# Patient Record
Sex: MALE | Race: WHITE | Employment: OTHER | URBAN - METROPOLITAN AREA
[De-identification: names, ages, dates, MRNs, and addresses within clinical notes are randomized per-mention and may not be internally consistent; named-entity substitution may affect disease eponyms.]

---

## 2017-04-13 PROBLEM — R29.898 WEAKNESS OF BOTH ARMS: Status: ACTIVE | Noted: 2017-04-13
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
## 2017-10-18 PROBLEM — G47.00 INSOMNIA: Status: ACTIVE | Noted: 2017-10-18

## 2017-11-13 PROBLEM — R32 URINARY INCONTINENCE: Status: ACTIVE | Noted: 2017-11-13

## 2017-11-27 ENCOUNTER — HOSPITAL ENCOUNTER (OUTPATIENT)
Dept: MRI IMAGING | Age: 67
Discharge: HOME | End: 2017-11-27
Attending: FAMILY MEDICINE
Payer: MEDICARE

## 2017-11-27 DIAGNOSIS — N39.498 OTHER URINARY INCONTINENCE: ICD-10-CM

## 2017-11-27 PROCEDURE — 70551 MRI BRAIN STEM W/O DYE: CPT

## 2017-12-13 PROBLEM — R26.81 UNSTEADY GAIT: Status: ACTIVE | Noted: 2017-12-13

## 2018-05-13 ENCOUNTER — HOSPITAL ENCOUNTER (EMERGENCY)
Age: 68
Discharge: HOME | End: 2018-05-13
Attending: EMERGENCY MEDICINE
Payer: MEDICARE

## 2018-05-13 VITALS
RESPIRATION RATE: 16 BRPM | OXYGEN SATURATION: 97 % | BODY MASS INDEX: 27.3 KG/M2 | SYSTOLIC BLOOD PRESSURE: 116 MMHG | DIASTOLIC BLOOD PRESSURE: 81 MMHG | HEART RATE: 88 BPM | TEMPERATURE: 97.8 F | HEIGHT: 71 IN | WEIGHT: 195 LBS

## 2018-05-13 DIAGNOSIS — S39.012A BACK STRAIN, INITIAL ENCOUNTER: Primary | ICD-10-CM

## 2018-05-13 PROCEDURE — 99281 EMR DPT VST MAYX REQ PHY/QHP: CPT | Performed by: EMERGENCY MEDICINE

## 2018-05-13 NOTE — DISCHARGE INSTRUCTIONS
Back Strain: Care Instructions  Your Care Instructions    Back strain happens when you overstretch, or pull, a muscle in your back. You may hurt your back in an accident or when you exercise or lift something. Most back pain will get better with rest and time. You can take care of yourself at home to help your back heal.  Follow-up care is a key part of your treatment and safety. Be sure to make and go to all appointments, and call your doctor if you are having problems. It's also a good idea to know your test results and keep a list of the medicines you take. How can you care for yourself at home? · Try to stay as active as you can, but stop or reduce any activity that causes pain. · Put ice or a cold pack on the sore muscle for 10 to 20 minutes at a time to stop swelling. Try this every 1 to 2 hours for 3 days (when you are awake) or until the swelling goes down. Put a thin cloth between the ice pack and your skin. · After 2 or 3 days, apply a heating pad on low or a warm cloth to your back. Some doctors suggest that you go back and forth between hot and cold treatments. · Take pain medicines exactly as directed. ¨ If the doctor gave you a prescription medicine for pain, take it as prescribed. ¨ If you are not taking a prescription pain medicine, ask your doctor if you can take an over-the-counter medicine. · Try sleeping on your side with a pillow between your legs. Or put a pillow under your knees when you lie on your back. These measures can ease pain in your lower back. · Return to your usual level of activity slowly. When should you call for help? Call 911 anytime you think you may need emergency care. For example, call if:  ? · You are unable to move a leg at all. ?Call your doctor now or seek immediate medical care if:  ? · You have new or worse symptoms in your legs, belly, or buttocks. Symptoms may include:  ¨ Numbness or tingling. ¨ Weakness. ¨ Pain.    ? · You lose bladder or bowel control. ? Watch closely for changes in your health, and be sure to contact your doctor if you are not getting better as expected. Where can you learn more? Go to http://keegan-marti.info/. Enter Y140 in the search box to learn more about \"Back Strain: Care Instructions. \"  Current as of: March 21, 2017  Content Version: 11.4  © 7678-6950 [employer]. Care instructions adapted under license by [employer] (which disclaims liability or warranty for this information). If you have questions about a medical condition or this instruction, always ask your healthcare professional. [place] [address] any warranty or liability for your use of this information.

## 2018-05-13 NOTE — ED PROVIDER NOTES
Patient is a 75 y.o. male presenting with fall. The history is provided by the patient. Fall   The accident occurred more than 1 week ago. The fall occurred while standing. He fell from a height of ground level. He landed on hard floor. There was no blood loss. Point of impact: buttocks. Pain location: low and mid back, worse in the morning. The pain is moderate. He was ambulatory at the scene. There was no entrapment after the fall. Associated symptoms include extremity weakness (chronic fr4om remote cva on left). Pertinent negatives include no fever, no numbness (no saddle anesthesia), no abdominal pain, no nausea, no vomiting, no headaches, no loss of consciousness and no tingling. The risk factors include being elderly. The symptoms are aggravated by activity. He has tried nothing for the symptoms.         Past Medical History:   Diagnosis Date    Alcoholic pancreatitis 52/9106    after mother's death, states he slowed down on his drinking after this episode    Aneurysm ([address]     3 small of aorta, 2.4 x 2.3  cm in diameter is largest    Aneurysm artery, celiac ([address] 8/6/2014    Chronic pain     left foot    CVA (cerebral infarction) 1998, 2008    left sided weakness(TIA) stroke causing weakness    Depression     Diabetes ([address] 2010     typell, Avg fasting ; denies s/s hypo does not know last HA1C    Diabetic ulcer of left great toe ([address]     Enlarged aorta ([address] 8/6/2014    arteriomegaly 2.9cm 7/30/14     Gout     History of stroke 1998    Residual left hemiparesis    Hypertension     Mixed hyperlipidemia     Obesity     Skin cancer     neck    Stroke ([address]     Unspecified constipation     Unspecified vitamin D deficiency        Past Surgical History:   Procedure Laterality Date    APPENDECTOMY      over 20 yrs ago    HX ADENOIDECTOMY      childhood    HX CATARACT REMOVAL  OD-2013/OS-2014    with IOL placement    HX COLONOSCOPY  2007, 2010, 2014    with polypectomy    HX ORTHOPAEDIC  12/2012    L great toe fusion    HX ORTHOPAEDIC  last 4/2013    Left great toe surgery to total 3    HX ORTHOPAEDIC  11/2013    amp left great toe    HX TONSILLECTOMY      childhood    HX UROLOGICAL  1980's    spermatocele removed         Family History:   Problem Relation Age of Onset    Cancer Mother     Other Brother      Angiosarcoma, AAA    Cancer Brother     Diabetes Brother     Diabetes Father     Hypertension Father     Other Father        Social History     Social History    Marital status:      Spouse name: N/A    Number of children: N/A    Years of education: N/A     Occupational History    Not on file. Social History Main Topics    Smoking status: Current Some Day Smoker     Years: 13.00     Types: Cigars    Smokeless tobacco: Never Used      Comment: cigar/ 2 per day    Alcohol use 0.0 oz/week      Comment: occassional    Drug use: No    Sexual activity: Not Currently     Other Topics Concern    Not on file     Social History Narrative         ALLERGIES: Penicillin g; Percocet [oxycodone-acetaminophen]; and Sulfa (sulfonamide antibiotics)    Review of Systems   Constitutional: Negative for fever. Gastrointestinal: Negative for abdominal pain, diarrhea, nausea and vomiting. Genitourinary: Negative for dysuria. Chronic ibncontinence     Musculoskeletal: Positive for back pain and extremity weakness (chronic fr4om remote cva on left). Negative for neck pain. Neurological: Negative for tingling, loss of consciousness, weakness, numbness (no saddle anesthesia) and headaches. Vitals:    05/13/18 1156   BP: 116/81   Pulse: 88   Resp: 16   Temp: 97.8 °F (36.6 °C)   SpO2: 97%   Weight: 88.5 kg (195 lb)   Height: 5' 11\" (1.803 m)            Physical Exam   Constitutional: He appears well-developed and well-nourished. HENT:   Mouth/Throat: Oropharynx is clear and moist.   Eyes: EOM are normal. Pupils are equal, round, and reactive to light. Cardiovascular: Normal rate, regular rhythm and normal heart sounds. Pulmonary/Chest: Effort normal and breath sounds normal.   Abdominal: Soft. He exhibits no distension and no mass. There is no tenderness. There is no rebound. Musculoskeletal: Normal range of motion. He exhibits tenderness (bilateral paraspinous tenderness that is mild. No midline thoracic or lumbar tenderness or sacral tenderness is present). Pelvis is stable and nontender. Moves lower extremities and ambulates without difficulty. Neurological: He is alert. No cranial nerve deficit or sensory deficit.   4 out of 5 strength left side compared to right. Chronic from prior CVA. Nursing note and vitals reviewed. MDM  Number of Diagnoses or Management Options  Back strain, initial encounter:   Diagnosis management comments: Clinically no indication for x-ray as there is no midline tenderness or pelvic or sacral tenderness. Muscle strain and pain described. Patient states he hates gets improvement with ice and heat throughout the day and is more stiff and hurts worse in the morning when he first gets up. History of abdominal aortic aneurysm but no pulsatile abdominal mass. Being followed by Dr. Angeli Conway. Exam and history not consistent with abdominal aortic rupture.         ED Course       Procedures

## 2018-05-13 NOTE — ED TRIAGE NOTES
Pt states he fell about a week ago and was helped up by EMS. Pt did not get seen after fall. Pt states he has appt with primary care doctor tomorrow. Pt states he is having pain and wanted to come be seen prior to tomorrow.     Vane Lynn, RN

## 2018-09-06 ENCOUNTER — APPOINTMENT (OUTPATIENT)
Dept: GENERAL RADIOLOGY | Age: 68
End: 2018-09-06
Attending: EMERGENCY MEDICINE
Payer: MEDICARE

## 2018-09-06 ENCOUNTER — HOSPITAL ENCOUNTER (EMERGENCY)
Age: 68
Discharge: HOME | End: 2018-09-06
Attending: EMERGENCY MEDICINE
Payer: MEDICARE

## 2018-09-06 VITALS
SYSTOLIC BLOOD PRESSURE: 131 MMHG | BODY MASS INDEX: 27.72 KG/M2 | WEIGHT: 198 LBS | HEIGHT: 71 IN | HEART RATE: 88 BPM | DIASTOLIC BLOOD PRESSURE: 84 MMHG | RESPIRATION RATE: 18 BRPM | OXYGEN SATURATION: 92 % | TEMPERATURE: 99.6 F

## 2018-09-06 PROCEDURE — 75810000275 HC EMERGENCY DEPT VISIT NO LEVEL OF CARE: Performed by: EMERGENCY MEDICINE

## 2019-04-29 ENCOUNTER — APPOINTMENT (OUTPATIENT)
Dept: CT IMAGING | Age: 69
End: 2019-04-29
Attending: EMERGENCY MEDICINE
Payer: MEDICARE

## 2019-04-29 ENCOUNTER — HOSPITAL ENCOUNTER (EMERGENCY)
Age: 69
Discharge: HOME | End: 2019-04-29
Attending: EMERGENCY MEDICINE
Payer: MEDICARE

## 2019-04-29 VITALS
WEIGHT: 210 LBS | HEIGHT: 71 IN | DIASTOLIC BLOOD PRESSURE: 78 MMHG | BODY MASS INDEX: 29.4 KG/M2 | SYSTOLIC BLOOD PRESSURE: 128 MMHG | TEMPERATURE: 98.1 F | HEART RATE: 88 BPM | RESPIRATION RATE: 16 BRPM | OXYGEN SATURATION: 96 %

## 2019-04-29 DIAGNOSIS — R42 DIZZINESS: Primary | ICD-10-CM

## 2019-04-29 LAB
ANION GAP SERPL CALC-SCNC: 9 MMOL/L
ATRIAL RATE: 81 BPM
BUN SERPL-MCNC: 17 MG/DL (ref 8–23)
CALCIUM SERPL-MCNC: 9.1 MG/DL (ref 8.3–10.4)
CALCULATED P AXIS, ECG09: 45 DEGREES
CALCULATED R AXIS, ECG10: 10 DEGREES
CALCULATED T AXIS, ECG11: 35 DEGREES
CHLORIDE SERPL-SCNC: 104 MMOL/L (ref 98–107)
CO2 SERPL-SCNC: 27 MMOL/L (ref 21–32)
CREAT SERPL-MCNC: 0.82 MG/DL (ref 0.8–1.5)
DIAGNOSIS, 93000: NORMAL
GLUCOSE SERPL-MCNC: 159 MG/DL (ref 65–100)
P-R INTERVAL, ECG05: 168 MS
POTASSIUM SERPL-SCNC: 4.1 MMOL/L (ref 3.5–5.1)
Q-T INTERVAL, ECG07: 382 MS
QRS DURATION, ECG06: 86 MS
QTC CALCULATION (BEZET), ECG08: 443 MS
SODIUM SERPL-SCNC: 140 MMOL/L (ref 136–145)
VENTRICULAR RATE, ECG03: 81 BPM

## 2019-04-29 PROCEDURE — 80048 BASIC METABOLIC PNL TOTAL CA: CPT

## 2019-04-29 PROCEDURE — 93005 ELECTROCARDIOGRAM TRACING: CPT | Performed by: EMERGENCY MEDICINE

## 2019-04-29 PROCEDURE — 99285 EMERGENCY DEPT VISIT HI MDM: CPT | Performed by: EMERGENCY MEDICINE

## 2019-04-29 PROCEDURE — 70450 CT HEAD/BRAIN W/O DYE: CPT

## 2019-04-29 RX ORDER — SODIUM CHLORIDE 0.9 % (FLUSH) 0.9 %
5-40 SYRINGE (ML) INJECTION AS NEEDED
Status: DISCONTINUED | OUTPATIENT
Start: 2019-04-29 | End: 2019-04-29 | Stop reason: HOSPADM

## 2019-04-29 RX ORDER — SODIUM CHLORIDE 0.9 % (FLUSH) 0.9 %
5-40 SYRINGE (ML) INJECTION EVERY 8 HOURS
Status: DISCONTINUED | OUTPATIENT
Start: 2019-04-29 | End: 2019-04-29 | Stop reason: HOSPADM

## 2019-04-29 NOTE — ED NOTES
I have reviewed discharge instructions with the patient. The patient verbalized understanding. Patient left ED via Discharge Method: ambulatory to Home with family. Opportunity for questions and clarification provided. Patient given 0 scripts. To continue your aftercare when you leave the hospital, you may receive an automated call from our care team to check in on how you are doing. This is a free service and part of our promise to provide the best care and service to meet your aftercare needs.  If you have questions, or wish to unsubscribe from this service please call [phone]. Thank you for Choosing our [facility] Emergency Department.

## 2019-04-29 NOTE — ED PROVIDER NOTES
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 Patient apparently went to an urgent care today where he had a CBC and EKG done and then he was sent here for further evaluation. Patient said he has not had a sure why he was sent here. He says that his dizziness is only a problem when he is driving. He said sitting at rest she does not feel as if the world was spinning around him and he has no sensation spinning. Patient said that he is able to walk at his baseline, which is with the use of a cane due to partial amputation of his foot. He said he does not feel unstable when he is up walking around. He said his dizziness manifests when he is trying to drive and he said he feels that he has to drive slowly in order to not be dizzy. He said at other times it doesn't seem to bother him. Denies chest pain and says he has no difficulty breathing or shortness of breath. Elements of this note were created using speech recognition software. As such, errors of speech recognition may be present. Past Medical History:  
Diagnosis Date  Alcoholic pancreatitis 95/1979  
 after mother's death, states he slowed down on his drinking after this episode  Aneurysm ([address] 3 small of aorta, 2.4 x 2.3  cm in diameter is largest  
 Aneurysm artery, celiac ([address] 8/6/2014  Chronic pain   
 left foot  CVA (cerebral infarction) 1998, 2008  
 left sided weakness(TIA) stroke causing weakness  Depression  Diabetes ([address] 2010  
  typell, Avg fasting ; denies s/s hypo does not know last HA1C  
 Diabetic ulcer of left great toe ([address]  Enlarged aorta ([address] 8/6/2014  
 arteriomegaly 2.9cm 7/30/14  Gout  History of stroke 1998 Residual left hemiparesis  Hypertension  Mixed hyperlipidemia  Obesity  Skin cancer   
 neck  Stroke ([address]  Unspecified constipation  Unspecified vitamin D deficiency Past Surgical History:  
Procedure Laterality Date  APPENDECTOMY    
 over 20 yrs ago  HX ADENOIDECTOMY childhood  HX CATARACT REMOVAL  OD-2013/OS-2014  
 with IOL placement  HX COLONOSCOPY  2007, 2010, 2014  
 with polypectomy  HX ORTHOPAEDIC  12/2012 L great toe fusion  HX ORTHOPAEDIC  last 4/2013 Left great toe surgery to total 3  
 HX ORTHOPAEDIC  11/2013  
 amp left great toe  HX TONSILLECTOMY childhood  HX UROLOGICAL  1980's  
 spermatocele removed Family History:  
Problem Relation Age of Onset  Cancer Mother  Other Brother Angiosarcoma, AAA  Cancer Brother  Diabetes Brother  Diabetes Father  Hypertension Father  Other Father Social History Socioeconomic History  Marital status:  Spouse name: Not on file  Number of children: Not on file  Years of education: Not on file  Highest education level: Not on file Occupational History  Not on file Social Needs  Financial resource strain: Not on file  Food insecurity:  
  Worry: Not on file Inability: Not on file  Transportation needs:  
  Medical: Not on file Non-medical: Not on file Tobacco Use  Smoking status: Current Some Day Smoker Years: 13.00 Types: Cigars  Smokeless tobacco: Never Used  Tobacco comment: cigar/ 2 per day Substance and Sexual Activity  Alcohol use: No  
  Alcohol/week: 0.0 oz  
  Comment: occassional  
 Drug use: No  
 Sexual activity: Not Currently Lifestyle  Physical activity:  
  Days per week: Not on file Minutes per session: Not on file  Stress: Not on file Relationships  Social connections:  
  Talks on phone: Not on file Gets together: Not on file Attends [employer] service: Not on file Active member of club or organization: Not on file Attends meetings of clubs or organizations: Not on file Relationship status: Not on file  Intimate partner violence:  
  Fear of current or ex partner: Not on file Emotionally abused: Not on file Physically abused: Not on file Forced sexual activity: Not on file Other Topics Concern  Not on file Social History Narrative  Not on file ALLERGIES: Penicillin g; Percocet [oxycodone-acetaminophen]; and Sulfa (sulfonamide antibiotics) Review of Systems Constitutional: Negative for chills, diaphoresis and fever. HENT: Negative for congestion, rhinorrhea and sore throat. Eyes: Negative for redness and visual disturbance. Respiratory: Negative for cough, chest tightness, shortness of breath and wheezing. Cardiovascular: Negative for chest pain and palpitations. Gastrointestinal: Negative for abdominal pain, blood in stool, diarrhea, nausea and vomiting. Endocrine: Negative for polydipsia and polyuria. Genitourinary: Negative for dysuria and hematuria. Musculoskeletal: Negative for arthralgias, myalgias and neck stiffness. Skin: Negative for rash. Allergic/Immunologic: Negative for environmental allergies and food allergies. Neurological: Positive for dizziness. Negative for weakness and headaches. Hematological: Negative for adenopathy. Does not bruise/bleed easily. Psychiatric/Behavioral: Negative for confusion and sleep disturbance. The patient is not nervous/anxious. Vitals:  
 04/29/19 1433 04/29/19 1525 04/29/19 1555 BP: 129/82 124/66 116/77 Pulse: 73 88 90 Resp: 16 16 16 Temp: 98.8 °F (37.1 °C) SpO2: 94%  95% Weight: 95.3 kg (210 lb) Height: 5' 11\" (1.803 m) Physical Exam  
Constitutional: He is oriented to person, place, and time. He appears well-developed and well-nourished. HENT:  
Head: Normocephalic and atraumatic. Eyes: Pupils are equal, round, and reactive to light. Conjunctivae and EOM are normal.  
Neck: Normal range of motion. Cardiovascular: Normal rate and regular rhythm. Pulmonary/Chest: Effort normal and breath sounds normal. No respiratory distress. He has no wheezes. He has no rales. He exhibits no tenderness. Abdominal: Soft. Bowel sounds are normal. There is no rebound and no guarding. Musculoskeletal: Normal range of motion. He exhibits no edema or tenderness. Lymphadenopathy:  
  He has no cervical adenopathy. Neurological: He is alert and oriented to person, place, and time. Patient is able to walk in the ER with his cane at his baseline. Skin: Skin is warm and dry. Psychiatric: He has a normal mood and affect. Nursing note and vitals reviewed. MDM Number of Diagnoses or Management Options Diagnosis management comments: I am uncertain about the exact nature of this dizziness. It does not sound like an ataxia or coordination problems. He does not have any vertiginous symptoms. Why iy manifests only when driving I am unsure. Blood work is unremarkable. His head CT scan is pending. 5:32 PM 
CT is unremarkable. I will plan to discharge him home and encouraged him to follow up with his primary care doctor. Procedures

## 2019-04-29 NOTE — DISCHARGE INSTRUCTIONS
Return with any fevers, vomiting, difficult breathing, worsening symptoms, or additional concerns. Follow-up with your primary care doctor for reevaluation in one or 2 days.

## 2019-04-29 NOTE — ED TRIAGE NOTES
Pt was seen at an urgent care and advised to come to the ER. Friend with pt states that he is to come to ER and get MRI, EKG and more labs because he is so dizzy he cannot drive. Pt denies pain, states he is dizzy. He is alert and oriented. Pt had labs at [facility]. Dr Sj Feliz said he would review the chart to see what needed to be ordered.

## 2020-05-11 ENCOUNTER — HOSPITAL ENCOUNTER (EMERGENCY)
Age: 70
Discharge: ANOTHER HOSPITAL | DRG: 872 | End: 2020-05-11
Attending: EMERGENCY MEDICINE
Payer: MEDICARE

## 2020-05-11 ENCOUNTER — HOSPITAL ENCOUNTER (INPATIENT)
Age: 70
LOS: 5 days | Discharge: NURSING, REHAB OR LONG-TERM CARE | DRG: 872 | End: 2020-05-16
Attending: INTERNAL MEDICINE | Admitting: FAMILY MEDICINE
Payer: MEDICARE

## 2020-05-11 VITALS
RESPIRATION RATE: 20 BRPM | HEART RATE: 70 BPM | OXYGEN SATURATION: 95 % | HEIGHT: 71 IN | BODY MASS INDEX: 26.04 KG/M2 | SYSTOLIC BLOOD PRESSURE: 139 MMHG | WEIGHT: 186 LBS | DIASTOLIC BLOOD PRESSURE: 79 MMHG | TEMPERATURE: 99.5 F

## 2020-05-11 DIAGNOSIS — N30.00 ACUTE CYSTITIS WITHOUT HEMATURIA: Primary | ICD-10-CM

## 2020-05-11 DIAGNOSIS — D72.829 LEUKOCYTOSIS, UNSPECIFIED TYPE: ICD-10-CM

## 2020-05-11 DIAGNOSIS — R53.1 WEAKNESS: ICD-10-CM

## 2020-05-11 DIAGNOSIS — E11.9 TYPE 2 DIABETES MELLITUS WITHOUT COMPLICATION, WITHOUT LONG-TERM CURRENT USE OF INSULIN (HCC): ICD-10-CM

## 2020-05-11 PROBLEM — A41.9 SEPSIS (HCC): Status: ACTIVE | Noted: 2020-05-11

## 2020-05-11 PROBLEM — N39.0 UTI (URINARY TRACT INFECTION): Status: ACTIVE | Noted: 2020-05-11

## 2020-05-11 LAB
ALBUMIN SERPL-MCNC: 4.3 G/DL (ref 3.2–4.6)
ALBUMIN/GLOB SERPL: 1.2 {RATIO} (ref 1.2–3.5)
ALP SERPL-CCNC: 42 U/L (ref 50–136)
ALT SERPL-CCNC: 28 U/L (ref 12–65)
ANION GAP SERPL CALC-SCNC: 8 MMOL/L (ref 7–16)
APPEARANCE UR: ABNORMAL
AST SERPL-CCNC: 22 U/L (ref 15–37)
ATRIAL RATE: 113 BPM
BACTERIA URNS QL MICRO: ABNORMAL /HPF
BASOPHILS # BLD: 0.1 K/UL (ref 0–0.2)
BASOPHILS NFR BLD: 0 % (ref 0–2)
BILIRUB SERPL-MCNC: 2.2 MG/DL (ref 0.2–1.1)
BILIRUB UR QL: NEGATIVE
BUN SERPL-MCNC: 20 MG/DL (ref 8–23)
CALCIUM SERPL-MCNC: 11.7 MG/DL (ref 8.3–10.4)
CALCULATED P AXIS, ECG09: 36 DEGREES
CALCULATED R AXIS, ECG10: 32 DEGREES
CALCULATED T AXIS, ECG11: 13 DEGREES
CASTS URNS QL MICRO: ABNORMAL /LPF
CHLORIDE SERPL-SCNC: 99 MMOL/L (ref 98–107)
CK SERPL-CCNC: 687 U/L (ref 21–215)
CO2 SERPL-SCNC: 26 MMOL/L (ref 21–32)
COLOR UR: ABNORMAL
CREAT SERPL-MCNC: 1.15 MG/DL (ref 0.8–1.5)
DIAGNOSIS, 93000: NORMAL
DIFFERENTIAL METHOD BLD: ABNORMAL
EOSINOPHIL # BLD: 0.1 K/UL (ref 0–0.8)
EOSINOPHIL NFR BLD: 0 % (ref 0.5–7.8)
EPI CELLS #/AREA URNS HPF: 0 /HPF
ERYTHROCYTE [DISTWIDTH] IN BLOOD BY AUTOMATED COUNT: 11.9 % (ref 11.9–14.6)
EST. AVERAGE GLUCOSE BLD GHB EST-MCNC: 166 MG/DL
GLOBULIN SER CALC-MCNC: 3.6 G/DL (ref 2.3–3.5)
GLUCOSE SERPL-MCNC: 228 MG/DL (ref 65–100)
GLUCOSE UR STRIP.AUTO-MCNC: 250 MG/DL
HBA1C MFR BLD: 7.4 % (ref 4.8–6)
HCT VFR BLD AUTO: 46.1 % (ref 41.1–50.3)
HGB BLD-MCNC: 16.5 G/DL (ref 13.6–17.2)
HGB UR QL STRIP: ABNORMAL
IMM GRANULOCYTES # BLD AUTO: 0.1 K/UL (ref 0–0.5)
IMM GRANULOCYTES NFR BLD AUTO: 1 % (ref 0–5)
KETONES UR QL STRIP.AUTO: ABNORMAL MG/DL
LACTATE SERPL-SCNC: 2.1 MMOL/L (ref 0.4–2)
LACTATE SERPL-SCNC: 4.6 MMOL/L (ref 0.4–2)
LEUKOCYTE ESTERASE UR QL STRIP.AUTO: ABNORMAL
LYMPHOCYTES # BLD: 0.6 K/UL (ref 0.5–4.6)
LYMPHOCYTES NFR BLD: 3 % (ref 13–44)
MCH RBC QN AUTO: 34.2 PG (ref 26.1–32.9)
MCHC RBC AUTO-ENTMCNC: 35.8 G/DL (ref 31.4–35)
MCV RBC AUTO: 95.6 FL (ref 79.6–97.8)
MONOCYTES # BLD: 1.2 K/UL (ref 0.1–1.3)
MONOCYTES NFR BLD: 6 % (ref 4–12)
MYOGLOBIN SERPL-MCNC: 1041 NG/ML
NEUTS SEG # BLD: 18.6 K/UL (ref 1.7–8.2)
NEUTS SEG NFR BLD: 90 % (ref 43–78)
NITRITE UR QL STRIP.AUTO: NEGATIVE
NRBC # BLD: 0 K/UL (ref 0–0.2)
P-R INTERVAL, ECG05: 176 MS
PH UR STRIP: 5 [PH] (ref 5–9)
PLATELET # BLD AUTO: 173 K/UL (ref 150–450)
PMV BLD AUTO: 10.3 FL (ref 9.4–12.3)
POTASSIUM SERPL-SCNC: 4 MMOL/L (ref 3.5–5.1)
PROT SERPL-MCNC: 7.9 G/DL (ref 6.3–8.2)
PROT UR STRIP-MCNC: NEGATIVE MG/DL
Q-T INTERVAL, ECG07: 296 MS
QRS DURATION, ECG06: 88 MS
QTC CALCULATION (BEZET), ECG08: 406 MS
RBC # BLD AUTO: 4.82 M/UL (ref 4.23–5.6)
RBC #/AREA URNS HPF: ABNORMAL /HPF
SODIUM SERPL-SCNC: 133 MMOL/L (ref 136–145)
SP GR UR REFRACTOMETRY: 1.02 (ref 1–1.02)
UROBILINOGEN UR QL STRIP.AUTO: 0.2 EU/DL (ref 0.2–1)
VENTRICULAR RATE, ECG03: 113 BPM
WBC # BLD AUTO: 20.6 K/UL (ref 4.3–11.1)
WBC URNS QL MICRO: ABNORMAL /HPF

## 2020-05-11 PROCEDURE — 93005 ELECTROCARDIOGRAM TRACING: CPT | Performed by: EMERGENCY MEDICINE

## 2020-05-11 PROCEDURE — 74011636637 HC RX REV CODE- 636/637: Performed by: FAMILY MEDICINE

## 2020-05-11 PROCEDURE — 96365 THER/PROPH/DIAG IV INF INIT: CPT

## 2020-05-11 PROCEDURE — 82962 GLUCOSE BLOOD TEST: CPT

## 2020-05-11 PROCEDURE — 65660000000 HC RM CCU STEPDOWN

## 2020-05-11 PROCEDURE — 87088 URINE BACTERIA CULTURE: CPT

## 2020-05-11 PROCEDURE — 85025 COMPLETE CBC W/AUTO DIFF WBC: CPT

## 2020-05-11 PROCEDURE — 83605 ASSAY OF LACTIC ACID: CPT

## 2020-05-11 PROCEDURE — 87086 URINE CULTURE/COLONY COUNT: CPT

## 2020-05-11 PROCEDURE — 87040 BLOOD CULTURE FOR BACTERIA: CPT

## 2020-05-11 PROCEDURE — 81001 URINALYSIS AUTO W/SCOPE: CPT

## 2020-05-11 PROCEDURE — 36415 COLL VENOUS BLD VENIPUNCTURE: CPT

## 2020-05-11 PROCEDURE — 82550 ASSAY OF CK (CPK): CPT

## 2020-05-11 PROCEDURE — 86580 TB INTRADERMAL TEST: CPT | Performed by: FAMILY MEDICINE

## 2020-05-11 PROCEDURE — 74011000302 HC RX REV CODE- 302: Performed by: FAMILY MEDICINE

## 2020-05-11 PROCEDURE — 74011000258 HC RX REV CODE- 258: Performed by: EMERGENCY MEDICINE

## 2020-05-11 PROCEDURE — 87186 SC STD MICRODIL/AGAR DIL: CPT

## 2020-05-11 PROCEDURE — 74011250636 HC RX REV CODE- 250/636: Performed by: EMERGENCY MEDICINE

## 2020-05-11 PROCEDURE — 83874 ASSAY OF MYOGLOBIN: CPT

## 2020-05-11 PROCEDURE — 83036 HEMOGLOBIN GLYCOSYLATED A1C: CPT

## 2020-05-11 PROCEDURE — 99285 EMERGENCY DEPT VISIT HI MDM: CPT

## 2020-05-11 PROCEDURE — 80053 COMPREHEN METABOLIC PANEL: CPT

## 2020-05-11 PROCEDURE — 74011250636 HC RX REV CODE- 250/636: Performed by: FAMILY MEDICINE

## 2020-05-11 RX ORDER — AMOXICILLIN 250 MG
2 CAPSULE ORAL
Status: DISCONTINUED | OUTPATIENT
Start: 2020-05-11 | End: 2020-05-17 | Stop reason: HOSPADM

## 2020-05-11 RX ORDER — OXYBUTYNIN CHLORIDE 10 MG/1
10 TABLET, EXTENDED RELEASE ORAL DAILY
COMMUNITY

## 2020-05-11 RX ORDER — DIPHENHYDRAMINE HCL 25 MG
25 CAPSULE ORAL
Status: DISCONTINUED | OUTPATIENT
Start: 2020-05-11 | End: 2020-05-17 | Stop reason: HOSPADM

## 2020-05-11 RX ORDER — CYCLOBENZAPRINE HCL 10 MG
10 TABLET ORAL
Status: DISCONTINUED | OUTPATIENT
Start: 2020-05-11 | End: 2020-05-12

## 2020-05-11 RX ORDER — INSULIN LISPRO 100 [IU]/ML
INJECTION, SOLUTION INTRAVENOUS; SUBCUTANEOUS
Status: DISCONTINUED | OUTPATIENT
Start: 2020-05-11 | End: 2020-05-17 | Stop reason: HOSPADM

## 2020-05-11 RX ORDER — NALOXONE HYDROCHLORIDE 0.4 MG/ML
0.4 INJECTION, SOLUTION INTRAMUSCULAR; INTRAVENOUS; SUBCUTANEOUS AS NEEDED
Status: DISCONTINUED | OUTPATIENT
Start: 2020-05-11 | End: 2020-05-17 | Stop reason: HOSPADM

## 2020-05-11 RX ORDER — ACETAMINOPHEN 325 MG/1
650 TABLET ORAL
Status: DISCONTINUED | OUTPATIENT
Start: 2020-05-11 | End: 2020-05-17 | Stop reason: HOSPADM

## 2020-05-11 RX ORDER — ONDANSETRON 2 MG/ML
4 INJECTION INTRAMUSCULAR; INTRAVENOUS
Status: DISCONTINUED | OUTPATIENT
Start: 2020-05-11 | End: 2020-05-17 | Stop reason: HOSPADM

## 2020-05-11 RX ORDER — GUAIFENESIN 100 MG/5ML
324 LIQUID (ML) ORAL DAILY
Status: DISCONTINUED | OUTPATIENT
Start: 2020-05-12 | End: 2020-05-17 | Stop reason: HOSPADM

## 2020-05-11 RX ORDER — HEPARIN SODIUM 5000 [USP'U]/ML
5000 INJECTION, SOLUTION INTRAVENOUS; SUBCUTANEOUS EVERY 8 HOURS
Status: DISCONTINUED | OUTPATIENT
Start: 2020-05-11 | End: 2020-05-17 | Stop reason: HOSPADM

## 2020-05-11 RX ADMIN — SODIUM CHLORIDE 1000 ML: 900 INJECTION, SOLUTION INTRAVENOUS at 16:47

## 2020-05-11 RX ADMIN — TUBERCULIN PURIFIED PROTEIN DERIVATIVE 5 UNITS: 5 INJECTION, SOLUTION INTRADERMAL at 22:21

## 2020-05-11 RX ADMIN — CEFTRIAXONE 1 G: 1 INJECTION, POWDER, FOR SOLUTION INTRAMUSCULAR; INTRAVENOUS at 16:46

## 2020-05-11 RX ADMIN — INSULIN LISPRO 4 UNITS: 100 INJECTION, SOLUTION INTRAVENOUS; SUBCUTANEOUS at 22:13

## 2020-05-11 RX ADMIN — HEPARIN SODIUM 5000 UNITS: 5000 INJECTION INTRAVENOUS; SUBCUTANEOUS at 22:14

## 2020-05-11 NOTE — PROGRESS NOTES
Chart reviewed and spoke with ED Physician prior to meeting pt. Pt lives at home alone, has a Hx of strokes with weakness on the one side. Pt has been falling recently and has struggles making it to the restroom. His best friend Valentina Deal" #[phone], calls multiple times daily and goes over every morning to change his pads on his bed. Pt is very inde, up until recently. His PCP Dr. Tyrone Solis had ordered [facility] services through [facility] approx a \"few months back\" but they have stopped coming. She and pt are interested on those services to be resumed, pt would like to return to his home residence upon d/c. . I have faxed info to [facility] for RN/PT/Aide services. Adrian Gifford stated that she was going over to the pa' house now to straighten up for him and she if he was to be d/c home, that she would wait there for him. @0728 Pt has a elevated WBC's and will need to be admitted at the [facility], CM will continue to follow for any other d/c needs. Pt may benefit form a BSC at home, since he has been having struggles making it to the restroom.

## 2020-05-11 NOTE — PROGRESS NOTES
TRANSFER - IN REPORT:    Verbal report received from Sharon(name) on Charles Cheese  being received from ER(unit) for routine progression of care      Report consisted of patients Situation, Background, Assessment and   Recommendations(SBAR). Information from the following report(s) SBAR, ED Summary and Recent Results was reviewed with the receiving nurse. Opportunity for questions and clarification was provided.

## 2020-05-11 NOTE — ED NOTES
TRANSFER - OUT REPORT:    Verbal report given to Patric Buerger, RN of [facility] on Aime Milton  being transferred to 1 [facility] for routine progression of care       Report consisted of patients Situation, Background, Assessment and   Recommendations(SBAR). Information from the following report(s) SBAR was reviewed with the receiving nurse. Lines:   Peripheral IV 05/11/20 Right Antecubital (Active)   Site Assessment Clean, dry, & intact 5/11/2020  3:01 PM   Phlebitis Assessment 0 5/11/2020  3:01 PM   Infiltration Assessment 0 5/11/2020  3:01 PM   Dressing Type Gauze;Tape;Transparent 5/11/2020  3:01 PM   Hub Color/Line Status Pink;Flushed 5/11/2020  3:01 PM   Action Taken Blood drawn 5/11/2020  3:01 PM        Opportunity for questions and clarification was provided.       Patient transported with:   [email or web address]

## 2020-05-11 NOTE — ED NOTES
Patient comes via EMS from home. Per EMS, patient found in \"deplorable conditions\". Patient was found on the floor unable to get back up on his own, unknown how long patient was on the floor for.

## 2020-05-11 NOTE — ED PROVIDER NOTES
Patient has a history of a prior stroke with residual left-sided weakness, type 2 diabetes, hyperlipidemia, hypertension and aortic aneurysm. He lives at home by himself independently. He states he slid out of his bed around 11 AM and fell to the floor. He was too weak to get up. He states this is not an uncommon occurrence. He has a friend who comes by daily who helps him clean and care for himself. She often has had to help him up off the floor. She came over today and was unable to get him up off of the floor thus EMS was called. They state that his house was in disarray and smelled of urine. I spoke with a friend by phone, she states that this is true but it is because he is unable to control his urination. She goes over daily to help him clean and washes his sheets. He has had extensive home health in the past though that has ceased with COVID-19. The patient is not complaining of any pain.            Past Medical History:   Diagnosis Date    Alcoholic pancreatitis 32/8420    after mother's death, states he slowed down on his drinking after this episode    Aneurysm ([address]     3 small of aorta, 2.4 x 2.3  cm in diameter is largest    Aneurysm artery, celiac ([address] 8/6/2014    Chronic pain     left foot    CVA (cerebral infarction) 1998, 2008    left sided weakness(TIA) stroke causing weakness    Depression     Diabetes ([address] 2010     typell, Avg fasting ; denies s/s hypo does not know last HA1C    Diabetic ulcer of left great toe ([address]     Enlarged aorta ([address] 8/6/2014    arteriomegaly 2.9cm 7/30/14     Gout     History of stroke 1998    Residual left hemiparesis    Hypertension     Mixed hyperlipidemia     Obesity     Skin cancer     neck    Stroke ([address]     Unspecified constipation     Unspecified vitamin D deficiency        Past Surgical History:   Procedure Laterality Date    APPENDECTOMY      over 20 yrs ago    HX ADENOIDECTOMY      childhood    HX CATARACT REMOVAL OD-2013/OS-2014    with IOL placement    HX COLONOSCOPY  2007, 2010, 2014    with polypectomy    HX ORTHOPAEDIC  12/2012    L great toe fusion    HX ORTHOPAEDIC  last 4/2013    Left great toe surgery to total 3    HX ORTHOPAEDIC  11/2013    amp left great toe    HX TONSILLECTOMY      childhood    HX UROLOGICAL  1980's    spermatocele removed         Family History:   Problem Relation Age of Onset    Cancer Mother     Other Brother         Angiosarcoma, AAA    Cancer Brother     Diabetes Brother     Diabetes Father     Hypertension Father     Other Father        Social History     Socioeconomic History    Marital status:      Spouse name: Not on file    Number of children: Not on file    Years of education: Not on file    Highest education level: Not on file   Occupational History    Not on file   Social Needs    Financial resource strain: Not on file    Food insecurity     Worry: Not on file     Inability: Not on file    Transportation needs     Medical: Not on file     Non-medical: Not on file   Tobacco Use    Smoking status: Current Some Day Smoker     Years: 13.00     Types: Cigars    Smokeless tobacco: Never Used    Tobacco comment: cigar/ 2 per day   Substance and Sexual Activity    Alcohol use: No     Alcohol/week: 0.0 standard drinks     Comment: occassional    Drug use: No    Sexual activity: Not Currently   Lifestyle    Physical activity     Days per week: Not on file     Minutes per session: Not on file    Stress: Not on file   Relationships    Social connections     Talks on phone: Not on file     Gets together: Not on file     Attends [employer] service: Not on file     Active member of club or organization: Not on file     Attends meetings of clubs or organizations: Not on file     Relationship status: Not on file    Intimate partner violence     Fear of current or ex partner: Not on file     Emotionally abused: Not on file     Physically abused: Not on file Forced sexual activity: Not on file   Other Topics Concern    Not on file   Social History Narrative    Not on file         ALLERGIES: Penicillin g; Percocet [oxycodone-acetaminophen]; and Sulfa (sulfonamide antibiotics)    Review of Systems   Constitutional: Negative for chills and fever. Gastrointestinal: Negative for nausea and vomiting. All other systems reviewed and are negative. Vitals:    05/11/20 1501 05/11/20 1507 05/11/20 1513 05/11/20 1519   BP:   121/65    Pulse: (!) 117  (!) 115 (!) 116   Temp:       SpO2: 94% 94%  96%            Physical Exam  Vitals signs and nursing note reviewed. Constitutional:       General: He is not in acute distress. Appearance: Normal appearance. He is well-developed and normal weight. HENT:      Head: Normocephalic and atraumatic. Eyes:      Conjunctiva/sclera: Conjunctivae normal.      Pupils: Pupils are equal, round, and reactive to light. Neck:      Musculoskeletal: Normal range of motion and neck supple. Cardiovascular:      Rate and Rhythm: Normal rate and regular rhythm. Pulses: Normal pulses. Heart sounds: Normal heart sounds. Pulmonary:      Effort: Pulmonary effort is normal. No respiratory distress. Musculoskeletal: Normal range of motion. Right lower leg: No edema. Left lower leg: No edema. Skin:     General: Skin is warm and dry. Neurological:      Mental Status: He is alert and oriented to person, place, and time. Motor: Weakness present. Comments: Diffuse L sided weakness          MDM  Number of Diagnoses or Management Options  Acute cystitis without hematuria: new and requires workup  Leukocytosis, unspecified type: new and requires workup  Type 2 diabetes mellitus without complication, without long-term current use of insulin ([facility]):   Weakness:   Diagnosis management comments: 2:41 PM Spoke with Sanam Marsh [phone], POA and patient's best friend. Will have social work see patient.   4:34 PM white count is 21.6 with a definite urinary tract infection. Discussed results with patient, need for admission. He is agreeable. The hospitalist has been paged.        Amount and/or Complexity of Data Reviewed  Clinical lab tests: ordered and reviewed  Obtain history from someone other than the patient: yes  Discuss the patient with other providers: yes    Risk of Complications, Morbidity, and/or Mortality  Presenting problems: moderate  Diagnostic procedures: moderate  Management options: moderate    Patient Progress  Patient progress: improved         Procedures

## 2020-05-12 ENCOUNTER — APPOINTMENT (OUTPATIENT)
Dept: ULTRASOUND IMAGING | Age: 70
DRG: 872 | End: 2020-05-12
Attending: FAMILY MEDICINE
Payer: MEDICARE

## 2020-05-12 LAB
ALBUMIN SERPL-MCNC: 3.7 G/DL (ref 3.2–4.6)
ALBUMIN/GLOB SERPL: 1.1 {RATIO} (ref 1.2–3.5)
ALP SERPL-CCNC: 41 U/L (ref 50–136)
ALT SERPL-CCNC: 28 U/L (ref 12–65)
ANION GAP SERPL CALC-SCNC: 6 MMOL/L (ref 7–16)
AST SERPL-CCNC: 51 U/L (ref 15–37)
BASOPHILS # BLD: 0.1 K/UL (ref 0–0.2)
BASOPHILS NFR BLD: 0 % (ref 0–2)
BILIRUB SERPL-MCNC: 2.3 MG/DL (ref 0.2–1.1)
BUN SERPL-MCNC: 20 MG/DL (ref 8–23)
CALCIUM SERPL-MCNC: 10.1 MG/DL (ref 8.3–10.4)
CHLORIDE SERPL-SCNC: 106 MMOL/L (ref 98–107)
CK SERPL-CCNC: 2004 U/L (ref 21–215)
CO2 SERPL-SCNC: 25 MMOL/L (ref 21–32)
CREAT SERPL-MCNC: 0.93 MG/DL (ref 0.8–1.5)
DIFFERENTIAL METHOD BLD: ABNORMAL
EOSINOPHIL # BLD: 0.1 K/UL (ref 0–0.8)
EOSINOPHIL NFR BLD: 0 % (ref 0.5–7.8)
ERYTHROCYTE [DISTWIDTH] IN BLOOD BY AUTOMATED COUNT: 11.9 % (ref 11.9–14.6)
GLOBULIN SER CALC-MCNC: 3.3 G/DL (ref 2.3–3.5)
GLUCOSE BLD STRIP.AUTO-MCNC: 185 MG/DL (ref 65–100)
GLUCOSE BLD STRIP.AUTO-MCNC: 189 MG/DL (ref 65–100)
GLUCOSE BLD STRIP.AUTO-MCNC: 195 MG/DL (ref 65–100)
GLUCOSE BLD STRIP.AUTO-MCNC: 195 MG/DL (ref 65–100)
GLUCOSE BLD STRIP.AUTO-MCNC: 232 MG/DL (ref 65–100)
GLUCOSE SERPL-MCNC: 167 MG/DL (ref 65–100)
HCT VFR BLD AUTO: 40.7 % (ref 41.1–50.3)
HGB BLD-MCNC: 14 G/DL (ref 13.6–17.2)
IMM GRANULOCYTES # BLD AUTO: 0.1 K/UL (ref 0–0.5)
IMM GRANULOCYTES NFR BLD AUTO: 1 % (ref 0–5)
LACTATE SERPL-SCNC: 1.5 MMOL/L (ref 0.4–2)
LYMPHOCYTES # BLD: 0.8 K/UL (ref 0.5–4.6)
LYMPHOCYTES NFR BLD: 5 % (ref 13–44)
MCH RBC QN AUTO: 33.4 PG (ref 26.1–32.9)
MCHC RBC AUTO-ENTMCNC: 34.4 G/DL (ref 31.4–35)
MCV RBC AUTO: 97.1 FL (ref 79.6–97.8)
MM INDURATION POC: 0 MM (ref 0–5)
MONOCYTES # BLD: 1.1 K/UL (ref 0.1–1.3)
MONOCYTES NFR BLD: 7 % (ref 4–12)
NEUTS SEG # BLD: 13.3 K/UL (ref 1.7–8.2)
NEUTS SEG NFR BLD: 87 % (ref 43–78)
NRBC # BLD: 0 K/UL (ref 0–0.2)
PLATELET # BLD AUTO: 126 K/UL (ref 150–450)
PMV BLD AUTO: 10.4 FL (ref 9.4–12.3)
POTASSIUM SERPL-SCNC: 3.6 MMOL/L (ref 3.5–5.1)
PPD POC: NEGATIVE NEGATIVE
PROT SERPL-MCNC: 7 G/DL (ref 6.3–8.2)
RBC # BLD AUTO: 4.19 M/UL (ref 4.23–5.6)
SODIUM SERPL-SCNC: 137 MMOL/L (ref 136–145)
WBC # BLD AUTO: 15.4 K/UL (ref 4.3–11.1)

## 2020-05-12 PROCEDURE — 74011250637 HC RX REV CODE- 250/637: Performed by: INTERNAL MEDICINE

## 2020-05-12 PROCEDURE — 97162 PT EVAL MOD COMPLEX 30 MIN: CPT

## 2020-05-12 PROCEDURE — 74011250637 HC RX REV CODE- 250/637: Performed by: FAMILY MEDICINE

## 2020-05-12 PROCEDURE — 74011636637 HC RX REV CODE- 636/637: Performed by: FAMILY MEDICINE

## 2020-05-12 PROCEDURE — 97166 OT EVAL MOD COMPLEX 45 MIN: CPT

## 2020-05-12 PROCEDURE — 83605 ASSAY OF LACTIC ACID: CPT

## 2020-05-12 PROCEDURE — 85025 COMPLETE CBC W/AUTO DIFF WBC: CPT

## 2020-05-12 PROCEDURE — 80053 COMPREHEN METABOLIC PANEL: CPT

## 2020-05-12 PROCEDURE — 97112 NEUROMUSCULAR REEDUCATION: CPT

## 2020-05-12 PROCEDURE — 74011000258 HC RX REV CODE- 258: Performed by: FAMILY MEDICINE

## 2020-05-12 PROCEDURE — 74011250636 HC RX REV CODE- 250/636: Performed by: INTERNAL MEDICINE

## 2020-05-12 PROCEDURE — 65660000000 HC RM CCU STEPDOWN

## 2020-05-12 PROCEDURE — 97535 SELF CARE MNGMENT TRAINING: CPT

## 2020-05-12 PROCEDURE — 74011250636 HC RX REV CODE- 250/636: Performed by: FAMILY MEDICINE

## 2020-05-12 PROCEDURE — 93978 VASCULAR STUDY: CPT

## 2020-05-12 PROCEDURE — 97530 THERAPEUTIC ACTIVITIES: CPT

## 2020-05-12 PROCEDURE — 82550 ASSAY OF CK (CPK): CPT

## 2020-05-12 PROCEDURE — 36415 COLL VENOUS BLD VENIPUNCTURE: CPT

## 2020-05-12 RX ORDER — ATORVASTATIN CALCIUM 40 MG/1
40 TABLET, FILM COATED ORAL
Status: DISCONTINUED | OUTPATIENT
Start: 2020-05-12 | End: 2020-05-17 | Stop reason: HOSPADM

## 2020-05-12 RX ORDER — ASPIRIN 325 MG
325 TABLET ORAL
Status: DISCONTINUED | OUTPATIENT
Start: 2020-05-12 | End: 2020-05-12 | Stop reason: SDUPTHER

## 2020-05-12 RX ORDER — SODIUM CHLORIDE 9 MG/ML
150 INJECTION, SOLUTION INTRAVENOUS CONTINUOUS
Status: DISCONTINUED | OUTPATIENT
Start: 2020-05-12 | End: 2020-05-17 | Stop reason: HOSPADM

## 2020-05-12 RX ORDER — OXYBUTYNIN CHLORIDE 10 MG/1
10 TABLET, EXTENDED RELEASE ORAL DAILY
Status: DISCONTINUED | OUTPATIENT
Start: 2020-05-12 | End: 2020-05-17 | Stop reason: HOSPADM

## 2020-05-12 RX ORDER — CYCLOBENZAPRINE HCL 10 MG
10 TABLET ORAL
Status: DISCONTINUED | OUTPATIENT
Start: 2020-05-12 | End: 2020-05-17 | Stop reason: HOSPADM

## 2020-05-12 RX ORDER — EZETIMIBE 10 MG/1
10 TABLET ORAL
Status: DISCONTINUED | OUTPATIENT
Start: 2020-05-12 | End: 2020-05-17 | Stop reason: HOSPADM

## 2020-05-12 RX ADMIN — ACETAMINOPHEN 650 MG: 325 TABLET, FILM COATED ORAL at 00:00

## 2020-05-12 RX ADMIN — HEPARIN SODIUM 5000 UNITS: 5000 INJECTION INTRAVENOUS; SUBCUTANEOUS at 21:28

## 2020-05-12 RX ADMIN — ASPIRIN 81 MG 324 MG: 81 TABLET ORAL at 08:30

## 2020-05-12 RX ADMIN — SODIUM CHLORIDE 125 ML/HR: 9 INJECTION, SOLUTION INTRAVENOUS at 21:28

## 2020-05-12 RX ADMIN — ATORVASTATIN CALCIUM 40 MG: 40 TABLET, FILM COATED ORAL at 21:29

## 2020-05-12 RX ADMIN — INSULIN LISPRO 2 UNITS: 100 INJECTION, SOLUTION INTRAVENOUS; SUBCUTANEOUS at 08:29

## 2020-05-12 RX ADMIN — INSULIN LISPRO 2 UNITS: 100 INJECTION, SOLUTION INTRAVENOUS; SUBCUTANEOUS at 16:19

## 2020-05-12 RX ADMIN — CEFTRIAXONE 1 G: 1 INJECTION, POWDER, FOR SOLUTION INTRAMUSCULAR; INTRAVENOUS at 15:36

## 2020-05-12 RX ADMIN — HEPARIN SODIUM 5000 UNITS: 5000 INJECTION INTRAVENOUS; SUBCUTANEOUS at 12:08

## 2020-05-12 RX ADMIN — INSULIN LISPRO 2 UNITS: 100 INJECTION, SOLUTION INTRAVENOUS; SUBCUTANEOUS at 22:00

## 2020-05-12 RX ADMIN — OXYBUTYNIN CHLORIDE 10 MG: 10 TABLET, EXTENDED RELEASE ORAL at 09:20

## 2020-05-12 RX ADMIN — SODIUM CHLORIDE 100 ML/HR: 9 INJECTION, SOLUTION INTRAVENOUS at 08:30

## 2020-05-12 RX ADMIN — INSULIN LISPRO 2 UNITS: 100 INJECTION, SOLUTION INTRAVENOUS; SUBCUTANEOUS at 12:08

## 2020-05-12 RX ADMIN — HEPARIN SODIUM 5000 UNITS: 5000 INJECTION INTRAVENOUS; SUBCUTANEOUS at 05:44

## 2020-05-12 RX ADMIN — EZETIMIBE 10 MG: 10 TABLET ORAL at 21:29

## 2020-05-12 NOTE — PROGRESS NOTES
Hospitalist Progress Note    2020  Admit Date: 2020  7:38 PM   NAME: Shruthi Odell   :  1950   MRN:  [id number]   Attending: Beryl Flores MD  PCP:  Saba Colin MD    HPI/SUBJECTIVE:   78 y.o. male who presented to the ED for cc weakness to which he slid off his bed without noted trauma and strong odor to urine. 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 Cristina Perez, and CVA with residual right sided weakness. Lives by himself. Admitted for sepsis due to UTI.    : Pt seen, feels better, still weak, AAO X 3. Denies any pain. Nursing notes and chart reviewed. Review of Systems negative with exception of pertinent positives noted above. PHYSICAL EXAM     Visit Vitals  BP 90/52 (BP 1 Location: Right arm, BP Patient Position: At rest;Head of bed elevated (Comment degrees))   Pulse 97   Temp 97.9 °F (36.6 °C)   Resp 20   SpO2 92%      Temp (24hrs), Av.3 °F (37.4 °C), Min:97.9 °F (36.6 °C), Max:100.9 °F (38.3 °C)    Oxygen Therapy  O2 Sat (%): 92 % (20 0254)    Intake/Output Summary (Last 24 hours) at 2020 0756  Last data filed at 2020 0302  Gross per 24 hour   Intake    Output 500 ml   Net -500 ml         General: No acute distress. Alert.    Head:  AT/NC  Lungs:  CTABL. Heart:  RRR, no murmur, rub, or gallop  Abdomen: Soft, non-distended, non-tender, +bs  Extremities: No cyanosis or clubbing. Neurologic:  No focal deficits. Moves all extremities. Skin:  No Obvious Rash  Psych:  Normal affect. LABS AND STUDIES:  Personally reviewed all labs, meds, and studies for past 24hrs.       ASSESSMENT      Active Hospital Problems    Diagnosis Date Noted    Sepsis ([facility] 2020    UTI (urinary tract infection) 2020    Unsteady gait 2017    Urinary incontinence 2017    Diabetes mellitus with complication ([facility]     Mixed hyperlipidemia     Aneurysm artery, celiac ([facility] 08/06/2014    CVA, old, hemiparesis ([facility] 05/24/2012       PLAN:    · Sepsis secondary to UTI- Ceftriaxone. Follow Cx.       · Lactic acidosis - Secondary to sepsis. Resolved.     · DM type II - SS. Check A1C.      · CVA with chronic right sided weakness= poor hygiene noted and very weak on exam. PPD. Consult PT/OT. Daily high dose ASA. COVID test ordered.      · Aortic aneurysm - Repeat US since has been over two years since last US performed. Keep systolic BP <485     · Elevated total bilirubin - Likely due to current sepsis. Trend. Hold statin. · Fall precautions    Dispo: Pending improvement, likely in 2-3 days, SW consulted. DVT ppx:  hsq  Discussed plan with pt who is in agreement. All questions answered.     Signed By: Margaretta Gowers, MD     May 12, 2020

## 2020-05-12 NOTE — PROGRESS NOTES
Pt resting quietly in bed, VSS. US aorta completed, COVID swab obtained. Pt's family called multiple times this shift for updates. Report to be given to oncoming RN at bedside.

## 2020-05-12 NOTE — PROGRESS NOTES
Problem: Mobility Impaired (Adult and Pediatric)  Goal: *Acute Goals and Plan of Care  Description: STG:  (1.)Maciej Myers will move from supine to sit and sit to supine , scoot up and down and roll side to side with MODERATE ASSIST within 3 treatment day(s). (2.)Maciej Isbell will transfer from bed to chair and chair to bed with MAXIMAL ASSIST using the least restrictive device within 3 treatment day(s). (3.)Maciej Isbell will perform standing static and dynamic balance activities x 5 minutes with MINIMAL ASSIST to improve safety within 3 treatment day(s). (4.)Maciej Isbell will perform bilateral lower extremity exercises x 20 min for HEP with SUPERVISION to improve strength, endurance, and functional mobility within 3 treatment day(s). LTG:  (1.)Maciej Isbell  will move from supine to sit and sit to supine , scoot up and down and roll side to side with CONTACT GUARD ASSIST within 7 treatment day(s). (2.)Maciej Isbell will transfer from bed to chair and chair to bed with MINIMAL ASSIST using the least restrictive device within 7 treatment day(s). (3.)Maciej Isbell will ambulate with MINIMAL ASSIST for 50 feet with the least restrictive device within 7 treatment day(s). (4.)Maciej Isbell will perform standing static and dynamic balance activities x 15 minutes with CONTACT GUARD ASSIST to improve safety within 7 treatment day(s).     PHYSICAL THERAPY: Initial Assessment, Daily Note, and AM 5/12/2020  INPATIENT: PT Visit Days : 1  [place] / [place] / Product Type: Medicare /       NAME/AGE/GENDER: Micaela Omer is a 78 y.o. male   PRIMARY DIAGNOSIS: Sepsis ([facility] [A41.9]  UTI (urinary tract infection) [N39.0] Sepsis ([facility] Sepsis ([facility]        ICD-10: Treatment Diagnosis   Generalized Muscle Weakness (M62.81)  Other lack of cordination (R27.8)  Difficulty in walking, Not elsewhere classified (R26.2)  Other abnormalities of gait and mobility (R26.89)  History of falling (Z91.81)   Precaution/Allergies:  Penicillin g; Percocet [oxycodone-acetaminophen]; and Sulfa (sulfonamide antibiotics)      ASSESSMENT:     Mr. Darnella Phalen is a 78year old M who presents to hospital after falling out of bed. Admitted with UTI and sepsis. PMH includes CVA with residual L sided weakness. Prior to hospital admission pt lives alone, with a friend checking on him daily, in a one story home with no step(s) to enter. Pt endorses just this one fall in past 6 months. Prior to admission Mr. Darnella Phalen uses a rollator walker for mobility. Upon entering, pt resting in bed, agreeable to PT evaluation. Slouched posture with lateral lean towards L side noted, with pt having difficulty correcting. he reports no pain at rest. BLE assessment indicates sensation to light touch intact, AROM WFL, and strength diminished. Pt performed supine > sit with Mod Ax2 and additional time and cues for sequencing, sitting EOB with initially poor sitting balance control. Pt fluctuating between fair and poor throughout entire session requiring facilitation to prevent posterolateral lean towards L side. Pt with decreased awareness of leaning and difficulty correcting without assist throughout session. Pt performed functional tasks sitting EOB with cues for upright posture and trunk control. Sit > stand with Max Ax2 using RW. Pt with poor standing balance, heavy lean to L requiring constant support to maintain safe standing as brief changed. Unable to progress BLE or take steps while in standing. Returned to sit Mod Ax2. Scooting towards head of bed Max A x2 with facilitation of forward trunk lean and coordination of BUE and BLE. Sit > supine Mod Ax2. Rolling side to side Mod A x2 for brief and pad positioning. At end of session pt resting in bed with all needs within reach, alarm activated for safety, RN notified.  Pt presents as functioning below his baseline, with deficits in mobility including transfers, gait, balance, and activity tolerance. Pt will benefit from skilled therapy services to address stated deficits to promote return to highest level of function, independence, and safety. Will continue to follow. At this time, patient is appropriate for Co-treatment with occupational therapy due to patient's decreased overall endurance/tolerance levels, as well as need for high level skilled assistance to complete functional transfers/mobility and functional tasks. Skippy Marker is appropriate for a multidisciplinary co-treatment of PT and OT to address goals of both disciplines. This section established at most recent assessment   PROBLEM LIST (Impairments causing functional limitations):  Decreased Strength  Decreased ADL/Functional Activities  Decreased Transfer Abilities  Decreased Ambulation Ability/Technique  Decreased Balance  Decreased Activity Tolerance   INTERVENTIONS PLANNED: (Benefits and precautions of physical therapy have been discussed with the patient.)  Balance Exercise  Bed Mobility  Family Education  Gait Training  Home Exercise Program (HEP)  Neuromuscular Re-education/Strengthening  Range of Motion (ROM)  Therapeutic Activites  Therapeutic Exercise/Strengthening  Transfer Training     TREATMENT PLAN: Frequency/Duration: 3 times a week for duration of hospital stay  Rehabilitation Potential For Stated Goals: Good     REHAB RECOMMENDATIONS (at time of discharge pending progress):    Placement: It is my opinion, based on this patient's performance to date, that Mr. Avelina Lindquist may benefit from intensive therapy at a [address] [place] [address] after discharge due to the functional deficits listed above that are likely to improve with skilled rehabilitation and concerns that he/she may be unsafe to be unsupervised at home due to medical complications and mobility deficits which put him at increase risk of functional decline and/or falling.   Equipment:   Hygiene Aides, Type: Commode (Bedside)              HISTORY:   History of Present Injury/Illness (Reason for Referral):  Per H&P:\"Patient is a 78 y.o. male who presented to the ED for cc weakness to which he slid off his bed without noted trauma and strong odor to urine. Patient is a poor historian so hard to get detailed history. 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 Andressa Chavez, and CVA with residual right sided weakness. Lives by himself. \"  Past Medical History/Comorbidities:   Mr. Erinn Harrison  has a past medical history of Alcoholic pancreatitis (63/4803), Aneurysm [facility]), Aneurysm artery, celiac ([facility] (8/6/2014), Chronic pain, CVA (cerebral infarction) (1998, 2008), Depression, Diabetes ([address] (2010), Diabetic ulcer of left great toe ([address] Enlarged aorta ([address] (8/6/2014), Gout, History of stroke (1998), Hypertension, Mixed hyperlipidemia, Obesity, Skin cancer, Stroke ([address] Unspecified constipation, and Unspecified vitamin D deficiency. He also has no past medical history of Chronic kidney disease. Mr. Erinn Harrison  has a past surgical history that includes hx tonsillectomy; hx adenoidectomy; pr appendectomy; hx orthopaedic (12/2012); hx orthopaedic (last 4/2013); hx orthopaedic (11/2013); hx cataract removal (OD-2013/OS-2014); hx urological (1980's); and hx colonoscopy (2007, 2010, 2014). Social History/Living Environment:   Home Environment: Private residence  # Steps to Enter: 0  One/Two Story Residence: One story  Living Alone: Yes  Support Systems: Friends \ neighbors  Patient Expects to be Discharged to[de-identified] Unknown  Current DME Used/Available at Home: Shower chair, Walker, rollator  Tub or Shower Type: Shower  Prior Level of Function/Work/Activity:  PMH includes CVA with residual L sided weakness. Prior to hospital admission pt lives alone, with a friend checking on him daily, in a one story home with no step(s) to enter. Pt endorses just this one fall in past 6 months.  Prior to admission Mr. Julián Maradiaga uses a rollator walker for mobility. Number of Personal Factors/Comorbidities that affect the Plan of Care: 1-2: MODERATE COMPLEXITY   EXAMINATION:   Most Recent Physical Functioning:   Gross Assessment:  AROM: Within functional limits  Strength: Generally decreased, functional  Coordination: Generally decreased, functional               Posture:  Posture (WDL): Exceptions to WDL  Posture Assessment: Forward head, Rounded shoulders, Trunk flexion  Balance:  Sitting: Impaired  Sitting - Static: Fair (occasional)  Sitting - Dynamic: Poor (constant support)  Standing: Impaired  Standing - Static: Poor  Standing - Dynamic : Poor Bed Mobility:  Rolling: Moderate assistance;Assist x2  Supine to Sit: Moderate assistance;Assist x2  Sit to Supine: Moderate assistance;Assist x2  Scooting: Maximum assistance;Assist x2  Interventions: Safety awareness training; Tactile cues; Verbal cues  Wheelchair Mobility:     Transfers:  Sit to Stand: Maximum assistance;Assist x2  Stand to Sit: Maximum assistance;Assist x2  Gait:            Body Structures Involved:  Nerves  Bones  Joints  Muscles Body Functions Affected:  Genitourinary  Neuromusculoskeletal  Movement Related Activities and Participation Affected:  General Tasks and Demands  Mobility  Self Care  Domestic Life  Interpersonal Interactions and Relationships   Number of elements that affect the Plan of Care: 4+: HIGH COMPLEXITY   CLINICAL PRESENTATION:   Presentation: Evolving clinical presentation with changing clinical characteristics: MODERATE COMPLEXITY   CLINICAL DECISION MAKIN [facility] Inpatient Short Form  How much difficulty does the patient currently have. .. Unable A Lot A Little None   1. Turning over in bed (including adjusting bedclothes, sheets and blankets)? [] 1   [x] 2   [] 3   [] 4   2.   Sitting down on and standing up from a chair with arms ( e.g., wheelchair, bedside commode, etc.)   [] 1   [x] 2   [] 3   [] 4   3. Moving from lying on back to sitting on the side of the bed? [] 1   [x] 2   [] 3   [] 4   How much help from another person does the patient currently need. .. Total A Lot A Little None   4. Moving to and from a bed to a chair (including a wheelchair)? [] 1   [x] 2   [] 3   [] 4   5. Need to walk in hospital room? [x] 1   [] 2   [] 3   [] 4   6. Climbing 3-5 steps with a railing? [x] 1   [] 2   [] 3   [] 4   © 2007, Trustees of [address] [place] [address] [address] [id number], under license to [employer]. All rights reserved      Score:  Initial: 10 Most Recent: X (Date: -- )    Interpretation of Tool:  Represents activities that are increasingly more difficult (i.e. Bed mobility, Transfers, Gait). Medical Necessity:     Patient is expected to demonstrate progress in   strength, range of motion, balance, coordination, and functional technique   to   improve safety during all mobility. .  Reason for Services/Other Comments:  Patient continues to require skilled intervention due to   medical complications and mobility deficits which imipact his level of function, safety, and independence as indicated above. .   Use of outcome tool(s) and clinical judgement create a POC that gives a: Questionable prediction of patient's progress: MODERATE COMPLEXITY        TREATMENT:   (In addition to Assessment/Re-Assessment sessions the following treatments were rendered)   Pre-treatment Symptoms/Complaints:  None  Pain: Initial:   Pain Intensity 1: 0  Post Session:  0/10     Today's treatment session addressed Decreased Strength, Decreased ADL/Functional Activities, Decreased Transfer Abilities, Decreased Balance, and Decreased Activity Tolerance to progress towards achieving stated therapy goals. During this session, Occupational Therapy addressed ADLs to progress towards their discipline specific goal(s).   Co-treatment was necessary to improve patient's cognitive participation, ability to follow higher level commands, ability to increase activity demands, and ability to return to normal functional activity. Therapeutic Activity: (    12 Minutes): Therapeutic activities including bed mobility (sit <> supine, scooting, rolling) and sit <> stand transfer training to improve mobility, strength, balance, and coordination. Required maximal   to promote static and dynamic balance in standing and promote coordination of bilateral, upper extremity(s), lower extremity(s). Neuromuscular Re-education: ( 28 Minutes): Facilitation of sitting and standing balance control supported with BUE to improve balance, coordination, and posture. Required maximal visual, verbal, manual, and tactile cues to promote static and dynamic balance in sitting and trunk control and midline orientation. Braces/Orthotics/Lines/Etc:   bowen catheter  O2 Device: Room Air   Treatment/Session Assessment:    Response to Treatment:  see above  Interdisciplinary Collaboration:   Physical Therapist  Occupational Therapist  Registered Nurse  After treatment position/precautions:   Supine in bed  Bed alarm/tab alert on  Bed/Chair-wheels locked  Bed in low position  Call light within reach  RN notified   Compliance with Program/Exercises: Will assess as treatment progresses  Recommendations/Intent for next treatment session: \"Next visit will focus on advancements to more challenging activities and reduction in assistance provided\".     Total Treatment Duration:  PT Patient Time In/Time Out  Time In: 0922  Time Out: [address], PT

## 2020-05-12 NOTE — PROGRESS NOTES
Patient arrived room 829 via stretcher. Patient is stable. Dual skin assessment completed with Michelle Amador. No skin break down noted. Redness noted on patient buttocks. Amputated left big toe. No distress at this time. Bed is low, locked and call light within reach.

## 2020-05-12 NOTE — PROGRESS NOTES
MSN, CM:  Spoke with patient this AM about discharge planning. Patient lives alone in own home with 3 steps for entrance. Patient has no family here but has a friend that helps him out daily. Patient is independent with all ADL's but requires a rollator for ambulation. Patient will need a BSC upon discharge r/t incontinent episodes at night. Will use [employer] for this equipment. Patient has right sided weakness r/t CVA x2 (1998 and 2008). Patient has received [facility] services in 2012 and recently had [facility] which was just completed. PT and OT ordered for consult and recommendations. Case Management will continue to monitor for discharge needs. Care Management Interventions  PCP Verified by CM: Yes(Dr. Rupa Cohen)  Mode of Transport at Discharge:  Other (see comment)(Friend to transport)  Transition of Care Consult (CM Consult): Discharge Planning  Physical Therapy Consult: Yes  Occupational Therapy Consult: Yes  Current Support Network: Own Home, Lives Alone  Confirm Follow Up Transport: Self  Freedom of Choice List was Provided with Basic Dialogue that Supports the Patient's Individualized Plan of Care/Goals, Treatment Preferences and Shares the Quality Data Associated with the Providers?: Yes  Discharge Location  Discharge Placement: Unable to determine at this time

## 2020-05-12 NOTE — PROGRESS NOTES
Problem: Self Care Deficits Care Plan (Adult)  Goal: *Acute Goals and Plan of Care (Insert Text)  Description:   1. Patient will complete lower body bathing and dressing with CGA and good static and fair dynamic seated balance. 2. Patient will complete toilet hygiene with Min A and fair static standing balance. 3. Patient will complete seated ADL task x 5 minutes without support from therapy for balance. 4. Patient will complete bed mobility with CGA and one cue for placement for UE to assist.  5. Patient will complete functional transfers with Min A and adaptive equipment as needed. 6. Patient will complete transfer from bed to chair to prepare for seated ADL task with Min A and adaptive equipment as needed. Timeframe: 7 visits      Outcome: Progressing Towards Goal     OCCUPATIONAL THERAPY: Initial Assessment, Daily Note, and AM 5/12/2020  INPATIENT: OT Visit Days: 1  [place] / [place] / Product Type: Medicare /      NAME/AGE/GENDER: Aime Milton is a 78 y.o. male   PRIMARY DIAGNOSIS:  Sepsis ([facility] [A41.9]  UTI (urinary tract infection) [N39.0] Sepsis ([facility] Sepsis ([facility]    ICD-10: Treatment Diagnosis:    · Generalized Muscle Weakness (M62.81)  · Other lack of cordination (R27.8)  · Difficulty in walking, Not elsewhere classified (R26.2)  · Other abnormalities of gait and mobility (R26.89)  · History of falling (Z91.81)   Precautions/Allergies:    Fall Risk Penicillin g; Percocet [oxycodone-acetaminophen]; and Sulfa (sulfonamide antibiotics)      ASSESSMENT:     Mr. Nilay Ryan is a 78 y.o. male admitted for Sepsis and s/p recent fall from chair in which pt was found on floor by friend. At baseline, patient lives alone in a one story home with 0 JOSUE.  Pt endorses independence for performance of ADLs but per evaluation pt seems as though he is receiving more assist. Pt reports dependence on friend \"Michelle\" for IADLs and reports that she comes to assist him with cooking and cleaning one time per week. Per chart review, pt with prior fall history and difficulty getting back up. Pt also with prior CVA (per pt approximately 10 years ago) that has resulted in residual L sided weakness. Sidney Figueroa found supine in bed with lean to L side and agreeable to OT/PT evaluation. Reports no pain prior to or following functional mobility. Patient able to complete bed mobility with Mod A x 2. Seated edged of bed pt with fair (-) static and poor dynamic seated balance. LUE generally decreased yet functional for ROM, strength, and coordination. RUE WFL for ROM, strength, and coordination. Pt reports R hand dominance. OT worked on self care while PT worked on functional transfers and balance. Pt required Mod to Max manual support for balance to perform self-care tasks. If pt was able to hold self upright and remain oriented to midline he would be more independent for the completion of self-care tasks. In addition to manual support for balance, pt required Min A for upper body bathing, Mod A for upper body dressing, Mod A for lower body bathing (feet and perineal), and CGA to don socks. Pt required Max A x 2 for sit to stand with use of RW. Upon standing, pt presented with significant lean to L side and unable to follow verbal, visual, and tactile cues to lean to R side to improve posture and balance. In standing, pt required Total A for toilet hygiene, including doffing/donning diaper brief, wiping, and gown management. Pt required Max A x 2 for stand to sit and Mod A x 2 to return from sit to supine. Pt left supine in bed and repositioned for comfort. All needs met and within reach. RN notified. Pt greatest deficit is his balance as he is unable to hold himself upright without support to complete seated or standing ADL tasks.      At this time, patient is appropriate for Co-treatment with physical therapy due to patient's decreased overall endurance/tolerance levels, as well as need for high level skilled assistance and cueing to complete functional transfers/mobility and functional tasks. Pt is appropriate for a multidisciplinary co-treatment of PT and OT to address goals of both disciplines. Chrissie Almeida is currently functioning below baseline for ADLs and functional mobility and would benefit from acute OT to increase independence and safety with ADLs and functional mobility. Will follow for duration of hospital stay to address the above goals. Patient was educated on role and plan of care of occupational therapy. This section established at most recent assessment   PROBLEM LIST (Impairments causing functional limitations):  1. Decreased Strength  2. Decreased ADL/Functional Activities  3. Decreased Transfer Abilities  4. Decreased Ambulation Ability/Technique  5. Decreased Balance  6. Decreased Activity Tolerance  7. Increased Fatigue  8. Decreased Flexibility/Joint Mobility  9. Decreased Cognition   INTERVENTIONS PLANNED: (Benefits and precautions of occupational therapy have been discussed with the patient.)  1. Activities of daily living training  2. Adaptive equipment training  3. Balance training  4. Clothing management  5. Neuromuscular re-eduation  6. Re-evaluation  7. Therapeutic activity  8. Therapeutic exercise     TREATMENT PLAN: Frequency/Duration: Follow patient 3x/week to address above goals. Rehabilitation Potential For Stated Goals: [facility] (at time of discharge pending progress):    Placement:   It is my opinion, based on this patient's performance to date, that Mr. Avelina Lindquist may benefit from intensive therapy at a [address] [place] [address] after discharge due to the functional deficits listed above that are likely to improve with skilled rehabilitation and concerns that he/she may be unsafe to be unsupervised at home due to decreased independence, safety, balance, strength, ROM, and activity tolerance for performace of ADLs and functional mobility. .  Equipment:    TBD              OCCUPATIONAL PROFILE AND HISTORY:   History of Present Injury/Illness (Reason for Referral):   See H&P  Past Medical History/Comorbidities:   Mr. 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 He also has no past medical history of Chronic kidney disease. Mr. Jose Felix  has a past surgical history that includes hx tonsillectomy; hx adenoidectomy; pr appendectomy; hx orthopaedic (12/2012); hx orthopaedic (last 4/2013); hx orthopaedic (11/2013); hx cataract removal (OD-2013/OS-2014); hx urological (1980's); and hx colonoscopy (2007, 2010, 2014). Social History/Living Environment:   Home Environment: Private residence  # Steps to Enter: 0  One/Two Story Residence: One story  Living Alone: Yes  Support Systems: Friends \ neighbors  Patient Expects to be Discharged to[de-identified] Private residence  Current DME Used/Available at Home: Shower chair, Walker, rollator  Tub or Shower Type: Shower  Prior Level of Function/Work/Activity:  At baseline, patient lives alone in a one story home with 0 JOSUE. Pt endorses independence for performance of ADLs but per evaluation it seems as though he requires greater assist. Pt reports dependence on friend \"Michelle\" for IADLs and reports that she comes to assist him with cooking and cleaning one time per week. Per chart review, pt with prior fall history and difficulty getting back up. Pt also with prior CVA (per pt approximately 10 years ago) that has resulted in residual L sided weakness.      Personal Factors:          Sex:  male        Age:  78 y.o.   Number of Personal Factors/Comorbidities that affect the Plan of Care: Extensive review of physical, cognitive, and psychosocial performance (3+):  HIGH COMPLEXITY   ASSESSMENT OF OCCUPATIONAL PERFORMANCE[de-identified]   Activities of Daily Living:   Basic ADLs (From Assessment) Complex ADLs (From Assessment)   Feeding: Setup, Minimum assistance  Oral Facial Hygiene/Grooming: Moderate assistance  Bathing: Moderate assistance  Upper Body Dressing: Moderate assistance  Lower Body Dressing: Maximum assistance  Toileting: Total assistance Instrumental ADL  Meal Preparation: Total assistance  Homemaking: Total assistance   Grooming/Bathing/Dressing Activities of Daily Living   Grooming  Grooming Assistance: Set-up  Position Performed: Seated edge of bed  Washing Face: Set-up(Mod to Max A for balance)     Upper Body Bathing  Bathing Assistance: Moderate assistance  Position Performed: Seated edge of bed(Mod A for balance)     Lower Body Bathing  Bathing Assistance: Moderate assistance  Perineal  : Total assistance (dependent)  Position Performed: (Seated edge of bed) Toileting  Toileting Assistance: Total assistance(dependent)  Bowel Hygiene: Total assistance (dependent)  Clothing Management: Total assistance (dependent)   Upper Body Dressing Assistance  Dressing Assistance: Moderate assistance  Hospital Gown: Moderate assistance  Pullover Shirt: Minimum assistance     Lower Body Dressing Assistance  Socks: Moderate assistance(For balance) Bed/Mat Mobility  Rolling: Moderate assistance;Assist x2  Supine to Sit: Moderate assistance;Assist x2  Sit to Supine:  Moderate assistance;Assist x2  Sit to Stand: Maximum assistance;Assist x2  Stand to Sit: Maximum assistance;Assist x2  Scooting: Maximum assistance     Most Recent Physical Functioning:   Gross Assessment:  AROM: Generally decreased, functional(L UE)  Strength: Generally decreased, functional(L UE)  Coordination: Generally decreased, functional(L UE)               Posture: Balance:  Sitting: Impaired  Sitting - Static: Fair (occasional)(-)  Sitting - Dynamic: Poor (constant support)  Standing: Impaired  Standing - Static: Poor  Standing - Dynamic : Poor Bed Mobility:  Rolling: Moderate assistance;Assist x2  Supine to Sit: Moderate assistance;Assist x2  Sit to Supine: Moderate assistance;Assist x2  Scooting: Maximum assistance  Wheelchair Mobility:     Transfers:  Sit to Stand: Maximum assistance;Assist x2  Stand to Sit: Maximum assistance;Assist x2            Patient Vitals for the past 6 hrs:   BP BP Patient Position SpO2 Pulse   20 0719 112/70 At rest;Head of bed elevated (Comment degrees) 98 % 65       Mental Status  Neurologic State: Alert, Confused  Orientation Level: Oriented X4                          Physical Skills Involved:  1. Range of Motion  2. Balance  3. Strength  4. Activity Tolerance  5. Fine Motor Control  6. Gross Motor Control Cognitive Skills Affected (resulting in the inability to perform in a timely and safe manner):  1. Perception  2. Executive Function  3. Sustained Attention  4. Divided Attention Psychosocial Skills Affected:  1. Habits/Routines  2. Environmental Adaptation   Number of elements that affect the Plan of Care: 5+:  HIGH COMPLEXITY   CLINICAL DECISION MAKIN [facility] [address] [address] AM-PAC 6 Clicks   Daily Activity Inpatient Short Form  How much help from another person does the patient currently need. .. Total A Lot A Little None   1. Putting on and taking off regular lower body clothing? [] 1   [x] 2   [] 3   [] 4   2. Bathing (including washing, rinsing, drying)? [] 1   [x] 2   [] 3   [] 4   3. Toileting, which includes using toilet, bedpan or urinal?   [x] 1   [] 2   [] 3   [] 4   4. Putting on and taking off regular upper body clothing? [] 1   [x] 2   [] 3   [] 4   5. Taking care of personal grooming such as brushing teeth? [] 1   [x] 2   [] 3   [] 4   6. Eating meals?    [] 1   [] 2   [x] 3   [] 4   © 2007, Trustees of [facility] [address] [address], under license to [employer]. All rights reserved      Score:  Initial: 12, completed, 5/12/2020 Most Recent: X (Date: -- )    Interpretation of Tool:  Represents activities that are increasingly more difficult (i.e. Bed mobility, Transfers, Gait). Medical Necessity:     · Skilled intervention continues to be required due to decreased independence, safety, balance, strength, ROM, and activity tolerance. .  Reason for Services/Other Comments:  · Patient continues to require skilled intervention due to medical complications and patient unable to attend/participate in therapy as expected. Use of outcome tool(s) and clinical judgement create a POC that gives a: MODERATE COMPLEXITY         TREATMENT:   (In addition to Assessment/Re-Assessment sessions the following treatments were rendered)     Pre-treatment Symptoms/Complaints:  Pt alert and oriented x 4 but confused during today's session and needs verbal cueing from therapy to answer questions. Pain: Initial:   Pain Intensity 1: 0  Post Session:  Unchanged     Today's treatment session addressed Decreased ADL/Functional Activities and Decreased Activity Tolerance to progress towards achieving goal(s) listed above. During this session, Physical Therapy addressed  Functional Transfersand Balance to progress towards their discipline specific goal(s). Co-treatment was necessary to improve patient's cognitive participation, ability to follow higher level commands, ability to increase activity demands and ability to return to normal functional activity. Self Care: (38 minutes): Procedure(s) utilized to improve and/or restore self-care/home management as related to dressing, bathing and grooming. Required moderate to maximal support from therapy for balance and minimal to moderate visual, verbal, manual and tactile cueing to facilitate activities of daily living skills.  Pt required Min A for upper body bathing, Mod A for upper body dressing, Mod A for lower body bathing (feet and perineal), and CGA to don socks. In standing, pt required Total A for toilet hygiene, including doffing/donning diaper brief, wiping, and gown management. Braces/Orthotics/Lines/Etc:   · bowen catheter   · Telemetry Monitor  Treatment/Session Assessment:    · Response to Treatment:  Pt good participant during therapy but required a lot of support from therapy for increased balance to perform self-care tasks. · Interdisciplinary Collaboration:   o Physical Therapist  o Occupational Therapist  o Registered Nurse  · After treatment position/precautions:   o Supine in bed  o Bed alarm/tab alert on  o Bed/Chair-wheels locked  o Bed in low position  o Call light within reach  o RN notified   · Compliance with Program/Exercises: Compliant most of the time, Will assess as treatment progresses. · Recommendations/Intent for next treatment session: \"Next visit will focus on advancements to more challenging activities and reduction in assistance provided\".   Total Treatment Duration:  OT Patient Time In/Time Out  Time In: 0922  Time Out: 31270 Germán Garcia

## 2020-05-13 LAB
ANION GAP SERPL CALC-SCNC: 9 MMOL/L (ref 7–16)
BACTERIA SPEC CULT: ABNORMAL
BUN SERPL-MCNC: 15 MG/DL (ref 8–23)
CALCIUM SERPL-MCNC: 8.5 MG/DL (ref 8.3–10.4)
CHLORIDE SERPL-SCNC: 107 MMOL/L (ref 98–107)
CK SERPL-CCNC: 1097 U/L (ref 21–215)
CO2 SERPL-SCNC: 23 MMOL/L (ref 21–32)
CREAT SERPL-MCNC: 0.7 MG/DL (ref 0.8–1.5)
ERYTHROCYTE [DISTWIDTH] IN BLOOD BY AUTOMATED COUNT: 11.7 % (ref 11.9–14.6)
GLUCOSE BLD STRIP.AUTO-MCNC: 131 MG/DL (ref 65–100)
GLUCOSE BLD STRIP.AUTO-MCNC: 156 MG/DL (ref 65–100)
GLUCOSE BLD STRIP.AUTO-MCNC: 158 MG/DL (ref 65–100)
GLUCOSE BLD STRIP.AUTO-MCNC: 199 MG/DL (ref 65–100)
GLUCOSE SERPL-MCNC: 156 MG/DL (ref 65–100)
HCT VFR BLD AUTO: 38.6 % (ref 41.1–50.3)
HGB BLD-MCNC: 13.3 G/DL (ref 13.6–17.2)
MAGNESIUM SERPL-MCNC: 1.6 MG/DL (ref 1.8–2.4)
MCH RBC QN AUTO: 33.8 PG (ref 26.1–32.9)
MCHC RBC AUTO-ENTMCNC: 34.5 G/DL (ref 31.4–35)
MCV RBC AUTO: 98 FL (ref 79.6–97.8)
MM INDURATION POC: 0 MM (ref 0–5)
NRBC # BLD: 0 K/UL (ref 0–0.2)
PLATELET # BLD AUTO: 130 K/UL (ref 150–450)
PMV BLD AUTO: 10.4 FL (ref 9.4–12.3)
POTASSIUM SERPL-SCNC: 3.7 MMOL/L (ref 3.5–5.1)
PPD POC: NEGATIVE NEGATIVE
RBC # BLD AUTO: 3.94 M/UL (ref 4.23–5.6)
SERVICE CMNT-IMP: ABNORMAL
SODIUM SERPL-SCNC: 139 MMOL/L (ref 136–145)
WBC # BLD AUTO: 8.9 K/UL (ref 4.3–11.1)

## 2020-05-13 PROCEDURE — 74011250636 HC RX REV CODE- 250/636: Performed by: INTERNAL MEDICINE

## 2020-05-13 PROCEDURE — 74011636637 HC RX REV CODE- 636/637: Performed by: FAMILY MEDICINE

## 2020-05-13 PROCEDURE — 85027 COMPLETE CBC AUTOMATED: CPT

## 2020-05-13 PROCEDURE — 74011250637 HC RX REV CODE- 250/637: Performed by: INTERNAL MEDICINE

## 2020-05-13 PROCEDURE — 74011250636 HC RX REV CODE- 250/636: Performed by: FAMILY MEDICINE

## 2020-05-13 PROCEDURE — 36415 COLL VENOUS BLD VENIPUNCTURE: CPT

## 2020-05-13 PROCEDURE — 82550 ASSAY OF CK (CPK): CPT

## 2020-05-13 PROCEDURE — 83735 ASSAY OF MAGNESIUM: CPT

## 2020-05-13 PROCEDURE — 80048 BASIC METABOLIC PNL TOTAL CA: CPT

## 2020-05-13 PROCEDURE — 65660000000 HC RM CCU STEPDOWN

## 2020-05-13 PROCEDURE — 74011000258 HC RX REV CODE- 258: Performed by: FAMILY MEDICINE

## 2020-05-13 PROCEDURE — 97535 SELF CARE MNGMENT TRAINING: CPT

## 2020-05-13 PROCEDURE — 74011250637 HC RX REV CODE- 250/637: Performed by: FAMILY MEDICINE

## 2020-05-13 PROCEDURE — 97530 THERAPEUTIC ACTIVITIES: CPT

## 2020-05-13 PROCEDURE — 82962 GLUCOSE BLOOD TEST: CPT

## 2020-05-13 RX ORDER — MAGNESIUM SULFATE HEPTAHYDRATE 40 MG/ML
2 INJECTION, SOLUTION INTRAVENOUS ONCE
Status: COMPLETED | OUTPATIENT
Start: 2020-05-13 | End: 2020-05-13

## 2020-05-13 RX ADMIN — SODIUM CHLORIDE 125 ML/HR: 9 INJECTION, SOLUTION INTRAVENOUS at 06:08

## 2020-05-13 RX ADMIN — SODIUM CHLORIDE 150 ML/HR: 9 INJECTION, SOLUTION INTRAVENOUS at 19:46

## 2020-05-13 RX ADMIN — HEPARIN SODIUM 5000 UNITS: 5000 INJECTION INTRAVENOUS; SUBCUTANEOUS at 06:08

## 2020-05-13 RX ADMIN — INSULIN LISPRO 2 UNITS: 100 INJECTION, SOLUTION INTRAVENOUS; SUBCUTANEOUS at 07:30

## 2020-05-13 RX ADMIN — ATORVASTATIN CALCIUM 40 MG: 40 TABLET, FILM COATED ORAL at 21:54

## 2020-05-13 RX ADMIN — MAGNESIUM SULFATE HEPTAHYDRATE 2 G: 40 INJECTION, SOLUTION INTRAVENOUS at 09:00

## 2020-05-13 RX ADMIN — INSULIN LISPRO 2 UNITS: 100 INJECTION, SOLUTION INTRAVENOUS; SUBCUTANEOUS at 11:54

## 2020-05-13 RX ADMIN — HEPARIN SODIUM 5000 UNITS: 5000 INJECTION INTRAVENOUS; SUBCUTANEOUS at 11:57

## 2020-05-13 RX ADMIN — EZETIMIBE 10 MG: 10 TABLET ORAL at 21:54

## 2020-05-13 RX ADMIN — SODIUM CHLORIDE 1000 ML: 9 INJECTION, SOLUTION INTRAVENOUS at 09:26

## 2020-05-13 RX ADMIN — HEPARIN SODIUM 5000 UNITS: 5000 INJECTION INTRAVENOUS; SUBCUTANEOUS at 21:54

## 2020-05-13 RX ADMIN — ASPIRIN 81 MG 324 MG: 81 TABLET ORAL at 07:35

## 2020-05-13 RX ADMIN — OXYBUTYNIN CHLORIDE 10 MG: 10 TABLET, EXTENDED RELEASE ORAL at 08:24

## 2020-05-13 RX ADMIN — INSULIN LISPRO 2 UNITS: 100 INJECTION, SOLUTION INTRAVENOUS; SUBCUTANEOUS at 17:11

## 2020-05-13 RX ADMIN — CEFTRIAXONE 1 G: 1 INJECTION, POWDER, FOR SOLUTION INTRAMUSCULAR; INTRAVENOUS at 14:28

## 2020-05-13 NOTE — PROGRESS NOTES
Hospitalist Progress Note    2020  Admit Date: 2020  7:38 PM   NAME: Angela Hudson   :  1950   MRN:  [id number]   Attending: Les Mayer MD  PCP:  Alex Frankel MD    HPI/SUBJECTIVE:   78 y.o. male who presented to the ED for cc weakness to which he slid off his bed without noted trauma and strong odor to urine. 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 Brice Roblero, and CVA with residual right sided weakness. Lives by himself. Admitted for sepsis due to E. Coli UTI.    : Pt seen, Afebrile, feels the same, AAO X 3. Denies pain. Laying in bed. Nursing notes and chart reviewed. Review of Systems negative with exception of pertinent positives noted above. PHYSICAL EXAM     Visit Vitals  /85 (BP 1 Location: Left arm, BP Patient Position: At rest)   Pulse 80   Temp 98.5 °F (36.9 °C)   Resp 18   SpO2 93%      Temp (24hrs), Av.2 °F (36.8 °C), Min:97.8 °F (36.6 °C), Max:98.5 °F (36.9 °C)    Oxygen Therapy  O2 Sat (%): 93 % (20 0736)  O2 Device: Room air (20 0704)    Intake/Output Summary (Last 24 hours) at 2020 0831  Last data filed at 2020 3936  Gross per 24 hour   Intake 2434 ml   Output 1050 ml   Net 1384 ml         General: No acute distress. Alert.    Head:  AT/NC  Lungs:  CTABL. Heart:  RRR, no murmur, rub, or gallop  Abdomen: Soft, non-distended, non-tender, +bs  Extremities: No cyanosis or clubbing. Neurologic:  No focal deficits. Moves all extremities. Skin:  No Obvious Rash  Psych:  Normal affect. LABS AND STUDIES:  Personally reviewed all labs, meds, and studies for past 24hrs.       ASSESSMENT      Active Hospital Problems    Diagnosis Date Noted    Sepsis ([address] 2020    UTI (urinary tract infection) 2020    Unsteady gait 2017    Urinary incontinence 2017    [facility] with [facility]     Mixed hyperlipidemia     Aneurysm artery, celiac ([facility] 08/06/2014    CVA, old, hemiparesis ([facility] 05/24/2012       PLAN:    · Sepsis secondary to E Coli UTI- c/w Ceftriaxone till 5/15.     · Lactic acidosis - Secondary to sepsis. Resolved.     · DM type II - on SS, trend sugars, A1c 7.4.      · CVA with chronic right sided weakness: Poor hygiene noted and very weak on exam. PPD. Consult PT/OT. Daily high dose ASA. COVID test pending for placement.     · Aortic aneurysm - Repeat US stable findings. BP control.      · Elevated total bilirubin - Likely due to current sepsis. Trend. Hold statin. · Hypomagnesemia: Replete and recheck. · Fall precautions    Dispo: Pending improvement, likely in 2-3 days, SW consulted. Needs SNF. DVT ppx:  hsq  Discussed plan with pt who is in agreement. All questions answered.     Signed By: Flower Burks MD     May 13, 2020

## 2020-05-13 NOTE — PROGRESS NOTES
Problem: Urinary Tract Infection - Adult  Goal: *Absence of infection signs and symptoms  Outcome: Progressing Towards Goal

## 2020-05-13 NOTE — PROGRESS NOTES
Shift assessment complete. Lungs clear on auscultation. Respirations present. Even and unlabored. No s/s of distress. Zero c/o pain at this time. Call light within reach. Encouraged patient to call for assistance. Patient verbalizes understanding. See Doc Flowsheet for assessment details. Patient resting in bed. Bed alarm in progress. Door open for observation. Will continue to monitor.

## 2020-05-13 NOTE — PROGRESS NOTES
[id number] Pt resting quietly in bed with no c/o pain, nausea, and no signs of acute distress noted. Refer to flowsheet for assessment. Bed locked in lowest position with call light in reach and bed alarm set.

## 2020-05-13 NOTE — PROGRESS NOTES
Report received from [place], [facility]. Pt resting quietly, NAD.

## 2020-05-13 NOTE — PROGRESS NOTES
Problem: Self Care Deficits Care Plan (Adult)  Goal: *Acute Goals and Plan of Care (Insert Text)  Description:   1. Patient will complete lower body bathing and dressing with CGA and good static and fair dynamic seated balance. 2. Patient will complete toilet hygiene with Min A and fair static standing balance. 3. Patient will complete seated ADL task x 5 minutes without support from therapy for balance. MET 5/13/2020  4. Patient will complete bed mobility with CGA and one cue for placement for UE to assist.  5. Patient will complete functional transfers with Min A and adaptive equipment as needed. 6. Patient will complete transfer from bed to chair to prepare for seated ADL task with Min A and adaptive equipment as needed. Timeframe: 7 visits      Outcome: Progressing Towards Goal     OCCUPATIONAL THERAPY: Daily Note and PM 5/13/2020  INPATIENT: OT Visit Days: 2  [place] / [place] / Product Type: Medicare /      NAME/AGE/GENDER: Kassidy Oleary is a 78 y.o. male   PRIMARY DIAGNOSIS:  Sepsis ([facility] [address] [A41.9]  UTI (urinary tract infection) [N39.0] Sepsis ([facility] Sepsis ([facility] [address]    ICD-10: Treatment Diagnosis:    · Generalized Muscle Weakness (M62.81)  · Other lack of cordination (R27.8)  · Difficulty in walking, Not elsewhere classified (R26.2)  · Other abnormalities of gait and mobility (R26.89)  · History of falling (Z91.81)   Precautions/Allergies:    Fall Risk Penicillin g; Percocet [oxycodone-acetaminophen]; and Sulfa (sulfonamide antibiotics)      ASSESSMENT:     Mr. Bijan Hernandez is a 78 y.o. male admitted for Sepsis and s/p recent fall from chair in which pt was found on floor by friend. At baseline, patient lives alone in a one story home with 0 JOSUE.  Pt endorses independence for performance of ADLs but per evaluation pt seems as though he is receiving more assist. Pt reports dependence on friend \"Michelle\" for IADLs and reports that she comes to assist him with cooking and cleaning one time per week. Per chart review, pt with prior fall history and difficulty getting back up. Pt also with prior CVA (per pt approximately 10 years ago) that has resulted in residual L sided weakness. Iain Norris found seated upright in recliner chair. Pt seen for co-treatment by OT and PT. OT worked on self-care while PT facilitated functional transfers. Reports no pain prior to or following functional mobility. Pt completes sit to stand with Mod A x 2 and use of RW. Pt still presents with poor static and poor dynamic standing balance and requires Mod A x 2 with use of RW to walk from chair to bed. Pt required Mod A x 2 for stand to sit. Seated edge of bed, pt with good static and fair dynamic seated balance. Pt requires Max A to don shower cap and lather leave in shampoo into hair with pt able to help minimally with use of R UE. Pt requires Mod A to comb hair with assist needed to access hair on back of head. Pt requires SBA to brush teeth. Pt able to complete self care tasks without support from therapy for balance. Pt required Mod A x 2 for sit to supine. Pt left supine in bed with all needs met and within reach. Repositioned for increased comfort. RN notified. Will continue POC. At this time, patient is appropriate for Co-treatment with physical therapy due to patient's decreased overall endurance/tolerance levels, as well as need for high level skilled assistance and cueing to complete functional transfers/mobility and functional tasks. Pt is appropriate for a multidisciplinary co-treatment of PT and OT to address goals of both disciplines. This section established at most recent assessment   PROBLEM LIST (Impairments causing functional limitations):  1. Decreased Strength  2. Decreased ADL/Functional Activities  3. Decreased Transfer Abilities  4. Decreased Ambulation Ability/Technique  5. Decreased Balance  6. Decreased Activity Tolerance  7. Increased Fatigue  8.  Decreased Flexibility/Joint Mobility  9. Decreased Cognition   INTERVENTIONS PLANNED: (Benefits and precautions of occupational therapy have been discussed with the patient.)  1. Activities of daily living training  2. Adaptive equipment training  3. Balance training  4. Clothing management  5. Neuromuscular re-eduation  6. Re-evaluation  7. Therapeutic activity  8. Therapeutic exercise     TREATMENT PLAN: Frequency/Duration: Follow patient 3x/week to address above goals. Rehabilitation Potential For Stated Goals: [facility] ([place], [place]) (at time of discharge pending progress):    Placement: It is my opinion, based on this patient's performance to date, that Mr. Jo-Ann Lees may benefit from intensive therapy at a [address] [place] [address] after discharge due to the functional deficits listed above that are likely to improve with skilled rehabilitation and concerns that he/she may be unsafe to be unsupervised at home due to decreased independence, safety, balance, strength, ROM, and activity tolerance for performace of ADLs and functional mobility. .  Equipment:    TBD              OCCUPATIONAL PROFILE AND HISTORY:   History of Present Injury/Illness (Reason for Referral):   See H&P  Past Medical History/Comorbidities:   Mr. 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 He also has no past medical history of Chronic kidney disease.   Mr. Jo-Ann Lees  has a past surgical history that includes hx tonsillectomy; hx adenoidectomy; pr appendectomy; hx orthopaedic (12/2012); hx orthopaedic (last 4/2013); hx orthopaedic (11/2013); hx cataract removal (OD-2013/OS-2014); hx urological (1980's); and hx colonoscopy (2007, 2010, 2014). Social History/Living Environment:   Home Environment: Private residence  # Steps to Enter: 0  One/Two Story Residence: One story  Living Alone: Yes  Support Systems: Friends \ neighbors  Patient Expects to be Discharged to[de-identified] Unknown  Current DME Used/Available at Home: Shower chair, Walker, rollator  Tub or Shower Type: Shower  Prior Level of Function/Work/Activity:  At baseline, patient lives alone in a one story home with 0 JOSUE. Pt endorses independence for performance of ADLs but per evaluation it seems as though he requires greater assist. Pt reports dependence on friend \"Michelle\" for IADLs and reports that she comes to assist him with cooking and cleaning one time per week. Per chart review, pt with prior fall history and difficulty getting back up. Pt also with prior CVA (per pt approximately 10 years ago) that has resulted in residual L sided weakness. Personal Factors:          Sex:  male        Age:  78 y.o. Number of Personal Factors/Comorbidities that affect the Plan of Care: Extensive review of physical, cognitive, and psychosocial performance (3+):  HIGH COMPLEXITY   ASSESSMENT OF OCCUPATIONAL PERFORMANCE[de-identified]   Activities of Daily Living:   Basic ADLs (From Assessment) Complex ADLs (From Assessment)   Feeding: Setup, Minimum assistance  Oral Facial Hygiene/Grooming: Moderate assistance  Bathing: Moderate assistance  Upper Body Dressing: Moderate assistance  Lower Body Dressing: Maximum assistance  Toileting: Total assistance Instrumental ADL  Meal Preparation: Total assistance  Homemaking: Total assistance   Grooming/Bathing/Dressing Activities of Daily Living   Grooming  Grooming Assistance: (Pt required Max A to don shower cap)  Position Performed: Seated edge of bed  Brushing Teeth: Stand-by assistance                         Bed/Mat Mobility  Rolling:  Moderate assistance;Assist x2  Supine to Sit: Moderate assistance;Assist x2  Sit to Supine: Moderate assistance;Maximum assistance;Assist x2  Sit to Stand: Moderate assistance;Assist x2  Stand to Sit: Moderate assistance;Assist x2  Bed to Chair: Moderate assistance;Assist x2  Scooting: Moderate assistance     Most Recent Physical Functioning:   Gross Assessment:  AROM: Generally decreased, functional(L UE)  Strength: Generally decreased, functional(L UE)  Coordination: Generally decreased, functional(L UE)               Posture:  Posture (WDL): Exceptions to WDL  Posture Assessment: Forward head, Rounded shoulders, Trunk flexion  Balance:  Sitting: Impaired  Sitting - Static: Good (unsupported)  Sitting - Dynamic: Fair (occasional)  Standing: Impaired  Standing - Static: Poor  Standing - Dynamic : Poor Bed Mobility:  Rolling: Moderate assistance;Assist x2  Supine to Sit: Moderate assistance;Assist x2  Sit to Supine: Moderate assistance;Maximum assistance;Assist x2  Scooting: Moderate assistance  Interventions: Safety awareness training; Tactile cues; Verbal cues  Wheelchair Mobility:     Transfers:  Sit to Stand: Moderate assistance;Assist x2  Stand to Sit: Moderate assistance;Assist x2  Bed to Chair: Moderate assistance;Assist x2  Interventions: Safety awareness training; Tactile cues; Verbal cues            Patient Vitals for the past 6 hrs:   BP BP Patient Position SpO2 Pulse   05/13/20 1129 112/68 At rest 95 % 87   05/13/20 1524 103/66 At rest  78       Mental Status  Neurologic State: Alert, Eyes open spontaneously  Orientation Level: Oriented X4  Cognition: Follows commands  Safety/Judgement: Fall prevention, Decreased awareness of environment, Decreased awareness of need for assistance, Decreased awareness of need for safety, Decreased insight into deficits                          Physical Skills Involved:  1. Range of Motion  2. Balance  3. Strength  4. Activity Tolerance  5. Fine Motor Control  6.  Gross Motor Control Cognitive Skills Affected (resulting in the inability to perform in a timely and safe manner):  1. Perception  2. Executive Function  3. Sustained Attention  4. Divided Attention Psychosocial Skills Affected:  1. Habits/Routines  2. Environmental Adaptation   Number of elements that affect the Plan of Care: 5+:  HIGH COMPLEXITY   CLINICAL DECISION MAKING:   FREDERIC HOLDERAGE AM-PAC 6 Clicks   Daily Activity Inpatient Short Form  How much help from another person does the patient currently need. .. Total A Lot A Little None   1. Putting on and taking off regular lower body clothing? [] 1   [x] 2   [] 3   [] 4   2. Bathing (including washing, rinsing, drying)? [] 1   [x] 2   [] 3   [] 4   3. Toileting, which includes using toilet, bedpan or urinal?   [x] 1   [] 2   [] 3   [] 4   4. Putting on and taking off regular upper body clothing? [] 1   [x] 2   [] 3   [] 4   5. Taking care of personal grooming such as brushing teeth? [] 1   [x] 2   [] 3   [] 4   6. Eating meals? [] 1   [] 2   [x] 3   [] 4   © 2007, Trustees of [facility] [employer], under license to [employer]. All rights reserved      Score:  Initial: 12, completed, 5/12/2020 Most Recent: X (Date: -- )    Interpretation of Tool:  Represents activities that are increasingly more difficult (i.e. Bed mobility, Transfers, Gait). Medical Necessity:     · Skilled intervention continues to be required due to decreased independence, safety, balance, strength, ROM, and activity tolerance. .  Reason for Services/Other Comments:  · Patient continues to require skilled intervention due to medical complications and patient unable to attend/participate in therapy as expected.    Use of outcome tool(s) and clinical judgement create a POC that gives a: MODERATE COMPLEXITY         TREATMENT:   (In addition to Assessment/Re-Assessment sessions the following treatments were rendered)     Pre-treatment Symptoms/Complaints:  None  Pain: Initial:   Pain Intensity 1: 0  Post Session:  Unchanged     Today's treatment session addressed Decreased ADL/Functional Activities and Decreased Activity Tolerance to progress towards achieving goal(s) listed above. During this session, Physical Therapy addressed  Functional Transfersto progress towards their discipline specific goal(s). Co-treatment was necessary to improve patient's cognitive participation, ability to follow higher level commands, ability to increase activity demands and ability to return to normal functional activity. Self Care: (25 minutes): Procedure(s) utilized to improve and/or restore self-care/home management as related to grooming. Required minimal to maximal visual, verbal, manual and tactile cueing to facilitate activities of daily living skills. Pt requires Max A to don shower cap and lather leave in shampoo into hair with pt able to help minimally with use of R UE. Pt requires Mod A to comb hair with assist needed to access hair on back of head. Pt requires SBA to brush teeth. Pt able to complete self care tasks without support from therapy for balance. Braces/Orthotics/Lines/Etc:   · bowen catheter   · Telemetry Monitor  Treatment/Session Assessment:    · Response to Treatment:  Pt observed to have improved balance for performance of seated ADL tasks compared to prior session. · Interdisciplinary Collaboration:   o Physical Therapist  o Occupational Therapist  o Registered Nurse  · After treatment position/precautions:   o Supine in bed  o Bed alarm/tab alert on  o Bed/Chair-wheels locked  o Bed in low position  o Call light within reach  o RN notified  o Nurse at bedside   · Compliance with Program/Exercises: Compliant most of the time, Will assess as treatment progresses. · Recommendations/Intent for next treatment session: \"Next visit will focus on advancements to more challenging activities and reduction in assistance provided\".   Total Treatment Duration:  OT Patient Time In/Time Out  Time In: 1400  Time Out: 5 [facility]

## 2020-05-13 NOTE — PROGRESS NOTES
Problem: Urinary Tract Infection - Adult  Goal: *Absence of infection signs and symptoms  Outcome: Progressing Towards Goal     Problem: Diabetes Self-Management  Goal: *Disease process and treatment process  Description: Define diabetes and identify own type of diabetes; list 3 options for treating diabetes. Outcome: Progressing Towards Goal  Goal: *Incorporating nutritional management into lifestyle  Description: Describe effect of type, amount and timing of food on blood glucose; list 3 methods for planning meals. Outcome: Progressing Towards Goal  Goal: *Incorporating physical activity into lifestyle  Description: State effect of exercise on blood glucose levels. Outcome: Progressing Towards Goal  Goal: *Developing strategies to promote health/change behavior  Description: Define the ABC's of diabetes; identify appropriate screenings, schedule and personal plan for screenings. Outcome: Progressing Towards Goal  Goal: *Using medications safely  Description: State effect of diabetes medications on diabetes; name diabetes medication taking, action and side effects. Outcome: Progressing Towards Goal  Goal: *Monitoring blood glucose, interpreting and using results  Description: Identify recommended blood glucose targets  and personal targets. Outcome: Progressing Towards Goal  Goal: *Prevention, detection, treatment of acute complications  Description: List symptoms of hyper- and hypoglycemia; describe how to treat low blood sugar and actions for lowering  high blood glucose level. Outcome: Progressing Towards Goal  Goal: *Prevention, detection and treatment of chronic complications  Description: Define the natural course of diabetes and describe the relationship of blood glucose levels to long term complications of diabetes.   Outcome: Progressing Towards Goal  Goal: *Developing strategies to address psychosocial issues  Description: Describe feelings about living with diabetes; identify support needed and support network  Outcome: Progressing Towards Goal  Goal: *Insulin pump training  Outcome: Progressing Towards Goal  Goal: *Sick day guidelines  Outcome: Progressing Towards Goal  Goal: *Patient Specific Goal (EDIT GOAL, INSERT TEXT)  Outcome: Progressing Towards Goal     Problem: Falls - Risk of  Goal: *Absence of Falls  Description: Document Muna Fall Risk and appropriate interventions in the flowsheet. Outcome: Progressing Towards Goal  Note: Fall Risk Interventions:  Mobility Interventions: Bed/chair exit alarm, Patient to call before getting OOB, OT consult for ADLs, PT Consult for mobility concerns, PT Consult for assist device competence, Strengthening exercises (ROM-active/passive), Utilize walker, cane, or other assistive device    Mentation Interventions: Adequate sleep, hydration, pain control, Bed/chair exit alarm, Reorient patient    Medication Interventions: Bed/chair exit alarm, Patient to call before getting OOB, Teach patient to arise slowly    Elimination Interventions: Bed/chair exit alarm, Call light in reach, Stay With Me (per policy), Patient to call for help with toileting needs    History of Falls Interventions: Bed/chair exit alarm, Room close to nurse's station         Problem: Pressure Injury - Risk of  Goal: *Prevention of pressure injury  Description: Document Malvin Scale and appropriate interventions in the flowsheet.   Outcome: Progressing Towards Goal  Note: Pressure Injury Interventions:  Sensory Interventions: Assess changes in LOC, Assess need for specialty bed, Chair cushion, Check visual cues for pain, Discuss PT/OT consult with provider, Float heels, Maintain/enhance activity level, Keep linens dry and wrinkle-free, Minimize linen layers, Monitor skin under medical devices, Pad between skin to skin    Moisture Interventions: Absorbent underpads, Internal/External fecal devices, Internal/External urinary devices    Activity Interventions: Increase time out of bed, PT/OT evaluation    Mobility Interventions: Assess need for specialty bed, HOB 30 degrees or less, PT/OT evaluation, Float heels    Nutrition Interventions: Offer support with meals,snacks and hydration, Document food/fluid/supplement intake

## 2020-05-13 NOTE — PROGRESS NOTES
Problem: Mobility Impaired (Adult and Pediatric)  Goal: *Acute Goals and Plan of Care  Description: STG:  (1.)Maciej Myers will move from supine to sit and sit to supine , scoot up and down and roll side to side with MODERATE ASSIST within 3 treatment day(s). (2.)Stephen D Wauneta Lesches will transfer from bed to chair and chair to bed with MAXIMAL ASSIST using the least restrictive device within 3 treatment day(s). (3.)Stephen D Wauneta Lesches will perform standing static and dynamic balance activities x 5 minutes with MINIMAL ASSIST to improve safety within 3 treatment day(s). (4.)Stephen D Wauneta Lesches will perform bilateral lower extremity exercises x 20 min for HEP with SUPERVISION to improve strength, endurance, and functional mobility within 3 treatment day(s). LTG:  (1.)Stephen D Wauneta Lesches  will move from supine to sit and sit to supine , scoot up and down and roll side to side with CONTACT GUARD ASSIST within 7 treatment day(s). (2.)Stephen D Wauneta Lesches will transfer from bed to chair and chair to bed with MINIMAL ASSIST using the least restrictive device within 7 treatment day(s). (3.)Stephen D Wauneta Lesches will ambulate with MINIMAL ASSIST for 50 feet with the least restrictive device within 7 treatment day(s). (4.)Stephen D Wauneta Lesches will perform standing static and dynamic balance activities x 15 minutes with CONTACT GUARD ASSIST to improve safety within 7 treatment day(s).     PHYSICAL THERAPY: Daily Note and PM 5/13/2020  INPATIENT: PT Visit Days : 2  [place] / [place] / Product Type: Medicare /       NAME/AGE/GENDER: Apple Jain is a 78 y.o. male   PRIMARY DIAGNOSIS: Sepsis ([facility] [address] [A41.9]  UTI (urinary tract infection) [N39.0] Sepsis ([facility] [address] Sepsis ([facility] [address]       ICD-10: Treatment Diagnosis   · Generalized Muscle Weakness (M62.81)  · Other lack of cordination (R27.8)  · Difficulty in walking, Not elsewhere classified (R26.2)  · Other abnormalities of gait and mobility (R26.89)  · History of falling (Z91.81)   Precaution/Allergies:  Penicillin g; Percocet [oxycodone-acetaminophen]; and Sulfa (sulfonamide antibiotics)      ASSESSMENT:     Mr. Ez Ramos is a 78year old M who presents to hospital after falling out of bed. Admitted with UTI and sepsis. PMH includes CVA with residual L sided weakness. Prior to hospital admission pt lives alone, with a friend checking on him daily, in a one story home with no step(s) to enter. Pt endorses just this one fall in past 6 months. Prior to admission Mr. Ez Ramos uses a rollator walker for mobility. 5/13 second session in order to assist pt back to bed per pt/RN request: Pt presents sitting up in chair, agreeable to mobility and requesting to get back to bed. Reports he was having discomfort in his neck which is why he wishes to get back to bed so soon. Co-treatment initiated to include scooting towards edge of chair Navi/additional time. Sit <> stand Mod A x2. Still with lean to L. Steps to edge of bed, Mod Ax2/RW. Pt with several episodes of unsteadiness and BLE crossing requiring max A and correction to maintain safety and balance. Supported standing Mod A edge of bed with BUE support at [address] [place] [address] while pt got cleaned up. Attempted to instruct pt in side steps toward head of bed however pt with immense difficulty progressing BLE, decreased coordination. Pt performed functional activities sitting edge of bed unsupported with fair sitting balance and trunk control. Sit > supine with Mod-Max A x2 - pt seems to have difficulty initiating mobility into supine with significant assist.  At end of session pt resting in bed, with all needs within reach, alarm activated for safety, RN at bedside. Pt presents as functioning below his baseline, with deficits in mobility including transfers, gait, balance, and activity tolerance.  Pt will benefit from skilled therapy services to address stated deficits to promote return to highest level of function, independence, and safety. Will continue therapy efforts towards stated goals which continue to be appropriate. Pt made progress with sitting balance, transfers, and standing activities this session. Still high fall risk. At this time, patient is appropriate for Co-treatment with occupational therapy due to patient's decreased overall endurance/tolerance levels, as well as need for high level skilled assistance to complete functional transfers/mobility and functional tasks. Kerwin Coombs is appropriate for a multidisciplinary co-treatment of PT and OT to address goals of both disciplines. This section established at most recent assessment   PROBLEM LIST (Impairments causing functional limitations):  1. Decreased Strength  2. Decreased ADL/Functional Activities  3. Decreased Transfer Abilities  4. Decreased Ambulation Ability/Technique  5. Decreased Balance  6. Decreased Activity Tolerance   INTERVENTIONS PLANNED: (Benefits and precautions of physical therapy have been discussed with the patient.)  1. Balance Exercise  2. Bed Mobility  3. Family Education  4. Gait Training  5. Home Exercise Program (HEP)  6. Neuromuscular Re-education/Strengthening  7. Range of Motion (ROM)  8. Therapeutic Activites  9. Therapeutic Exercise/Strengthening  10. Transfer Training     TREATMENT PLAN: Frequency/Duration: 3 times a week for duration of hospital stay  Rehabilitation Potential For Stated Goals: Good     REHAB RECOMMENDATIONS (at time of discharge pending progress):    Placement:   It is my opinion, based on this patient's performance to date, that Mr. Lexy Loza may benefit from intensive therapy at a [address] [place] [address] after discharge due to the functional deficits listed above that are likely to improve with skilled rehabilitation and concerns that he/she may be unsafe to be unsupervised at home due to medical complications and mobility deficits which put him at increase risk of functional decline and/or falling. Equipment:   [employer], Type: Commode (Bedside)            HISTORY:   History of Present Injury/Illness (Reason for Referral):  Per H&P:\"Patient is a 78 y.o. male who presented to the ED for cc weakness to which he slid off his bed without noted trauma and strong odor to urine. Patient is a poor historian so hard to get detailed history. 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 Darnell Barros, and CVA with residual right sided weakness. Lives by himself. \"  Past Medical History/Comorbidities:   Mr. Ez Ramos  has a past medical history of Alcoholic pancreatitis (07/7503), [facility] Aneurysm artery, celiac ([facility] [address] (8/6/2014), Chronic pain, CVA (cerebral infarction) (1998, 2008), Depression, Diabetes ([address] (2010), Diabetic ulcer of left great toe ([address] Enlarged aorta ([facility] [address] (8/6/2014), Gout, History of stroke (1998), Hypertension, Mixed hyperlipidemia, Obesity, Skin cancer, Stroke ([address] Unspecified constipation, and Unspecified vitamin D deficiency. He also has no past medical history of Chronic kidney disease. Mr. Ez Ramos  has a past surgical history that includes hx tonsillectomy; hx adenoidectomy; pr appendectomy; hx orthopaedic (12/2012); hx orthopaedic (last 4/2013); hx orthopaedic (11/2013); hx cataract removal (OD-2013/OS-2014); hx urological (1980's); and hx colonoscopy (2007, 2010, 2014). Social History/Living Environment:   Home Environment: Private residence  # Steps to Enter: 0  One/Two Story Residence: One story  Living Alone: Yes  Support Systems: Friends \ neighbors  Patient Expects to be Discharged to[de-identified] Unknown  Current DME Used/Available at Home: Shower chair, Walker, rollator  Tub or Shower Type: Shower  Prior Level of Function/Work/Activity:  PMH includes CVA with residual L sided weakness. Prior to hospital admission pt lives alone, with a friend checking on him daily, in a one story home with no step(s) to enter.  Pt endorses just this one fall in past 6 months. Prior to admission Mr. Janet Hedrick uses a rollator walker for mobility. Number of Personal Factors/Comorbidities that affect the Plan of Care: 1-2: MODERATE COMPLEXITY   EXAMINATION:   Most Recent Physical Functioning:   Gross Assessment:  AROM: Within functional limits  Strength: Generally decreased, functional  Coordination: Generally decreased, functional               Posture:  Posture (WDL): Exceptions to WDL  Posture Assessment: Forward head, Rounded shoulders, Trunk flexion  Balance:  Sitting: Impaired  Sitting - Static: Good (unsupported)  Sitting - Dynamic: Fair (occasional)  Standing: Impaired  Standing - Static: Poor  Standing - Dynamic : Poor Bed Mobility:  Rolling: Moderate assistance;Assist x2  Supine to Sit: Moderate assistance;Assist x2  Sit to Supine: Moderate assistance;Maximum assistance;Assist x2  Scooting: Moderate assistance  Interventions: Safety awareness training; Tactile cues; Verbal cues  Wheelchair Mobility:     Transfers:  Sit to Stand: Moderate assistance;Assist x2  Stand to Sit: Moderate assistance;Assist x2  Bed to Chair: Moderate assistance;Assist x2  Interventions: Safety awareness training; Tactile cues; Verbal cues  Gait:     Base of Support: Center of gravity altered;Narrowed  Speed/Lulú: Shuffled;Slow;Pace decreased (<100 feet/min)  Step Length: Left shortened;Right shortened  Swing Pattern: Left asymmetrical;Right asymmetrical  Gait Abnormalities: Decreased step clearance; Hemiplegic; Path deviations;Scissoring;Shuffling gait;Trunk sway increased  Distance (ft): 5 Feet (ft)  Assistive Device: Walker, rolling;Gait belt  Ambulation - Level of Assistance: Moderate assistance;Assist x2      Body Structures Involved:  1. Nerves  2. Bones  3. Joints  4. Muscles Body Functions Affected:  1. Genitourinary  2. Neuromusculoskeletal  3. Movement Related Activities and Participation Affected:  1. General Tasks and Demands  2. Mobility  3.  Self Care  4. Domestic Life  5. Interpersonal Interactions and Relationships   Number of elements that affect the Plan of Care: 4+: HIGH COMPLEXITY   CLINICAL PRESENTATION:   Presentation: Evolving clinical presentation with changing clinical characteristics: MODERATE COMPLEXITY   CLINICAL DECISION MAKIN [facility] Mobility Inpatient Short Form  How much difficulty does the patient currently have. .. Unable A Lot A Little None   1. Turning over in bed (including adjusting bedclothes, sheets and blankets)? [] 1   [x] 2   [] 3   [] 4   2. Sitting down on and standing up from a chair with arms ( e.g., wheelchair, bedside commode, etc.)   [] 1   [x] 2   [] 3   [] 4   3. Moving from lying on back to sitting on the side of the bed? [] 1   [x] 2   [] 3   [] 4   How much help from another person does the patient currently need. .. Total A Lot A Little None   4. Moving to and from a bed to a chair (including a wheelchair)? [] 1   [x] 2   [] 3   [] 4   5. Need to walk in hospital room? [x] 1   [] 2   [] 3   [] 4   6. Climbing 3-5 steps with a railing? [x] 1   [] 2   [] 3   [] 4   © , Trustees of [address] [place] [address] [address] [address], under license to [employer]. All rights reserved      Score:  Initial: 10 Most Recent: X (Date: -- )    Interpretation of Tool:  Represents activities that are increasingly more difficult (i.e. Bed mobility, Transfers, Gait). Medical Necessity:     · Patient is expected to demonstrate progress in   · strength, range of motion, balance, coordination, and functional technique  ·  to   · improve safety during all mobility. · .  Reason for Services/Other Comments:  · Patient continues to require skilled intervention due to   · medical complications and mobility deficits which imipact his level of function, safety, and independence as indicated above.    · .   Use of outcome tool(s) and clinical judgement create a POC that gives a: Questionable prediction of patient's progress: MODERATE COMPLEXITY        TREATMENT:   (In addition to Assessment/Re-Assessment sessions the following treatments were rendered)   Pre-treatment Symptoms/Complaints:  Wishing to get back to bed  Pain: Initial:   Pain Intensity 1: 0  Post Session:  0/10     Today's treatment session addressed Decreased Strength, Decreased ADL/Functional Activities, Decreased Transfer Abilities, Decreased Ambulation Ability/Technique, Decreased Balance and Decreased Activity Tolerance to progress towards achieving stated therapy goals. During this session, Occupational Therapy addressed ADLs to progress towards their discipline specific goal(s). Co-treatment was necessary to improve patient's cognitive participation, ability to follow higher level commands, ability to increase activity demands and ability to return to normal functional activity. Therapeutic Activity: (    29 Minutes): Therapeutic activities including bed mobility (sit <> supine, scooting, rolling) and sit <> stand transfer training, sitting and standing balance training, and steps to bedside chair to improve mobility, strength, balance, and coordination. Required maximal cues   to promote static and dynamic balance in standing and promote coordination of bilateral, upper extremity(s), lower extremity(s). Braces/Orthotics/Lines/Etc:   · IV  · bowen catheter  · O2 Device: Room Air  Treatment/Session Assessment:    · Response to Treatment:  see above  · Interdisciplinary Collaboration:   o Physical Therapist  o Occupational Therapist  o Registered Nurse  · After treatment position/precautions:   o Supine in bed  o Bed alarm/tab alert on  o Bed/Chair-wheels locked  o Bed in low position  o Call light within reach  o Nurse at bedside   · Compliance with Program/Exercises: Will assess as treatment progresses  · Recommendations/Intent for next treatment session:   \"Next visit will focus on advancements to more challenging activities and reduction in assistance provided\".     Total Treatment Duration:  PT Patient Time In/Time Out  Time In: 0140  Time Out: [address] MORENITA Walker

## 2020-05-13 NOTE — PROGRESS NOTES
Problem: Mobility Impaired (Adult and Pediatric)  Goal: *Acute Goals and Plan of Care  Description: STG:  (1.)Maciej Myers will move from supine to sit and sit to supine , scoot up and down and roll side to side with MODERATE ASSIST within 3 treatment day(s). (2.)Maciej Birmingham will transfer from bed to chair and chair to bed with MAXIMAL ASSIST using the least restrictive device within 3 treatment day(s). (3.)Maciej Birmingham will perform standing static and dynamic balance activities x 5 minutes with MINIMAL ASSIST to improve safety within 3 treatment day(s). (4.)Maciej Birmingham will perform bilateral lower extremity exercises x 20 min for HEP with SUPERVISION to improve strength, endurance, and functional mobility within 3 treatment day(s). LTG:  (1.)Maciej Birmingham  will move from supine to sit and sit to supine , scoot up and down and roll side to side with CONTACT GUARD ASSIST within 7 treatment day(s). (2.)Maciej Birmingham will transfer from bed to chair and chair to bed with MINIMAL ASSIST using the least restrictive device within 7 treatment day(s). (3.)Maciej Birmingham will ambulate with MINIMAL ASSIST for 50 feet with the least restrictive device within 7 treatment day(s). (4.)Maciej Birmingham will perform standing static and dynamic balance activities x 15 minutes with CONTACT GUARD ASSIST to improve safety within 7 treatment day(s).     PHYSICAL THERAPY: Daily Note and PM 5/13/2020  INPATIENT: PT Visit Days : 2  [place] / [place] / Product Type: Medicare /       NAME/AGE/GENDER: Charles Cuevas is a 78 y.o. male   PRIMARY DIAGNOSIS: Sepsis ([facility] [address] [A41.9]  UTI (urinary tract infection) [N39.0] Sepsis ([facility] [address] Sepsis ([facility] [address]       ICD-10: Treatment Diagnosis   · Generalized Muscle Weakness (M62.81)  · Other lack of cordination (R27.8)  · Difficulty in walking, Not elsewhere classified (R26.2)  · Other abnormalities of gait and mobility (R26.89)  · History of falling (Z91.81)   Precaution/Allergies:  Penicillin g; Percocet [oxycodone-acetaminophen]; and Sulfa (sulfonamide antibiotics)      ASSESSMENT:     Mr. Jose Felix is a 78year old M who presents to hospital after falling out of bed. Admitted with UTI and sepsis. PMH includes CVA with residual L sided weakness. Prior to hospital admission pt lives alone, with a friend checking on him daily, in a one story home with no step(s) to enter. Pt endorses just this one fall in past 6 months. Prior to admission Mr. Jose Felix uses a rollator walker for mobility. 5/13: Upon entering, pt resting in bed, agreeable to PT. Slouched posture with lateral lean and head turned towards L side. He reports no pain at rest. Pt performed supine > sit with Mod Ax2 and additional time and cues for sequencing, sitting EOB with improved sitting balance control compared to previous session. Sit > stand with Mod Ax2 using RW. Improved ability to get to upright standing, however still with heavy L lean requiring assist to maintain safety and balance in standing. Side steps to bedside chair; pt with delayed coordination BLE and decreased ability to progress BLE requiring increased cues. Pt began to sit prematurely despite cues/instruction from therapist. Max Ax2 to get pt to sit safely in chair & control descent. Pt able to scoot towards back of chair with CGA. At end of session pt resting in chair, footrest elevated, with all needs within reach, alarm activated for safety, RN notified. Pt presents as functioning below his baseline, with deficits in mobility including transfers, gait, balance, and activity tolerance. Pt will benefit from skilled therapy services to address stated deficits to promote return to highest level of function, independence, and safety. Will continue therapy efforts towards stated goals which continue to be appropriate.  Pt made progress with sitting balance, transfers, and standing activities this session. Still high fall risk. At this time, patient is appropriate for Co-treatment with occupational therapy due to patient's decreased overall endurance/tolerance levels, as well as need for high level skilled assistance to complete functional transfers/mobility and functional tasks. Sidney Figueroa is appropriate for a multidisciplinary co-treatment of PT and OT to address goals of both disciplines. This section established at most recent assessment   PROBLEM LIST (Impairments causing functional limitations):  1. Decreased Strength  2. Decreased ADL/Functional Activities  3. Decreased Transfer Abilities  4. Decreased Ambulation Ability/Technique  5. Decreased Balance  6. Decreased Activity Tolerance   INTERVENTIONS PLANNED: (Benefits and precautions of physical therapy have been discussed with the patient.)  1. Balance Exercise  2. Bed Mobility  3. Family Education  4. Gait Training  5. Home Exercise Program (HEP)  6. Neuromuscular Re-education/Strengthening  7. Range of Motion (ROM)  8. Therapeutic Activites  9. Therapeutic Exercise/Strengthening  10. Transfer Training     TREATMENT PLAN: Frequency/Duration: 3 times a week for duration of hospital stay  Rehabilitation Potential For Stated Goals: Good     REHAB RECOMMENDATIONS (at time of discharge pending progress):    Placement: It is my opinion, based on this patient's performance to date, that Mr. Everton Garcia may benefit from intensive therapy at a [address] [place] [address] after discharge due to the functional deficits listed above that are likely to improve with skilled rehabilitation and concerns that he/she may be unsafe to be unsupervised at home due to medical complications and mobility deficits which put him at increase risk of functional decline and/or falling.   Equipment:   [employer], Type: Commode (Bedside)            HISTORY:   History of Present Injury/Illness (Reason for Referral):  Per H&P:\"Patient is a 78 y.o. male who presented to the ED for cc weakness to which he slid off his bed without noted trauma and strong odor to urine. Patient is a poor historian so hard to get detailed history. 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 Behzad Stringer, and CVA with residual right sided weakness. Lives by himself. \"  Past Medical History/Comorbidities:   Mr. Avelina Lindquist  has a past medical history of Alcoholic pancreatitis (97/2902), [facility] Aneurysm artery, celiac ([facility] [address] (8/6/2014), Chronic pain, CVA (cerebral infarction) (1998, 2008), Depression, Diabetes ([facility] [address] (2010), Diabetic ulcer of left great toe ([facility] [address] Enlarged aorta ([facility] [address] (8/6/2014), Gout, History of stroke (1998), Hypertension, Mixed hyperlipidemia, Obesity, Skin cancer, Stroke ([facility] [address] Unspecified constipation, and Unspecified vitamin D deficiency. He also has no past medical history of Chronic kidney disease. Mr. Avelina Lindquist  has a past surgical history that includes hx tonsillectomy; hx adenoidectomy; pr appendectomy; hx orthopaedic (12/2012); hx orthopaedic (last 4/2013); hx orthopaedic (11/2013); hx cataract removal (OD-2013/OS-2014); hx urological (1980's); and hx colonoscopy (2007, 2010, 2014). Social History/Living Environment:   Home Environment: Private residence  # Steps to Enter: 0  One/Two Story Residence: One story  Living Alone: Yes  Support Systems: Friends \ neighbors  Patient Expects to be Discharged to[de-identified] Unknown  Current DME Used/Available at Home: Shower chair, Walker, rollator  Tub or Shower Type: Shower  Prior Level of Function/Work/Activity:  PMH includes CVA with residual L sided weakness. Prior to hospital admission pt lives alone, with a friend checking on him daily, in a one story home with no step(s) to enter. Pt endorses just this one fall in past 6 months. Prior to admission Mr. Avelina Lindquist uses a rollator walker for mobility.    Number of Personal Factors/Comorbidities that affect the Plan of Care: 1-2: MODERATE COMPLEXITY   EXAMINATION:   Most Recent Physical Functioning:   Gross Assessment:  AROM: Within functional limits  Strength: Generally decreased, functional  Coordination: Generally decreased, functional               Posture:  Posture (WDL): Exceptions to WDL  Posture Assessment: Forward head, Rounded shoulders, Trunk flexion  Balance:  Sitting: Impaired  Sitting - Static: Fair (occasional)  Sitting - Dynamic: Fair (occasional)  Standing: Impaired  Standing - Static: Poor  Standing - Dynamic : Poor Bed Mobility:  Rolling: Moderate assistance;Assist x2  Supine to Sit: Moderate assistance;Assist x2  Sit to Supine: Moderate assistance;Assist x2  Scooting: Moderate assistance;Assist x2  Interventions: Safety awareness training; Tactile cues; Verbal cues  Wheelchair Mobility:     Transfers:  Sit to Stand: Moderate assistance;Assist x2  Stand to Sit: Moderate assistance;Assist x2  Bed to Chair: Moderate assistance;Maximum assistance;Assist x2  Interventions: Safety awareness training; Tactile cues; Verbal cues  Gait:            Body Structures Involved:  1. Nerves  2. Bones  3. Joints  4. Muscles Body Functions Affected:  1. Genitourinary  2. Neuromusculoskeletal  3. Movement Related Activities and Participation Affected:  1. General Tasks and Demands  2. Mobility  3. Self Care  4. Domestic Life  5. Interpersonal Interactions and Relationships   Number of elements that affect the Plan of Care: 4+: HIGH COMPLEXITY   CLINICAL PRESENTATION:   Presentation: Evolving clinical presentation with changing clinical characteristics: MODERATE COMPLEXITY   CLINICAL DECISION MAKIN [facility] Mobility Inpatient Short Form  How much difficulty does the patient currently have. .. Unable A Lot A Little None   1. Turning over in bed (including adjusting bedclothes, sheets and blankets)? [] 1   [x] 2   [] 3   [] 4   2.   Sitting down on and standing up from a chair with arms ( e.g., wheelchair, bedside commode, etc.)   [] 1   [x] 2   [] 3   [] 4   3. Moving from lying on back to sitting on the side of the bed? [] 1   [x] 2   [] 3   [] 4   How much help from another person does the patient currently need. .. Total A Lot A Little None   4. Moving to and from a bed to a chair (including a wheelchair)? [] 1   [x] 2   [] 3   [] 4   5. Need to walk in hospital room? [x] 1   [] 2   [] 3   [] 4   6. Climbing 3-5 steps with a railing? [x] 1   [] 2   [] 3   [] 4   © 2007, Trustees of [address] [place] [address] [address] [address], under license to [employer]. All rights reserved      Score:  Initial: 10 Most Recent: X (Date: -- )    Interpretation of Tool:  Represents activities that are increasingly more difficult (i.e. Bed mobility, Transfers, Gait). Medical Necessity:     · Patient is expected to demonstrate progress in   · strength, range of motion, balance, coordination, and functional technique  ·  to   · improve safety during all mobility. · .  Reason for Services/Other Comments:  · Patient continues to require skilled intervention due to   · medical complications and mobility deficits which imipact his level of function, safety, and independence as indicated above. · .   Use of outcome tool(s) and clinical judgement create a POC that gives a: Questionable prediction of patient's progress: MODERATE COMPLEXITY        TREATMENT:   (In addition to Assessment/Re-Assessment sessions the following treatments were rendered)   Pre-treatment Symptoms/Complaints:  Requesting to eat lunch  Pain: Initial:   Pain Intensity 1: 0  Post Session:  0/10     Therapeutic Activity: (    23 Minutes): Therapeutic activities including bed mobility (sit <> supine, scooting, rolling) and sit <> stand transfer training, standing balance training, and steps to bedside chair to improve mobility, strength, balance, and coordination.   Required maximal   to promote static and dynamic balance in standing and promote coordination of bilateral, upper extremity(s), lower extremity(s). Braces/Orthotics/Lines/Etc:   · IV  · bowen catheter  · O2 Device: Room Air  Treatment/Session Assessment:    · Response to Treatment:  see above  · Interdisciplinary Collaboration:   o Physical Therapist  o Registered Nurse  o Rehabilitation Attendant  · After treatment position/precautions:   o Up in chair  o Bed alarm/tab alert on  o Bed/Chair-wheels locked  o Bed in low position  o Call light within reach  o RN notified   · Compliance with Program/Exercises: Will assess as treatment progresses  · Recommendations/Intent for next treatment session: \"Next visit will focus on advancements to more challenging activities and reduction in assistance provided\".     Total Treatment Duration:  PT Patient Time In/Time Out  Time In: 1305  Time Out: [address], [place]

## 2020-05-14 LAB
ANION GAP SERPL CALC-SCNC: 10 MMOL/L (ref 7–16)
BUN SERPL-MCNC: 9 MG/DL (ref 8–23)
CALCIUM SERPL-MCNC: 8.3 MG/DL (ref 8.3–10.4)
CHLORIDE SERPL-SCNC: 106 MMOL/L (ref 98–107)
CK SERPL-CCNC: 1272 U/L (ref 21–215)
CO2 SERPL-SCNC: 21 MMOL/L (ref 21–32)
CREAT SERPL-MCNC: 0.6 MG/DL (ref 0.8–1.5)
EMERGENT DISEASE PANEL, EDPR: NOT DETECTED
GLUCOSE BLD STRIP.AUTO-MCNC: 117 MG/DL (ref 65–100)
GLUCOSE BLD STRIP.AUTO-MCNC: 120 MG/DL (ref 65–100)
GLUCOSE BLD STRIP.AUTO-MCNC: 135 MG/DL (ref 65–100)
GLUCOSE BLD STRIP.AUTO-MCNC: 144 MG/DL (ref 65–100)
GLUCOSE SERPL-MCNC: 134 MG/DL (ref 65–100)
MAGNESIUM SERPL-MCNC: 1.8 MG/DL (ref 1.8–2.4)
MM INDURATION POC: 0 MM (ref 0–5)
POTASSIUM SERPL-SCNC: 3.3 MMOL/L (ref 3.5–5.1)
PPD POC: NEGATIVE NEGATIVE
SODIUM SERPL-SCNC: 137 MMOL/L (ref 136–145)

## 2020-05-14 PROCEDURE — 65660000000 HC RM CCU STEPDOWN

## 2020-05-14 PROCEDURE — 74011250636 HC RX REV CODE- 250/636: Performed by: FAMILY MEDICINE

## 2020-05-14 PROCEDURE — 74011250637 HC RX REV CODE- 250/637: Performed by: FAMILY MEDICINE

## 2020-05-14 PROCEDURE — 82962 GLUCOSE BLOOD TEST: CPT

## 2020-05-14 PROCEDURE — 80048 BASIC METABOLIC PNL TOTAL CA: CPT

## 2020-05-14 PROCEDURE — 74011250636 HC RX REV CODE- 250/636: Performed by: INTERNAL MEDICINE

## 2020-05-14 PROCEDURE — 82550 ASSAY OF CK (CPK): CPT

## 2020-05-14 PROCEDURE — 83735 ASSAY OF MAGNESIUM: CPT

## 2020-05-14 PROCEDURE — 74011250637 HC RX REV CODE- 250/637: Performed by: INTERNAL MEDICINE

## 2020-05-14 PROCEDURE — 36415 COLL VENOUS BLD VENIPUNCTURE: CPT

## 2020-05-14 PROCEDURE — 74011000258 HC RX REV CODE- 258: Performed by: FAMILY MEDICINE

## 2020-05-14 RX ORDER — POTASSIUM CHLORIDE 14.9 MG/ML
20 INJECTION INTRAVENOUS ONCE
Status: DISCONTINUED | OUTPATIENT
Start: 2020-05-14 | End: 2020-05-14

## 2020-05-14 RX ADMIN — SODIUM CHLORIDE 150 ML/HR: 9 INJECTION, SOLUTION INTRAVENOUS at 09:32

## 2020-05-14 RX ADMIN — SODIUM CHLORIDE 150 ML/HR: 9 INJECTION, SOLUTION INTRAVENOUS at 01:56

## 2020-05-14 RX ADMIN — ASPIRIN 81 MG 324 MG: 81 TABLET ORAL at 08:41

## 2020-05-14 RX ADMIN — HEPARIN SODIUM 5000 UNITS: 5000 INJECTION INTRAVENOUS; SUBCUTANEOUS at 21:49

## 2020-05-14 RX ADMIN — ACETAMINOPHEN 650 MG: 325 TABLET, FILM COATED ORAL at 16:04

## 2020-05-14 RX ADMIN — SODIUM CHLORIDE 1000 ML: 900 INJECTION, SOLUTION INTRAVENOUS at 11:00

## 2020-05-14 RX ADMIN — SODIUM CHLORIDE 150 ML/HR: 9 INJECTION, SOLUTION INTRAVENOUS at 21:47

## 2020-05-14 RX ADMIN — OXYBUTYNIN CHLORIDE 10 MG: 10 TABLET, EXTENDED RELEASE ORAL at 08:40

## 2020-05-14 RX ADMIN — ATORVASTATIN CALCIUM 40 MG: 40 TABLET, FILM COATED ORAL at 21:48

## 2020-05-14 RX ADMIN — HEPARIN SODIUM 5000 UNITS: 5000 INJECTION INTRAVENOUS; SUBCUTANEOUS at 05:47

## 2020-05-14 RX ADMIN — EZETIMIBE 10 MG: 10 TABLET ORAL at 21:48

## 2020-05-14 RX ADMIN — HEPARIN SODIUM 5000 UNITS: 5000 INJECTION INTRAVENOUS; SUBCUTANEOUS at 12:32

## 2020-05-14 RX ADMIN — POTASSIUM BICARBONATE 20 MEQ: 782 TABLET, EFFERVESCENT ORAL at 12:32

## 2020-05-14 RX ADMIN — CEFTRIAXONE 1 G: 1 INJECTION, POWDER, FOR SOLUTION INTRAMUSCULAR; INTRAVENOUS at 15:19

## 2020-05-14 NOTE — PROGRESS NOTES
Problem: Urinary Tract Infection - Adult  Goal: *Absence of infection signs and symptoms  Outcome: Progressing Towards Goal     Problem: Diabetes Self-Management  Goal: *Disease process and treatment process  Description: Define diabetes and identify own type of diabetes; list 3 options for treating diabetes. Outcome: Progressing Towards Goal  Goal: *Incorporating nutritional management into lifestyle  Description: Describe effect of type, amount and timing of food on blood glucose; list 3 methods for planning meals.   Outcome: Progressing Towards Goal

## 2020-05-14 NOTE — PROGRESS NOTES
Hospitalist Progress Note    2020  Admit Date: 2020  7:38 PM   NAME: Familia Robledo   :  1950   MRN:  [id number]   Attending: Harvey Aguirre MD  PCP:  Shilpa Owens MD    HPI/SUBJECTIVE:   78 y.o. male who presented to the ED for cc weakness to which he slid off his bed without noted trauma and strong odor to urine. 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 Shade Galloway, and CVA with residual right sided weakness. Lives by himself. Admitted for sepsis due to E. Coli UTI.    : Pt seen, laying in bed, afebrile, feels better, AAO X 3. Denies pain. Nursing notes and chart reviewed. Review of Systems negative with exception of pertinent positives noted above. PHYSICAL EXAM     Visit Vitals  /82 (BP 1 Location: Left arm, BP Patient Position: At rest;Head of bed elevated (Comment degrees))   Pulse 76   Temp 99.6 °F (37.6 °C)   Resp 18   Wt 86.1 kg (189 lb 13.1 oz)   SpO2 93%   BMI 26.47 kg/m²      Temp (24hrs), Av.3 °F (36.8 °C), Min:97.6 °F (36.4 °C), Max:99.6 °F (37.6 °C)    Oxygen Therapy  O2 Sat (%): 93 % (20 0741)  O2 Device: Room air (20 1430)    Intake/Output Summary (Last 24 hours) at 2020 0900  Last data filed at 2020 8892  Gross per 24 hour   Intake 3086 ml   Output 2550 ml   Net 536 ml         General: No acute distress. Alert.    Head:  AT/NC  Lungs:  CTABL. Heart:  RRR, no murmur, rub, or gallop  Abdomen: Soft, non-distended, non-tender, +bs  Extremities: No cyanosis or clubbing. Neurologic:  No focal deficits. Moves all extremities. Skin:  No Obvious Rash  Psych:  Normal affect. LABS AND STUDIES:  Personally reviewed all labs, meds, and studies for past 24hrs.       ASSESSMENT      Active Hospital Problems    Diagnosis Date Noted    Sepsis ([facility] [address] 2020    UTI (urinary tract infection) 2020    Unsteady gait 2017    Urinary incontinence 2017    Diabetes mellitus with complication ([email or web address] 85/21/1711    Mixed hyperlipidemia     Aneurysm artery, celiac ([email or web address] 08/06/2014    CVA, old, hemiparesis ([email or web address] 05/24/2012       PLAN:    · Sepsis secondary to E Coli UTI- c/w Ceftriaxone till 5/15.     · Lactic acidosis - Secondary to sepsis. Resolved.     · DM type II - on SS, trend sugars, A1c 7.4.      · CVA with chronic right sided weakness: Poor hygiene noted and very weak on exam. PPD. Consult PT/OT. Daily high dose ASA. COVID test negative for placement.     · Aortic aneurysm - Repeat US with stable findings. BP control. Outpatient follow-up.     · Elevated total bilirubin - Likely due to current sepsis. Trend. Holding statin. · Hypomagnesemia: Replete and recheck. · Fall precautions    Dispo: Pending improvement, likely in 2-3 days, SW consulted. Needs SNF. Full code  DVT ppx:  hsq  Discussed plan with pt who is in agreement. All questions answered.     Signed By: Manuel Johansen MD     May 14, 2020

## 2020-05-14 NOTE — PROGRESS NOTES
Patient alert and oriented x 4 with periods of confusion. Respirations even and unlabored. Lung sounds diminished in bases. Cruz draining clear yellow urine. Bowel sounds active. Trace edema present in bilateral feet. Denies any needs. No distress observed. Call light in reach. Bed in low position. Bed alarm on.  Door left open

## 2020-05-14 NOTE — PROGRESS NOTES
Problem: Urinary Tract Infection - Adult  Goal: *Absence of infection signs and symptoms  Outcome: Progressing Towards Goal     Problem: Patient Education: Go to Patient Education Activity  Goal: Patient/Family Education  Outcome: Progressing Towards Goal     Problem: Diabetes Self-Management  Goal: *Disease process and treatment process  Description: Define diabetes and identify own type of diabetes; list 3 options for treating diabetes. Outcome: Progressing Towards Goal  Goal: *Incorporating nutritional management into lifestyle  Description: Describe effect of type, amount and timing of food on blood glucose; list 3 methods for planning meals. Outcome: Progressing Towards Goal  Goal: *Incorporating physical activity into lifestyle  Description: State effect of exercise on blood glucose levels. Outcome: Progressing Towards Goal  Goal: *Developing strategies to promote health/change behavior  Description: Define the ABC's of diabetes; identify appropriate screenings, schedule and personal plan for screenings. Outcome: Progressing Towards Goal  Goal: *Using medications safely  Description: State effect of diabetes medications on diabetes; name diabetes medication taking, action and side effects. Outcome: Progressing Towards Goal  Goal: *Monitoring blood glucose, interpreting and using results  Description: Identify recommended blood glucose targets  and personal targets. Outcome: Progressing Towards Goal  Goal: *Prevention, detection, treatment of acute complications  Description: List symptoms of hyper- and hypoglycemia; describe how to treat low blood sugar and actions for lowering  high blood glucose level. Outcome: Progressing Towards Goal  Goal: *Prevention, detection and treatment of chronic complications  Description: Define the natural course of diabetes and describe the relationship of blood glucose levels to long term complications of diabetes.   Outcome: Progressing Towards Goal  Goal: *Developing strategies to address psychosocial issues  Description: Describe feelings about living with diabetes; identify support needed and support network  Outcome: Progressing Towards Goal  Goal: *Insulin pump training  Outcome: Progressing Towards Goal  Goal: *Sick day guidelines  Outcome: Progressing Towards Goal  Goal: *Patient Specific Goal (EDIT GOAL, INSERT TEXT)  Outcome: Progressing Towards Goal     Problem: Patient Education: Go to Patient Education Activity  Goal: Patient/Family Education  Outcome: Progressing Towards Goal     Problem: Patient Education: Go to Patient Education Activity  Goal: Patient/Family Education  Outcome: Progressing Towards Goal     Problem: Patient Education: Go to Patient Education Activity  Goal: Patient/Family Education  Outcome: Progressing Towards Goal     Problem: Falls - Risk of  Goal: *Absence of Falls  Description: Document Gianni Patel Fall Risk and appropriate interventions in the flowsheet. Outcome: Progressing Towards Goal  Note: Fall Risk Interventions:  Mobility Interventions: Bed/chair exit alarm, Patient to call before getting OOB, OT consult for ADLs    Mentation Interventions: Adequate sleep, hydration, pain control, Bed/chair exit alarm, Door open when patient unattended, Increase mobility, Reorient patient, Update white board    Medication Interventions: Bed/chair exit alarm, Evaluate medications/consider consulting pharmacy    Elimination Interventions: Bed/chair exit alarm, Call light in reach, Toileting schedule/hourly rounds    History of Falls Interventions: Bed/chair exit alarm, Door open when patient unattended, Evaluate medications/consider consulting pharmacy         Problem: Patient Education: Go to Patient Education Activity  Goal: Patient/Family Education  Outcome: Progressing Towards Goal     Problem: Pressure Injury - Risk of  Goal: *Prevention of pressure injury  Description: Document Malvin Scale and appropriate interventions in the flowsheet.   Outcome: Progressing Towards Goal  Note: Pressure Injury Interventions:  Sensory Interventions: Assess changes in LOC, Assess need for specialty bed, Maintain/enhance activity level    Moisture Interventions: Apply protective barrier, creams and emollients, Absorbent underpads, Moisture barrier    Activity Interventions: Pressure redistribution bed/mattress(bed type), PT/OT evaluation    Mobility Interventions: HOB 30 degrees or less, Pressure redistribution bed/mattress (bed type), PT/OT evaluation    Nutrition Interventions: Document food/fluid/supplement intake                     Problem: Patient Education: Go to Patient Education Activity  Goal: Patient/Family Education  Outcome: Progressing Towards Goal

## 2020-05-14 NOTE — PROGRESS NOTES
Patient is agreeable to discharging to rehab. Referrals made to [facility] and [facility]. Awaiting response. Addendum  CM called patient's emergency contact and made her aware of the discharge plan.

## 2020-05-15 ENCOUNTER — APPOINTMENT (OUTPATIENT)
Dept: GENERAL RADIOLOGY | Age: 70
DRG: 872 | End: 2020-05-15
Attending: INTERNAL MEDICINE
Payer: MEDICARE

## 2020-05-15 LAB
ANION GAP SERPL CALC-SCNC: 8 MMOL/L (ref 7–16)
BUN SERPL-MCNC: 9 MG/DL (ref 8–23)
CALCIUM SERPL-MCNC: 8.5 MG/DL (ref 8.3–10.4)
CHLORIDE SERPL-SCNC: 108 MMOL/L (ref 98–107)
CK SERPL-CCNC: 974 U/L (ref 21–215)
CO2 SERPL-SCNC: 21 MMOL/L (ref 21–32)
CREAT SERPL-MCNC: 0.56 MG/DL (ref 0.8–1.5)
GLUCOSE BLD STRIP.AUTO-MCNC: 122 MG/DL (ref 65–100)
GLUCOSE BLD STRIP.AUTO-MCNC: 145 MG/DL (ref 65–100)
GLUCOSE BLD STRIP.AUTO-MCNC: 165 MG/DL (ref 65–100)
GLUCOSE BLD STRIP.AUTO-MCNC: 192 MG/DL (ref 65–100)
GLUCOSE SERPL-MCNC: 121 MG/DL (ref 65–100)
POTASSIUM SERPL-SCNC: 3.5 MMOL/L (ref 3.5–5.1)
SODIUM SERPL-SCNC: 137 MMOL/L (ref 136–145)

## 2020-05-15 PROCEDURE — 36415 COLL VENOUS BLD VENIPUNCTURE: CPT

## 2020-05-15 PROCEDURE — 74011250637 HC RX REV CODE- 250/637: Performed by: FAMILY MEDICINE

## 2020-05-15 PROCEDURE — 97530 THERAPEUTIC ACTIVITIES: CPT

## 2020-05-15 PROCEDURE — 74011250636 HC RX REV CODE- 250/636: Performed by: INTERNAL MEDICINE

## 2020-05-15 PROCEDURE — 74011250637 HC RX REV CODE- 250/637: Performed by: INTERNAL MEDICINE

## 2020-05-15 PROCEDURE — 82550 ASSAY OF CK (CPK): CPT

## 2020-05-15 PROCEDURE — 74011000258 HC RX REV CODE- 258: Performed by: FAMILY MEDICINE

## 2020-05-15 PROCEDURE — 82962 GLUCOSE BLOOD TEST: CPT

## 2020-05-15 PROCEDURE — 74011636637 HC RX REV CODE- 636/637: Performed by: FAMILY MEDICINE

## 2020-05-15 PROCEDURE — 80048 BASIC METABOLIC PNL TOTAL CA: CPT

## 2020-05-15 PROCEDURE — 71045 X-RAY EXAM CHEST 1 VIEW: CPT

## 2020-05-15 PROCEDURE — 74011250636 HC RX REV CODE- 250/636: Performed by: FAMILY MEDICINE

## 2020-05-15 PROCEDURE — 65660000000 HC RM CCU STEPDOWN

## 2020-05-15 RX ADMIN — ASPIRIN 81 MG 324 MG: 81 TABLET ORAL at 08:30

## 2020-05-15 RX ADMIN — SODIUM CHLORIDE 150 ML/HR: 9 INJECTION, SOLUTION INTRAVENOUS at 18:01

## 2020-05-15 RX ADMIN — HEPARIN SODIUM 5000 UNITS: 5000 INJECTION INTRAVENOUS; SUBCUTANEOUS at 13:00

## 2020-05-15 RX ADMIN — EZETIMIBE 10 MG: 10 TABLET ORAL at 21:37

## 2020-05-15 RX ADMIN — OXYBUTYNIN CHLORIDE 10 MG: 10 TABLET, EXTENDED RELEASE ORAL at 08:30

## 2020-05-15 RX ADMIN — INSULIN LISPRO 2 UNITS: 100 INJECTION, SOLUTION INTRAVENOUS; SUBCUTANEOUS at 21:37

## 2020-05-15 RX ADMIN — ATORVASTATIN CALCIUM 40 MG: 40 TABLET, FILM COATED ORAL at 21:37

## 2020-05-15 RX ADMIN — INSULIN LISPRO 2 UNITS: 100 INJECTION, SOLUTION INTRAVENOUS; SUBCUTANEOUS at 18:01

## 2020-05-15 RX ADMIN — SENNOSIDES AND DOCUSATE SODIUM 2 TABLET: 8.6; 5 TABLET ORAL at 21:37

## 2020-05-15 RX ADMIN — SODIUM CHLORIDE 150 ML/HR: 9 INJECTION, SOLUTION INTRAVENOUS at 11:26

## 2020-05-15 RX ADMIN — HEPARIN SODIUM 5000 UNITS: 5000 INJECTION INTRAVENOUS; SUBCUTANEOUS at 21:37

## 2020-05-15 RX ADMIN — HEPARIN SODIUM 5000 UNITS: 5000 INJECTION INTRAVENOUS; SUBCUTANEOUS at 05:36

## 2020-05-15 RX ADMIN — SODIUM CHLORIDE 150 ML/HR: 9 INJECTION, SOLUTION INTRAVENOUS at 05:33

## 2020-05-15 RX ADMIN — CEFTRIAXONE 1 G: 1 INJECTION, POWDER, FOR SOLUTION INTRAMUSCULAR; INTRAVENOUS at 15:00

## 2020-05-15 NOTE — PROGRESS NOTES
Hospitalist Progress Note    5/15/2020  Admit Date: 2020  7:38 PM   NAME: Luz Kessler   :  1950   MRN:  [id number]   Attending: Roberto Metzger MD  PCP:  Melisa Joel MD    HPI/SUBJECTIVE:   78 y.o. male who presented to the ED for cc weakness to which he slid off his bed without noted trauma and strong odor to urine. 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 Khushi Justice, and CVA with residual right sided weakness. Lives by himself. Admitted for sepsis due to E. Coli UTI.    5/15: Pt seen, laying in bed, had fever overnight 100.8, feels better, AAO X 3. Denies pain. Nursing notes and chart reviewed. Review of Systems negative with exception of pertinent positives noted above. PHYSICAL EXAM     Visit Vitals  /72 (BP 1 Location: Right arm, BP Patient Position: At rest;Head of bed elevated (Comment degrees))   Pulse 77   Temp 99.1 °F (37.3 °C)   Resp 18   Wt 86.1 kg (189 lb 13.1 oz)   SpO2 93%   BMI 26.47 kg/m²      Temp (24hrs), Av °F (37.2 °C), Min:98.1 °F (36.7 °C), Max:100.8 °F (38.2 °C)    Oxygen Therapy  O2 Sat (%): 93 % (05/15/20 0409)  O2 Device: Room air (20 1358)    Intake/Output Summary (Last 24 hours) at 5/15/2020 0828  Last data filed at 5/15/2020 0536  Gross per 24 hour   Intake    Output 4610 ml   Net -4610 ml         General: No acute distress. Alert.    Head:  AT/NC  Lungs:  CTABL. Heart:  RRR, no murmur, rub, or gallop  Abdomen: Soft, non-distended, non-tender, +bs  Extremities: No cyanosis or clubbing. Neurologic:  No focal deficits. Moves all extremities. Skin:  No Obvious Rash  Psych:  Normal affect. LABS AND STUDIES:  Personally reviewed all labs, meds, and studies for past 24hrs.       ASSESSMENT      Active Hospital Problems    Diagnosis Date Noted    Sepsis ([address] 2020    UTI (urinary tract infection) 2020    Unsteady gait 2017    Urinary incontinence 2017    Diabetes mellitus with complication ([facility] 51/33/5325    Mixed hyperlipidemia     Aneurysm artery, celiac ([facility] 08/06/2014    CVA, old, hemiparesis ([facility] 05/24/2012       PLAN:    · Sepsis secondary to E Coli UTI- s/p Ceftriaxone till 5/15.     · Lactic acidosis - Secondary to sepsis. Resolved.     · DM type II - on SS, trend sugars, A1c 7.4.      · CVA with chronic right sided weakness: Poor hygiene noted and very weak on exam. PPD. Consult PT/OT. Daily high dose ASA. COVID test negative for placement.     · Aortic aneurysm - Repeat US with stable findings. BP control. Outpatient follow-up.     · Elevated total bilirubin - Likely due to current sepsis. Trend. Holding statin. · Hypomagnesemia: Replete and recheck. · Fall precautions    Dispo: Pending improvement, likely in 2-3 days, SW consulted. Needs SNF. COVID 19 negative. Full code  DVT ppx:  hsq  Discussed plan with pt who is in agreement. All questions answered.     Signed By: Morenita Crawford MD     May 15, 2020

## 2020-05-15 NOTE — PROGRESS NOTES
Problem: Mobility Impaired (Adult and Pediatric)  Goal: *Acute Goals and Plan of Care  Description: STG:  (1.)Maciej Myers will move from supine to sit and sit to supine , scoot up and down and roll side to side with MODERATE ASSIST within 3 treatment day(s). (2.)Maciej De Jesus will transfer from bed to chair and chair to bed with MAXIMAL ASSIST using the least restrictive device within 3 treatment day(s). (3.)Maciejyoly Lambmus Pu will perform standing static and dynamic balance activities x 5 minutes with MINIMAL ASSIST to improve safety within 3 treatment day(s). (4.)Maciej D [place] Pu will perform bilateral lower extremity exercises x 20 min for HEP with SUPERVISION to improve strength, endurance, and functional mobility within 3 treatment day(s). LTG:  (1.)Maciej Gutierrez Pu  will move from supine to sit and sit to supine , scoot up and down and roll side to side with CONTACT GUARD ASSIST within 7 treatment day(s). (2.)Maciej Gutierrez Pu will transfer from bed to chair and chair to bed with MINIMAL ASSIST using the least restrictive device within 7 treatment day(s). (3.)Maciejyoly Gutierrez Pu will ambulate with MINIMAL ASSIST for 50 feet with the least restrictive device within 7 treatment day(s). (4.)Maciejyoly Gutierrez Pu will perform standing static and dynamic balance activities x 15 minutes with CONTACT GUARD ASSIST to improve safety within 7 treatment day(s).     PHYSICAL THERAPY: Daily Note and PM 5/15/2020  INPATIENT: PT Visit Days : 3  [place] / [place] / Product Type: Medicare /       NAME/AGE/GENDER: Iain Norris is a 78 y.o. male   PRIMARY DIAGNOSIS: Sepsis ([facility] [A41.9]  UTI (urinary tract infection) [N39.0] Sepsis ([facility] Sepsis ([facility]       ICD-10: Treatment Diagnosis   · Generalized Muscle Weakness (M62.81)  · Other lack of cordination (R27.8)  · Difficulty in walking, Not elsewhere classified (R26.2)  · Other abnormalities of gait and mobility (R26.89)  · History of falling (Z91.81)   Precaution/Allergies:  Penicillin g; Percocet [oxycodone-acetaminophen]; and Sulfa (sulfonamide antibiotics)      ASSESSMENT:     Mr. Rosi Alvares is a 78year old M who presents to hospital after falling out of bed. Admitted with UTI and sepsis. PMH includes CVA with residual L sided weakness. Prior to hospital admission pt lives alone, with a friend checking on him daily, in a one story home with no step(s) to enter. Pt endorses just this one fall in past 6 months. Prior to admission Mr. Rosi Alvares uses a rollator walker for mobility. 5/14/20: Upon entering, pt resting in bed, agreeable to PT. Pt performed supine > sit with Min Ax2 and additional time and cues for sequencing, sitting EOB with good sitting balance control. Min A to scoot towards edge of bed for safe, comfortable positioning. Sit > stand with Min Ax2 using RW. Improved ability to get to upright standing, however still with significant L lean requiring assist to maintain safety and balance in standing. Side steps edge of bed, pt with delayed coordination BLE and decreased ability to progress BLE requiring increased cues. Decreased standing balance. Sit <> stand with Min Ax2. At one time pt sat prematurely, requiring assist to maintain safety in standing. Once pt fatigued, sit > supine with Min Ax2. At end of session pt resting in bed, all needs within reach, alarm activated for safety, RN notified. Pt presents as functioning below his baseline, with deficits in mobility including transfers, gait, balance, and activity tolerance. Pt will benefit from skilled therapy services to address stated deficits to promote return to highest level of function, independence, and safety. Will continue therapy efforts towards stated goals which continue to be appropriate. Continued progress with sitting and standing balance, transfers, and strength this session.      At this time, patient is appropriate for Co-treatment with occupational therapy due to patient's decreased overall endurance/tolerance levels, as well as need for high level skilled assistance to complete functional transfers/mobility and functional tasks. Iain Norris is appropriate for a multidisciplinary co-treatment of PT and OT to address goals of both disciplines. This section established at most recent assessment   PROBLEM LIST (Impairments causing functional limitations):  1. Decreased Strength  2. Decreased ADL/Functional Activities  3. Decreased Transfer Abilities  4. Decreased Ambulation Ability/Technique  5. Decreased Balance  6. Decreased Activity Tolerance   INTERVENTIONS PLANNED: (Benefits and precautions of physical therapy have been discussed with the patient.)  1. Balance Exercise  2. Bed Mobility  3. Family Education  4. Gait Training  5. Home Exercise Program (HEP)  6. Neuromuscular Re-education/Strengthening  7. Range of Motion (ROM)  8. Therapeutic Activites  9. Therapeutic Exercise/Strengthening  10. Transfer Training     TREATMENT PLAN: Frequency/Duration: 3 times a week for duration of hospital stay  Rehabilitation Potential For Stated Goals: Good     REHAB RECOMMENDATIONS (at time of discharge pending progress):    Placement: It is my opinion, based on this patient's performance to date, that Mr. Matt De Jesus may benefit from intensive therapy at a [address] [place] [address] after discharge due to the functional deficits listed above that are likely to improve with skilled rehabilitation and concerns that he/she may be unsafe to be unsupervised at home due to medical complications and mobility deficits which put him at increase risk of functional decline and/or falling.   Equipment:   Immunovative Therapies, Type: Commode (Bedside)            HISTORY:   History of Present Injury/Illness (Reason for Referral):  Per H&P:\"Patient is a 78 y.o. male who presented to the ED for cc weakness to which he slid off his bed without noted trauma and strong odor to urine. Patient is a poor historian so hard to get detailed history. 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 Christian Ruiz, and CVA with residual right sided weakness. Lives by himself. \"  Past Medical History/Comorbidities:   Mr. Jo-Ann Lees  has a past medical history of Alcoholic pancreatitis (45/9825), Aneurysm [facility]), Aneurysm artery, celiac ([facility] (8/6/2014), Chronic pain, CVA (cerebral infarction) (1998, 2008), Depression, Diabetes ([facility] (2010), Diabetic ulcer of left great toe ([facility] Enlarged aorta ([facility] (8/6/2014), Gout, History of stroke (1998), Hypertension, Mixed hyperlipidemia, Obesity, Skin cancer, Stroke ([facility] Unspecified constipation, and Unspecified vitamin D deficiency. He also has no past medical history of Chronic kidney disease. Mr. Jo-Ann Lees  has a past surgical history that includes hx tonsillectomy; hx adenoidectomy; pr appendectomy; hx orthopaedic (12/2012); hx orthopaedic (last 4/2013); hx orthopaedic (11/2013); hx cataract removal (OD-2013/OS-2014); hx urological (1980's); and hx colonoscopy (2007, 2010, 2014). Social History/Living Environment:   Home Environment: Private residence  # Steps to Enter: 0  One/Two Story Residence: One story  Living Alone: Yes  Support Systems: Friends \ neighbors  Patient Expects to be Discharged to[de-identified] Unknown  Current DME Used/Available at Home: Shower chair, Walker, rollator  Tub or Shower Type: Shower  Prior Level of Function/Work/Activity:  PMH includes CVA with residual L sided weakness. Prior to hospital admission pt lives alone, with a friend checking on him daily, in a one story home with no step(s) to enter. Pt endorses just this one fall in past 6 months. Prior to admission Mr. Jo-Ann Lees uses a rollator walker for mobility.    Number of Personal Factors/Comorbidities that affect the Plan of Care: 1-2: MODERATE COMPLEXITY   EXAMINATION:   Most Recent Physical Functioning:   Gross Assessment:  AROM: Within functional limits  Strength: Generally decreased, functional  Coordination: Generally decreased, functional               Posture:  Posture (WDL): Exceptions to WDL  Posture Assessment: Forward head, Rounded shoulders, Trunk flexion  Balance:  Sitting: Impaired  Sitting - Static: Good (unsupported)  Sitting - Dynamic: Fair (occasional)  Standing: Impaired  Standing - Static: Fair;Constant support  Standing - Dynamic : Poor;Constant support Bed Mobility:  Supine to Sit: Minimum assistance;Assist x1;Assist x2  Sit to Supine: Minimum assistance;Assist x2  Scooting: Minimum assistance;Assist x2  Interventions: Safety awareness training; Tactile cues; Verbal cues  Wheelchair Mobility:     Transfers:  Sit to Stand: Minimum assistance; Moderate assistance;Assist x2  Stand to Sit: Minimum assistance;Assist x2  Bed to Chair: Moderate assistance;Assist x2  Interventions: Safety awareness training; Tactile cues; Verbal cues  Gait:     Base of Support: Center of gravity altered  Speed/Lulú: Shuffled;Slow;Pace decreased (<100 feet/min)  Step Length: Right shortened;Left shortened  Swing Pattern: Left asymmetrical;Right asymmetrical  Gait Abnormalities: Decreased step clearance; Hemiplegic; Path deviations;Scissoring;Shuffling gait;Trunk sway increased  Distance (ft): 5 Feet (ft)  Assistive Device: Walker, rolling;Gait belt  Ambulation - Level of Assistance: Moderate assistance;Assist x2      Body Structures Involved:  1. Nerves  2. Bones  3. Joints  4. Muscles Body Functions Affected:  1. Genitourinary  2. Neuromusculoskeletal  3. Movement Related Activities and Participation Affected:  1. General Tasks and Demands  2. Mobility  3. Self Care  4. Domestic Life  5.  Interpersonal Interactions and Relationships   Number of elements that affect the Plan of Care: 4+: HIGH COMPLEXITY   CLINICAL PRESENTATION:   Presentation: Evolving clinical presentation with changing clinical characteristics: [facility] [id number] [place] [address] AM-PAC 6 Clicks   Basic Mobility Inpatient Short Form  How much difficulty does the patient currently have. .. Unable A Lot A Little None   1. Turning over in bed (including adjusting bedclothes, sheets and blankets)? [] 1   [x] 2   [] 3   [] 4   2. Sitting down on and standing up from a chair with arms ( e.g., wheelchair, bedside commode, etc.)   [] 1   [x] 2   [] 3   [] 4   3. Moving from lying on back to sitting on the side of the bed? [] 1   [x] 2   [] 3   [] 4   How much help from another person does the patient currently need. .. Total A Lot A Little None   4. Moving to and from a bed to a chair (including a wheelchair)? [] 1   [x] 2   [] 3   [] 4   5. Need to walk in hospital room? [x] 1   [] 2   [] 3   [] 4   6. Climbing 3-5 steps with a railing? [x] 1   [] 2   [] 3   [] 4   © , Trustees of [address] [place], [place] [address], under license to [employer]. All rights reserved      Score:  Initial: 10 Most Recent: X (Date: -- )    Interpretation of Tool:  Represents activities that are increasingly more difficult (i.e. Bed mobility, Transfers, Gait). Medical Necessity:     · Patient is expected to demonstrate progress in   · strength, range of motion, balance, coordination, and functional technique  ·  to   · improve safety during all mobility. · .  Reason for Services/Other Comments:  · Patient continues to require skilled intervention due to   · medical complications and mobility deficits which imipact his level of function, safety, and independence as indicated above.    · .   Use of outcome tool(s) and clinical judgement create a POC that gives a: Questionable prediction of patient's progress: MODERATE COMPLEXITY        TREATMENT:   (In addition to Assessment/Re-Assessment sessions the following treatments were rendered)   Pre-treatment Symptoms/Complaints:  None  Pain: Initial:   Pain Intensity 1: 0  Post Session:  0/10     Therapeutic Activity: (    23 Minutes): Therapeutic activities including bed mobility (sit <> supine, scooting, rolling) and sit <> stand transfer training, sitting and standing balance training, and steps edge of bed to improve mobility, strength, balance, and coordination. Required mod cues   to promote static and dynamic balance in standing and promote coordination of bilateral, upper extremity(s), lower extremity(s). Braces/Orthotics/Lines/Etc:   · IV  · bowen catheter  · O2 Device: Room Air  Treatment/Session Assessment:    · Response to Treatment:  see above  · Interdisciplinary Collaboration:   o Physical Therapist  o Registered Nurse  o Rehabilitation Attendant  · After treatment position/precautions:   o Supine in bed  o Bed alarm/tab alert on  o Bed/Chair-wheels locked  o Bed in low position  o Call light within reach  o Nurse at bedside   · Compliance with Program/Exercises: Will assess as treatment progresses  · Recommendations/Intent for next treatment session: \"Next visit will focus on advancements to more challenging activities and reduction in assistance provided\".     Total Treatment Duration:  PT Patient Time In/Time Out  Time In: 5431  Time Out: 99 [facility],

## 2020-05-15 NOTE — PROGRESS NOTES
Patient is resting in bed. Assessment completed. Respirations are even and unlabored. Cruz is intact. No distress at this time. Bed is low, locked and call light within reach.

## 2020-05-16 VITALS
OXYGEN SATURATION: 96 % | HEART RATE: 92 BPM | WEIGHT: 189.82 LBS | BODY MASS INDEX: 26.47 KG/M2 | DIASTOLIC BLOOD PRESSURE: 80 MMHG | SYSTOLIC BLOOD PRESSURE: 125 MMHG | TEMPERATURE: 97.2 F | RESPIRATION RATE: 16 BRPM

## 2020-05-16 LAB
BACTERIA SPEC CULT: NORMAL
BACTERIA SPEC CULT: NORMAL
CK SERPL-CCNC: 696 U/L (ref 21–215)
GLUCOSE BLD STRIP.AUTO-MCNC: 123 MG/DL (ref 65–100)
GLUCOSE BLD STRIP.AUTO-MCNC: 159 MG/DL (ref 65–100)
GLUCOSE BLD STRIP.AUTO-MCNC: 203 MG/DL (ref 65–100)
SERVICE CMNT-IMP: NORMAL
SERVICE CMNT-IMP: NORMAL

## 2020-05-16 PROCEDURE — 74011250637 HC RX REV CODE- 250/637: Performed by: INTERNAL MEDICINE

## 2020-05-16 PROCEDURE — 65660000000 HC RM CCU STEPDOWN

## 2020-05-16 PROCEDURE — 82962 GLUCOSE BLOOD TEST: CPT

## 2020-05-16 PROCEDURE — 74011636637 HC RX REV CODE- 636/637: Performed by: FAMILY MEDICINE

## 2020-05-16 PROCEDURE — 82550 ASSAY OF CK (CPK): CPT

## 2020-05-16 PROCEDURE — 74011250636 HC RX REV CODE- 250/636: Performed by: INTERNAL MEDICINE

## 2020-05-16 PROCEDURE — 74011250637 HC RX REV CODE- 250/637: Performed by: FAMILY MEDICINE

## 2020-05-16 PROCEDURE — 36415 COLL VENOUS BLD VENIPUNCTURE: CPT

## 2020-05-16 PROCEDURE — 74011250636 HC RX REV CODE- 250/636: Performed by: FAMILY MEDICINE

## 2020-05-16 RX ORDER — AMOXICILLIN 250 MG
2 CAPSULE ORAL DAILY
Qty: 30 TAB | Refills: 1 | Status: SHIPPED
Start: 2020-05-16

## 2020-05-16 RX ADMIN — INSULIN LISPRO 2 UNITS: 100 INJECTION, SOLUTION INTRAVENOUS; SUBCUTANEOUS at 11:30

## 2020-05-16 RX ADMIN — HEPARIN SODIUM 5000 UNITS: 5000 INJECTION INTRAVENOUS; SUBCUTANEOUS at 04:32

## 2020-05-16 RX ADMIN — OXYBUTYNIN CHLORIDE 10 MG: 10 TABLET, EXTENDED RELEASE ORAL at 09:38

## 2020-05-16 RX ADMIN — ASPIRIN 81 MG 324 MG: 81 TABLET ORAL at 09:38

## 2020-05-16 RX ADMIN — SODIUM CHLORIDE 150 ML/HR: 9 INJECTION, SOLUTION INTRAVENOUS at 00:49

## 2020-05-16 RX ADMIN — HEPARIN SODIUM 5000 UNITS: 5000 INJECTION INTRAVENOUS; SUBCUTANEOUS at 15:04

## 2020-05-16 NOTE — DISCHARGE SUMMARY
Hospitalist Discharge Summary     Patient ID:  Abisai Bertrand  [id number]  20 y.o.  1950  Admit date: 5/11/2020  7:38 PM  Discharge date and time: 5/16/2020  Attending: Jami Sever, MD  PCP:  Sravanthi Loredo MD  Treatment Team: Attending Provider: Jami Sever, MD; Utilization Review: Laisha Manzano; Utilization Review: Sy Davis RN; Care Manager: Carolyn Brito Principal Diagnosis Sepsis ([address]   Principal Problem:    Sepsis ([address] (5/11/2020)    Active Problems:    CVA, old, hemiparesis ([address] (5/24/2012)      Aneurysm artery, celiac ([address] (8/6/2014)      Mixed hyperlipidemia ()      Diabetes mellitus with complication ([address] (9/05/1096)      Urinary incontinence (11/13/2017)      Unsteady gait (12/13/2017)      UTI (urinary tract infection) (5/11/2020)         Hospital Course:    78 y.o. male who presented to the ED for cc weakness to which he slid off his bed without noted trauma and strong odor to urine. Hx DM type II, HLD, urinary incontinence, 2.5 CM AAA of right common iliac artery followed by Dr. Eloina Finney, and CVA with residual right sided weakness. Lives by himself. Admitted for sepsis due to UTI. He was started on IV fluids and ceftriaxone. Blood cultures were negative and urine culture grew pansensitive E. coli. Patient completed treatment with ceftriaxone. He is afebrile with stable vital signs and feels back to his baseline. Ultrasound of abdominal aorta showed 2.6 cm abdominal aortic aneurysm with stable size. It showed an incidental finding of her right common iliac artery new small dissection flap. This was discussed over the phone with Dr. Yane Mckenna vascular surgery who did not recommend any urgent intervention and recommended outpatient follow-up in his office. Patient is medically ready for discharge to rehab today. He will follow-up with his PCP and with vascular surgery in 2 weeks. Plan discussed with pt who is in agreement with the plan.   All questions answered. Pt was stable at time of discharge. Follow up as per below. Significant Diagnostic Imaging:     Labs: Results:       Chemistry Recent Labs     05/15/20  0545 05/14/20  0613   * 134*    137   K 3.5 3.3*   * 106   CO2 21 21   BUN 9 9   CREA 0.56* 0.60*   CA 8.5 8.3   AGAP 8 10      CBC w/Diff No results for input(s): WBC, RBC, HGB, HCT, PLT, GRANS, LYMPH, EOS, HGBEXT, HCTEXT, PLTEXT in the last 72 hours. Cardiac Enzymes Recent Labs     05/16/20  0632 05/15/20  0545   * 974*      Coagulation No results for input(s): PTP, INR, APTT, INREXT in the last 72 hours. Last A1c Lab Results   Component Value Date/Time    Hemoglobin A1c 7.4 (H) 05/11/2020 10:55 PM    Hemoglobin A1c, External 6.0 05/19/2015      Lipid Panel Lab Results   Component Value Date/Time    Cholesterol, total 124 09/01/2017 10:17 AM    HDL Cholesterol 32 (L) 09/01/2017 10:17 AM    LDL, calculated 64 09/01/2017 10:17 AM    VLDL, calculated 28 09/01/2017 10:17 AM    Triglyceride 139 09/01/2017 10:17 AM      BNP No results for input(s): BNPP in the last 72 hours. Liver Enzymes No results for input(s): TP, ALB, TBIL, AP, SGOT, GPT in the last 72 hours. No lab exists for component: DBIL   Thyroid Studies Lab Results   Component Value Date/Time    TSH 2.340 09/01/2017 10:17 AM          Xr Chest Sngl V    Result Date: 5/15/2020  EXAMINATION: CHEST RADIOGRAPH 5/15/2020 10:23 AM ACCESSION NUMBER: [id number] INDICATION: R/O aspiration PNEUMONIA COMPARISON: Chest x-ray 3/12/2008, CT abdomen and pelvis 1/26/2015 TECHNIQUE: A single AP view of the chest was obtained. FINDINGS: Support Lines and Tubes: None Cardiac Silhouette: Within normal limits in size. Lungs: There is a left-sided retrocardiac opacity in the setting of low lung volumes. Pleura: No pleural effusion. No pneumothorax. Osseous Structures: Multiple healed left rib fractures. Upper Abdomen: Unremarkable. IMPRESSION: 1.  Left lung base retrocardiac opacity. In the setting of low lung volumes, this is favored to be atelectasis. However, there is elevated concern for infection or aspiration, upright PA and lateral radiographs with deep inspiration would be of benefit. 2. Multiple healed left rib fractures. VOICE DICTATED BY: Dr. Zander Caldwell    Duplex Aorta/ivc/iliac/grafts Complete    Result Date: 5/12/2020  TITLE: Aortoiliacarterial ultrasound examination. INDICATION: Abdominal aortic aneurysm, 441.9. TECHNIQUE:  Grayscale, Color, and Spectral Doppler Interrogation performed. COMPARISON: Report dated 3/21/2018 and CT dated June 26, 2015. ABDOMEN:  The abdominal aorta measures maximally 2.7 cm in diameter. Previous maximum size is 2.6 cm. Of note, the proximal and mid aorta are not well-seen secondary to overlying bowel gas RIGHT LOWER EXTREMITY:  Common iliac artery equals 2.5 cm., Stable in appearance. However, note is made in the posterior wall, there may be a nonflow-limiting dissection flap. This is a new finding. LEFT LOWER EXTREMITY:  Common iliac artery equals 2.1 cm. Previously, this measured 1.9 cm. IMPRESSION:  1. Grossly stable appearance and size of the abdominal aorta and left common iliac artery. 2. The right common iliac artery is grossly stable in size however there may be a new small dissection flap.         Discharge Exam:  Patient Vitals for the past 24 hrs:   Temp Pulse Resp BP SpO2   05/16/20 1125 97.1 °F (36.2 °C) 83 18 117/83 92 %   05/16/20 0730 97.3 °F (36.3 °C) 68 20 (!) 143/91 94 %   05/16/20 0422 98.3 °F (36.8 °C) 74 16 134/80 92 %   05/15/20 2343 99.1 °F (37.3 °C) 68 16 124/79 95 %   05/15/20 2028 98.6 °F (37 °C) 73 18 124/89 94 %   05/15/20 1600 98.2 °F (36.8 °C) 71 18 126/72 95 %     Visit Vitals  /83 (BP 1 Location: Right arm, BP Patient Position: At rest;Supine)   Pulse 83   Temp 97.1 °F (36.2 °C)   Resp 18   Wt 86.1 kg (189 lb 13.1 oz)   SpO2 92%   BMI 26.47 kg/m²     General appearance: alert, cooperative, no distress  Lungs: CTABL  Heart: RRR, no m/r/g  Abdomen: soft, non-tender. Bowel sounds normal.   Extremities: no cyanosis. Neurologic: Left hemiparesis, cranial nerves intact. Disposition: Short-term rehab  Discharge Condition: stable  Patient Instructions: Activity as tolerated. Cardiac/diabetic diet. Current Discharge Medication List      START taking these medications    Details   senna-docusate (PERICOLACE) 8.6-50 mg per tablet Take 2 Tabs by mouth daily. Qty: 30 Tab, Refills: 1         CONTINUE these medications which have NOT CHANGED    Details   oxybutynin chloride XL (DITROPAN XL) 10 mg CR tablet Take 10 mg by mouth daily. dulaglutide (TRULICITY) 5.56 AY/5.3 mL sub-q pen 0.5 mL by SubCUTAneous route every seven (7) days. Qty: 4 Syringe, Refills: 3    Associated Diagnoses: Diabetes mellitus with complication (HCC)      cyclobenzaprine (FLEXERIL) 10 mg tablet Take 1 Tab by mouth three (3) times daily as needed for Muscle Spasm(s). Qty: 30 Tab, Refills: 1    Associated Diagnoses: Left-sided low back pain without sciatica, unspecified chronicity      metFORMIN ER (GLUCOPHAGE XR) 500 mg tablet Take 2 Tabs by mouth two (2) times a day. Qty: 120 Tab, Refills: 3    Associated Diagnoses: Diabetes mellitus with complication (HCC)      Blood-Glucose Meter monitoring kit Use as directed  DX: E11.8  Qty: 1 Kit, Refills: 0      glucose blood VI test strips (BLOOD GLUCOSE TEST) strip Test twice daily  DX: E11.8  Qty: 100 Strip, Refills: 11      Lancets misc Use twice daily   DX: E11.8  Qty: 100 Each, Refills: 11      mirabegron ER (MYRBETRIQ) 50 mg ER tablet Take 1 Tab by mouth daily. Qty: 30 Tab, Refills: 3    Associated Diagnoses: Urinary incontinence, unspecified type      atorvastatin (LIPITOR) 40 mg tablet Take 1 Tab by mouth nightly. Qty: 90 Tab, Refills: 1    Associated Diagnoses: Mixed hyperlipidemia      ezetimibe (ZETIA) 10 mg tablet Take 1 Tab by mouth nightly.   Qty: 90 Tab, Refills: 1 Associated Diagnoses: Mixed hyperlipidemia      L-Mfolate-B6 Phos-Methyl-B12 (METANX) 3-35-2 mg tab tab Take 1 Tab by mouth daily. Indications: VITAMIN DEFICIENCY PREVENTION  Qty: 90 Tab, Refills: 1    Associated Diagnoses: Mixed hyperlipidemia      linaclotide (LINZESS) 145 mcg cap capsule Take 1 Cap by mouth Daily (before breakfast). Qty: 90 Cap, Refills: 1    Associated Diagnoses: Other constipation      ibuprofen (MOTRIN) 600 mg tablet Take  by mouth every six (6) hours as needed for Pain.      ergocalciferol (VITAMIN D2) 50,000 unit capsule Take 50,000 Units by mouth every seven (7) days. aspirin (ASPIRIN) 325 mg tablet Take 325 mg by mouth nightly. fenofibric acid (TRILIPIX) 135 mg capsule Take 135 mg by mouth nightly. Vaccinations:   Pt screened by nursing for pneumococcal and influenza vaccination needs at discharge, will be ordered if needed and pt consented. Immunization History   Administered Date(s) Administered    Influenza High Dose Vaccine PF 10/18/2017    Influenza Vaccine 11/02/2013    Pneumococcal Conjugate (PCV-13) 08/30/2016    Pneumococcal Vaccine (Unspecified Type) 02/15/2010, 11/24/2014    TB Skin Test (PPD) Intradermal 05/11/2020    Tdap 02/15/2010    Zoster Vaccine, Live 01/01/2015       Follow-up Information     Follow up With Specialties Details Why Contact Info    [address], Courtney Black MD Family Practice   [address] [place] [address],[address], [address]  [phone]            Time spent in the discharge process was 35 minutes.       Signed By: Demetri Madera MD     May 16, 2020

## 2020-05-16 NOTE — PROGRESS NOTES
Reviewed notes for spiritual concerns prior to visit  Visit with patient to build rapport with . Calm  Encouraged with presence and words of hope    Patient demonstrates confidence in the care team.  Appears to be coping with illness.     No physical contact with patient per guidelines  Assurance of ongoing prayers    Nice man to visit       Renetta Olivier,  Staff   C: [phone]  /  [email or web address]

## 2020-05-16 NOTE — PROGRESS NOTES
Patient is up for discharge to [facility] today. Patient will be getting transported via [employer]. CM contacted [facility] to schedule transport for patient. Est time of arrival from [facility] is 5:30-6:00.

## 2020-05-16 NOTE — PROGRESS NOTES
Patient's AKASH Fulton) called stating that the patient told her that he does not want to go to [facility], but wants to go [facility]. I explained to her that [facility] did not have a bed available, only Van out of the recommendations that she provided us with. She asked me if I know if the facility has any patient's with COVID-19. I told her that I would not have that information but she could call the facility and ask them, but I was not sure if they would give her that information or not. I proceeded to explain to her the benefits of the patient going to a SNF (due to the 24 hour care, etc). She stated that if the patient did not want to go to the SNF, then he would pay to have nurses come to his house around the clock. She said that she was going to call the SNF and ask if they had any patient's with COVID-19, and call the patient to see if he still wanted to go and she would call me back. I explained to her that I have already contacted the ambulance services and that I needed to know quickly so that I would know whether or not to cancel the transport. She understood.

## 2020-05-16 NOTE — PROGRESS NOTES
HOB elevated , left hemiparesis, repositioned in bed, alert, oriented X 4, no acute distress, weakness left side, Lungs crackles bases anterior and posterior, bowen catheter, cloudy, right foot drop.

## 2020-05-16 NOTE — PROGRESS NOTES
Shift assessment completed via doc flow sheet. Pt A & O to person place and situation. Lungs clear but diminished, HR WNL, NSR. Abdomen soft and non tender. C/o constipation. PRN Pericolace 2 tabs administered. No c/o pain at this time. IVS c/d/i, no s/sx of infection/infiltration noted. Cruz in place. Draining well. Pt able to make needs known. No concerns at this time. Bed alarm in place. Bed low and locked. Call light within reach. Will continue to monitor.

## 2020-05-17 NOTE — PROGRESS NOTES
TRANSFER - OUT REPORT:    Verbal report given to Сергей on Camron Moise  being transferred to [facility] for routine progression of care       Report consisted of patients Situation, Background, Assessment and   Recommendations(SBAR). Information from the following report(s) SBAR was reviewed with the receiving nurse. Lines:   Peripheral IV 05/16/20 Right Antecubital (Active)        Opportunity for questions and clarification was provided. Patient transported via ambulance.

## 2020-05-18 ENCOUNTER — PATIENT OUTREACH (OUTPATIENT)
Dept: CASE MANAGEMENT | Age: 70
End: 2020-05-18

## 2020-05-19 NOTE — PROGRESS NOTES
Transition of care outreach postponed for 14 days due to patient's discharge to SNF. [facility] This note will not be viewable in [address]

## 2020-05-22 ENCOUNTER — PATIENT OUTREACH (OUTPATIENT)
Dept: CASE MANAGEMENT | Age: 70
End: 2020-05-22

## 2020-05-22 NOTE — PROGRESS NOTES
Community Care Team documentation for patient in [facility]    The information below provided by:Van    PT Update: PT ambulates 10 feet with Mod A and FWW, transfers CGA, UB Adls CGA and LB adls Mod A, toileting Mod A        Nursing Update:no acute nsg issues      Discharge Date:5/27/20 TBD      Assign to [address] [place] [address]

## 2020-05-29 ENCOUNTER — PATIENT OUTREACH (OUTPATIENT)
Dept: CASE MANAGEMENT | Age: 70
End: 2020-05-29

## 2020-05-30 NOTE — PROGRESS NOTES
Patient contacted regarding recent discharge and COVID-19 risk. Discussed COVID-19 related testing which was not done at this time. Test results were not done. Patient informed of results, if available? N/A    Care Transition Nurse/ Ambulatory Care Manager contacted the patient by telephone to perform post discharge assessment. Verified name and  with patient as identifiers. Patient has following risk factors of: immunocompromised. CTN/ACM reviewed discharge instructions, medical action plan and red flags related to discharge diagnosis. Reviewed and educated them on any new and changed medications related to discharge diagnosis. Advised obtaining a 90-day supply of all daily and as-needed medications. Education provided regarding infection prevention, and signs and symptoms of COVID-19 and when to seek medical attention with patient who verbalized understanding. Discussed exposure protocols and quarantine from [address] you at higher risk for severe illness  and given an opportunity for questions and concerns. The patient agrees to contact the COVID-19 hotline [phone] or PCP office for questions related to their healthcare. CTN/ACM provided contact information for future reference. From CDC: Are you at higher risk for severe illness?  Wash your hands often.  Avoid close contact (6 feet, which is about two arm lengths) with people who are sick.  Put distance between yourself and other people if COVID-19 is spreading in your community.  Clean and disinfect frequently touched surfaces.  Avoid all cruise travel and non-essential air travel.  Call your healthcare professional if you have concerns about COVID-19 and your underlying condition or if you are sick.     For more information on steps you can take to protect yourself, see CDC's How to Protect Yourself      Patient/family/caregiver given information for Kimo Dutta and agrees to enroll no  Patient's preferred e-mail:  N/A  Patient's preferred phone number: N/A  Based on Loop alert triggers, patient will be contacted by nurse care manager for worsening symptoms: no    Plan for follow-up call in 7-14 days based on severity of symptoms and risk factors.

## 2020-06-11 ENCOUNTER — PATIENT OUTREACH (OUTPATIENT)
Dept: CASE MANAGEMENT | Age: 70
End: 2020-06-11

## 2022-03-09 ENCOUNTER — HOSPITAL ENCOUNTER (EMERGENCY)
Age: 72
Discharge: HOME | End: 2022-03-09
Attending: EMERGENCY MEDICINE
Payer: MEDICARE

## 2022-03-09 VITALS
OXYGEN SATURATION: 97 % | HEIGHT: 71 IN | RESPIRATION RATE: 18 BRPM | SYSTOLIC BLOOD PRESSURE: 133 MMHG | HEART RATE: 93 BPM | WEIGHT: 180 LBS | DIASTOLIC BLOOD PRESSURE: 62 MMHG | BODY MASS INDEX: 25.2 KG/M2 | TEMPERATURE: 98.5 F

## 2022-03-09 DIAGNOSIS — E11.69 TYPE 2 DIABETES MELLITUS WITH OTHER SPECIFIED COMPLICATION, WITHOUT LONG-TERM CURRENT USE OF INSULIN (HCC): Primary | ICD-10-CM

## 2022-03-09 DIAGNOSIS — E86.0 DEHYDRATION: ICD-10-CM

## 2022-03-09 LAB
ALBUMIN SERPL-MCNC: 3.7 G/DL (ref 3.2–4.6)
ALBUMIN/GLOB SERPL: 1 {RATIO} (ref 1.2–3.5)
ALP SERPL-CCNC: 48 U/L (ref 50–136)
ALT SERPL-CCNC: 39 U/L (ref 12–65)
ANION GAP SERPL CALC-SCNC: 7 MMOL/L (ref 7–16)
AST SERPL-CCNC: 51 U/L (ref 15–37)
BACTERIA URNS QL MICRO: 0 /HPF
BASOPHILS # BLD: 0.1 K/UL (ref 0–0.2)
BASOPHILS NFR BLD: 1 % (ref 0–2)
BILIRUB SERPL-MCNC: 1 MG/DL (ref 0.2–1.1)
BUN SERPL-MCNC: 18 MG/DL (ref 8–23)
CALCIUM SERPL-MCNC: 9.1 MG/DL (ref 8.3–10.4)
CASTS URNS QL MICRO: 0 /LPF
CHLORIDE SERPL-SCNC: 106 MMOL/L (ref 98–107)
CO2 SERPL-SCNC: 21 MMOL/L (ref 21–32)
CREAT SERPL-MCNC: 0.87 MG/DL (ref 0.8–1.5)
DIFFERENTIAL METHOD BLD: ABNORMAL
EOSINOPHIL # BLD: 0.1 K/UL (ref 0–0.8)
EOSINOPHIL NFR BLD: 1 % (ref 0.5–7.8)
EPI CELLS #/AREA URNS HPF: ABNORMAL /HPF
ERYTHROCYTE [DISTWIDTH] IN BLOOD BY AUTOMATED COUNT: 12.3 % (ref 11.9–14.6)
GLOBULIN SER CALC-MCNC: 3.8 G/DL (ref 2.3–3.5)
GLUCOSE BLD STRIP.AUTO-MCNC: 210 MG/DL (ref 65–100)
GLUCOSE SERPL-MCNC: 212 MG/DL (ref 65–100)
HCT VFR BLD AUTO: 43.8 % (ref 41.1–50.3)
HGB BLD-MCNC: 15.3 G/DL (ref 13.6–17.2)
IMM GRANULOCYTES # BLD AUTO: 0 K/UL (ref 0–0.5)
IMM GRANULOCYTES NFR BLD AUTO: 0 % (ref 0–5)
LYMPHOCYTES # BLD: 1.3 K/UL (ref 0.5–4.6)
LYMPHOCYTES NFR BLD: 13 % (ref 13–44)
MCH RBC QN AUTO: 33.6 PG (ref 26.1–32.9)
MCHC RBC AUTO-ENTMCNC: 34.9 G/DL (ref 31.4–35)
MCV RBC AUTO: 96.3 FL (ref 79.6–97.8)
MONOCYTES # BLD: 0.9 K/UL (ref 0.1–1.3)
MONOCYTES NFR BLD: 9 % (ref 4–12)
NEUTS SEG # BLD: 7.5 K/UL (ref 1.7–8.2)
NEUTS SEG NFR BLD: 76 % (ref 43–78)
NRBC # BLD: 0 K/UL (ref 0–0.2)
PLATELET # BLD AUTO: 207 K/UL (ref 150–450)
PMV BLD AUTO: 10 FL (ref 9.4–12.3)
POTASSIUM SERPL-SCNC: 4.8 MMOL/L (ref 3.5–5.1)
PROT SERPL-MCNC: 7.5 G/DL (ref 6.3–8.2)
RBC # BLD AUTO: 4.55 M/UL (ref 4.23–5.6)
RBC #/AREA URNS HPF: >100 /HPF
SERVICE CMNT-IMP: ABNORMAL
SODIUM SERPL-SCNC: 134 MMOL/L (ref 136–145)
WBC # BLD AUTO: 9.8 K/UL (ref 4.3–11.1)
WBC URNS QL MICRO: ABNORMAL /HPF

## 2022-03-09 PROCEDURE — 80053 COMPREHEN METABOLIC PANEL: CPT

## 2022-03-09 PROCEDURE — 96360 HYDRATION IV INFUSION INIT: CPT

## 2022-03-09 PROCEDURE — 85025 COMPLETE CBC W/AUTO DIFF WBC: CPT

## 2022-03-09 PROCEDURE — 74011250636 HC RX REV CODE- 250/636: Performed by: EMERGENCY MEDICINE

## 2022-03-09 PROCEDURE — 81015 MICROSCOPIC EXAM OF URINE: CPT

## 2022-03-09 PROCEDURE — 99284 EMERGENCY DEPT VISIT MOD MDM: CPT

## 2022-03-09 PROCEDURE — 96361 HYDRATE IV INFUSION ADD-ON: CPT

## 2022-03-09 PROCEDURE — 82962 GLUCOSE BLOOD TEST: CPT

## 2022-03-09 RX ADMIN — SODIUM CHLORIDE 1000 ML: 900 INJECTION, SOLUTION INTRAVENOUS at 12:53

## 2022-03-09 NOTE — ED TRIAGE NOTES
Per EMS pt has increased weakness and high blood sugar. BGL of 210 in triage. Pt states he feels fine.

## 2022-03-09 NOTE — ED NOTES
I have reviewed discharge instructions with the patient. The patient verbalized understanding. Patient left ED via Discharge Method: stretcher to Home with Lanie Willis. Opportunity for questions and clarification provided. Patient given 0 scripts. To continue your aftercare when you leave the hospital, you may receive an automated call from our care team to check in on how you are doing. This is a free service and part of our promise to provide the best care and service to meet your aftercare needs.  If you have questions, or wish to unsubscribe from this service please call [phone]. Thank you for Choosing our [facility] Emergency Department.

## 2022-03-09 NOTE — ED PROVIDER NOTES
39-year-old well-appearing male presents today after a friend called EMS for him stating that the patient's blood sugar was elevated. Per EMS blood glucose was 294 on arrival, however patient had no other complaints at that time. On arrival here in the ER point-of-care glucose was 214. Patient denies any chest pain, shortness of breath, abdominal pain, nausea. He denies any increased weakness or fatigue. No fever, chills, body aches. Denies any headache or dizziness. No dysuria or hematuria. He states he is unsure why he was sent here. He does check his blood sugars intermittently at home and states he has not had any readings above 200. He takes his medications as prescribed. Per chart review patient does have home health care who helps him manage his medications at home. No recent hospitalizations or surgeries.            Past Medical History:   Diagnosis Date    Alcoholic pancreatitis 78/1880    after mother's death, states he slowed down on his drinking after this episode    Aneurysm ([address]     3 small of aorta, 2.4 x 2.3  cm in diameter is largest    Aneurysm artery, celiac ([address] 8/6/2014    Chronic pain     left foot    CVA (cerebral infarction) 1998, 2008    left sided weakness(TIA) stroke causing weakness    Depression     Diabetes ([address] 2010     typell, Avg fasting ; denies s/s hypo does not know last HA1C    Diabetic ulcer of left great toe ([address]     Enlarged aorta ([address] 8/6/2014    arteriomegaly 2.9cm 7/30/14     Gout     History of stroke 1998    Residual left hemiparesis    Hypertension     Mixed hyperlipidemia     Obesity     Skin cancer     neck    Stroke ([address]     Unspecified constipation     Unspecified vitamin D deficiency        Past Surgical History:   Procedure Laterality Date    HX ADENOIDECTOMY      childhood    HX CATARACT REMOVAL  OD-2013/OS-2014    with IOL placement    HX COLONOSCOPY  2007, 2010, 2014    with polypectomy    HX ORTHOPAEDIC  12/2012    L Problem: Patient Care Overview  Goal: Plan of Care/Patient Progress Review  Outcome: No Change  Orientation: Alert and oriented x4. Awake, alert, cooperative, no apparent distress.    VSS. 99% on RA. Afebrile.  IVF: Dilt gtt infusing 5 ml/hr  Tele: Afib RVR  LS: clear and equal bilaterally. Clear to auscultation bilaterally but diminished throughout, No wheezing  GI: Passing gas. No BM. Denies N/V.  Normal bowel sounds, soft, non-distended, non-tender  : Adequate urine output. Clear yellow.   Skin: WDL/intact. No rashes, no cyanosis, dry to touch  Activity: SBA to assist of 1 to the bathroom  Pain: C/o chest discomfort with ambulation and deep breaths, once in bed and at rest no chest pain. No PRN pain meds given.  Has hx of restless legs, uncomfortable and unable to sleep, requested requip as she takes this med at home to calm her legs at night. Ice pack and lotion applied to BLE to help relieve spasms also. requip given  DC Plan: Continue with current cares. Triggered sepsis/ lactate protocol this AM, 0648 Lactate resulted 2.2, MD paged         great toe fusion    HX ORTHOPAEDIC  last 4/2013    Left great toe surgery to total 3    HX ORTHOPAEDIC  11/2013    amp left great toe    HX TONSILLECTOMY      childhood    HX UROLOGICAL  1980's    spermatocele removed    [place] APPENDECTOMY      over 20 yrs ago         Family History:   Problem Relation Age of Onset    Cancer Mother     Other Brother         Angiosarcoma, AAA    Cancer Brother     Diabetes Brother     Diabetes Father     Hypertension Father     Other Father        Social History     Socioeconomic History    Marital status:      Spouse name: Not on file    Number of children: Not on file    Years of education: Not on file    Highest education level: Not on file   Occupational History    Not on file   Tobacco Use    Smoking status: Current Some Day Smoker     Years: 13.00     Types: Cigars    Smokeless tobacco: Never Used    Tobacco comment: cigar/ 2 per day   Substance and Sexual Activity    Alcohol use: No     Alcohol/week: 0.0 standard drinks     Comment: occassional    Drug use: No    Sexual activity: Not Currently   Other Topics Concern    Not on file   Social History Narrative    Not on file     Social Determinants of Health     Financial Resource Strain:     Difficulty of Paying Living Expenses: Not on file   Food Insecurity:     Worried About Running Out of Food in the Last Year: Not on file    Abram of Food in the Last Year: Not on file   Transportation Needs:     Lack of Transportation (Medical): Not on file    Lack of Transportation (Non-Medical):  Not on file   Physical Activity:     Days of Exercise per Week: Not on file    Minutes of Exercise per Session: Not on file   Stress:     Feeling of Stress : Not on file   Social Connections:     Frequency of Communication with Friends and Family: Not on file    Frequency of Social Gatherings with Friends and Family: Not on file    Attends Scientologist Services: Not on file   [employer] of Clubs or Organizations: Not on file    Attends Club or Organization Meetings: Not on file    Marital Status: Not on file   Intimate Partner Violence:     Fear of Current or Ex-Partner: Not on file    Emotionally Abused: Not on file    Physically Abused: Not on file    Sexually Abused: Not on file   Housing Stability:     Unable to Pay for Housing in the Last Year: Not on file    Number of [place] in the Last Year: Not on file    Unstable Housing in the Last Year: Not on file         ALLERGIES: Penicillin g, Percocet [oxycodone-acetaminophen], and Sulfa (sulfonamide antibiotics)    Review of Systems   Constitutional: Negative for activity change, chills, fatigue and fever. HENT: Negative for congestion, ear pain, sinus pressure and sore throat. Respiratory: Negative for cough and shortness of breath. Cardiovascular: Negative for chest pain. Gastrointestinal: Negative for abdominal pain. Musculoskeletal: Negative for arthralgias. Skin: Negative for rash. Neurological: Negative for dizziness, weakness and headaches. Vitals:    03/09/22 1225   BP: 136/75   Pulse: 93   Resp: 18   Temp: 98.5 °F (36.9 °C)   SpO2: 95%   Weight: 81.6 kg (180 lb)   Height: 5' 11\" (1.803 m)            Physical Exam  Vitals and nursing note reviewed. Constitutional:       General: He is not in acute distress. Appearance: Normal appearance. HENT:      Head: Normocephalic and atraumatic. Mouth/Throat:      Mouth: Mucous membranes are moist.      Pharynx: Oropharynx is clear. No oropharyngeal exudate or posterior oropharyngeal erythema. Eyes:      Conjunctiva/sclera: Conjunctivae normal.   Cardiovascular:      Rate and Rhythm: Normal rate and regular rhythm. Heart sounds: No murmur heard. No friction rub. No gallop. Pulmonary:      Effort: Pulmonary effort is normal.      Breath sounds: No wheezing, rhonchi or rales.    Abdominal:      General: Bowel sounds are normal.      Palpations: Abdomen is soft.      Tenderness: There is no abdominal tenderness. There is no guarding. Musculoskeletal:      Cervical back: Normal range of motion. Skin:     General: Skin is warm and dry. Capillary Refill: Capillary refill takes less than 2 seconds. Neurological:      Mental Status: He is alert and oriented to person, place, and time. Mental status is at baseline. Motor: No weakness. Psychiatric:         Mood and Affect: Mood normal.         Thought Content: Thought content normal.         Judgment: Judgment normal.          MDM  Number of Diagnoses or Management Options  Dehydration  Type 2 diabetes mellitus with other specified complication, without long-term current use of insulin ([facility] [address]  Diagnosis management comments: In summary this is a well-appearing 76-year-old male presenting today after a friend called EMS concerned that the patient's blood sugar was elevated. Himself has no complaints today. He is alert and oriented, though per chart review he does have some baseline dementia secondary to previous CVA. He is currently on Metformin 1500 mg daily and Trulicity for control of his 2 diabetes. On arrival point-of-care glucose 210. Labs are grossly unremarkable with no signs of infection. Urine is unremarkable as well. Patient was treated with IV fluids here today due to mild hyponatremia likely secondary to mild dehydration. Patient feels better after treatment and is ready to be discharged home. Discussed all lab and imaging results with patient and/or family. I discussed my recommendations and treatment plan with the patient and/or family using shared medical decision making they are in agreement with this plan. All medications if prescribed were discussed and possible adverse side effects discussed. Patient and/or family were provided an opportunity to ask questions.  They were educated on signs/symptoms that would warrant emergent re-evaluation in the ER and they verbalized understanding. Maryan Finley; 3/9/2022 @3:53 PM Voice dictation software was used during the making of this note. This software is not perfect and grammatical and other typographical errors may be present.   This note has not been proofread for errors.  ====================================         Amount and/or Complexity of Data Reviewed  Clinical lab tests: ordered and reviewed    Patient Progress  Patient progress: stable    ED Course as of 03/09/22 1508   Wed Mar 09, 2022   1417 Urine micro pending [KE]   1457 RBC(!): >100  Likely from catheterization, no gross hematuria present [KE]      ED Course User Index  [KE] LATONIA Haddad       Procedures

## 2022-03-11 ENCOUNTER — HOSPITAL ENCOUNTER (INPATIENT)
Age: 72
LOS: 6 days | Discharge: NURSING, REHAB OR LONG-TERM CARE | DRG: 064 | End: 2022-03-17
Attending: EMERGENCY MEDICINE | Admitting: STUDENT IN AN ORGANIZED HEALTH CARE EDUCATION/TRAINING PROGRAM
Payer: MEDICARE

## 2022-03-11 ENCOUNTER — APPOINTMENT (OUTPATIENT)
Dept: GENERAL RADIOLOGY | Age: 72
DRG: 064 | End: 2022-03-11
Attending: EMERGENCY MEDICINE
Payer: MEDICARE

## 2022-03-11 ENCOUNTER — APPOINTMENT (OUTPATIENT)
Dept: ULTRASOUND IMAGING | Age: 72
DRG: 064 | End: 2022-03-11
Attending: NURSE PRACTITIONER
Payer: MEDICARE

## 2022-03-11 ENCOUNTER — APPOINTMENT (OUTPATIENT)
Dept: CT IMAGING | Age: 72
DRG: 064 | End: 2022-03-11
Attending: EMERGENCY MEDICINE
Payer: MEDICARE

## 2022-03-11 DIAGNOSIS — R41.0 DELIRIUM: Primary | ICD-10-CM

## 2022-03-11 DIAGNOSIS — R50.9 ACUTE FEBRILE ILLNESS: ICD-10-CM

## 2022-03-11 DIAGNOSIS — I69.359 CVA, OLD, HEMIPARESIS (HCC): ICD-10-CM

## 2022-03-11 DIAGNOSIS — I65.1 BASILAR ARTERY THROMBOSIS: ICD-10-CM

## 2022-03-11 PROBLEM — I10 BENIGN ESSENTIAL HTN: Status: ACTIVE | Noted: 2022-03-11

## 2022-03-11 PROBLEM — R53.1 WEAKNESS: Status: ACTIVE | Noted: 2022-03-11

## 2022-03-11 LAB
ALBUMIN SERPL-MCNC: 3.9 G/DL (ref 3.2–4.6)
ALBUMIN/GLOB SERPL: 1.1 {RATIO} (ref 1.2–3.5)
ALP SERPL-CCNC: 59 U/L (ref 50–136)
ALT SERPL-CCNC: 37 U/L (ref 12–65)
ANION GAP SERPL CALC-SCNC: 7 MMOL/L (ref 7–16)
AST SERPL-CCNC: 23 U/L (ref 15–37)
B PERT DNA SPEC QL NAA+PROBE: NOT DETECTED
BACTERIA URNS QL MICRO: 0 /HPF
BASOPHILS # BLD: 0 K/UL (ref 0–0.2)
BASOPHILS NFR BLD: 1 % (ref 0–2)
BILIRUB SERPL-MCNC: 0.8 MG/DL (ref 0.2–1.1)
BORDETELLA PARAPERTUSSIS PCR, BORPAR: NOT DETECTED
BUN SERPL-MCNC: 18 MG/DL (ref 8–23)
C PNEUM DNA SPEC QL NAA+PROBE: NOT DETECTED
CALCIUM SERPL-MCNC: 9.9 MG/DL (ref 8.3–10.4)
CASTS URNS QL MICRO: ABNORMAL /LPF
CHLORIDE SERPL-SCNC: 106 MMOL/L (ref 98–107)
CK SERPL-CCNC: 113 U/L (ref 21–215)
CO2 SERPL-SCNC: 25 MMOL/L (ref 21–32)
COVID-19 RAPID TEST, COVR: NOT DETECTED
CREAT SERPL-MCNC: 0.88 MG/DL (ref 0.8–1.5)
DIFFERENTIAL METHOD BLD: ABNORMAL
EOSINOPHIL # BLD: 0.1 K/UL (ref 0–0.8)
EOSINOPHIL NFR BLD: 2 % (ref 0.5–7.8)
EPI CELLS #/AREA URNS HPF: ABNORMAL /HPF
ERYTHROCYTE [DISTWIDTH] IN BLOOD BY AUTOMATED COUNT: 12.2 % (ref 11.9–14.6)
FLUAV SUBTYP SPEC NAA+PROBE: NOT DETECTED
FLUBV RNA SPEC QL NAA+PROBE: NOT DETECTED
GLOBULIN SER CALC-MCNC: 3.4 G/DL (ref 2.3–3.5)
GLUCOSE BLD STRIP.AUTO-MCNC: 145 MG/DL (ref 65–100)
GLUCOSE SERPL-MCNC: 199 MG/DL (ref 65–100)
HADV DNA SPEC QL NAA+PROBE: NOT DETECTED
HCOV 229E RNA SPEC QL NAA+PROBE: NOT DETECTED
HCOV HKU1 RNA SPEC QL NAA+PROBE: NOT DETECTED
HCOV NL63 RNA SPEC QL NAA+PROBE: NOT DETECTED
HCOV OC43 RNA SPEC QL NAA+PROBE: NOT DETECTED
HCT VFR BLD AUTO: 44.1 % (ref 41.1–50.3)
HGB BLD-MCNC: 15.4 G/DL (ref 13.6–17.2)
HMPV RNA SPEC QL NAA+PROBE: NOT DETECTED
HPIV1 RNA SPEC QL NAA+PROBE: NOT DETECTED
HPIV2 RNA SPEC QL NAA+PROBE: NOT DETECTED
HPIV3 RNA SPEC QL NAA+PROBE: NOT DETECTED
HPIV4 RNA SPEC QL NAA+PROBE: NOT DETECTED
IMM GRANULOCYTES # BLD AUTO: 0 K/UL (ref 0–0.5)
IMM GRANULOCYTES NFR BLD AUTO: 0 % (ref 0–5)
LACTATE SERPL-SCNC: 1.8 MMOL/L (ref 0.4–2)
LYMPHOCYTES # BLD: 1 K/UL (ref 0.5–4.6)
LYMPHOCYTES NFR BLD: 12 % (ref 13–44)
M PNEUMO DNA SPEC QL NAA+PROBE: NOT DETECTED
MCH RBC QN AUTO: 33.3 PG (ref 26.1–32.9)
MCHC RBC AUTO-ENTMCNC: 34.9 G/DL (ref 31.4–35)
MCV RBC AUTO: 95.5 FL (ref 79.6–97.8)
MONOCYTES # BLD: 0.6 K/UL (ref 0.1–1.3)
MONOCYTES NFR BLD: 8 % (ref 4–12)
NEUTS SEG # BLD: 6.4 K/UL (ref 1.7–8.2)
NEUTS SEG NFR BLD: 77 % (ref 43–78)
NRBC # BLD: 0 K/UL (ref 0–0.2)
PLATELET # BLD AUTO: 198 K/UL (ref 150–450)
PMV BLD AUTO: 10 FL (ref 9.4–12.3)
POTASSIUM SERPL-SCNC: 3.6 MMOL/L (ref 3.5–5.1)
PROCALCITONIN SERPL-MCNC: <0.05 NG/ML (ref 0–0.49)
PROT SERPL-MCNC: 7.3 G/DL (ref 6.3–8.2)
RBC # BLD AUTO: 4.62 M/UL (ref 4.23–5.6)
RBC #/AREA URNS HPF: >100 /HPF
RSV RNA SPEC QL NAA+PROBE: NOT DETECTED
RV+EV RNA SPEC QL NAA+PROBE: NOT DETECTED
SARS-COV-2 PCR, COVPCR: NOT DETECTED
SERVICE CMNT-IMP: ABNORMAL
SODIUM SERPL-SCNC: 138 MMOL/L (ref 136–145)
SOURCE, COVRS: NORMAL
WBC # BLD AUTO: 8.1 K/UL (ref 4.3–11.1)
WBC URNS QL MICRO: ABNORMAL /HPF

## 2022-03-11 PROCEDURE — 71045 X-RAY EXAM CHEST 1 VIEW: CPT

## 2022-03-11 PROCEDURE — 82550 ASSAY OF CK (CPK): CPT

## 2022-03-11 PROCEDURE — 87635 SARS-COV-2 COVID-19 AMP PRB: CPT

## 2022-03-11 PROCEDURE — 0202U NFCT DS 22 TRGT SARS-COV-2: CPT

## 2022-03-11 PROCEDURE — 93978 VASCULAR STUDY: CPT

## 2022-03-11 PROCEDURE — 74011250636 HC RX REV CODE- 250/636: Performed by: NURSE PRACTITIONER

## 2022-03-11 PROCEDURE — 65270000029 HC RM PRIVATE

## 2022-03-11 PROCEDURE — 87086 URINE CULTURE/COLONY COUNT: CPT

## 2022-03-11 PROCEDURE — 2709999900 HC NON-CHARGEABLE SUPPLY

## 2022-03-11 PROCEDURE — 87040 BLOOD CULTURE FOR BACTERIA: CPT

## 2022-03-11 PROCEDURE — 84145 PROCALCITONIN (PCT): CPT

## 2022-03-11 PROCEDURE — 80053 COMPREHEN METABOLIC PANEL: CPT

## 2022-03-11 PROCEDURE — 81015 MICROSCOPIC EXAM OF URINE: CPT

## 2022-03-11 PROCEDURE — 85025 COMPLETE CBC W/AUTO DIFF WBC: CPT

## 2022-03-11 PROCEDURE — 93005 ELECTROCARDIOGRAM TRACING: CPT | Performed by: EMERGENCY MEDICINE

## 2022-03-11 PROCEDURE — 83605 ASSAY OF LACTIC ACID: CPT

## 2022-03-11 PROCEDURE — 74011250637 HC RX REV CODE- 250/637: Performed by: NURSE PRACTITIONER

## 2022-03-11 PROCEDURE — 74011000258 HC RX REV CODE- 258: Performed by: NURSE PRACTITIONER

## 2022-03-11 PROCEDURE — 74011000250 HC RX REV CODE- 250: Performed by: NURSE PRACTITIONER

## 2022-03-11 PROCEDURE — 82962 GLUCOSE BLOOD TEST: CPT

## 2022-03-11 PROCEDURE — 99285 EMERGENCY DEPT VISIT HI MDM: CPT

## 2022-03-11 PROCEDURE — 70450 CT HEAD/BRAIN W/O DYE: CPT

## 2022-03-11 RX ORDER — OXYBUTYNIN CHLORIDE 10 MG/1
10 TABLET, EXTENDED RELEASE ORAL DAILY
Status: DISCONTINUED | OUTPATIENT
Start: 2022-03-12 | End: 2022-03-17 | Stop reason: HOSPADM

## 2022-03-11 RX ORDER — ACETAMINOPHEN 650 MG/1
650 SUPPOSITORY RECTAL
Status: DISCONTINUED | OUTPATIENT
Start: 2022-03-11 | End: 2022-03-17 | Stop reason: HOSPADM

## 2022-03-11 RX ORDER — ENOXAPARIN SODIUM 100 MG/ML
40 INJECTION SUBCUTANEOUS DAILY
Status: DISCONTINUED | OUTPATIENT
Start: 2022-03-12 | End: 2022-03-13 | Stop reason: ALTCHOICE

## 2022-03-11 RX ORDER — SODIUM CHLORIDE 0.9 % (FLUSH) 0.9 %
5-40 SYRINGE (ML) INJECTION AS NEEDED
Status: DISCONTINUED | OUTPATIENT
Start: 2022-03-11 | End: 2022-03-17 | Stop reason: HOSPADM

## 2022-03-11 RX ORDER — AMOXICILLIN 250 MG
2 CAPSULE ORAL DAILY
Status: DISCONTINUED | OUTPATIENT
Start: 2022-03-12 | End: 2022-03-17 | Stop reason: HOSPADM

## 2022-03-11 RX ORDER — ONDANSETRON 4 MG/1
4 TABLET, ORALLY DISINTEGRATING ORAL
Status: DISCONTINUED | OUTPATIENT
Start: 2022-03-11 | End: 2022-03-17 | Stop reason: HOSPADM

## 2022-03-11 RX ORDER — ONDANSETRON 2 MG/ML
4 INJECTION INTRAMUSCULAR; INTRAVENOUS
Status: DISCONTINUED | OUTPATIENT
Start: 2022-03-11 | End: 2022-03-17 | Stop reason: HOSPADM

## 2022-03-11 RX ORDER — INSULIN LISPRO 100 [IU]/ML
INJECTION, SOLUTION INTRAVENOUS; SUBCUTANEOUS
Status: DISCONTINUED | OUTPATIENT
Start: 2022-03-11 | End: 2022-03-17 | Stop reason: HOSPADM

## 2022-03-11 RX ORDER — EZETIMIBE 10 MG/1
10 TABLET ORAL
Status: DISCONTINUED | OUTPATIENT
Start: 2022-03-12 | End: 2022-03-17 | Stop reason: HOSPADM

## 2022-03-11 RX ORDER — SODIUM CHLORIDE 0.9 % (FLUSH) 0.9 %
5-40 SYRINGE (ML) INJECTION EVERY 8 HOURS
Status: DISCONTINUED | OUTPATIENT
Start: 2022-03-11 | End: 2022-03-17 | Stop reason: HOSPADM

## 2022-03-11 RX ORDER — SODIUM CHLORIDE, SODIUM LACTATE, POTASSIUM CHLORIDE, CALCIUM CHLORIDE 600; 310; 30; 20 MG/100ML; MG/100ML; MG/100ML; MG/100ML
100 INJECTION, SOLUTION INTRAVENOUS CONTINUOUS
Status: DISCONTINUED | OUTPATIENT
Start: 2022-03-11 | End: 2022-03-13

## 2022-03-11 RX ORDER — FENOFIBRATE 160 MG/1
160 TABLET ORAL
Status: DISCONTINUED | OUTPATIENT
Start: 2022-03-12 | End: 2022-03-17 | Stop reason: HOSPADM

## 2022-03-11 RX ORDER — ACETAMINOPHEN 325 MG/1
650 TABLET ORAL
Status: DISCONTINUED | OUTPATIENT
Start: 2022-03-11 | End: 2022-03-17 | Stop reason: HOSPADM

## 2022-03-11 RX ORDER — ASPIRIN 325 MG
325 TABLET ORAL
Status: DISCONTINUED | OUTPATIENT
Start: 2022-03-11 | End: 2022-03-16

## 2022-03-11 RX ORDER — ATORVASTATIN CALCIUM 40 MG/1
40 TABLET, FILM COATED ORAL
Status: DISCONTINUED | OUTPATIENT
Start: 2022-03-11 | End: 2022-03-15

## 2022-03-11 RX ORDER — POLYETHYLENE GLYCOL 3350 17 G/17G
17 POWDER, FOR SOLUTION ORAL DAILY PRN
Status: DISCONTINUED | OUTPATIENT
Start: 2022-03-11 | End: 2022-03-17 | Stop reason: HOSPADM

## 2022-03-11 RX ADMIN — ATORVASTATIN CALCIUM 40 MG: 40 TABLET, FILM COATED ORAL at 20:13

## 2022-03-11 RX ADMIN — ASPIRIN 325 MG: 325 TABLET, FILM COATED ORAL at 21:10

## 2022-03-11 RX ADMIN — SODIUM CHLORIDE, PRESERVATIVE FREE 5 ML: 5 INJECTION INTRAVENOUS at 22:00

## 2022-03-11 RX ADMIN — CEFTRIAXONE 1 G: 1 INJECTION, POWDER, FOR SOLUTION INTRAMUSCULAR; INTRAVENOUS at 20:14

## 2022-03-11 RX ADMIN — SODIUM CHLORIDE, SODIUM LACTATE, POTASSIUM CHLORIDE, AND CALCIUM CHLORIDE 100 ML/HR: 600; 310; 30; 20 INJECTION, SOLUTION INTRAVENOUS at 18:54

## 2022-03-11 NOTE — ED NOTES
Un able to obtain urine specimen, resistance met. When retracting catheter, blood clot was present.  Pt asked to stop procedure

## 2022-03-11 NOTE — ED TRIAGE NOTES
Pt presented to ED via EMS from home with c/o unable to get out of chair <24 hours since last seen in ER on 03/09/2022. Pt noted to have urinated on self, deficated on self and bleeding around rectum from sitting in feces. Pt sts in pain when position pt when changing linens and clothes. Per family/friend at bedside, they have a care giver at the residence for 8 hours a day and a Home health RN visits once a week to check on pt.     VVS, Pt alert & oriented x3, respiratory even, non-labored    Pmh: CVA left side deficit

## 2022-03-11 NOTE — H&P
Hospitalist History and Physical   Admit Date:  3/11/2022  1:47 PM   Name:  Belkis Tejeda   Age:  66 y.o. Sex:  male  :  1950   MRN:  [id number]   Room:  [place] [address]    Presenting Complaint: Fatigue and Incontinence    Reason(s) for Admission: Weakness [R53.1]     History of Present Illness:   Belkis Tejeda is a 66 y.o. 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 He lives alone, does have a sitter 8 hours/day, his daughter lives out of state and essentially not involved. EMS was called related to inability to ambulate at all today. Found in feces and urine. Patient knows he is at the hospital knows his name. He is unsure of the date or day or year. Patient usually gets around with a Rollator. Also history of stroke with some residual left-sided weakness. Urine in ED showed 10-20 WBC, he has poor hygiene. T max 99. Procal 0.05, no leukocytosis. CK ordered and pending. BC x2 and urine culture ordered. CT head shows chronic findings of right side lacunar infarct and supraclinoid artery. H/O AAA and iliac aneurysm that he is followed by Dr. Jeremiah Beltrán for. Review of Systems:  10 systems reviewed and negative except as noted in HPI. Assessment & Plan:     Principal Problem:    UTI (urinary tract infection) (2020)    Rocephin  Trend urine and BC    Active Problems:    CVA, old, hemiparesis ([address] (2012)    PT/OT  Resume normal strength ASA and statin          Aneurysm artery, celiac ([address] (2014)    Noted  Never followed up with vascular, will obtain doppler to compare size and based on findings decide if inpatient consult needed    *last measurements:  ABDOMEN:  The abdominal aorta measures maximally 2.7 cm in diameter. Previous  maximum size is 2.6 cm.  Of note, the proximal and mid aorta are not well-seen  secondary to overlying bowel gas     RIGHT LOWER EXTREMITY:  Common iliac artery equals 2.5 cm., Stable in  appearance. However, note is made in the posterior wall, there may be a  nonflow-limiting dissection flap. This is a new finding.     LEFT LOWER EXTREMITY:  Common iliac artery equals 2.1 cm.  Previously, this  measured 1.9 cm.           Mixed hyperlipidemia ()    statin      Diabetes mellitus with complication ([facility] ()    SSI  Last A1C .9      Weakness with frequent falls (3/11/2022)    PT/OT, CM consult, PPD  Check CK  Start  ml/h      Benign essential HTN (3/11/2022)    Resume home meds  Controlled at present    Supraclinoid aneurysm:  Stable and chronic, noted on CT scan  Can follow up outpatient         Dispo/Discharge Plannin-2 days, likely need placement    Diet: ADULT DIET Regular; 3 carb choices (45 gm/meal)  VTE ppx: Lovenox SQ  Code status: Full Code    Hospital Problems as of 3/11/2022 Date Reviewed: 3/11/2022          Codes Class Noted - Resolved POA    Weakness ICD-10-CM: R53.1  ICD-9-CM: 780.79  3/11/2022 - Present Yes        Benign essential HTN ICD-10-CM: I10  ICD-9-CM: 401.1  3/11/2022 - Present Yes        * (Principal) UTI (urinary tract infection) ICD-10-CM: N39.0  ICD-9-CM: 599.0  2020 - Present Yes        Diabetes mellitus with complication ([facility] CLJ-18-CV: E11.8  ICD-9-CM: 250.90  2015 - Present Yes        Mixed hyperlipidemia ICD-10-CM: E78.2  ICD-9-CM: 272.2  Unknown - Present Yes        Aneurysm artery, celiac (HCC) (Chronic) ICD-10-CM: I72.8  ICD-9-CM: 442.84  2014 - Present Yes        CVA, old, hemiparesis ([facility] ICD-10-CM: S46.152  ICD-9-CM: 438.20  2012 - Present Yes              Past History:  Past Medical History:   Diagnosis Date    Alcoholic pancreatitis     after mother's death, states he slowed down on his drinking after this episode    Aneurysm ([address]     3 small of aorta, 2.4 x 2.3  cm in diameter is largest    Aneurysm artery, celiac ([facility] 8/6/2014    Chronic pain     left foot    CVA (cerebral infarction) 1998, 2008    left sided weakness(TIA) stroke causing weakness    Depression     Diabetes ([facility] 2010     typell, Avg fasting ; denies s/s hypo does not know last HA1C    Diabetic ulcer of left great toe ([facility]     Enlarged aorta ([facility] 8/6/2014    arteriomegaly 2.9cm 7/30/14     Gout     History of stroke 1998    Residual left hemiparesis    Hypertension     Mixed hyperlipidemia     Obesity     Skin cancer     neck    Stroke ([facility]     Unspecified constipation     Unspecified vitamin D deficiency      Past Surgical History:   Procedure Laterality Date    HX ADENOIDECTOMY      childhood    HX CATARACT REMOVAL  OD-2013/OS-2014    with IOL placement    HX COLONOSCOPY  2007, 2010, 2014    with polypectomy    HX ORTHOPAEDIC  12/2012    L great toe fusion    HX ORTHOPAEDIC  last 4/2013    Left great toe surgery to total 3    HX ORTHOPAEDIC  11/2013    amp left great toe    HX TONSILLECTOMY      childhood    HX UROLOGICAL  1980's    spermatocele removed    SC APPENDECTOMY      over 20 yrs ago      Allergies   Allergen Reactions    Penicillin G Hives and Rash    Percocet [Oxycodone-Acetaminophen] Anxiety    Sulfa (Sulfonamide Antibiotics) Other (comments)     Intolerance- unknown      Social History     Tobacco Use    Smoking status: Current Some Day Smoker     Years: 13.00     Types: Cigars    Smokeless tobacco: Never Used    Tobacco comment: cigar/ 2 per day   Substance Use Topics    Alcohol use: No     Alcohol/week: 0.0 standard drinks     Comment: occassional      Family History   Problem Relation Age of Onset    Cancer Mother     Other Brother         Angiosarcoma, AAA    Cancer Brother     Diabetes Brother     Diabetes Father     Hypertension Father     Other Father       Family history reviewed and negative except as noted above.     Immunization History   Administered Date(s) Administered   Lynette Rodriguez Influenza High Dose Vaccine PF 10/18/2017    Influenza Vaccine 11/02/2013    Pneumococcal Conjugate (PCV-13) 08/30/2016    Pneumococcal Vaccine (Unspecified Type) 02/15/2010, 11/24/2014    TB Skin Test (PPD) Intradermal 05/11/2020    Tdap 02/15/2010    Zoster Vaccine, Live 01/01/2015     Prior to Admit Medications:  Current Outpatient Medications   Medication Instructions    aspirin (ASPIRIN) 325 mg, EVERY BEDTIME    atorvastatin (LIPITOR) 40 mg, Oral, EVERY BEDTIME    Blood-Glucose Meter monitoring kit Use as directed  DX: E11.8    cyclobenzaprine (FLEXERIL) 10 mg, Oral, 3 TIMES DAILY AS NEEDED    dulaglutide (TRULICITY) 8.71 mg, SubCUTAneous, EVERY 7 DAYS    ergocalciferol (VITAMIN D2) 50,000 Units, EVERY 7 DAYS    ezetimibe (ZETIA) 10 mg, Oral, EVERY BEDTIME    fenofibric acid (TRILIPIX) 135 mg, EVERY BEDTIME    glucose blood VI test strips (BLOOD GLUCOSE TEST) strip Test twice daily  DX: E11.8    ibuprofen (MOTRIN) 600 mg tablet Oral, EVERY 6 HOURS AS NEEDED    L-Mfolate-B6 Phos-Methyl-B12 (METANX) 3-35-2 mg tab tab 1 Tablet, Oral, DAILY    Lancets misc Use twice daily   DX: E11.8    linaCLOtide (LINZESS) 145 mcg, Oral, DAILY BEFORE BREAKFAST    metFORMIN ER (GLUCOPHAGE XR) 1,000 mg, Oral, 2 TIMES DAILY    mirabegron ER (MYRBETRIQ) 50 mg, Oral, DAILY    oxybutynin chloride XL (DITROPAN XL) 10 mg, Oral, DAILY    senna-docusate (PERICOLACE) 8.6-50 mg per tablet 2 Tablets, Oral, DAILY       Objective:     Patient Vitals for the past 24 hrs:   Temp Pulse Resp BP SpO2   03/11/22 1756  93 24 (!) 131/90    03/11/22 1700  91  130/68 94 %   03/11/22 1659   23     03/11/22 1647  87 29  94 %   03/11/22 1630  91 30 133/63    03/11/22 1600  92 26 (!) 133/93    03/11/22 1413 99.1 °F (37.3 °C) 99 17 125/79 96 %   03/11/22 1342 99.1 °F (37.3 °C) (!) 102 17 (!) 136/91 97 %     Oxygen Therapy  O2 Sat (%): 94 % (03/11/22 1700)  Pulse via Oximetry: 91 beats per minute (03/11/22 1700)    Estimated body mass index is 24.41 kg/m² as calculated from the following:    Height as of this encounter: 6' (1.829 m). Weight as of this encounter: 81.6 kg (180 lb). Intake/Output Summary (Last 24 hours) at 3/11/2022 1833  Last data filed at 3/11/2022 1800  Gross per 24 hour   Intake    Output 100 ml   Net -100 ml         Physical Exam:  Blood pressure (!) 131/90, pulse 93, temperature 99.1 °F (37.3 °C), resp. rate 24, height 6' (1.829 m), weight 81.6 kg (180 lb), SpO2 94 %. General:    Well nourished. No overt distress, disheveled appearance, body odor  Head:  Normocephalic, atraumatic  Eyes:  Sclerae appear normal.  Pupils equally round. ENT:  Nares appear normal, no drainage. Moist oral mucosa  Neck:  No restricted ROM. Trachea midline   CV:   RRR. No m/r/g. No jugular venous distension. Lungs:   CTAB. No wheezing, rhonchi, or rales. Respirations even, unlabored  Abdomen: Bowel sounds present. Soft, nontender, nondistended. Extremities: No cyanosis or clubbing. No edema  Skin:     No rashes and normal coloration. Warm and dry. Neuro:  CN II-XII grossly intact. Sensation intact. A&O name, place, unsure situation, date, or time. Left side weakness  Psych:  Normal mood and affect.       I have reviewed ordered lab tests and independently visualized imaging below:    Last 24hr Labs:  Recent Results (from the past 24 hour(s))   CBC WITH AUTOMATED DIFF    Collection Time: 03/11/22  2:43 PM   Result Value Ref Range    WBC 8.1 4.3 - 11.1 K/uL    RBC 4.62 4.23 - 5.6 M/uL    HGB 15.4 13.6 - 17.2 g/dL    HCT 44.1 41.1 - 50.3 %    MCV 95.5 79.6 - 97.8 FL    MCH 33.3 (H) 26.1 - 32.9 PG    MCHC 34.9 31.4 - 35.0 g/dL    RDW 12.2 11.9 - 14.6 %    PLATELET 222 333 - 525 K/uL    MPV 10.0 9.4 - 12.3 FL    ABSOLUTE NRBC 0.00 0.0 - 0.2 K/uL    NEUTROPHILS 77 43 - 78 %    LYMPHOCYTES 12 (L) 13 - 44 %    MONOCYTES 8 4.0 - 12.0 %    EOSINOPHILS 2 0.5 - 7.8 %    BASOPHILS 1 0.0 - 2.0 %    IMMATURE GRANULOCYTES 0 0.0 - 5.0 %    ABS. NEUTROPHILS 6.4 1.7 - 8.2 K/UL    ABS. LYMPHOCYTES 1.0 0.5 - 4.6 K/UL    ABS. MONOCYTES 0.6 0.1 - 1.3 K/UL    ABS. EOSINOPHILS 0.1 0.0 - 0.8 K/UL    ABS. BASOPHILS 0.0 0.0 - 0.2 K/UL    ABS. IMM. GRANS. 0.0 0.0 - 0.5 K/UL    DF AUTOMATED     METABOLIC PANEL, COMPREHENSIVE    Collection Time: 03/11/22  2:43 PM   Result Value Ref Range    Sodium 138 136 - 145 mmol/L    Potassium 3.6 3.5 - 5.1 mmol/L    Chloride 106 98 - 107 mmol/L    CO2 25 21 - 32 mmol/L    Anion gap 7 7 - 16 mmol/L    Glucose 199 (H) 65 - 100 mg/dL    BUN 18 8 - 23 MG/DL    Creatinine 0.88 0.8 - 1.5 MG/DL    GFR est AA >60 >60 ml/min/1.73m2    GFR est non-AA >60 >60 ml/min/1.73m2    Calcium 9.9 8.3 - 10.4 MG/DL    Bilirubin, total 0.8 0.2 - 1.1 MG/DL    ALT (SGPT) 37 12 - 65 U/L    AST (SGOT) 23 15 - 37 U/L    Alk.  phosphatase 59 50 - 136 U/L    Protein, total 7.3 6.3 - 8.2 g/dL    Albumin 3.9 3.2 - 4.6 g/dL    Globulin 3.4 2.3 - 3.5 g/dL    A-G Ratio 1.1 (L) 1.2 - 3.5     LACTIC ACID    Collection Time: 03/11/22  2:43 PM   Result Value Ref Range    Lactic acid 1.8 0.4 - 2.0 MMOL/L   PROCALCITONIN    Collection Time: 03/11/22  2:43 PM   Result Value Ref Range    Procalcitonin <0.05 0.00 - 0.49 ng/mL   COVID-19 RAPID TEST    Collection Time: 03/11/22  3:50 PM   Result Value Ref Range    Specimen source NASAL SWAB      COVID-19 rapid test Not detected NOTD     URINE MICROSCOPIC    Collection Time: 03/11/22  5:58 PM   Result Value Ref Range    WBC 10-20 0 /hpf    RBC >100 (H) 0 /hpf    Epithelial cells 0-3 0 /hpf    Bacteria 0 0 /hpf    Casts 0-3 0 /lpf       All Micro Results     Procedure Component Value Units Date/Time    CULTURE, URINE [[id number]]     Order Status: Sent Specimen: Urine from Clean catch     CULTURE, BLOOD [[id number]]     Order Status: Sent Specimen: Blood     CULTURE, BLOOD [[id number]]     Order Status: Sent Specimen: Blood     RESPIRATORY VIRUS PANEL W/COVID-19, PCR [[id number]]     Order Status: Sent Specimen: NASOPHARYNGEAL SWAB     COVID-19 RAPID TEST [[id number]] Collected: 03/11/22 5025    Order Status: Completed Specimen: Nasopharyngeal Updated: 03/11/22 1621     Specimen source NASAL SWAB        COVID-19 rapid test Not detected        Comment:      The specimen is NEGATIVE for SARS-CoV-2, the novel coronavirus associated with COVID-19. A negative result does not rule out COVID-19. This test has been authorized by the FDA under an Emergency Use Authorization (EUA) for use by authorized laboratories. Fact sheet for Healthcare Providers: [employer]  Fact sheet for Patients: [employer]       Methodology: Isothermal Nucleic Acid Amplification               Other Studies:  CT HEAD WO CONT    Result Date: 3/11/2022  CT head without contrast History: Worsening weakness. Technique: 5mm axial images were obtained from the skull base to the vertex without intravenous contrast.  Radiation dose reduction techniques were used for this study:  Our CT scanners use one or all of the following: Automated exposure control, adjustment of the mA and/or kVp according to patient's size, iterative reconstruction. Sagittal and coronal reformatted images were submitted. Comparison: 04/29/2019. 09/07/2010. Findings: Prominent extra-axial CSF density collection along the left frontotemporal region has not significantly changed dating back to prior remote imaging in 2010. There are associated dystrophic calcifications. 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 There is approximately 5 mm of left right midline shift. Ventricles an sulci are prominent but stable. There are no acute extra-axial fluid collections. Patchy areas of decreased attenuation are present within the supratentorial white matter. These are nonspecific findings but would be most compatible with mild chronic small vessel ischemic change.  There is a remote right basal ganglia lacunar infarction. No evidence of acute intraparenchymal hemorrhage is present. There is no evidence to suggest an acute major territorial infarct. Stable chronic findings including left supraclinoid aneurysm. XR CHEST PORT    Result Date: 3/11/2022  Chest X-ray INDICATION: Sepsis. Lethargy. Hyperglycemia. COMPARISON: Chest x-ray 5/15/2020. A portable AP view of the chest was obtained. FINDINGS: The lungs are clear. There are no infiltrates or effusions. The heart size is normal.  Old healed left rib fracture deformities again noted. .      No acute findings in the chest           Signed:  Abdoulaye Brown NP

## 2022-03-11 NOTE — ED PROVIDER NOTES
74-year-old male was brought in by EMS. Patient social situation such that he lives by himself. He has somewhat of functions as a POA. He has no family other than a daughter that lives out of state and not involved. He has a nurse visits once a week and a caretaker they are present during 8 hours during the day daily. Patient's had some issues with some increasing weakness and ambulation. Seen in the emergency department 2 days ago because of increased blood sugar. Since being discharged in the home, patient is not ambulated since. He waits too much for the home health aide and the patient's power of  to really get him up to the bedside commode. He is resided in his own stool and urine since that time. Brought in today because he still not able to ambulate. Some decreased Po intake. Patient's had no vomiting or diarrhea. Denies any fever chills. Denies any pain. Patient knows he is at the hospital knows his name and converses with his power of . He is unsure of the date or day or year. Patient usually gets around with a Rollator. Patient has a history of hypertension and diabetes. Also history of stroke with some residual left-sided weakness. The history is provided by the patient, the EMS personnel and a caregiver. Fatigue  This is a new problem. The current episode started 2 days ago. The problem has been gradually worsening. There was no focality noted. There has been no fever. Associated symptoms include shortness of breath, altered mental status and confusion. Pertinent negatives include no chest pain, no vomiting and no nausea. Associated medical issues include CVA. Incontinence   Pertinent negatives include no nausea and no vomiting.         Past Medical History:   Diagnosis Date    Alcoholic pancreatitis 48/0999    after mother's death, states he slowed down on his drinking after this episode    Aneurysm ([facility] [address]     3 small of aorta, 2.4 x 2.3  cm in diameter is largest    Aneurysm artery, celiac ([facility] [address] 8/6/2014    Chronic pain     left foot    CVA (cerebral infarction) 1998, 2008    left sided weakness(TIA) stroke causing weakness    Depression     Diabetes ([address] 2010     typesarahi, Avg fasting ; denies s/s hypo does not know last HA1C    Diabetic ulcer of left great toe ([address]     Enlarged aorta ([facility] [address] 8/6/2014    arteriomegaly 2.9cm 7/30/14     Gout     History of stroke 1998    Residual left hemiparesis    Hypertension     Mixed hyperlipidemia     Obesity     Skin cancer     neck    Stroke ([address]     Unspecified constipation     Unspecified vitamin D deficiency        Past Surgical History:   Procedure Laterality Date    HX ADENOIDECTOMY      childhood    HX CATARACT REMOVAL  OD-2013/OS-2014    with IOL placement    HX COLONOSCOPY  2007, 2010, 2014    with polypectomy    HX ORTHOPAEDIC  12/2012    L great toe fusion    HX ORTHOPAEDIC  last 4/2013    Left great toe surgery to total 3    HX ORTHOPAEDIC  11/2013    amp left great toe    HX TONSILLECTOMY      childhood    HX UROLOGICAL  1980's    spermatocele removed    OK APPENDECTOMY      over 20 yrs ago         Family History:   Problem Relation Age of Onset    Cancer Mother     Other Brother         Angiosarcoma, AAA    Cancer Brother     Diabetes Brother     Diabetes Father     Hypertension Father     Other Father        Social History     Socioeconomic History    Marital status:      Spouse name: Not on file    Number of children: Not on file    Years of education: Not on file    Highest education level: Not on file   Occupational History    Not on file   Tobacco Use    Smoking status: Current Some Day Smoker     Years: 13.00     Types: Cigars    Smokeless tobacco: Never Used    Tobacco comment: cigar/ 2 per day   Substance and Sexual Activity    Alcohol use: No     Alcohol/week: 0.0 standard drinks     Comment: occassional    Drug use: No    Sexual activity: Not Currently   Other Topics Concern    Not on file   Social History Narrative    Not on file     Social Determinants of Health     Financial Resource Strain:     Difficulty of Paying Living Expenses: Not on file   Food Insecurity:     Worried About Running Out of Food in the Last Year: Not on file    Abram of Food in the Last Year: Not on file   Transportation Needs:     Lack of Transportation (Medical): Not on file    Lack of Transportation (Non-Medical): Not on file   Physical Activity:     Days of Exercise per Week: Not on file    Minutes of Exercise per Session: Not on file   Stress:     Feeling of Stress : Not on file   Social Connections:     Frequency of Communication with Friends and Family: Not on file    Frequency of Social Gatherings with Friends and Family: Not on file    Attends Cheondoism Services: Not on file    Active Member of [address] [place] [address] or Organizations: Not on file    Attends Club or Organization Meetings: Not on file    Marital Status: Not on file   Intimate Partner Violence:     Fear of Current or Ex-Partner: Not on file    Emotionally Abused: Not on file    Physically Abused: Not on file    Sexually Abused: Not on file   Housing Stability:     Unable to Pay for Housing in the Last Year: Not on file    Number of [place] in the Last Year: Not on file    Unstable Housing in the Last Year: Not on file         ALLERGIES: Penicillin g, Percocet [oxycodone-acetaminophen], and Sulfa (sulfonamide antibiotics)    Review of Systems   Constitutional: Positive for fatigue. Respiratory: Positive for shortness of breath. Cardiovascular: Negative for chest pain. Gastrointestinal: Negative for nausea and vomiting. Psychiatric/Behavioral: Positive for confusion. All other systems reviewed and are negative.       Vitals:    03/11/22 1342 03/11/22 1413   BP: (!) 136/91 125/79   Pulse: (!) 102 99   Resp: 17 17   Temp: 99.1 °F (37.3 °C) 99.1 °F (37.3 °C)   SpO2: 97% 96%   Weight: 81.6 kg (180 lb)    Height: 6' (1.829 m)             Physical Exam  Vitals and nursing note reviewed. Constitutional:       General: He is not in acute distress. Appearance: He is well-developed. HENT:      Head: Normocephalic and atraumatic. Right Ear: External ear normal.      Left Ear: External ear normal.      Mouth/Throat:      Pharynx: No oropharyngeal exudate. Eyes:      Conjunctiva/sclera: Conjunctivae normal.      Pupils: Pupils are equal, round, and reactive to light. Cardiovascular:      Rate and Rhythm: Normal rate and regular rhythm. Heart sounds: No murmur heard. Pulmonary:      Effort: No respiratory distress. Breath sounds: Normal breath sounds. Abdominal:      General: Bowel sounds are normal.      Palpations: Abdomen is soft. There is no mass. Tenderness: There is no abdominal tenderness. There is no guarding or rebound. Hernia: No hernia is present. Musculoskeletal:      Cervical back: Normal range of motion and neck supple. Skin:     General: Skin is warm and dry. Neurological:      Mental Status: He is alert. He is disoriented. Gait: Gait normal.      Comments: Nl speech  Some left-sided drift. Can hold right leg off the stretcher about 2 seconds. Cannot lift left leg off stretcher. Psychiatric:         Speech: Speech normal.          MDM  Number of Diagnoses or Management Options  Diagnosis management comments: Patient feels warm. Assess for fever. Potential for Covid. Screening lab work for dehydration, electrolyte abnormalities or anemia. Check EKG. Check x-ray for pneumonia. Repeat urinalysis get rectal temperature. Patient may have an acute issue regarding potential for some infection or dehydration on top of deconditioning and residual previous stroke. Cannot care for himself. .  Head CT to assess for recurrent stroke       Amount and/or Complexity of Data Reviewed  Clinical lab tests: ordered and reviewed  Tests in the radiology section of CPT®: ordered and reviewed  Tests in the medicine section of CPT®: ordered and reviewed  Obtain history from someone other than the patient: yes  Review and summarize past medical records: yes  Independent visualization of images, tracings, or specimens: yes (Interpretation EKG reveals normal sinus rhythm at 98. Nonspecific ST changes. 1 PVC. Normal QT interval)    Risk of Complications, Morbidity, and/or Mortality  Presenting problems: moderate  Diagnostic procedures: low  Management options: low           Procedures      Results Include:    Recent Results (from the past 24 hour(s))   CBC WITH AUTOMATED DIFF    Collection Time: 03/11/22  2:43 PM   Result Value Ref Range    WBC 8.1 4.3 - 11.1 K/uL    RBC 4.62 4.23 - 5.6 M/uL    HGB 15.4 13.6 - 17.2 g/dL    HCT 44.1 41.1 - 50.3 %    MCV 95.5 79.6 - 97.8 FL    MCH 33.3 (H) 26.1 - 32.9 PG    MCHC 34.9 31.4 - 35.0 g/dL    RDW 12.2 11.9 - 14.6 %    PLATELET 836 991 - 828 K/uL    MPV 10.0 9.4 - 12.3 FL    ABSOLUTE NRBC 0.00 0.0 - 0.2 K/uL    NEUTROPHILS 77 43 - 78 %    LYMPHOCYTES 12 (L) 13 - 44 %    MONOCYTES 8 4.0 - 12.0 %    EOSINOPHILS 2 0.5 - 7.8 %    BASOPHILS 1 0.0 - 2.0 %    IMMATURE GRANULOCYTES 0 0.0 - 5.0 %    ABS. NEUTROPHILS 6.4 1.7 - 8.2 K/UL    ABS. LYMPHOCYTES 1.0 0.5 - 4.6 K/UL    ABS. MONOCYTES 0.6 0.1 - 1.3 K/UL    ABS. EOSINOPHILS 0.1 0.0 - 0.8 K/UL    ABS. BASOPHILS 0.0 0.0 - 0.2 K/UL    ABS. IMM.  GRANS. 0.0 0.0 - 0.5 K/UL    DF AUTOMATED     METABOLIC PANEL, COMPREHENSIVE    Collection Time: 03/11/22  2:43 PM   Result Value Ref Range    Sodium 138 136 - 145 mmol/L    Potassium 3.6 3.5 - 5.1 mmol/L    Chloride 106 98 - 107 mmol/L    CO2 25 21 - 32 mmol/L    Anion gap 7 7 - 16 mmol/L    Glucose 199 (H) 65 - 100 mg/dL    BUN 18 8 - 23 MG/DL    Creatinine 0.88 0.8 - 1.5 MG/DL    GFR est AA >60 >60 ml/min/1.73m2    GFR est non-AA >60 >60 ml/min/1.73m2    Calcium 9.9 8.3 - 10.4 MG/DL    Bilirubin, total 0.8 0.2 - 1.1 MG/DL    ALT (SGPT) 37 12 - 65 U/L    AST (SGOT) 23 15 - 37 U/L    Alk. phosphatase 59 50 - 136 U/L    Protein, total 7.3 6.3 - 8.2 g/dL    Albumin 3.9 3.2 - 4.6 g/dL    Globulin 3.4 2.3 - 3.5 g/dL    A-G Ratio 1.1 (L) 1.2 - 3.5     LACTIC ACID    Collection Time: 03/11/22  2:43 PM   Result Value Ref Range    Lactic acid 1.8 0.4 - 2.0 MMOL/L   PROCALCITONIN    Collection Time: 03/11/22  2:43 PM   Result Value Ref Range    Procalcitonin <0.05 0.00 - 0.49 ng/mL   COVID-19 RAPID TEST    Collection Time: 03/11/22  3:50 PM   Result Value Ref Range    Specimen source NASAL SWAB      COVID-19 rapid test Not detected NOTD     URINE MICROSCOPIC    Collection Time: 03/11/22  5:58 PM   Result Value Ref Range    WBC 10-20 0 /hpf    RBC >100 (H) 0 /hpf    Epithelial cells 0-3 0 /hpf    Bacteria 0 0 /hpf    Casts 0-3 0 /lpf     CT HEAD WO CONT    Result Date: 3/11/2022  CT head without contrast History: Worsening weakness. Technique: 5mm axial images were obtained from the skull base to the vertex without intravenous contrast.  Radiation dose reduction techniques were used for this study:  Our CT scanners use one or all of the following: Automated exposure control, adjustment of the mA and/or kVp according to patient's size, iterative reconstruction. Sagittal and coronal reformatted images were submitted. Comparison: 04/29/2019. 09/07/2010. Findings: Prominent extra-axial CSF density collection along the left frontotemporal region has not significantly changed dating back to prior remote imaging in 2010. There are associated dystrophic calcifications. 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 There is approximately 5 mm of left right midline shift. Ventricles an sulci are prominent but stable. There are no acute extra-axial fluid collections. Patchy areas of decreased attenuation are present within the supratentorial white matter.  These are nonspecific findings but would be most compatible with mild chronic small vessel ischemic change. There is a remote right basal ganglia lacunar infarction. No evidence of acute intraparenchymal hemorrhage is present. There is no evidence to suggest an acute major territorial infarct. Stable chronic findings including left supraclinoid aneurysm. XR CHEST PORT    Result Date: 3/11/2022  Chest X-ray INDICATION: Sepsis. Lethargy. Hyperglycemia. COMPARISON: Chest x-ray 5/15/2020. A portable AP view of the chest was obtained. FINDINGS: The lungs are clear. There are no infiltrates or effusions. The heart size is normal.  Old healed left rib fracture deformities again noted. .      No acute findings in the chest         Febrile illness aggravating patient's chronic debility. Will discuss with hospitalist for admission.      Rectal temperature was 100.3 at 3 PM.

## 2022-03-12 ENCOUNTER — APPOINTMENT (OUTPATIENT)
Dept: CT IMAGING | Age: 72
DRG: 064 | End: 2022-03-12
Attending: STUDENT IN AN ORGANIZED HEALTH CARE EDUCATION/TRAINING PROGRAM
Payer: MEDICARE

## 2022-03-12 LAB
ANION GAP SERPL CALC-SCNC: 6 MMOL/L (ref 7–16)
ATRIAL RATE: 98 BPM
BUN SERPL-MCNC: 14 MG/DL (ref 8–23)
CALCIUM SERPL-MCNC: 9.3 MG/DL (ref 8.3–10.4)
CALCULATED P AXIS, ECG09: 43 DEGREES
CALCULATED R AXIS, ECG10: 39 DEGREES
CALCULATED T AXIS, ECG11: 15 DEGREES
CHLORIDE SERPL-SCNC: 106 MMOL/L (ref 98–107)
CO2 SERPL-SCNC: 24 MMOL/L (ref 21–32)
CREAT SERPL-MCNC: 0.71 MG/DL (ref 0.8–1.5)
DIAGNOSIS, 93000: NORMAL
ERYTHROCYTE [DISTWIDTH] IN BLOOD BY AUTOMATED COUNT: 12.1 % (ref 11.9–14.6)
GLUCOSE BLD STRIP.AUTO-MCNC: 139 MG/DL (ref 65–100)
GLUCOSE BLD STRIP.AUTO-MCNC: 155 MG/DL (ref 65–100)
GLUCOSE BLD STRIP.AUTO-MCNC: 156 MG/DL (ref 65–100)
GLUCOSE BLD STRIP.AUTO-MCNC: 170 MG/DL (ref 65–100)
GLUCOSE SERPL-MCNC: 152 MG/DL (ref 65–100)
HCT VFR BLD AUTO: 39.6 % (ref 41.1–50.3)
HGB BLD-MCNC: 14.1 G/DL (ref 13.6–17.2)
MCH RBC QN AUTO: 34.1 PG (ref 26.1–32.9)
MCHC RBC AUTO-ENTMCNC: 35.6 G/DL (ref 31.4–35)
MCV RBC AUTO: 95.7 FL (ref 79.6–97.8)
NRBC # BLD: 0 K/UL (ref 0–0.2)
P-R INTERVAL, ECG05: 164 MS
PLATELET # BLD AUTO: 193 K/UL (ref 150–450)
PMV BLD AUTO: 9.9 FL (ref 9.4–12.3)
POTASSIUM SERPL-SCNC: 3.4 MMOL/L (ref 3.5–5.1)
Q-T INTERVAL, ECG07: 314 MS
QRS DURATION, ECG06: 82 MS
QTC CALCULATION (BEZET), ECG08: 400 MS
RBC # BLD AUTO: 4.14 M/UL (ref 4.23–5.6)
SERVICE CMNT-IMP: ABNORMAL
SODIUM SERPL-SCNC: 136 MMOL/L (ref 136–145)
VENTRICULAR RATE, ECG03: 98 BPM
WBC # BLD AUTO: 7.9 K/UL (ref 4.3–11.1)

## 2022-03-12 PROCEDURE — 82962 GLUCOSE BLOOD TEST: CPT

## 2022-03-12 PROCEDURE — 97535 SELF CARE MNGMENT TRAINING: CPT

## 2022-03-12 PROCEDURE — 80048 BASIC METABOLIC PNL TOTAL CA: CPT

## 2022-03-12 PROCEDURE — 36415 COLL VENOUS BLD VENIPUNCTURE: CPT

## 2022-03-12 PROCEDURE — 74011250637 HC RX REV CODE- 250/637: Performed by: STUDENT IN AN ORGANIZED HEALTH CARE EDUCATION/TRAINING PROGRAM

## 2022-03-12 PROCEDURE — 97161 PT EVAL LOW COMPLEX 20 MIN: CPT

## 2022-03-12 PROCEDURE — 70496 CT ANGIOGRAPHY HEAD: CPT

## 2022-03-12 PROCEDURE — 74011250637 HC RX REV CODE- 250/637: Performed by: NURSE PRACTITIONER

## 2022-03-12 PROCEDURE — 74011000258 HC RX REV CODE- 258: Performed by: STUDENT IN AN ORGANIZED HEALTH CARE EDUCATION/TRAINING PROGRAM

## 2022-03-12 PROCEDURE — 74011000636 HC RX REV CODE- 636: Performed by: STUDENT IN AN ORGANIZED HEALTH CARE EDUCATION/TRAINING PROGRAM

## 2022-03-12 PROCEDURE — 65270000029 HC RM PRIVATE

## 2022-03-12 PROCEDURE — 85027 COMPLETE CBC AUTOMATED: CPT

## 2022-03-12 PROCEDURE — 74011636637 HC RX REV CODE- 636/637: Performed by: NURSE PRACTITIONER

## 2022-03-12 PROCEDURE — 97530 THERAPEUTIC ACTIVITIES: CPT

## 2022-03-12 PROCEDURE — 74011250636 HC RX REV CODE- 250/636: Performed by: NURSE PRACTITIONER

## 2022-03-12 PROCEDURE — 74011000250 HC RX REV CODE- 250: Performed by: NURSE PRACTITIONER

## 2022-03-12 PROCEDURE — 74011000258 HC RX REV CODE- 258: Performed by: NURSE PRACTITIONER

## 2022-03-12 PROCEDURE — 2709999900 HC NON-CHARGEABLE SUPPLY

## 2022-03-12 PROCEDURE — 97166 OT EVAL MOD COMPLEX 45 MIN: CPT

## 2022-03-12 RX ORDER — POTASSIUM CHLORIDE 20 MEQ/1
40 TABLET, EXTENDED RELEASE ORAL
Status: COMPLETED | OUTPATIENT
Start: 2022-03-12 | End: 2022-03-12

## 2022-03-12 RX ORDER — SODIUM CHLORIDE 0.9 % (FLUSH) 0.9 %
10 SYRINGE (ML) INJECTION
Status: COMPLETED | OUTPATIENT
Start: 2022-03-12 | End: 2022-03-12

## 2022-03-12 RX ADMIN — ATORVASTATIN CALCIUM 40 MG: 40 TABLET, FILM COATED ORAL at 21:52

## 2022-03-12 RX ADMIN — Medication 2 UNITS: at 12:12

## 2022-03-12 RX ADMIN — SODIUM CHLORIDE 100 ML: 9 INJECTION, SOLUTION INTRAVENOUS at 17:43

## 2022-03-12 RX ADMIN — Medication 10 ML: at 17:43

## 2022-03-12 RX ADMIN — POTASSIUM CHLORIDE 40 MEQ: 20 TABLET, EXTENDED RELEASE ORAL at 09:06

## 2022-03-12 RX ADMIN — SODIUM CHLORIDE, PRESERVATIVE FREE 10 ML: 5 INJECTION INTRAVENOUS at 14:00

## 2022-03-12 RX ADMIN — DOCUSATE SODIUM 50MG AND SENNOSIDES 8.6MG 2 TABLET: 8.6; 5 TABLET, FILM COATED ORAL at 09:06

## 2022-03-12 RX ADMIN — Medication 2 UNITS: at 09:05

## 2022-03-12 RX ADMIN — Medication 2 UNITS: at 21:52

## 2022-03-12 RX ADMIN — SODIUM CHLORIDE, SODIUM LACTATE, POTASSIUM CHLORIDE, AND CALCIUM CHLORIDE 100 ML/HR: 600; 310; 30; 20 INJECTION, SOLUTION INTRAVENOUS at 17:55

## 2022-03-12 RX ADMIN — SODIUM CHLORIDE, PRESERVATIVE FREE 10 ML: 5 INJECTION INTRAVENOUS at 06:10

## 2022-03-12 RX ADMIN — SODIUM CHLORIDE, PRESERVATIVE FREE 10 ML: 5 INJECTION INTRAVENOUS at 21:53

## 2022-03-12 RX ADMIN — OXYBUTYNIN CHLORIDE 10 MG: 10 TABLET, EXTENDED RELEASE ORAL at 09:06

## 2022-03-12 RX ADMIN — IOPAMIDOL 100 ML: 755 INJECTION, SOLUTION INTRAVENOUS at 17:43

## 2022-03-12 RX ADMIN — CEFTRIAXONE 1 G: 1 INJECTION, POWDER, FOR SOLUTION INTRAMUSCULAR; INTRAVENOUS at 19:58

## 2022-03-12 RX ADMIN — FENOFIBRATE 160 MG: 160 TABLET ORAL at 21:52

## 2022-03-12 RX ADMIN — ASPIRIN 325 MG: 325 TABLET, FILM COATED ORAL at 21:52

## 2022-03-12 RX ADMIN — EZETIMIBE 10 MG: 10 TABLET ORAL at 21:52

## 2022-03-12 RX ADMIN — ENOXAPARIN SODIUM 40 MG: 100 INJECTION SUBCUTANEOUS at 09:06

## 2022-03-12 NOTE — PROGRESS NOTES
END OF SHIFT NOTE:    Intake/Output  03/11 1901 - 03/12 0700  In: 1183 [I.V.:1183]  Out: -    Voiding: YES  Catheter: NO  Drain:   External Urinary Catheter 03/11/22 (Active)               Stool:  0 occurrences. Emesis:  0 occurrences. VITAL SIGNS  Patient Vitals for the past 12 hrs:   Temp Pulse Resp BP SpO2   03/12/22 0256 98 °F (36.7 °C) 73 18 116/86 95 %   03/11/22 2254 98 °F (36.7 °C) 80 18 (!) 139/93 96 %   03/11/22 2228  89 16 (!) 140/92 98 %   03/11/22 2130  86  (!) 148/82 98 %   03/11/22 2050  92  (!) 143/90 96 %   03/11/22 1950  90  (!) 142/86 95 %       Pain Assessment  Pain 1  Pain Scale 1: Visual (03/12/22 0256)  Pain Intensity 1: 0 (03/12/22 0256)  Patient Stated Pain Goal: 0 (03/12/22 0256)    Ambulating  No    Additional Information:   Unable to obtain bowen    Shift report given to oncoming nurse at the bedside.     Charito Das RN

## 2022-03-12 NOTE — PROGRESS NOTES
Problem: Mobility Impaired (Adult and Pediatric)  Goal: *Acute Goals and Plan of Care (Insert Text)  Outcome: Progressing Towards Goal  Note: STG:  (1.)Mr. Faraz Charles will move from supine to sit and sit to supine  with MINIMAL ASSIST within 4-7 treatment day(s). (2.)Mr. Faraz Charles will transfer from bed to chair and chair to bed with MINIMAL ASSIST using the least restrictive device within 4-7 treatment day(s). (3.)Mr. Faraz Charles will ambulate with MINIMAL ASSIST of 2 for 75 feet with the least restrictive device within 4-7 treatment day(s). (4.)Mr. Faraz Charles will participate in lower extremity exercises to increase strength to help with his mobility within 4-7 treatment days. PHYSICAL THERAPY: Initial Assessment and AM 3/12/2022  INPATIENT: PT Visit Days : 1  [place] / Product Type: Medicare /       NAME/AGE/GENDER: Susanne Blanca is a 66 y.o. male   PRIMARY DIAGNOSIS: Weakness [R53.1] UTI (urinary tract infection) UTI (urinary tract infection)        ICD-10: Treatment Diagnosis:    Generalized Muscle Weakness (M62.81)  Difficulty in walking, Not elsewhere classified (R26.2)   Precaution/Allergies:  Penicillin g, Percocet [oxycodone-acetaminophen], and Sulfa (sulfonamide antibiotics)      ASSESSMENT:     Mr. Faraz Charles presents with generalized weakness and decreased independence with functional mobility and pt will benefit from skilled PT interventions to maximize independence with functional mobility and strengthening. Pt lives at home alone, per chart has caregiver that is in the home 8 hours a day and a home health RN that comes once a week. Per chart he has been getting progressively weaker and not able to stand recently. Pt admitted with above diagnosis, able to say where he is but not why he came here. Today saw pt with OT and we worked on bed mobility, repeated sit to stand transfers and side stepping at the bedside.  Also worked on sitting balance/tolerance at the bedside. Pt very weak so helped get pt back to bed, positioned in supine with needs in reach. Hope to progress at next therapy session. This section established at most recent assessment   PROBLEM LIST (Impairments causing functional limitations):  Decreased Strength  Decreased ADL/Functional Activities  Decreased Transfer Abilities  Decreased Ambulation Ability/Technique  Decreased Balance  Decreased Activity Tolerance   INTERVENTIONS PLANNED: (Benefits and precautions of physical therapy have been discussed with the patient.)  Balance Exercise  Bed Mobility  Gait Training  Therapeutic Activites  Therapeutic Exercise/Strengthening  Transfer Training     TREATMENT PLAN: Frequency/Duration: daily for duration of hospital stay  Rehabilitation Potential For Stated Goals: Good     REHAB RECOMMENDATIONS (at time of discharge pending progress):    Placement: It is my opinion, based on this patient's performance to date, that Mr. Robles Calloway may benefit from intensive therapy at a [facility] after discharge due to the functional deficits listed above that are likely to improve with skilled rehabilitation and concerns that he/she may be unsafe to be unsupervised at home due to weakness and debility, would be unsafe to be up without assist .  Equipment: To be determined              HISTORY:   History of Present Injury/Illness (Reason for Referral):  Copied from MD note:   Gilford Lars is a 66 y.o. 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 He lives alone, does have a sitter 8 hours/day, his daughter lives out of state and essentially not involved. EMS was called related to inability to ambulate at all today. Found in feces and urine. Patient knows he is at the hospital knows his name. He is unsure of the date or day or year.   Patient usually gets around with a Rollator. Also history of stroke with some residual left-sided weakness. Urine in ED showed 10-20 WBC, he has poor hygiene. T max 99. Procal 0.05, no leukocytosis. CK ordered and pending. BC x2 and urine culture ordered. CT head shows chronic findings of right side lacunar infarct and supraclinoid artery. H/O AAA and iliac aneurysm that he is followed by Dr. Cox Friday for. Past Medical History/Comorbidities:   Mr. Darvin Easley  has a past medical history of Alcoholic pancreatitis (53/4877), Aneurysm [facility]), Aneurysm artery, celiac ([facility] [address] (8/6/2014), Chronic pain, CVA (cerebral infarction) (1998, 2008), Depression, Diabetes ([facility] [address] (2010), Diabetic ulcer of left great toe ([facility] [address] Enlarged aorta ([facility] [address] (8/6/2014), Gout, History of stroke (1998), Hypertension, Mixed hyperlipidemia, Obesity, Skin cancer, Stroke ([facility] [address] Unspecified constipation, and Unspecified vitamin D deficiency. He has no past medical history of Chronic kidney disease. Mr. Darvin Easley  has a past surgical history that includes hx tonsillectomy; hx adenoidectomy; pr appendectomy; hx orthopaedic (12/2012); hx orthopaedic (last 4/2013); hx orthopaedic (11/2013); hx cataract removal (OD-2013/OS-2014); hx urological (1980's); and hx colonoscopy (2007, 2010, 2014).   Social History/Living Environment:   Home Environment: Private residence  # Steps to Enter: 0  One/Two Story Residence: Two story, live on 1st floor  Living Alone: Yes  Support Systems: Caregiver/Home Care Staff  Patient Expects to be Discharged to[de-identified] Unable to determine at this time  Current DME Used/Available at Home: Walker, rollator  Prior Level of Function/Work/Activity:  Pt lives at home alone, getting weaker at home, used a rollator     Number of Personal Factors/Comorbidities that affect the Plan of Care: 0: LOW COMPLEXITY   EXAMINATION:   Most Recent Physical Functioning:   Gross Assessment:  AROM: Generally decreased, functional  Strength: Generally decreased, functional               Posture:  Posture (WDL): Exceptions to WDL  Posture Assessment: Forward head,Kyphosis,Rounded shoulders  Balance:  Sitting: Intact; High guard  Sitting - Static: Good (unsupported)  Sitting - Dynamic: Fair (occasional)  Standing: Impaired;Pull to stand; With support  Standing - Static: Constant support  Standing - Dynamic : Constant support Bed Mobility:  Supine to Sit: Moderate assistance  Sit to Supine: Minimum assistance  Wheelchair Mobility:     Transfers:  Sit to Stand: Minimum assistance; Moderate assistance  Stand to Sit: Minimum assistance  Gait:     Speed/Lulú: Shuffled; Slow  Step Length: Left shortened;Right shortened  Gait Abnormalities: Decreased step clearance;Shuffling gait; Step to gait;Trunk sway increased  Distance (ft):  (few steps by the bed)  Assistive Device: Walker, rolling  Ambulation - Level of Assistance: Minimal assistance; Moderate assistance;Assist x2  Interventions: Safety awareness training;Verbal cues  Home Environment: Private residence  Home Situation  Home Environment: Private residence  # Steps to Enter: 0  Living Alone: Yes  Patient Expects to be Discharged to[de-identified] Unable to determine at this time  Current DME Used/Available at Home: meli Young      Body Structures Involved:  Muscles Body Functions Affected: Movement Related Activities and Participation Affected: Mobility  Self Care   Number of elements that affect the Plan of Care: 4+: HIGH COMPLEXITY   CLINICAL PRESENTATION:   Presentation: Stable and uncomplicated: LOW COMPLEXITY   CLINICAL DECISION MAKIN [facility] [address] [address] AM-PAC 6 Clicks   Basic Mobility Inpatient Short Form  How much difficulty does the patient currently have. .. Unable A Lot A Little None   1. Turning over in bed (including adjusting bedclothes, sheets and blankets)? [] 1   [] 2   [x] 3   [] 4   2.   Sitting down on and standing up from a chair with arms ( e.g., wheelchair, bedside commode, etc.)   [] 1   [x] 2   [] 3   [] 4   3.  Moving from lying on back to sitting on the side of the bed? [] 1   [] 2   [x] 3   [] 4   How much help from another person does the patient currently need. .. Total A Lot A Little None   4. Moving to and from a bed to a chair (including a wheelchair)? [] 1   [x] 2   [] 3   [] 4   5. Need to walk in hospital room? [] 1   [x] 2   [] 3   [] 4   6. Climbing 3-5 steps with a railing? [] 1   [x] 2   [] 3   [] 4   © 2007, Trustees of [address] [place] [address] [address] [address], under license to [employer]. All rights reserved      Score:  Initial: 14 Most Recent: X (Date: -- )    Interpretation of Tool:  Represents activities that are increasingly more difficult (i.e. Bed mobility, Transfers, Gait). Medical Necessity:     Patient is expected to demonstrate progress in   strength, balance, coordination, and functional technique   to   decrease assistance required with functional mobility  . Reason for Services/Other Comments:  Patient continues to require skilled intervention due to   Inability to complete functional mobility independently  . Use of outcome tool(s) and clinical judgement create a POC that gives a: Questionable prediction of patient's progress: MODERATE COMPLEXITY            TREATMENT:   (In addition to Assessment/Re-Assessment sessions the following treatments were rendered)   Pre-treatment Symptoms/Complaints:  none  Pain: Initial:   Pain Intensity 1: 0  Post Session:  0/10     Therapeutic Activity: (    23): Therapeutic activities including Bed transfers, Ambulation on level ground, and sitting and standing balance/tolerance to improve mobility, strength, balance, and coordination. Required moderate Safety awareness training;Verbal cues to promote static and dynamic balance in sitting and standing.     Assessment    Braces/Orthotics/Lines/Etc:   IV  Treatment/Session Assessment:    Response to Treatment:  pt did fair, weak  Interdisciplinary Collaboration:   Occupational Therapist  Registered Nurse  After treatment position/precautions:   Supine in bed  Bed/Chair-wheels locked  Bed in low position  Call light within reach   Compliance with Program/Exercises: Will assess as treatment progresses  Recommendations/Intent for next treatment session: \"Next visit will focus on advancements to more challenging activities and reduction in assistance provided\".   Total Treatment Duration:  PT Patient Time In/Time Out  Time In: 0910  Time Out: [address] [facility] MORENITA Medina

## 2022-03-12 NOTE — PROGRESS NOTES
Problem: Self Care Deficits Care Plan (Adult)  Goal: *Acute Goals and Plan of Care (Insert Text)  Outcome: Progressing Towards Goal  Note:   Patient will complete toileting with moderate assist to increase self care independence. Patient will improve dynamic sitting, static standing, and dynamic standing at edge of bed for 15 minutes to improve independence with transfers and self cares. Patient will complete chair transfer with minimal assist using adaptive equipment as needed. Patient will complete toilet transfer with minimal assist using adaptive equipment as needed. Timeframe: 7 visits       OCCUPATIONAL THERAPY: Initial Assessment, Daily Note, and AM 3/12/2022  INPATIENT: OT Visit Days: 1  [place] / Product Type: Medicare /      NAME/AGE/GENDER: Barb Rinaldi is a 66 y.o. male   PRIMARY DIAGNOSIS:  Weakness [R53.1] UTI (urinary tract infection) UTI (urinary tract infection)        ICD-10: Treatment Diagnosis:    Generalized Muscle Weakness (M62.81)  Other lack of cordination (R27.8)  Difficulty in walking, Not elsewhere classified (R26.2)  History of falling (Z91.81)  Hemiplegia and hemiparesis following cerebral infarction affecting   left non-dominant side (V53.473)   Precautions/Allergies:   Penicillin g, Percocet [oxycodone-acetaminophen], and Sulfa (sulfonamide antibiotics)      ASSESSMENT:     Mr. Chuckie Olea presents with the above diagnosis and overall deficits in strength, functional mobility, and ADL performance. At baseline, pt lives alone and has assistance from caregiver 8hrs/day. He is able to mobilize with rollator at baseline and has assistance with all ADLs. Today, pt oriented to self and place but unable to verbalize time, situation, or elaborate on assistance needed at home. He required mod A from supine to sit and was note to have urine soaked brief. Pt able to maintain sitting balance with high guard at EOB during sponge bath with CNA. He require max A for bathing and total A for brief change and hygiene. He was able to perform sit<>stand at EOB with RW and min A x2. Able to take a couple of small steps to [facility] but was very unsteady. Pt returned to supine and was left with all needs met and in reach. He became emotional toward end of session. Anticipate further rehab upon discharge to return to his stated PLOF. This section established at most recent assessment   PROBLEM LIST (Impairments causing functional limitations):  Decreased Strength  Decreased ADL/Functional Activities  Decreased Transfer Abilities  Decreased Ambulation Ability/Technique  Decreased Balance  Decreased Activity Tolerance   INTERVENTIONS PLANNED: (Benefits and precautions of occupational therapy have been discussed with the patient.)  Activities of daily living training  Balance training  Neuromuscular re-eduation  Therapeutic activity  Therapeutic exercise     TREATMENT PLAN: Frequency/Duration: Follow patient 3x/week to address above goals. Rehabilitation Potential For Stated Goals: Good     REHAB RECOMMENDATIONS (at time of discharge pending progress):    Placement: It is my opinion, based on this patient's performance to date, that Mr. Himanshu Barbour may benefit from intensive therapy at a [address] [place] [address] after discharge due to the functional deficits listed above that are likely to improve with skilled rehabilitation and concerns that he/she may be unsafe to be unsupervised at home due to decline in functional mobility and increased confusion .   Equipment:   None at this time              OCCUPATIONAL PROFILE AND HISTORY:   History of Present Injury/Illness (Reason for Referral):  See H&P  Past Medical History/Comorbidities:   Mr. Himanshu Barbour  has a past medical history of Alcoholic pancreatitis (15/3800), Aneurysm ([facility] [address] Aneurysm artery, celiac ([facility] [address] (8/6/2014), Chronic pain, CVA (cerebral infarction) (1998, 2008), Depression, Diabetes ([address] (2010), Diabetic ulcer of left great toe ([facility] [address] Enlarged aorta ([facility] [address] (8/6/2014), Gout, History of stroke (1998), Hypertension, Mixed hyperlipidemia, Obesity, Skin cancer, Stroke ([facility] [address] Unspecified constipation, and Unspecified vitamin D deficiency. He has no past medical history of Chronic kidney disease. Mr. Eli Reis  has a past surgical history that includes hx tonsillectomy; hx adenoidectomy; pr appendectomy; hx orthopaedic (12/2012); hx orthopaedic (last 4/2013); hx orthopaedic (11/2013); hx cataract removal (OD-2013/OS-2014); hx urological (1980's); and hx colonoscopy (2007, 2010, 2014). Social History/Living Environment:   Home Environment: Private residence  # Steps to Enter: 0  One/Two Story Residence: Two story, live on 1st floor  Living Alone: Yes  Support Systems: Caregiver/Home Care Staff  Patient Expects to be Discharged to[de-identified] Home  Current DME Used/Available at Home: meli Cisneros  Prior Level of Function/Work/Activity:  Assistance with all ADLs from caregiver staff, rollator for mobility     Number of Personal Factors/Comorbidities that affect the Plan of Care: Expanded review of therapy/medical records (1-2):  MODERATE COMPLEXITY   ASSESSMENT OF OCCUPATIONAL PERFORMANCE[de-identified]   Activities of Daily Living:   Basic ADLs (From Assessment) Complex ADLs (From Assessment)   Feeding: Setup  Oral Facial Hygiene/Grooming: Setup,Minimum assistance  Bathing: Maximum assistance  Upper Body Dressing: Minimum assistance  Lower Body Dressing: Total assistance  Toileting: Total assistance     Grooming/Bathing/Dressing Activities of Daily Living     Cognitive Retraining  Safety/Judgement: Fall prevention                       Bed/Mat Mobility  Supine to Sit: Moderate assistance  Sit to Supine: Minimum assistance  Sit to Stand: Minimum assistance; Moderate assistance  Stand to Sit: Minimum assistance  Bed to Chair:  (did not attempt)  Scooting: Minimum assistance; Moderate assistance     Most Recent Physical Functioning:   Gross Assessment: Posture:     Balance:  Sitting: Intact; High guard  Standing: Pull to stand; With support; Impaired  Standing - Static: Constant support  Standing - Dynamic : Constant support Bed Mobility:  Supine to Sit: Moderate assistance  Sit to Supine: Minimum assistance  Scooting: Minimum assistance; Moderate assistance  Wheelchair Mobility:     Transfers:  Sit to Stand: Minimum assistance; Moderate assistance  Stand to Sit: Minimum assistance  Bed to Chair:  (did not attempt)            Patient Vitals for the past 6 hrs:   BP BP Patient Position SpO2 Pulse   03/12/22 0745 108/72 Lying left side 92 % 83       Mental Status  Neurologic State: Alert,Other (Comment) (periodic confusion)  Orientation Level: Oriented to person,Oriented to place,Disoriented to situation,Disoriented to time  Cognition: Follows commands  Perception: Cues to maintain midline in sitting  Perseveration: No perseveration noted  Safety/Judgement: Fall prevention                          Physical Skills Involved:  Balance  Strength  Activity Tolerance Cognitive Skills Affected (resulting in the inability to perform in a timely and safe manner):  WFL (periodic confusion) Psychosocial Skills Affected:  Environmental Adaptation   Number of elements that affect the Plan of Care: 3-5:  MODERATE COMPLEXITY   CLINICAL DECISION MAKING:   [facility] AM-PAC 6 Clicks   Daily Activity Inpatient Short Form  How much help from another person does the patient currently need. .. Total A Lot A Little None   1. Putting on and taking off regular lower body clothing? [x] 1   [] 2   [] 3   [] 4   2. Bathing (including washing, rinsing, drying)? [x] 1   [] 2   [] 3   [] 4   3. Toileting, which includes using toilet, bedpan or urinal?   [x] 1   [] 2   [] 3   [] 4   4. Putting on and taking off regular upper body clothing? [] 1   [x] 2   [] 3   [] 4   5. Taking care of personal grooming such as brushing teeth? [] 1   [] 2   [x] 3   [] 4   6.   Eating meals?   [] 1   [] 2   [x] 3   [] 4   © 2007, Trustees of [facility], under license to [employer]. All rights reserved      Score:  Initial: 11 Most Recent: X (Date: -- )    Interpretation of Tool:  Represents activities that are increasingly more difficult (i.e. Bed mobility, Transfers, Gait). Medical Necessity:     Skilled intervention continues to be required due to the above deficits. Reason for Services/Other Comments:  Patient continues to require skilled intervention due to   Increased confusion, decreased functional mobility and ADL performance  . Use of outcome tool(s) and clinical judgement create a POC that gives a: LOW COMPLEXITY         TREATMENT:   (In addition to Assessment/Re-Assessment sessions the following treatments were rendered)     Pre-treatment Symptoms/Complaints:    Pain: Initial:   Pain Intensity 1: 0  Post Session:  0     Co-treatment was necessary to improve patient's cognitive participation, ability to increase activity demands, and ability to return to normal functional activity. Self Care: (30): Procedure(s) (per grid) utilized to improve and/or restore self-care/home management as related to dressing, bathing, toileting, grooming, and functional mobility . Required moderate verbal, manual, and tactile cueing to facilitate activities of daily living skills and compensatory activities. Evaluation complete    Braces/Orthotics/Lines/Etc:   IV  Treatment/Session Assessment:    Response to Treatment:  Good, supine in bed  Interdisciplinary Collaboration:   Physical Therapist  Occupational Therapist  Registered Nurse  Certified Nursing Assistant/Patient Care Technician  After treatment position/precautions:   Supine in bed  Bed alarm/tab alert on  Bed/Chair-wheels locked  Bed in low position  Call light within reach  RN notified   Compliance with Program/Exercises: Compliant all of the time, Will assess as treatment progresses.   Recommendations/Intent for next treatment session: \"Next visit will focus on advancements to more challenging activities and reduction in assistance provided\".   Total Treatment Duration:  OT Patient Time In/Time Out  Time In: 0910  Time Out: [address] OT

## 2022-03-12 NOTE — PROGRESS NOTES
Problem: Pressure Injury - Risk of  Goal: *Prevention of pressure injury  Description: Document Malvin Scale and appropriate interventions in the flowsheet. Outcome: Progressing Towards Goal  Note: Pressure Injury Interventions:  Sensory Interventions: Assess changes in LOC    Moisture Interventions: Absorbent underpads    Activity Interventions: PT/OT evaluation    Mobility Interventions: Pressure redistribution bed/mattress (bed type)    Nutrition Interventions: Document food/fluid/supplement intake    Friction and Shear Interventions: Apply protective barrier, creams and emollients                Problem: Patient Education: Go to Patient Education Activity  Goal: Patient/Family Education  Outcome: Progressing Towards Goal     Problem: Falls - Risk of  Goal: *Absence of Falls  Description: Document Muna Fall Risk and appropriate interventions in the flowsheet. Outcome: Progressing Towards Goal  Note: Fall Risk Interventions:  Mobility Interventions: Bed/chair exit alarm    Mentation Interventions: More frequent rounding    Medication Interventions: Evaluate medications/consider consulting pharmacy    Elimination Interventions: Toileting schedule/hourly rounds    History of Falls Interventions:  Investigate reason for fall         Problem: Patient Education: Go to Patient Education Activity  Goal: Patient/Family Education  Outcome: Progressing Towards Goal

## 2022-03-12 NOTE — PROGRESS NOTES
KATI met with patient who presented confused. KATI called the patient's listed Janel Coreas with no answer. KATI then called the patient's emergency contact Nik Prince. Citlaly Pniolivia states that Milagros Alvarenga is his wife and he and she are the patient's HCPOA. Patient lives alone but has a private caregiver paid for by [employer] and private pay who comes by eight hours a day. The patient also has a private duty nurse who comes weekly. Patient is seen by Dr. Tea Esquivel for primary care and is current. Patient uses a rollator to ambulate, requires ADL assistance, and has had some falls. Patient for the last few weeks has been progressively weaker which Doroteo attributes to the patient's infection. Citlaly Miya states that he feels that the patient would benefit from STR to address weakness, states that he's talked to someone at [facility]. At this time the goal per Milagros Alvarenga and Citlaly Holliday would be for the patient to go to STR, get stronger, and return home with his private caregivers and [place] arranged. Referral sent in CC link. Confirmed PPD was placed. Care Management Interventions  PCP Verified by CM:  Yes  Mode of Transport at Discharge: BLS  Transition of Care Consult (CM Consult): SNF  Partner SNF: Yes  Physical Therapy Consult: Yes  Occupational Therapy Consult: Yes  Support Systems: Caregiver/Home Care Staff,Friend/Neighbor  Confirm Follow Up Transport: Family  The Plan for Transition of Care is Related to the Following Treatment Goals : STR  The Patient and/or Patient Representative was Provided with a Choice of Provider and Agrees with the Discharge Plan?: Yes  Name of the Patient Representative Who was Provided with a Choice of Provider and Agrees with the Discharge Plan: Carlin 3968 of Choice List was Provided with Basic Dialogue that Supports the Patient's Individualized Plan of Care/Goals, Treatment Preferences and Shares the Quality Data Associated with the Providers?: Yes  Discharge Location  [facility] [id number] [facility], [address] [place] [address] Work    [facility] [place]    * [email or web address]

## 2022-03-12 NOTE — PROGRESS NOTES
Pt bleeding a lot with multiple blood clot after the nurse tried to put bowen in. The bowen met resistant during insertion.   MD notified

## 2022-03-12 NOTE — PROGRESS NOTES
Hospitalist Progress Note   Admit Date:  3/11/2022  1:47 PM   Name:  Brian Flynn   Age:  66 y.o. Sex:  male  :  1950   MRN:  [id number]   Room:  [facility]/    Presenting Complaint: Fatigue and Incontinence    Reason(s) for Admission: Weakness [R53.1]     Hospital Course & Interval History:   Brian Flynn is a 66 y.o. 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 He lives alone, does have a sitter 8 hours/day, his daughter lives out of state and essentially not involved. EMS was called related to inability to ambulate at all today. Found in feces and urine.  Patient knows he is at the hospital knows his name. [facility] is unsure of the date or day or year. Golden Harrison usually gets around with a Rollator.   Also history of stroke with some residual left-sided weakness. Urine in ED showed 10-20 WBC, he has poor hygiene. T max 99. Procal 0.05, no leukocytosis. CK ordered and pending. BC x2 and urine culture ordered. CT head shows chronic findings of right side lacunar infarct and supraclinoid artery. H/O AAA and iliac aneurysm that he is followed by Dr. Carlos Eduardo Almaraz for. Subjective/24hr Events (22): Patient seen and examined at the bedside. He is AAO x2. Knows his name and place but not the year. ROS:  10 systems reviewed and negative except as noted above.      Assessment & Plan:   Acute Encephalopathy due to UTI  AAO X2 and baseline mental status is AAO X 3    UTI (urinary tract infection) (2020)  UA is positive    Continue Rocephin  Follow-up with urine and BC    Hypokalemia   K is 3.4, most likely  due to poor oral intake   S/p  40 po x 1  Will repeat bmp am        CVA, old, hemiparesis ([facility] (2012)    PT/OT recommending SNF  Continue normal strength ASA and statin    Urinary incontinence  Cruz placement and straight cath  is unsuccessful      Aneurysm artery, right common iliac artery ([facility] (8/6/2014)  Doppler showed   Aneurysmal dilation of the right common iliac artery which measures up to  3.3 cm in diameter. Possible associated focal dissection. F/u  CTA for further evaluation.      Mixed hyperlipidemia ()    statin       Di[facility] ([facility] (9/22/3510)    SSI  Last A1C January 5.9       Weakness with frequent falls (3/11/2022)    PT/OT, CM consult, PPD   CK normal       Benign essential HTN (3/11/2022)  Continue  home meds       Supraclinoid aneurysm:  Stable and chronic, noted on CT scan  Can follow up outpatient        Dispo/Discharge Planning:  Awaiting SNF placement     Spoke with the power of  over the phone and gave the update regarding the patient. Pt has 24x7 caregiver at home.      Diet: ADULT DIET Regular; 3 carb choices (45 gm/meal)  VTE ppx: Lovenox SQ  Code status: Full Code      Hospital Problems as of 3/12/2022 Date Reviewed: 3/11/2022          Codes Class Noted - Resolved POA    Weakness ICD-10-CM: R53.1  ICD-9-CM: 780.79  3/11/2022 - Present Yes        Benign essential HTN ICD-10-CM: I10  ICD-9-CM: 401.1  3/11/2022 - Present Yes        * (Principal) UTI (urinary tract infection) ICD-10-CM: N39.0  ICD-9-CM: 599.0  5/11/2020 - Present Yes        Diabetes mellitus with complication ([facility] LMR-26-WA: E11.8  ICD-9-CM: 250.90  8/28/2015 - Present Yes        Mixed hyperlipidemia ICD-10-CM: E78.2  ICD-9-CM: 272.2  Unknown - Present Yes        Aneurysm artery, celiac (HCC) (Chronic) ICD-10-CM: I72.8  ICD-9-CM: 442.84  8/6/2014 - Present Yes        CVA, old, hemiparesis ([facility] ICD-10-CM: I94.217  ICD-9-CM: 438.20  5/24/2012 - Present Yes              Objective:     Patient Vitals for the past 24 hrs:   Temp Pulse Resp BP SpO2   03/12/22 1140 98.2 °F (36.8 °C) 82 18 109/73 92 %   03/12/22 0745 98.3 °F (36.8 °C) 83 20 108/72 92 %   03/12/22 0256 98 °F (36.7 °C) 73 18 116/86 95 %   03/11/22 2254 98 °F (36.7 °C) 80 18 (!) 139/93 96 %   03/11/22 2228  89 16 (!) 140/92 98 %   03/11/22 2130  86  (!) 148/82 98 %   03/11/22 2050  92  (!) 143/90 96 %   03/11/22 1950  90  (!) 142/86 95 %   03/11/22 1844  92 20 (!) 147/96 94 %   03/11/22 1830   27 126/79    03/11/22 1829  87      03/11/22 1800  86  125/88    03/11/22 1758   15     03/11/22 1756  93 24 (!) 131/90    03/11/22 1700  91  130/68 94 %   03/11/22 1659   23     03/11/22 1647  87 29  94 %   03/11/22 1630  91 30 133/63    03/11/22 1600  92 26 (!) 133/93      Oxygen Therapy  O2 Sat (%): 92 % (03/12/22 1140)  Pulse via Oximetry: 93 beats per minute (03/11/22 1844)    Estimated body mass index is 24.41 kg/m² as calculated from the following:    Height as of this encounter: 6' (1.829 m). Weight as of this encounter: 81.6 kg (180 lb). Intake/Output Summary (Last 24 hours) at 3/12/2022 1516  Last data filed at 3/12/2022 0644  Gross per 24 hour   Intake 1183 ml   Output 100 ml   Net 1083 ml         Physical Exam:   Blood pressure 109/73, pulse 82, temperature 98.2 °F (36.8 °C), resp. rate 18, height 6' (1.829 m), weight 81.6 kg (180 lb), SpO2 92 %. General:    Well nourished. No overt distress, obese  Head:  Normocephalic, atraumatic  Eyes:  Sclerae appear normal.  Pupils equally round. ENT:  Nares appear normal, no drainage. Moist oral mucosa  Neck:  No restricted ROM. Trachea midline   CV:   RRR. No m/r/g. No jugular venous distension. Lungs:   CTAB. No wheezing, rhonchi, or rales. Respirations even, unlabored  Abdomen: Bowel sounds present. Soft, nontender, nondistended. Extremities: No cyanosis or clubbing. No edema  Skin:     No rashes and normal coloration. Warm and dry. Neuro:  CN II-XII grossly intact. Sensation intact. A&Ox3  Psych:  Normal mood and affect.       I have reviewed ordered lab tests and independently visualized imaging below:    Recent Labs:  Recent Results (from the past 48 hour(s)) EKG, 12 LEAD, INITIAL    Collection Time: 03/11/22  2:02 PM   Result Value Ref Range    Ventricular Rate 98 BPM    Atrial Rate 98 BPM    P-R Interval 164 ms    QRS Duration 82 ms    Q-T Interval 314 ms    QTC Calculation (Bezet) 400 ms    Calculated P Axis 43 degrees    Calculated R Axis 39 degrees    Calculated T Axis 15 degrees    Diagnosis       !! AGE AND GENDER SPECIFIC ECG ANALYSIS !! Sinus rhythm with occasional Premature ventricular complexes  Low voltage QRS  Nonspecific ST abnormality  Abnormal ECG  When compared with ECG of 11-MAR-2022 11:06,  Premature ventricular complexes are now Present  VT interval has decreased  Vent. rate has increased BY  44 BPM  Right bundle branch block is no longer Present  Confirmed by Lee Shah ([id number]) on 3/12/2022 8:28:48 AM     CBC WITH AUTOMATED DIFF    Collection Time: 03/11/22  2:43 PM   Result Value Ref Range    WBC 8.1 4.3 - 11.1 K/uL    RBC 4.62 4.23 - 5.6 M/uL    HGB 15.4 13.6 - 17.2 g/dL    HCT 44.1 41.1 - 50.3 %    MCV 95.5 79.6 - 97.8 FL    MCH 33.3 (H) 26.1 - 32.9 PG    MCHC 34.9 31.4 - 35.0 g/dL    RDW 12.2 11.9 - 14.6 %    PLATELET 736 298 - 807 K/uL    MPV 10.0 9.4 - 12.3 FL    ABSOLUTE NRBC 0.00 0.0 - 0.2 K/uL    NEUTROPHILS 77 43 - 78 %    LYMPHOCYTES 12 (L) 13 - 44 %    MONOCYTES 8 4.0 - 12.0 %    EOSINOPHILS 2 0.5 - 7.8 %    BASOPHILS 1 0.0 - 2.0 %    IMMATURE GRANULOCYTES 0 0.0 - 5.0 %    ABS. NEUTROPHILS 6.4 1.7 - 8.2 K/UL    ABS. LYMPHOCYTES 1.0 0.5 - 4.6 K/UL    ABS. MONOCYTES 0.6 0.1 - 1.3 K/UL    ABS. EOSINOPHILS 0.1 0.0 - 0.8 K/UL    ABS. BASOPHILS 0.0 0.0 - 0.2 K/UL    ABS. IMM.  GRANS. 0.0 0.0 - 0.5 K/UL    DF AUTOMATED     METABOLIC PANEL, COMPREHENSIVE    Collection Time: 03/11/22  2:43 PM   Result Value Ref Range    Sodium 138 136 - 145 mmol/L    Potassium 3.6 3.5 - 5.1 mmol/L    Chloride 106 98 - 107 mmol/L    CO2 25 21 - 32 mmol/L    Anion gap 7 7 - 16 mmol/L    Glucose 199 (H) 65 - 100 mg/dL    BUN 18 8 - 23 MG/DL    Creatinine 0.88 0.8 - 1.5 MG/DL    GFR est AA >60 >60 ml/min/1.73m2    GFR est non-AA >60 >60 ml/min/1.73m2    Calcium 9.9 8.3 - 10.4 MG/DL    Bilirubin, total 0.8 0.2 - 1.1 MG/DL    ALT (SGPT) 37 12 - 65 U/L    AST (SGOT) 23 15 - 37 U/L    Alk. phosphatase 59 50 - 136 U/L    Protein, total 7.3 6.3 - 8.2 g/dL    Albumin 3.9 3.2 - 4.6 g/dL    Globulin 3.4 2.3 - 3.5 g/dL    A-G Ratio 1.1 (L) 1.2 - 3.5     LACTIC ACID    Collection Time: 03/11/22  2:43 PM   Result Value Ref Range    Lactic acid 1.8 0.4 - 2.0 MMOL/L   PROCALCITONIN    Collection Time: 03/11/22  2:43 PM   Result Value Ref Range    Procalcitonin <0.05 0.00 - 0.49 ng/mL   CK    Collection Time: 03/11/22  2:43 PM   Result Value Ref Range     21 - 215 U/L   COVID-19 RAPID TEST    Collection Time: 03/11/22  3:50 PM   Result Value Ref Range    Specimen source NASAL SWAB      COVID-19 rapid test Not detected NOTD     URINE MICROSCOPIC    Collection Time: 03/11/22  5:58 PM   Result Value Ref Range    WBC 10-20 0 /hpf    RBC >100 (H) 0 /hpf    Epithelial cells 0-3 0 /hpf    Bacteria 0 0 /hpf    Casts 0-3 0 /lpf   CULTURE, URINE    Collection Time: 03/11/22  5:58 PM    Specimen: Clean catch; Urine   Result Value Ref Range    Special Requests: NO SPECIAL REQUESTS      Culture result:        NO GROWTH AFTER SHORT PERIOD OF INCUBATION. FURTHER RESULTS TO FOLLOW AFTER OVERNIGHT INCUBATION.    CULTURE, BLOOD    Collection Time: 03/11/22  6:47 PM    Specimen: Blood   Result Value Ref Range    Special Requests: LEFT  Antecubital        Culture result: NO GROWTH AFTER 12 HOURS     RESPIRATORY VIRUS PANEL W/COVID-19, PCR    Collection Time: 03/11/22  6:47 PM    Specimen: Nasopharyngeal   Result Value Ref Range    Adenovirus NOT DETECTED NOTDET      Coronavirus 229E NOT DETECTED NOTDET      Coronavirus HKU1 NOT DETECTED NOTDET      Coronavirus CVNL63 NOT DETECTED NOTDET      Coronavirus OC43 NOT DETECTED NOTDET      SARS-CoV-2, PCR NOT DETECTED NOTDET      Metapneumovirus NOT DETECTED NOTDET      Rhinovirus and Enterovirus NOT DETECTED NOTDET      Influenza A NOT DETECTED NOTDET      Influenza B NOT DETECTED NOTDET      Parainfluenza 1 NOT DETECTED NOTDET      Parainfluenza 2 NOT DETECTED NOTDET      Parainfluenza 3 NOT DETECTED NOTDET      Parainfluenza virus 4 NOT DETECTED NOTDET      RSV by PCR NOT DETECTED NOTDET      B. parapertussis, PCR NOT DETECTED NOTDET      Bordetella pertussis - PCR NOT DETECTED NOTDET      Chlamydophila pneumoniae DNA, QL, PCR NOT DETECTED NOTDET      Mycoplasma pneumoniae DNA, QL, PCR NOT DETECTED NOTDET     CULTURE, BLOOD    Collection Time: 03/11/22  6:53 PM    Specimen: Blood   Result Value Ref Range    Special Requests: NO SPECIAL REQUESTS  RIGHT  FOREARM        Culture result: NO GROWTH AFTER 12 HOURS     GLUCOSE, POC    Collection Time: 03/11/22  9:13 PM   Result Value Ref Range    Glucose (POC) 145 (H) 65 - 100 mg/dL    Performed by [facility]    METABOLIC PANEL, BASIC    Collection Time: 03/12/22  2:48 AM   Result Value Ref Range    Sodium 136 136 - 145 mmol/L    Potassium 3.4 (L) 3.5 - 5.1 mmol/L    Chloride 106 98 - 107 mmol/L    CO2 24 21 - 32 mmol/L    Anion gap 6 (L) 7 - 16 mmol/L    Glucose 152 (H) 65 - 100 mg/dL    BUN 14 8 - 23 MG/DL    Creatinine 0.71 (L) 0.8 - 1.5 MG/DL    GFR est AA >60 >60 ml/min/1.73m2    GFR est non-AA >60 >60 ml/min/1.73m2    Calcium 9.3 8.3 - 10.4 MG/DL   CBC W/O DIFF    Collection Time: 03/12/22  2:48 AM   Result Value Ref Range    WBC 7.9 4.3 - 11.1 K/uL    RBC 4.14 (L) 4.23 - 5.6 M/uL    HGB 14.1 13.6 - 17.2 g/dL    HCT 39.6 (L) 41.1 - 50.3 %    MCV 95.7 79.6 - 97.8 FL    MCH 34.1 (H) 26.1 - 32.9 PG    MCHC 35.6 (H) 31.4 - 35.0 g/dL    RDW 12.1 11.9 - 14.6 %    PLATELET 687 948 - 924 K/uL    MPV 9.9 9.4 - 12.3 FL    ABSOLUTE NRBC 0.00 0.0 - 0.2 K/uL   GLUCOSE, POC    Collection Time: 03/12/22  8:44 AM   Result Value Ref Range    Glucose (POC) 156 (H) 65 - 100 mg/dL    Performed by Stephan    GLUCOSE, POC Collection Time: 03/12/22 12:05 PM   Result Value Ref Range    Glucose (POC) 170 (H) 65 - 100 mg/dL    Performed by Emir        All Micro Results     Procedure Component Value Units Date/Time    CULTURE, URINE [[id number]] Collected: 03/11/22 1758    Order Status: Completed Specimen: Urine from Clean catch Updated: 03/12/22 0905     Special Requests: NO SPECIAL REQUESTS        Culture result:       NO GROWTH AFTER SHORT PERIOD OF INCUBATION. FURTHER RESULTS TO FOLLOW AFTER OVERNIGHT INCUBATION.           CULTURE, BLOOD [[id number]] Collected: 03/11/22 1847    Order Status: Completed Specimen: Blood Updated: 03/12/22 0818     Special Requests: --        LEFT  Antecubital       Culture result: NO GROWTH AFTER 12 HOURS       CULTURE, BLOOD [[id number]] Collected: 03/11/22 1853    Order Status: Completed Specimen: Blood Updated: 03/12/22 0818     Special Requests: --        NO SPECIAL REQUESTS  RIGHT  FOREARM       Culture result: NO GROWTH AFTER 12 HOURS       RESPIRATORY VIRUS PANEL W/COVID-19, PCR [[id number]] Collected: 03/11/22 1847    Order Status: Completed Specimen: Nasopharyngeal Updated: 03/11/22 2209     Adenovirus NOT DETECTED        Coronavirus 229E NOT DETECTED        Coronavirus HKU1 NOT DETECTED        Coronavirus CVNL63 NOT DETECTED        Coronavirus OC43 NOT DETECTED        SARS-CoV-2, PCR NOT DETECTED        Metapneumovirus NOT DETECTED        Rhinovirus and Enterovirus NOT DETECTED        Influenza A NOT DETECTED        Influenza B NOT DETECTED        Parainfluenza 1 NOT DETECTED        Parainfluenza 2 NOT DETECTED        Parainfluenza 3 NOT DETECTED        Parainfluenza virus 4 NOT DETECTED        RSV by PCR NOT DETECTED        B. parapertussis, PCR NOT DETECTED        Bordetella pertussis - PCR NOT DETECTED        Chlamydophila pneumoniae DNA, QL, PCR NOT DETECTED        Mycoplasma pneumoniae DNA, QL, PCR NOT DETECTED       COVID-19 RAPID TEST [[id number]] Collected: 03/11/22 2717 Order Status: Completed Specimen: Nasopharyngeal Updated: 03/11/22 1625     Specimen source NASAL SWAB        COVID-19 rapid test Not detected        Comment:      The specimen is NEGATIVE for SARS-CoV-2, the novel coronavirus associated with COVID-19. A negative result does not rule out COVID-19. This test has been authorized by the FDA under an Emergency Use Authorization (EUA) for use by authorized laboratories. Fact sheet for Healthcare Providers: [employer]  Fact sheet for Patients: [employer]       Methodology: Isothermal Nucleic Acid Amplification               Other Studies:  CT HEAD WO CONT    Result Date: 3/11/2022  CT head without contrast History: Worsening weakness. Technique: 5mm axial images were obtained from the skull base to the vertex without intravenous contrast.  Radiation dose reduction techniques were used for this study:  Our CT scanners use one or all of the following: Automated exposure control, adjustment of the mA and/or kVp according to patient's size, iterative reconstruction. Sagittal and coronal reformatted images were submitted. Comparison: 04/29/2019. 09/07/2010. Findings: Prominent extra-axial CSF density collection along the left frontotemporal region has not significantly changed dating back to prior remote imaging in 2010. There are associated dystrophic calcifications. 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 There is approximately 5 mm of left right midline shift. Ventricles an sulci are prominent but stable. There are no acute extra-axial fluid collections. Patchy areas of decreased attenuation are present within the supratentorial white matter. These are nonspecific findings but would be most compatible with mild chronic small vessel ischemic change. There is a remote right basal ganglia lacunar infarction.  No evidence of acute intraparenchymal hemorrhage is present. There is no evidence to suggest an acute major territorial infarct. Stable chronic findings including left supraclinoid aneurysm. DUPLEX AORTA/IVC/ILIAC/GRAFTS COMPLETE    Result Date: 3/12/2022  TITLE: Aorto-iliac arterial ultrasound examination. INDICATION: Abdominal aortic aneurysm. Celiac aneurysm. TECHNIQUE:  Grayscale, Color, and Spectral Doppler Interrogation performed. COMPARISON: CT 11/18/2014. ABDOMEN:  The proximal abdominal aorta measures 3.3 x 3.2 x 2.9 cm. The mid abdominal aorta aorta measures 2.7 x 2.3 x 2.5 cm. The distal abdominal aorta measures 2.4 x 2.3 x 2.2 cm. Meryle Sandman RIGHT LOWER EXTREMITY:  Common iliac artery equals 3.3 x 1.8 x 1.6 cm. Possible focal dissection associated with the right common iliac artery. LEFT LOWER EXTREMITY:  Common iliac artery equals 1.9 x 1.8 x 1.6 cm.     1.  Aneurysmal dilation of the right common iliac artery which measures up to 3.3 cm in diameter. Possible associated focal dissection. Consider CTA for further evaluation. 2.  No convincing evidence of abdominal aortic aneurysm. The celiac artery is not well evaluated.        Current Meds:  Current Facility-Administered Medications   Medication Dose Route Frequency    sodium chloride (NS) flush 5-40 mL  5-40 mL IntraVENous Q8H    sodium chloride (NS) flush 5-40 mL  5-40 mL IntraVENous PRN    acetaminophen (TYLENOL) tablet 650 mg  650 mg Oral Q6H PRN    Or    acetaminophen (TYLENOL) suppository 650 mg  650 mg Rectal Q6H PRN    polyethylene glycol (MIRALAX) packet 17 g  17 g Oral DAILY PRN    ondansetron (ZOFRAN ODT) tablet 4 mg  4 mg Oral Q8H PRN    Or    ondansetron (ZOFRAN) injection 4 mg  4 mg IntraVENous Q6H PRN    enoxaparin (LOVENOX) injection 40 mg  40 mg SubCUTAneous DAILY    insulin lispro (HUMALOG) injection   SubCUTAneous AC&HS    lactated Ringers infusion  100 mL/hr IntraVENous CONTINUOUS    cefTRIAXone (ROCEPHIN) 1 g in 0.9% sodium chloride (MBP/ADV) 50 mL MBP  1 g IntraVENous Q24H  aspirin tablet 325 mg  325 mg Oral QHS    atorvastatin (LIPITOR) tablet 40 mg  40 mg Oral QHS    ezetimibe (ZETIA) tablet 10 mg  10 mg Oral QHS    fenofibrate (LOFIBRA) tablet 160 mg  160 mg Oral QHS    linaCLOtide (LINZESS) capsule 145 mcg (Patient Supplied)  145 mcg Oral DAILY    oxybutynin chloride XL (DITROPAN XL) tablet 10 mg  10 mg Oral DAILY    senna-docusate (PERICOLACE) 8.6-50 mg per tablet 2 Tablet  2 Tablet Oral DAILY    tuberculin injection 5 Units  5 Units IntraDERMal ONCE       Signed:  Dora Zavala MD    Part of this note may have been written by using a voice dictation software. The note has been proof read but may still contain some grammatical/other typographical errors.

## 2022-03-12 NOTE — PROGRESS NOTES
03/11/22 2912   Dual Skin Pressure Injury Assessment   Dual Skin Pressure Injury Assessment X   Second Care Provider (Based on [address] [place] [address]) Ria RN   Skin Integumentary   Skin Integumentary (WDL) X   Skin Color Red   Skin Condition/Temp Inflamed;Petechiae   Skin Integrity Lesion (comment)    Pressure  Injury Documentation No Pressure Injury Noted-Pressure Ulcer Prevention Initiated   Wound Prevention and Protection Methods   Orientation of Wound Prevention Posterior   Location of Wound Prevention Sacrum/Coccyx   Dressing Present  No   Wound Offloading (Prevention Methods) Bed, pressure redistribution/air;Bed, pressure reduction mattress;Pillows;Repositioning;Turning

## 2022-03-12 NOTE — ACP (ADVANCE CARE PLANNING)
KATI met with patient who presented confused. SW called the patient's listed Mellisa Patrica with no answer. KATI then called the patient's emergency contact Jesús Owen who states that Terri Mcclellan is the patient's HCPOA and he is the secondary. KATI advised that the most recent documents on file list Terri Mcclellan as primary, and Jacqueline Tobias as secondary. Christopher Martino states that there is more a recent copy that he will bring in.      Alysa Frenhc LMSW    [facility]    * [email or web address]

## 2022-03-12 NOTE — ED NOTES
TRANSFER - OUT REPORT:    Verbal report given to DES Hart(name) on Susanne Blanca  being transferred to med surg(unit) for routine progression of care       Report consisted of patients Situation, Background, Assessment and   Recommendations(SBAR). Information from the following report(s) SBAR was reviewed with the receiving nurse. Lines:   Peripheral IV 03/11/22 Right Antecubital (Active)   Site Assessment Clean, dry, & intact 03/11/22 1533   Phlebitis Assessment 0 03/11/22 1533   Infiltration Assessment 0 03/11/22 1533   Dressing Status Clean, dry, & intact 03/11/22 1533        Opportunity for questions and clarification was provided.       Patient transported with:   Registered Nurse

## 2022-03-13 LAB
ANION GAP SERPL CALC-SCNC: 8 MMOL/L (ref 7–16)
APTT PPP: 27.7 SEC (ref 24.1–35.1)
BASOPHILS # BLD: 0 K/UL (ref 0–0.2)
BASOPHILS # BLD: 0.1 K/UL (ref 0–0.2)
BASOPHILS NFR BLD: 1 % (ref 0–2)
BASOPHILS NFR BLD: 1 % (ref 0–2)
BUN SERPL-MCNC: 12 MG/DL (ref 8–23)
CALCIUM SERPL-MCNC: 9.5 MG/DL (ref 8.3–10.4)
CHLORIDE SERPL-SCNC: 106 MMOL/L (ref 98–107)
CO2 SERPL-SCNC: 22 MMOL/L (ref 21–32)
CREAT SERPL-MCNC: 0.82 MG/DL (ref 0.8–1.5)
DIFFERENTIAL METHOD BLD: ABNORMAL
DIFFERENTIAL METHOD BLD: ABNORMAL
EOSINOPHIL # BLD: 0.2 K/UL (ref 0–0.8)
EOSINOPHIL # BLD: 0.3 K/UL (ref 0–0.8)
EOSINOPHIL NFR BLD: 4 % (ref 0.5–7.8)
EOSINOPHIL NFR BLD: 5 % (ref 0.5–7.8)
ERYTHROCYTE [DISTWIDTH] IN BLOOD BY AUTOMATED COUNT: 12.2 % (ref 11.9–14.6)
ERYTHROCYTE [DISTWIDTH] IN BLOOD BY AUTOMATED COUNT: 12.3 % (ref 11.9–14.6)
GLUCOSE BLD STRIP.AUTO-MCNC: 141 MG/DL (ref 65–100)
GLUCOSE BLD STRIP.AUTO-MCNC: 181 MG/DL (ref 65–100)
GLUCOSE BLD STRIP.AUTO-MCNC: 194 MG/DL (ref 65–100)
GLUCOSE BLD STRIP.AUTO-MCNC: 212 MG/DL (ref 65–100)
GLUCOSE SERPL-MCNC: 141 MG/DL (ref 65–100)
HCT VFR BLD AUTO: 40.7 % (ref 41.1–50.3)
HCT VFR BLD AUTO: 41.6 % (ref 41.1–50.3)
HGB BLD-MCNC: 14.2 G/DL (ref 13.6–17.2)
HGB BLD-MCNC: 14.5 G/DL (ref 13.6–17.2)
IMM GRANULOCYTES # BLD AUTO: 0 K/UL (ref 0–0.5)
IMM GRANULOCYTES # BLD AUTO: 0 K/UL (ref 0–0.5)
IMM GRANULOCYTES NFR BLD AUTO: 0 % (ref 0–5)
IMM GRANULOCYTES NFR BLD AUTO: 0 % (ref 0–5)
LYMPHOCYTES # BLD: 1 K/UL (ref 0.5–4.6)
LYMPHOCYTES # BLD: 1.8 K/UL (ref 0.5–4.6)
LYMPHOCYTES NFR BLD: 17 % (ref 13–44)
LYMPHOCYTES NFR BLD: 30 % (ref 13–44)
MAGNESIUM SERPL-MCNC: 2.2 MG/DL (ref 1.8–2.4)
MCH RBC QN AUTO: 33.3 PG (ref 26.1–32.9)
MCH RBC QN AUTO: 33.6 PG (ref 26.1–32.9)
MCHC RBC AUTO-ENTMCNC: 34.9 G/DL (ref 31.4–35)
MCHC RBC AUTO-ENTMCNC: 34.9 G/DL (ref 31.4–35)
MCV RBC AUTO: 95.6 FL (ref 79.6–97.8)
MCV RBC AUTO: 96.2 FL (ref 79.6–97.8)
MONOCYTES # BLD: 0.5 K/UL (ref 0.1–1.3)
MONOCYTES # BLD: 0.7 K/UL (ref 0.1–1.3)
MONOCYTES NFR BLD: 12 % (ref 4–12)
MONOCYTES NFR BLD: 9 % (ref 4–12)
NEUTS SEG # BLD: 3.1 K/UL (ref 1.7–8.2)
NEUTS SEG # BLD: 3.8 K/UL (ref 1.7–8.2)
NEUTS SEG NFR BLD: 52 % (ref 43–78)
NEUTS SEG NFR BLD: 69 % (ref 43–78)
NRBC # BLD: 0 K/UL (ref 0–0.2)
NRBC # BLD: 0 K/UL (ref 0–0.2)
PHOSPHATE SERPL-MCNC: 3.4 MG/DL (ref 2.3–3.7)
PLATELET # BLD AUTO: 186 K/UL (ref 150–450)
PLATELET # BLD AUTO: 201 K/UL (ref 150–450)
PMV BLD AUTO: 9.6 FL (ref 9.4–12.3)
PMV BLD AUTO: 9.7 FL (ref 9.4–12.3)
POTASSIUM SERPL-SCNC: 3.9 MMOL/L (ref 3.5–5.1)
RBC # BLD AUTO: 4.23 M/UL (ref 4.23–5.6)
RBC # BLD AUTO: 4.35 M/UL (ref 4.23–5.6)
SERVICE CMNT-IMP: ABNORMAL
SODIUM SERPL-SCNC: 136 MMOL/L (ref 136–145)
UFH PPP CHRO-ACNC: 0.9 IU/ML (ref 0.3–0.7)
WBC # BLD AUTO: 5.6 K/UL (ref 4.3–11.1)
WBC # BLD AUTO: 6 K/UL (ref 4.3–11.1)

## 2022-03-13 PROCEDURE — 85520 HEPARIN ASSAY: CPT

## 2022-03-13 PROCEDURE — 83735 ASSAY OF MAGNESIUM: CPT

## 2022-03-13 PROCEDURE — 65610000001 HC ROOM ICU GENERAL

## 2022-03-13 PROCEDURE — 74011250636 HC RX REV CODE- 250/636: Performed by: NURSE PRACTITIONER

## 2022-03-13 PROCEDURE — 74011250637 HC RX REV CODE- 250/637: Performed by: NURSE PRACTITIONER

## 2022-03-13 PROCEDURE — 84100 ASSAY OF PHOSPHORUS: CPT

## 2022-03-13 PROCEDURE — 74011000258 HC RX REV CODE- 258: Performed by: NURSE PRACTITIONER

## 2022-03-13 PROCEDURE — 82962 GLUCOSE BLOOD TEST: CPT

## 2022-03-13 PROCEDURE — 99222 1ST HOSP IP/OBS MODERATE 55: CPT | Performed by: PSYCHIATRY & NEUROLOGY

## 2022-03-13 PROCEDURE — 74011636637 HC RX REV CODE- 636/637: Performed by: NURSE PRACTITIONER

## 2022-03-13 PROCEDURE — 85730 THROMBOPLASTIN TIME PARTIAL: CPT

## 2022-03-13 PROCEDURE — 74011250636 HC RX REV CODE- 250/636: Performed by: STUDENT IN AN ORGANIZED HEALTH CARE EDUCATION/TRAINING PROGRAM

## 2022-03-13 PROCEDURE — 36415 COLL VENOUS BLD VENIPUNCTURE: CPT

## 2022-03-13 PROCEDURE — 97530 THERAPEUTIC ACTIVITIES: CPT

## 2022-03-13 PROCEDURE — 2709999900 HC NON-CHARGEABLE SUPPLY

## 2022-03-13 PROCEDURE — 74011000250 HC RX REV CODE- 250: Performed by: NURSE PRACTITIONER

## 2022-03-13 PROCEDURE — 80048 BASIC METABOLIC PNL TOTAL CA: CPT

## 2022-03-13 PROCEDURE — 85025 COMPLETE CBC W/AUTO DIFF WBC: CPT

## 2022-03-13 RX ORDER — HEPARIN SODIUM 5000 [USP'U]/100ML
18-36 INJECTION, SOLUTION INTRAVENOUS
Status: DISCONTINUED | OUTPATIENT
Start: 2022-03-13 | End: 2022-03-16

## 2022-03-13 RX ORDER — HEPARIN SODIUM 1000 [USP'U]/ML
80 INJECTION, SOLUTION INTRAVENOUS; SUBCUTANEOUS ONCE
Status: COMPLETED | OUTPATIENT
Start: 2022-03-13 | End: 2022-03-13

## 2022-03-13 RX ADMIN — SODIUM CHLORIDE, PRESERVATIVE FREE 10 ML: 5 INJECTION INTRAVENOUS at 13:31

## 2022-03-13 RX ADMIN — HEPARIN SODIUM 18 UNITS/KG/HR: 5000 INJECTION, SOLUTION INTRAVENOUS at 13:12

## 2022-03-13 RX ADMIN — CEFTRIAXONE 1 G: 1 INJECTION, POWDER, FOR SOLUTION INTRAMUSCULAR; INTRAVENOUS at 20:03

## 2022-03-13 RX ADMIN — HEPARIN SODIUM 6530 UNITS: 1000 INJECTION INTRAVENOUS; SUBCUTANEOUS at 13:27

## 2022-03-13 RX ADMIN — EZETIMIBE 10 MG: 10 TABLET ORAL at 21:17

## 2022-03-13 RX ADMIN — ASPIRIN 325 MG: 325 TABLET, FILM COATED ORAL at 21:17

## 2022-03-13 RX ADMIN — ENOXAPARIN SODIUM 40 MG: 100 INJECTION SUBCUTANEOUS at 09:28

## 2022-03-13 RX ADMIN — Medication 2 UNITS: at 21:25

## 2022-03-13 RX ADMIN — Medication 2 UNITS: at 12:17

## 2022-03-13 RX ADMIN — OXYBUTYNIN CHLORIDE 10 MG: 10 TABLET, EXTENDED RELEASE ORAL at 09:29

## 2022-03-13 RX ADMIN — SODIUM CHLORIDE, PRESERVATIVE FREE 10 ML: 5 INJECTION INTRAVENOUS at 05:52

## 2022-03-13 RX ADMIN — FENOFIBRATE 160 MG: 160 TABLET ORAL at 21:17

## 2022-03-13 RX ADMIN — DOCUSATE SODIUM 50MG AND SENNOSIDES 8.6MG 2 TABLET: 8.6; 5 TABLET, FILM COATED ORAL at 09:29

## 2022-03-13 RX ADMIN — ATORVASTATIN CALCIUM 40 MG: 40 TABLET, FILM COATED ORAL at 21:17

## 2022-03-13 RX ADMIN — SODIUM CHLORIDE, PRESERVATIVE FREE 10 ML: 5 INJECTION INTRAVENOUS at 21:17

## 2022-03-13 RX ADMIN — Medication 4 UNITS: at 17:59

## 2022-03-13 NOTE — PROGRESS NOTES
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 Spoke with vascular surgeon Tesha Bagley  over the phone and informed about the findings. He stated no need of anticoagulation like heparin drip. As he thinks it is not acute thrombosis. Neurology is consulted. And will schedule follow-up appointment with neurosurgery for multiple aneurysms in the carotid artery which are chronic.

## 2022-03-13 NOTE — PROGRESS NOTES
END OF SHIFT NOTE:    Intake/Output  03/12 1901 - 03/13 0700  In: 1263.3 [I.V.:1263.3]  Out: -    Voiding: YES  Catheter: YES  Drain:   External Urinary Catheter 03/11/22 (Active)               Stool:  1 occurrences. Emesis:  0 occurrences. VITAL SIGNS  Patient Vitals for the past 12 hrs:   Temp Pulse Resp BP SpO2   03/13/22 0310 97.8 °F (36.6 °C) 66 18 112/76 97 %   03/12/22 2250 98 °F (36.7 °C) 65 18 119/76 95 %   03/12/22 1923 98.3 °F (36.8 °C) 77 18 117/75 94 %       Pain Assessment  Pain 1  Pain Scale 1: Numeric (0 - 10) (03/13/22 0310)  Pain Intensity 1: 0 (03/13/22 0310)  Patient Stated Pain Goal: 0 (03/13/22 0310)    Ambulating  No    Additional Information:       Shift report given to oncoming nurse at the bedside.     Epifanio Solorzano RN

## 2022-03-13 NOTE — PROGRESS NOTES
Physical Exam  Skin:     General: Skin is warm and dry. Capillary Refill: Capillary refill takes less than 2 seconds. Dual skin assessment performed, finding noted as above.

## 2022-03-13 NOTE — CONSULTS
Consult    Patient: Kelly Sherman MRN: [id number]  SSN: [id number]    YOB: 1950  Age: 66 y.o. Sex: male        Assessment:     1. Basilar artery thrombosis, nonocclusive    2. History of stroke with left supraclinoid ICA occlusion    3. Supraclinoid left ICA aneurysm, chronic    4. 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, chronic    Hospital Problems  Date Reviewed: 3/11/2022          Codes Class Noted POA    Weakness ICD-10-CM: R53.1  ICD-9-CM: 780.79  3/11/2022 Yes        Benign essential HTN ICD-10-CM: I10  ICD-9-CM: 401.1  3/11/2022 Yes        * (Principal) UTI (urinary tract infection) ICD-10-CM: N39.0  ICD-9-CM: 599.0  5/11/2020 Yes        Diabetes mellitus with complication (HCC) [id number]: E11.8  ICD-9-CM: 250.90  8/28/2015 Yes        Mixed hyperlipidemia ICD-10-CM: E78.2  ICD-9-CM: 272.2  Unknown Yes        Aneurysm artery, celiac (HCC) (Chronic) ICD-10-CM: I72.8  ICD-9-CM: 442.84  8/6/2014 Yes        CVA, old, hemiparesis ([address] ICD-10-CM: H41.845  ICD-9-CM: 438.20  5/24/2012 Yes              Plan:     Discussed with endovascular neurosurgery and Dr.Jada Saran hightower    I will review the case with comprehensive stroke center at [facility] to make sure they are aware of the case    If the patient deteriorates clinically please call Code S, consult specialist on call and contact the transfer center at [facility] ([phone])      Subjective:      Kelly Sherman is a 66 y.o. male who is being seen for question of basilar artery clot. The patient is an unfortunate 58-year-old vasculopath who presented yesterday with acute confusion and increased motor difficulty associated with a urinary tract infection. CT of the head performed in the emergency department led to a CTA of the head and neck. This demonstrated multiple findings, most worrisome for linear filling defect consistent with thrombus in the basilar artery.   Proximal to this the patient had a vertebral artery stenosis with fusiform aneurysm. Patient has a chronic left hemiparesis and states that this is unchanged. He is without neurological complaint otherwise.     Past Medical History:   Diagnosis Date    Alcoholic pancreatitis 66/1386    after mother's death, states he slowed down on his drinking after this episode    Aneurysm ([address]     3 small of aorta, 2.4 x 2.3  cm in diameter is largest    Aneurysm artery, celiac ([address] 8/6/2014    Chronic pain     left foot    CVA (cerebral infarction) 1998, 2008    left sided weakness(TIA) stroke causing weakness    Depression     Diabetes ([address] 2010     typell, Avg fasting ; denies s/s hypo does not know last HA1C    Diabetic ulcer of left great toe ([address]     Enlarged aorta ([address] 8/6/2014    arteriomegaly 2.9cm 7/30/14     Gout     History of stroke 1998    Residual left hemiparesis    Hypertension     Mixed hyperlipidemia     Obesity     Skin cancer     neck    Stroke ([address]     Unspecified constipation     Unspecified vitamin D deficiency      Past Surgical History:   Procedure Laterality Date    HX ADENOIDECTOMY      childhood    HX CATARACT REMOVAL  OD-2013/OS-2014    with IOL placement    HX COLONOSCOPY  2007, 2010, 2014    with polypectomy    HX ORTHOPAEDIC  12/2012    L great toe fusion    HX ORTHOPAEDIC  last 4/2013    Left great toe surgery to total 3    HX ORTHOPAEDIC  11/2013    amp left great toe    HX TONSILLECTOMY      childhood    HX UROLOGICAL  1980's    spermatocele removed    MI APPENDECTOMY      over 21 yrs ago      Family History   Problem Relation Age of Onset    Cancer Mother     Other Brother         Angiosarcoma, AAA    Cancer Brother     Diabetes Brother     Diabetes Father     Hypertension Father     Other Father      Social History     Tobacco Use    Smoking status: Current Some Day Smoker     Years: 13.00     Types: Cigars    Smokeless tobacco: Never Used    Tobacco comment: cigar/ 2 per day Substance Use Topics    Alcohol use: No     Alcohol/week: 0.0 standard drinks     Comment: occassional      Current Facility-Administered Medications   Medication Dose Route Frequency Provider Last Rate Last Admin    sodium chloride (NS) flush 5-40 mL  5-40 mL IntraVENous Q8H Devoria Hu, NP   10 mL at 03/13/22 0552    sodium chloride (NS) flush 5-40 mL  5-40 mL IntraVENous PRN Devoria Hu, NP        acetaminophen (TYLENOL) tablet 650 mg  650 mg Oral Q6H PRN Devoria Hu, NP        Or   Rolf Kyaw acetaminophen (TYLENOL) suppository 650 mg  650 mg Rectal Q6H PRN Devoria Hu, NP        polyethylene glycol (MIRALAX) packet 17 g  17 g Oral DAILY PRN Devoria Hu, NP        ondansetron (ZOFRAN ODT) tablet 4 mg  4 mg Oral Q8H PRN Devoria Hu, NP        Or    ondansetron [facility]) injection 4 mg  4 mg IntraVENous Q6H PRN Devoria Hu, NP        enoxaparin (LOVENOX) injection 40 mg  40 mg SubCUTAneous DAILY Devoria Hu, NP   40 mg at 03/13/22 4505    insulin lispro (HUMALOG) injection   SubCUTAneous AC&HS Devoria Hu, NP   2 Units at 03/12/22 2152    lactated Ringers infusion  100 mL/hr IntraVENous CONTINUOUS Devoria Hu,  mL/hr at 03/12/22 1755 100 mL/hr at 03/12/22 1755    cefTRIAXone (ROCEPHIN) 1 g in 0.9% sodium chloride (MBP/ADV) 50 mL MBP  1 g IntraVENous Q24H Devoria Hu,  mL/hr at 03/12/22 1958 1 g at 03/12/22 1958    aspirin tablet 325 mg  325 mg Oral QHS Devoria Hu, NP   325 mg at 03/12/22 2152    atorvastatin (LIPITOR) tablet 40 mg  40 mg Oral Abbey Maxin, NP   40 mg at 03/12/22 2152    ezetimibe (ZETIA) tablet 10 mg  10 mg Oral Abbey Maxin, NP   10 mg at 03/12/22 2152    fenofibrate (LOFIBRA) tablet 160 mg  160 mg Oral Abbey Maxin, NP   160 mg at 03/12/22 2152    linaCLOtide (LINZESS) capsule 145 mcg (Patient Supplied)  145 mcg Oral DAILY Devoria Hu, NP        oxybutynin chloride XL (DITROPAN XL) tablet 10 mg  10 mg Oral DAILY Kyle Og NP   10 mg at 03/13/22 9795    senna-docusate (PERICOLACE) 8.6-50 mg per tablet 2 Tablet  2 Tablet Oral DAILY Helene Rojas NP   2 Tablet at 03/13/22 7569    tuberculin injection 5 Units  5 Units IntraDERMal Beth LirianoTHANIA            Allergies   Allergen Reactions    Penicillin G Hives and Rash    Percocet [Oxycodone-Acetaminophen] Anxiety    Sulfa (Sulfonamide Antibiotics) Other (comments)     Intolerance- unknown       Review of Systems: Pertinent positives and negatives per HPI      Objective:     Vitals:    03/12/22 1923 03/12/22 2250 03/13/22 0310 03/13/22 0857   BP: 117/75 119/76 112/76 116/80   Pulse: 77 65 66 71   Resp: 18 18 18 16   Temp: 98.3 °F (36.8 °C) 98 °F (36.7 °C) 97.8 °F (36.6 °C) 97.6 °F (36.4 °C)   SpO2: 94% 95% 97% 97%   Weight:       Height:            Physical Exam:      Limited physical examination due to telemedicine consult. Patient is alert sitting up in a bedside chair.   He is oriented x3 and fully coherent    Left hemiparesis affecting the upper extremities    Cranial nerves II through XII appear to be intact      Recent Results (from the past 24 hour(s))   GLUCOSE, POC    Collection Time: 03/12/22 12:05 PM   Result Value Ref Range    Glucose (POC) 170 (H) 65 - 100 mg/dL    Performed by Emir    GLUCOSE, POC    Collection Time: 03/12/22  4:24 PM   Result Value Ref Range    Glucose (POC) 139 (H) 65 - 100 mg/dL    Performed by Marcel    GLUCOSE, POC    Collection Time: 03/12/22  8:33 PM   Result Value Ref Range    Glucose (POC) 155 (H) 65 - 100 mg/dL    Performed by JorgeNIMA    CBC WITH AUTOMATED DIFF    Collection Time: 03/13/22  4:45 AM   Result Value Ref Range    WBC 6.0 4.3 - 11.1 K/uL    RBC 4.35 4.23 - 5.6 M/uL    HGB 14.5 13.6 - 17.2 g/dL    HCT 41.6 41.1 - 50.3 %    MCV 95.6 79.6 - 97.8 FL    MCH 33.3 (H) 26.1 - 32.9 PG    MCHC 34.9 31.4 - 35.0 g/dL    RDW 12.2 11.9 - 14.6 %    PLATELET 008 365 - 623 K/uL    MPV 9.6 9.4 - 12.3 FL    ABSOLUTE NRBC 0.00 0.0 - 0.2 K/uL    DF AUTOMATED      NEUTROPHILS 52 43 - 78 %    LYMPHOCYTES 30 13 - 44 %    MONOCYTES 12 4.0 - 12.0 %    EOSINOPHILS 5 0.5 - 7.8 %    BASOPHILS 1 0.0 - 2.0 %    IMMATURE GRANULOCYTES 0 0.0 - 5.0 %    ABS. NEUTROPHILS 3.1 1.7 - 8.2 K/UL    ABS. LYMPHOCYTES 1.8 0.5 - 4.6 K/UL    ABS. MONOCYTES 0.7 0.1 - 1.3 K/UL    ABS. EOSINOPHILS 0.3 0.0 - 0.8 K/UL    ABS. BASOPHILS 0.1 0.0 - 0.2 K/UL    ABS. IMM. GRANS. 0.0 0.0 - 0.5 K/UL   METABOLIC PANEL, BASIC    Collection Time: 03/13/22  4:45 AM   Result Value Ref Range    Sodium 136 136 - 145 mmol/L    Potassium 3.9 3.5 - 5.1 mmol/L    Chloride 106 98 - 107 mmol/L    CO2 22 21 - 32 mmol/L    Anion gap 8 7 - 16 mmol/L    Glucose 141 (H) 65 - 100 mg/dL    BUN 12 8 - 23 MG/DL    Creatinine 0.82 0.8 - 1.5 MG/DL    GFR est AA >60 >60 ml/min/1.73m2    GFR est non-AA >60 >60 ml/min/1.73m2    Calcium 9.5 8.3 - 10.4 MG/DL   MAGNESIUM    Collection Time: 03/13/22  4:45 AM   Result Value Ref Range    Magnesium 2.2 1.8 - 2.4 mg/dL   PHOSPHORUS    Collection Time: 03/13/22  4:45 AM   Result Value Ref Range    Phosphorus 3.4 2.3 - 3.7 MG/DL   GLUCOSE, POC    Collection Time: 03/13/22  7:20 AM   Result Value Ref Range    Glucose (POC) 141 (H) 65 - 100 mg/dL    Performed by Cedric        Lab Results   Component Value Date/Time    Cholesterol, total 124 09/01/2017 10:17 AM    HDL Cholesterol 32 (L) 09/01/2017 10:17 AM    LDL, calculated 64 09/01/2017 10:17 AM    VLDL, calculated 28 09/01/2017 10:17 AM    Triglyceride 139 09/01/2017 10:17 AM        Lab Results   Component Value Date/Time    Hemoglobin A1c 7.4 (H) 05/11/2020 10:55 PM    Hemoglobin A1c, External 6.0 05/19/2015 12:00 AM        CT Results (most recent):  Results from Hospital Encounter encounter on 03/11/22    CTA HEAD NECK W WO CONT    Narrative  Title:  CT arteriogram of the neck and head. Indication: Cerebral aneurysm.     Technique: Axial images of the neck and head were obtained after the uneventful  administration of intravenous iodinated contrast media. Contrast was used to  best identify the arterial structures. Images were reviewed on a separate, free  standing, three-dimensional workstation as per the referring physicians request.      All stenosis percentages derived by comparing the narrowest segment with the  distal Internal Carotid Artery luminal diameter, as described in the [place]  American Symptomatic Carotid Endarterectomy Trial (NASCET) criteria. All CT scans at this facility are performed using dose reduction/dose modulation  techniques, as appropriate the performed exam, including the following:  Automated Exposure Control; Adjustment of the mA and/or kV according to patient  size (this includes techniques or standardized protocols for targeted exams  where dose is matched to indication/reason for exam); and Use of Iterative  Reconstruction Technique. Comparison: CTA 2/8/2011. Findings:    Lungs:  No focal consolidation or pleural effusions. No suspicious pulmonary  nodules. .  Bones:  No osseous destruction. .  Paranasal sinuses:  Paranasal sinuses are clear. .  Brain:  No midline shift. No enhancing mass lesion or hydrocephalus. .  Encephalomalacia in the left MCA territory, unchanged. Chronic low-density  extra-axial fluid collection overlying the left temporal and frontal bones  measuring approximately 9.6 x 4.9 cm in diameter. This results in approximately  7 mm rightward midline shift. Soft tissues:  Within normal limits. .    Dural venous sinuses:  Patent. Aortic arch:  Non-aneurysmal. Arch vessels are patent. Moderate to advanced  generalized atherosclerotic change. Generalized arteriomegaly, similar to the  prior exam.    ANTERIOR CIRCULATION    Right common carotid artery:  No significant stenosis or occlusion. Right internal carotid artery:  No significant stenosis or occlusion.     Right middle cerebral artery:  No significant stenosis, occlusion, or aneurysm. Right anterior cerebral artery:  No significant stenosis, occlusion, or  aneurysm. Left common carotid artery: No significant stenosis or occlusion. Left internal carotid artery:  No significant stenosis or occlusion. Moderate to  advanced atherosclerotic atherosclerotic changes of the left carotid bulb. Advanced tortuosity is present. The proximal left internal carotid artery there  is atherosclerotic ulcer measuring 4 mm (series 3, image 307. Left supine on ICA  saccular aneurysm measuring 1.8 x 2.1 cm in diameter. Previously this measured  1.9 x 1.8 cm in diameter. Peripheral calcifications are present along the  aneurysm sac. Left middle cerebral artery:  Chronic occlusion of the left MCA. Left anterior cerebral artery:  No significant stenosis, occlusion, or aneurysm. Anterior communicating artery: No significant stenosis, occlusion, or aneurysm. POSTERIOR CIRCULATION    Right vertebral artery:  Aneurysmal enlargement of the proximal right vertebral  artery slightly distal to its origin. This measures 2.2 x 2.0 cm in diameter  (previously 1.7 x 1.7 cm in 2011). There is associated high-grade stenosis. There appears to be chronic mural thrombus with the patent portion only  measuring 9 mm. Left vertebral artery:  No significant stenosis or occlusion. Basilar artery:  No significant stenosis. Thin linear filling defect in the  proximal to mid basilar artery, new compared to the prior exam in 2011. Right posterior communicating artery: No significant stenosis, occlusion, or  aneurysm. Left posterior communicating artery:  No significant stenosis, occlusion, or  aneurysm. Right posterior cerebral artery: There appears to be occlusion at the expected  origin of the left PCA where it arises from the basilar artery; however the,  this appears unchanged. Fetal type left PCA. Left posterior cerebral artery:  No significant stenosis, occlusion, or  aneurysm. Impression  1.   Thin linear filling defect in the proximal to mid basilar artery, new  compared to the prior exam in 2011. This finding is suspicious for thrombus. 2.  Chronic left suprasellar ICA aneurysm measuring 2.1 x 1.8 cm in diameter,  similar to the prior exam in 2011. 3.  Chronic occlusion of the left MCA, unchanged. 4.  Aneurysmal enlargement of the proximal right vertebral artery slightly  distal to its origin. This measures 2.2 x 2.0 cm in diameter (previously 1.7 x  1.7 cm in 2011). There is associated high-grade stenosis. 5.  Chronic low-density extra-axial fluid collection overlying the left temporal  and frontal bones measuring approximately 9.6 x 4.9 cm in diameter. This results  in approximately 7 mm rightward midline shift. Unchanged from 2011.  6.  Generalized arteriomegaly and advanced atherosclerotic changes. Findings were called to Dr. Ian Alejandra by Dr. Taryn Varghese on 3-12-22 at 1844 hours via  telephone. Results for orders placed or performed during the hospital encounter of 03/11/22   EKG, 12 LEAD, INITIAL   Result Value Ref Range    Ventricular Rate 98 BPM    Atrial Rate 98 BPM    P-R Interval 164 ms    QRS Duration 82 ms    Q-T Interval 314 ms    QTC Calculation (Bezet) 400 ms    Calculated P Axis 43 degrees    Calculated R Axis 39 degrees    Calculated T Axis 15 degrees    Diagnosis       !! AGE AND GENDER SPECIFIC ECG ANALYSIS !! Sinus rhythm with occasional Premature ventricular complexes  Low voltage QRS  Nonspecific ST abnormality  Abnormal ECG  When compared with ECG of 11-MAR-2022 11:06,  Premature ventricular complexes are now Present  UT interval has decreased  Vent.  rate has increased BY  44 BPM  Right bundle branch block is no longer Present  Confirmed by Jesús Landing ([id number]) on 3/12/2022 8:28:48 AM          MRI Results (most recent):  Results from [place] encounter on 11/27/17    MRI BRAIN WO CONT    Narrative  MRI brain without contrast: 11/27/2017    History: incontinence, memory issues, gait disturbance. Incontinence, assess  for normal pressure hydrocephalus    Imaging sequences: Sagittal short TR/short TE, axial short TR/short TE, long  TR/long TE, FLAIR, gradient recall, diffusion weighted images and ADC mapping. Coronal FLAIR. Imaging was performed on a 1.5 Fabiola magnet. Comparison: 09/08/2010    Findings: Again noted is the extra-axial fluid collection overlying the left  frontal and temporal lobes of CSF signal intensity measuring approximately 9.7 x  4.9 x 8.4 cm in AP, transverse and craniocaudal dimensions, respectively. This  appears grossly stable when compared to the previous study. There is mass effect  upon the left lateral ventricle. There is approximately 4 mm of left-to-right  midline shift. There is ex vacuo dilatation of the body of the right lateral  ventricle due to a remote right basal ganglia lacunar infarction, unchanged. The  ventricles and sulci are otherwise prominent compatible with moderate volume  loss. There are several remote right cerebellar infarctions without significant  change. There is aneurysmal dilatation of the supraclinoid left internal carotid  artery measuring approximately 1.8 x 2.0 cm, previously measured at  approximately 1.9 x 1.9 cm suggesting interval stability. There is also  contiguous aneurysmal dilatation of the left M1 segment measuring approximately  9 mm in maximal dimension. There is no evidence of acute infarction. There is hemosiderin staining along  the sulci of the right parietal lobe suggesting previous subarachnoid  hemorrhage. Additional hemosiderin staining is present within the right basal  ganglia associated with the remote infarction. There may be similar but smaller  finding involving the left basal ganglia, unchanged. There is mild mucosal thickening within the maxillary sinuses. Impression  IMPRESSION:  1.  Extra-axial fluid collection following CSF signal intensity along the left  frontal anterior temporal lobes with mild left right midline shift, grossly  unchanged. This would be most suggestive of an arachnoid cyst.  2. Stable fusiform aneurysmal dilatation of the distal left internal carotid  artery and proximal left middle cerebral artery. 3. Moderate volume loss. 4. Remote lacunar infarctions as described. 5. Areas of hemosiderin staining from previous hemorrhage as described. US Results (most recent):  Results from [place] encounter on 02/10/14    US RETROPERITONEUM COMP    Narrative  ULTRASOUND KIDNEYS AND BLADDER:  2/10/2014    CLINICAL HISTORY:  Followup abnormal outside renal arterial duplex study with  left renal cyst.    FINDINGS:  Without prior outside study for comparison, ultrasound images show  that both kidneys are normal in size with the right measuring 12 and the left  13.4 cm in length. There is a 4.7 cm simple exophytic cyst at the upper pole  of the left kidney as well as a 1 cm cyst in the lower pole of the right  kidney. No solid renal mass, stone, hydronephrosis, or abnormal perinephric  collection is seen. Aorta, IVC, and bladder appear unremarkable as imaged. There is a small right common iliac artery aneurysm measuring 2.4 x 2.6 x 2.3  cm, which compares with the left common iliac artery which has a maximal  diameter of 1.8 cm. Impression  :  1.  Bilateral simple renal cysts, the largest at the left upper pole, with no  solid mass or other acute renal abnormality identified. 2.  2.6 cm right common iliac artery aneurysm. Most recent CTA  Results from [place] encounter on 03/11/22    CTA HEAD NECK W WO CONT    Narrative  Title:  CT arteriogram of the neck and head. Indication: Cerebral aneurysm. Technique: Axial images of the neck and head were obtained after the uneventful  administration of intravenous iodinated contrast media. Contrast was used to  best identify the arterial structures.   Images were reviewed on a separate, free  standing, three-dimensional workstation as per the referring physicians request.      All stenosis percentages derived by comparing the narrowest segment with the  distal Internal Carotid Artery luminal diameter, as described in the [place]  American Symptomatic Carotid Endarterectomy Trial (NASCET) criteria. All CT scans at this facility are performed using dose reduction/dose modulation  techniques, as appropriate the performed exam, including the following:  Automated Exposure Control; Adjustment of the mA and/or kV according to patient  size (this includes techniques or standardized protocols for targeted exams  where dose is matched to indication/reason for exam); and Use of Iterative  Reconstruction Technique. Comparison: CTA 2/8/2011. Findings:    Lungs:  No focal consolidation or pleural effusions. No suspicious pulmonary  nodules. .  Bones:  No osseous destruction. .  Paranasal sinuses:  Paranasal sinuses are clear. .  Brain:  No midline shift. No enhancing mass lesion or hydrocephalus. .  Encephalomalacia in the left MCA territory, unchanged. Chronic low-density  extra-axial fluid collection overlying the left temporal and frontal bones  measuring approximately 9.6 x 4.9 cm in diameter. This results in approximately  7 mm rightward midline shift. Soft tissues:  Within normal limits. .    Dural venous sinuses:  Patent. Aortic arch:  Non-aneurysmal. Arch vessels are patent. Moderate to advanced  generalized atherosclerotic change. Generalized arteriomegaly, similar to the  prior exam.    ANTERIOR CIRCULATION    Right common carotid artery:  No significant stenosis or occlusion. Right internal carotid artery:  No significant stenosis or occlusion. Right middle cerebral artery:  No significant stenosis, occlusion, or aneurysm. Right anterior cerebral artery:  No significant stenosis, occlusion, or  aneurysm. Left common carotid artery: No significant stenosis or occlusion.   Left internal carotid artery:  No significant stenosis or occlusion. Moderate to  advanced atherosclerotic atherosclerotic changes of the left carotid bulb. Advanced tortuosity is present. The proximal left internal carotid artery there  is atherosclerotic ulcer measuring 4 mm (series 3, image 307. Left supine on ICA  saccular aneurysm measuring 1.8 x 2.1 cm in diameter. Previously this measured  1.9 x 1.8 cm in diameter. Peripheral calcifications are present along the  aneurysm sac. Left middle cerebral artery:  Chronic occlusion of the left MCA. Left anterior cerebral artery:  No significant stenosis, occlusion, or aneurysm. Anterior communicating artery: No significant stenosis, occlusion, or aneurysm. POSTERIOR CIRCULATION    Right vertebral artery:  Aneurysmal enlargement of the proximal right vertebral  artery slightly distal to its origin. This measures 2.2 x 2.0 cm in diameter  (previously 1.7 x 1.7 cm in 2011). There is associated high-grade stenosis. There appears to be chronic mural thrombus with the patent portion only  measuring 9 mm. Left vertebral artery:  No significant stenosis or occlusion. Basilar artery:  No significant stenosis. Thin linear filling defect in the  proximal to mid basilar artery, new compared to the prior exam in 2011. Right posterior communicating artery: No significant stenosis, occlusion, or  aneurysm. Left posterior communicating artery:  No significant stenosis, occlusion, or  aneurysm. Right posterior cerebral artery: There appears to be occlusion at the expected  origin of the left PCA where it arises from the basilar artery; however the,  this appears unchanged. Fetal type left PCA. Left posterior cerebral artery:  No significant stenosis, occlusion, or  aneurysm. Impression  1. Thin linear filling defect in the proximal to mid basilar artery, new  compared to the prior exam in 2011. This finding is suspicious for thrombus.   2.  Chronic left suprasellar ICA aneurysm measuring 2.1 x 1.8 cm in diameter,  similar to the prior exam in 2011. 3.  Chronic occlusion of the left MCA, unchanged. 4.  Aneurysmal enlargement of the proximal right vertebral artery slightly  distal to its origin. This measures 2.2 x 2.0 cm in diameter (previously 1.7 x  1.7 cm in 2011). There is associated high-grade stenosis. 5.  Chronic low-density extra-axial fluid collection overlying the left temporal  and frontal bones measuring approximately 9.6 x 4.9 cm in diameter. This results  in approximately 7 mm rightward midline shift. Unchanged from 2011.  6.  Generalized arteriomegaly and advanced atherosclerotic changes. Findings were called to Dr. Otis Nj by Dr. Jeremy Reed on 3-12-22 at 1844 hours via  telephone. Most recent MRI  Results from [facility] [place] encounter on 11/27/17    MRI BRAIN WO CONT    Narrative  MRI brain without contrast: 11/27/2017    History: incontinence, memory issues, gait disturbance. Incontinence, assess  for normal pressure hydrocephalus    Imaging sequences: Sagittal short TR/short TE, axial short TR/short TE, long  TR/long TE, FLAIR, gradient recall, diffusion weighted images and ADC mapping. Coronal FLAIR. Imaging was performed on a 1.5 Fabiola magnet. Comparison: 09/08/2010    Findings: Again noted is the extra-axial fluid collection overlying the left  frontal and temporal lobes of CSF signal intensity measuring approximately 9.7 x  4.9 x 8.4 cm in AP, transverse and craniocaudal dimensions, respectively. This  appears grossly stable when compared to the previous study. There is mass effect  upon the left lateral ventricle. There is approximately 4 mm of left-to-right  midline shift. There is ex vacuo dilatation of the body of the right lateral  ventricle due to a remote right basal ganglia lacunar infarction, unchanged. The  ventricles and sulci are otherwise prominent compatible with moderate volume  loss.  There are several remote right cerebellar infarctions without significant  change. There is aneurysmal dilatation of the supraclinoid left internal carotid  artery measuring approximately 1.8 x 2.0 cm, previously measured at  approximately 1.9 x 1.9 cm suggesting interval stability. There is also  contiguous aneurysmal dilatation of the left M1 segment measuring approximately  9 mm in maximal dimension. There is no evidence of acute infarction. There is hemosiderin staining along  the sulci of the right parietal lobe suggesting previous subarachnoid  hemorrhage. Additional hemosiderin staining is present within the right basal  ganglia associated with the remote infarction. There may be similar but smaller  finding involving the left basal ganglia, unchanged. There is mild mucosal thickening within the maxillary sinuses. Impression  IMPRESSION:  1. Extra-axial fluid collection following CSF signal intensity along the left  frontal anterior temporal lobes with mild left right midline shift, grossly  unchanged. This would be most suggestive of an arachnoid cyst.  2. Stable fusiform aneurysmal dilatation of the distal left internal carotid  artery and proximal left middle cerebral artery. 3. Moderate volume loss. 4. Remote lacunar infarctions as described. 5. Areas of hemosiderin staining from previous hemorrhage as described.           Signed By: Sophia Garza MD     March 13, 2022

## 2022-03-13 NOTE — PROGRESS NOTES
TRANSFER - OUT REPORT:    Verbal report given to Elsie NUENS(name) on Leno Raymundo  being transferred to ICU room 373(unit) for urgent transfer       Report consisted of patients Situation, Background, Assessment and   Recommendations(SBAR). Information from the following report(s) SBAR, Kardex and MAR was reviewed with the receiving nurse. Lines:   Peripheral IV 03/12/22 Left;Posterior Hand (Active)   Site Assessment Clean, dry, & intact 03/12/22 1958   Phlebitis Assessment 0 03/12/22 1958   Infiltration Assessment 0 03/12/22 1958   Dressing Status Clean, dry, & intact 03/12/22 1958   Dressing Type Tape;Transparent 03/12/22 1958   Hub Color/Line Status Patent; Infusing 03/13/22 0310       Peripheral IV 03/12/22 Distal;Left Antecubital (Active)   Site Assessment Clean, dry, & intact 03/12/22 1958   Phlebitis Assessment 0 03/12/22 1958   Infiltration Assessment 0 03/12/22 1958   Dressing Status Clean, dry, & intact 03/12/22 1958   Dressing Type Tape;Transparent 03/12/22 1958   Hub Color/Line Status Patent; Flushed;Capped 03/13/22 0310   Alcohol Cap Used No 03/12/22 1958        Opportunity for questions and clarification was provided.       Patient transported with:   Registered Nurse  Tech

## 2022-03-13 NOTE — PROGRESS NOTES
NSR Contact Note    Called by RN on [address] [facility] [address] 3rd floor who states he is uncertain why NSR is being paged and does not know current condition of patient. 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 Notes suggest Neurology has been consulted and have not started pt on anticoagulation per reported radiology recommendation. Imaging reviewed and notes chronic left hemispheric encephalomalacia with ex vacuo changes, no significant mass effect with minimal changes from 2019 imaging, and less than 2mm of MLS at the level of the foramen, though atrophic anatomy limits measurement. Notable vascular pathology as specified on radiology review/report. Uncertain for current request for NSR consultation, but have expressed to calling RN that we do not provide open or endovascular management at [address] [place] [address] or [facility]. There is no apparent need for urgent nonvascular neurosurgery intervention. Have recommended the physician requesting consult contact our team and we can discuss or they can contact the endovascular team directly at [facility] for their recommendations and potential need for transfer for acute treatment vs outpt followup. No current [place] neurovascular coverage at this time, defer workup, management, and potential need for transfer vs outpt followup to [facility] endovascular team based upon limited information provided by consulting nurse and chart review.

## 2022-03-13 NOTE — PROGRESS NOTES
Problem: Pressure Injury - Risk of  Goal: *Prevention of pressure injury  Description: Document Malvin Scale and appropriate interventions in the flowsheet. Outcome: Progressing Towards Goal  Note: Pressure Injury Interventions:  Sensory Interventions: Assess changes in LOC,Assess need for specialty bed,Chair cushion    Moisture Interventions: Absorbent underpads,Apply protective barrier, creams and emollients    Activity Interventions: Assess need for specialty bed,Chair cushion,Pressure redistribution bed/mattress(bed type)    Mobility Interventions: Chair cushion,Assess need for specialty bed,Pressure redistribution bed/mattress (bed type)    Nutrition Interventions: Document food/fluid/supplement intake    Friction and Shear Interventions: Apply protective barrier, creams and emollients,Feet elevated on foot rest,Lift sheet                Problem: Pressure Injury - Risk of  Goal: *Prevention of pressure injury  Description: Document Malvin Scale and appropriate interventions in the flowsheet.   Outcome: Progressing Towards Goal  Note: Pressure Injury Interventions:  Sensory Interventions: Assess changes in LOC,Assess need for specialty bed,Chair cushion    Moisture Interventions: Absorbent underpads,Apply protective barrier, creams and emollients    Activity Interventions: Assess need for specialty bed,Chair cushion,Pressure redistribution bed/mattress(bed type)    Mobility Interventions: Chair cushion,Assess need for specialty bed,Pressure redistribution bed/mattress (bed type)    Nutrition Interventions: Document food/fluid/supplement intake    Friction and Shear Interventions: Apply protective barrier, creams and emollients,Feet elevated on foot rest,Lift sheet                Problem: Patient Education: Go to Patient Education Activity  Goal: Patient/Family Education  Outcome: Progressing Towards Goal     Problem: Falls - Risk of  Goal: *Absence of Falls  Description: Document Muna Fall Risk and appropriate interventions in the flowsheet.   Outcome: Progressing Towards Goal  Note: Fall Risk Interventions:  Mobility Interventions: Assess mobility with egress test,Bed/chair exit alarm,Mechanical lift    Mentation Interventions: Adequate sleep, hydration, pain control,Bed/chair exit alarm    Medication Interventions: Assess postural VS orthostatic hypotension,Bed/chair exit alarm,Patient to call before getting OOB    Elimination Interventions: Bed/chair exit alarm,Call light in reach,Patient to call for help with toileting needs    History of Falls Interventions: Bed/chair exit alarm,Consult care management for discharge planning,Investigate reason for fall         Problem: Patient Education: Go to Patient Education Activity  Goal: Patient/Family Education  Outcome: Progressing Towards Goal

## 2022-03-13 NOTE — PROGRESS NOTES
Problem: Mobility Impaired (Adult and Pediatric)  Goal: *Acute Goals and Plan of Care (Insert Text)  Outcome: Progressing Towards Goal  Note: DISCHARGE GOALS MODIFIED BASED ON PROGRESS 3/13/12 :  (1.)Mr. Vikash Lazo will move from supine to sit and sit to supine with STAND BY ASSIST, flat bed no rail. (2.)Mr. Vikash Lazo will transfer from bed to chair and chair to bed with CONTACT GUARD ASSIST using a walker. (3.)Mr. Vikash Lazo will ambulate with CONTACT GUARD ASSIST 25-30 ft using a rolling walker   (4.)Mr. Vikash Lazo will perform LE AROM exercises on cue without physical help. PHYSICAL THERAPY: Daily Note and AM 3/13/2022  INPATIENT: PT Visit Days : 2  [place] / [place] / Product Type: Medicare /       NAME/AGE/GENDER: Ramon Quinteros is a 66 y.o. male   PRIMARY DIAGNOSIS: Weakness [R53.1] UTI (urinary tract infection) UTI (urinary tract infection)       ICD-10: Treatment Diagnosis:    · Generalized Muscle Weakness (M62.81)  · Difficulty in walking, Not elsewhere classified (R26.2)   Precaution/Allergies:  Penicillin g, Percocet [oxycodone-acetaminophen], and Sulfa (sulfonamide antibiotics)      ASSESSMENT:     Mr. Vikash Lazo needed constant cues to stay on track with exercises & also to work through a task to completion. This pt when standing, has a tendency to sit down unpredictably, which extensive coaching to discourage pt from doing this. This will require extensive post acute rehab at SNF to become manageable, but will likely need Long Term placement. This section established at most recent assessment   PROBLEM LIST (Impairments causing functional limitations):  1. Decreased Strength  2. Decreased ADL/Functional Activities  3. Decreased Transfer Abilities  4. Decreased Ambulation Ability/Technique  5. Decreased Balance  6. Decreased Activity Tolerance   INTERVENTIONS PLANNED: (Benefits and precautions of physical therapy have been discussed with the patient.)  1.  Balance Exercise  2. Bed Mobility  3. Gait Training  4. Therapeutic Activites  5. Therapeutic Exercise/Strengthening  6. Transfer Training     TREATMENT PLAN: Frequency/Duration: daily for duration of hospital stay  Rehabilitation Potential For Stated Goals: Good     REHAB RECOMMENDATIONS (at time of discharge pending progress):    Placement: It is my opinion, based on this patient's performance to date, that Mr. Celine Shaver may benefit [facility] after discharge due to the functional deficits listed above that are likely to improve with skilled rehabilitation and concerns that he/she may be unsafe to be unsupervised at home due to weakness and debility, would be unsafe to be up without assist .  Equipment:    To be determined              HISTORY:   History of Present Injury/Illness (Reason for Referral):  Copied from MD note:   Aleisha Seth is a 66 y.o. 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 He lives alone, does have a sitter 8 hours/day, his daughter lives out of state and essentially not involved. EMS was called related to inability to ambulate at all today. Found in feces and urine. Patient knows he is at the hospital knows his name. He is unsure of the date or day or year. Patient usually gets around with a Rollator. Also history of stroke with some residual left-sided weakness. Urine in ED showed 10-20 WBC, he has poor hygiene. T max 99. Procal 0.05, no leukocytosis. CK ordered and pending. BC x2 and urine culture ordered. CT head shows chronic findings of right side lacunar infarct and supraclinoid artery. H/O AAA and iliac aneurysm that he is followed by Dr. Shane Sol for.       Past Medical History/Comorbidities:   Mr. 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 He has no past medical history of Chronic kidney disease. Mr. Raven Rain  has a past surgical history that includes hx tonsillectomy; hx adenoidectomy; pr appendectomy; hx orthopaedic (12/2012); hx orthopaedic (last 4/2013); hx orthopaedic (11/2013); hx cataract removal (OD-2013/OS-2014); hx urological (1980's); and hx colonoscopy (2007, 2010, 2014). Social History/Living Environment:   Home Environment: Private residence  # Steps to Enter: 0  One/Two Story Residence: Two story, live on 1st floor  Living Alone: Yes  Support Systems: Caregiver/Home Care Staff,Friend/Neighbor  Patient Expects to be Discharged to[de-identified] Skilled nursing facility  Current DME Used/Available at Home: meli Cazares  Prior Level of Function/Work/Activity:  Pt lives at home alone, getting weaker at home, used a rollator     Number of Personal Factors/Comorbidities that affect the Plan of Care: 0: LOW COMPLEXITY   EXAMINATION:   Most Recent Physical Functioning:   Gross Assessment:                  Posture:     Balance:  Sitting: Intact; Without support  Standing: Impaired; With support (walker)  Standing - Static:  (fair- with walker)  Standing - Dynamic :  (fair- with walker) Bed Mobility:  Supine to Sit: Contact guard assistance  Sit to Supine:  (NT)  Scooting: Contact guard assistance  Wheelchair Mobility:     Transfers:  Sit to Stand: Minimum assistance  Stand to Sit: Minimum assistance  Bed to Chair: Minimum assistance (with walker)  Duration: 39 Minutes (extra time to work through activity noted)  Gait:     Speed/Lulú: Shuffled; Slow  Step Length: Left shortened;Right shortened  Gait Abnormalities: Decreased step clearance;Trunk sway increased (didi easily)  Distance (ft): 5 Feet (ft)  Assistive Device: Walker, rolling  Ambulation - Level of Assistance: Minimal assistance  Interventions: Safety awareness training;Verbal cues         Body Structures Involved:  1. Muscles Body Functions Affected:  1. Movement Related Activities and Participation Affected:  1. Mobility  2. Self Care   Number of elements that affect the Plan of Care: 4+: HIGH COMPLEXITY   CLINICAL PRESENTATION:   Presentation: Stable and uncomplicated: LOW COMPLEXITY   CLINICAL DECISION MAKIN[address] [place] [address] AM-PAC 6 Clicks   Basic Mobility Inpatient Short Form  How much difficulty does the patient currently have. .. Unable A Lot A Little None   1. Turning over in bed (including adjusting bedclothes, sheets and blankets)? [] 1   [] 2   [x] 3   [] 4   2. Sitting down on and standing up from a chair with arms ( e.g., wheelchair, bedside commode, etc.)   [] 1   [x] 2   [] 3   [] 4   3. Moving from lying on back to sitting on the side of the bed? [] 1   [] 2   [x] 3   [] 4   How much help from another person does the patient currently need. .. Total A Lot A Little None   4. Moving to and from a bed to a chair (including a wheelchair)? [] 1   [x] 2   [] 3   [] 4   5. Need to walk in hospital room? [] 1   [x] 2   [] 3   [] 4   6. Climbing 3-5 steps with a railing? [] 1   [x] 2   [] 3   [] 4   © , Trustees of [address] [place] [address], under license to [employer]. All rights reserved      Score:  Initial: 14 Most Recent: X (Date: -- )    Interpretation of Tool:  Represents activities that are increasingly more difficult (i.e. Bed mobility, Transfers, Gait). Medical Necessity:     · Patient is expected to demonstrate progress in   · strength, balance, coordination, and functional technique  ·  to   · decrease assistance required with functional mobility  · .   Reason for Services/Other Comments:  · Patient continues to require skilled intervention due to   · Inability to complete functional mobility independently  · . Use of outcome tool(s) and clinical judgement create a POC that gives a: Questionable prediction of patient's progress: MODERATE COMPLEXITY            TREATMENT:   (In addition to Assessment/Re-Assessment sessions the following treatments were rendered)   Pre-treatment Symptoms/Complaints:  Pt agreeable  Pain: Initial: numeric scale  Pain Intensity 1: 0  Post Session:  0/10   Therapeutic Activity: (  39 Minutes (extra time to work through activity noted) ):  Therapeutic activities including LE AAROM as warm up, bed mobility, transfers & repeated sit<>stand with walker, repeated standing balance activity using a walker performing wt shift, short distance ambulation using a walker to improve mobility, strength, balance, coordination and dynamic movement of arm - bilateral, leg - bilateral and core to improve functional endurance & stability. .         Braces/Orthotics/Lines/Etc:   · IV  Treatment/Session Assessment:    · Response to Treatment:  Pt has a cognitive barrier, likely from UTI, that should improve as infection clears. · Interdisciplinary Collaboration:   o Registered Nurse  o   · After treatment position/precautions:   o Up in chair  o Bed alarm/tab alert on  o Bed/Chair-wheels locked  o Call light within reach  o RN notified   · Compliance with Program/Exercises: Will assess as treatment progresses  · Recommendations/Intent for next treatment session: \"Next visit will focus on advancements to more challenging activities and reduction in assistance provided\".   Total Treatment Duration:  PT Patient Time In/Time Out  Time In: 0937  Time Out: [address] [place], [place]

## 2022-03-13 NOTE — PROGRESS NOTES
TRANSFER - IN REPORT:    Verbal report received from [facility] (name) on Jana Shea  being received from med surg (unit) for routine progression of care      Report consisted of patients Situation, Background, Assessment and   Recommendations(SBAR). Information from the following report(s) SBAR, Kardex, Intake/Output, MAR, Recent Results was reviewed with the receiving nurse. Opportunity for questions and clarification was provided. Assessment to be completed upon patients arrival to unit and care assumed.

## 2022-03-13 NOTE — PROGRESS NOTES
Hospitalist Progress Note   Admit Date:  3/11/2022  1:47 PM   Name:  Emelia Neal   Age:  66 y.o. Sex:  male  :  1950   MRN:  [id number]   Room:  [facility]    Presenting Complaint: Fatigue and Incontinence    Reason(s) for Admission: Weakness [R53.1]     Hospital Course & Interval History:   Emelia Neal is a 66 y.o. 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 He lives alone, does have a sitter 8 hours/day, his daughter lives out of state and essentially not involved. EMS was called related to inability to ambulate at all today. Found in feces and urine.  Patient knows he is at the hospital knows his name. Indy Loco is unsure of the date or day or year. Philip Fish usually gets around with a Rollator.   Also history of stroke with some residual left-sided weakness. Urine in ED showed 10-20 WBC, he has poor hygiene. T max 99. Procal 0.05, no leukocytosis. CK ordered and pending. BC x2 and urine culture ordered. CT head shows chronic findings of right side lacunar infarct and supraclinoid artery. H/O AAA and iliac aneurysm that he is followed by Dr. Katharine Goodpasture for. Subjective/24hr Events (22): Patient seen and examined at the bedside. He is AAO x 3. Patient is having breakfast and very comfortable. Patient denies chest pain, nausea, vomiting, headache, blurry vision, weakness, tingling    ROS:  10 systems reviewed and negative except as noted above. Assessment & Plan:   Acute Encephalopathy due to UTI  Resolved and mental status is back to baseline    New basilar artery thrombosis, nonocclusive  Started on heparin drip  Neurochecks  Neurologist Dr. Layo Blackman spoke to endovascular team at [facility].    Any Change in mental status please call code stroke and contact the transfer center at [facility] ([phone]  Transferred to ICU for close monitoring  Neurology is consulted      UTI (urinary tract infection) (5/11/2020)  UA is positive    Continue Rocephin   urine and BC negative to date    Hypokalemia   Resolved     CVA, old, hemiparesis ([facility] (5/24/2012) with left supraclinoid ICA occlusion    PT/OT recommending SNF  Continue normal strength ASA and statin    Urinary incontinence  Cruz placement and straight cath  is unsuccessful      Chronic Supraclinoid left ICA aneurysm. 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      Mixed hyperlipidemia ()    statin       Diabetes mellitus with complication ([facility] (1/54/9446)    SSI  Last A1C January 5.9          Benign essential HTN (3/11/2022)  Continue  home meds      Dispo/Discharge Planning:  Awaiting SNF placement     Spoke with the power of  over the phone and gave the update regarding the patient. Pt has 24x7 caregiver at home.      Diet: ADULT DIET Regular; 3 carb choices (45 gm/meal)  VTE ppx:  Heparin GTT  Code status: Full Code      Hospital Problems as of 3/13/2022 Date Reviewed: 3/11/2022          Codes Class Noted - Resolved POA    Weakness ICD-10-CM: R53.1  ICD-9-CM: 780.79  3/11/2022 - Present Yes        Benign essential HTN ICD-10-CM: I10  ICD-9-CM: 401.1  3/11/2022 - Present Yes        * (Principal) UTI (urinary tract infection) ICD-10-CM: N39.0  ICD-9-CM: 599.0  5/11/2020 - Present Yes        Diabetes mellitus with complication ([facility] LUY-25-LF: E11.8  ICD-9-CM: 250.90  8/28/2015 - Present Yes        Mixed hyperlipidemia ICD-10-CM: E78.2  ICD-9-CM: 272.2  Unknown - Present Yes        Aneurysm artery, celiac (HCC) (Chronic) ICD-10-CM: I72.8  ICD-9-CM: 442.84  8/6/2014 - Present Yes        CVA, old, hemiparesis ([facility] ICD-10-CM: I71.009  ICD-9-CM: 438.20  5/24/2012 - Present Yes              Objective:     Patient Vitals for the past 24 hrs:   Temp Pulse Resp BP SpO2   03/13/22 1152 97.8 °F (36.6 °C) 88 20 97/75 97 %   03/13/22 0857 97.6 °F (36.4 °C) 71 16 116/80 97 %   03/13/22 0310 97.8 °F (36.6 °C) 66 18 112/76 97 %   03/12/22 2250 98 °F (36.7 °C) 65 18 119/76 95 %   03/12/22 1923 98.3 °F (36.8 °C) 77 18 117/75 94 %   03/12/22 1519 98.2 °F (36.8 °C) 84 18 124/83 93 %     Oxygen Therapy  O2 Sat (%): 97 % (03/13/22 1152)  Pulse via Oximetry: 93 beats per minute (03/11/22 1844)  O2 Device: None (Room air) (03/13/22 1152)    Estimated body mass index is 24.41 kg/m² as calculated from the following:    Height as of this encounter: 6' (1.829 m). Weight as of this encounter: 81.6 kg (180 lb). Intake/Output Summary (Last 24 hours) at 3/13/2022 1301  Last data filed at 3/13/2022 5291  Gross per 24 hour   Intake 1263.32 ml   Output    Net 1263.32 ml         Physical Exam:   Blood pressure 97/75, pulse 88, temperature 97.8 °F (36.6 °C), resp. rate 20, height 6' (1.829 m), weight 81.6 kg (180 lb), SpO2 97 %. General:    Well nourished. No overt distress, obese  Head:  Normocephalic, atraumatic  Eyes:  Sclerae appear normal.  Pupils equally round. ENT:  Nares appear normal, no drainage. Moist oral mucosa  Neck:  No restricted ROM. Trachea midline   CV:   RRR. No m/r/g. No jugular venous distension. Lungs:   CTAB. No wheezing, rhonchi, or rales. Respirations even, unlabored  Abdomen: Bowel sounds present. Soft, nontender, nondistended. Extremities: No cyanosis or clubbing. No edema  Skin:     No rashes and normal coloration. Warm and dry. Neuro:  CN II-XII grossly intact. Sensation intact. A&Ox3  Psych:  Normal mood and affect.       I have reviewed ordered lab tests and independently visualized imaging below:    Recent Labs:  Recent Results (from the past 48 hour(s))   EKG, 12 LEAD, INITIAL    Collection Time: 03/11/22  2:02 PM   Result Value Ref Range    Ventricular Rate 98 BPM    Atrial Rate 98 BPM    P-R Interval 164 ms    QRS Duration 82 ms    Q-T Interval 314 ms    QTC Calculation (Bezet) 400 ms    Calculated P Axis 43 degrees    Calculated R Axis 39 degrees    Calculated T Axis 15 degrees    Diagnosis       !! AGE AND GENDER SPECIFIC ECG ANALYSIS !! Sinus rhythm with occasional Premature ventricular complexes  Low voltage QRS  Nonspecific ST abnormality  Abnormal ECG  When compared with ECG of 11-MAR-2022 11:06,  Premature ventricular complexes are now Present  DE interval has decreased  Vent. rate has increased BY  44 BPM  Right bundle branch block is no longer Present  Confirmed by Courtney Maciel ([id number]) on 3/12/2022 8:28:48 AM     CBC WITH AUTOMATED DIFF    Collection Time: 03/11/22  2:43 PM   Result Value Ref Range    WBC 8.1 4.3 - 11.1 K/uL    RBC 4.62 4.23 - 5.6 M/uL    HGB 15.4 13.6 - 17.2 g/dL    HCT 44.1 41.1 - 50.3 %    MCV 95.5 79.6 - 97.8 FL    MCH 33.3 (H) 26.1 - 32.9 PG    MCHC 34.9 31.4 - 35.0 g/dL    RDW 12.2 11.9 - 14.6 %    PLATELET 859 824 - 849 K/uL    MPV 10.0 9.4 - 12.3 FL    ABSOLUTE NRBC 0.00 0.0 - 0.2 K/uL    NEUTROPHILS 77 43 - 78 %    LYMPHOCYTES 12 (L) 13 - 44 %    MONOCYTES 8 4.0 - 12.0 %    EOSINOPHILS 2 0.5 - 7.8 %    BASOPHILS 1 0.0 - 2.0 %    IMMATURE GRANULOCYTES 0 0.0 - 5.0 %    ABS. NEUTROPHILS 6.4 1.7 - 8.2 K/UL    ABS. LYMPHOCYTES 1.0 0.5 - 4.6 K/UL    ABS. MONOCYTES 0.6 0.1 - 1.3 K/UL    ABS. EOSINOPHILS 0.1 0.0 - 0.8 K/UL    ABS. BASOPHILS 0.0 0.0 - 0.2 K/UL    ABS. IMM. GRANS. 0.0 0.0 - 0.5 K/UL    DF AUTOMATED     METABOLIC PANEL, COMPREHENSIVE    Collection Time: 03/11/22  2:43 PM   Result Value Ref Range    Sodium 138 136 - 145 mmol/L    Potassium 3.6 3.5 - 5.1 mmol/L    Chloride 106 98 - 107 mmol/L    CO2 25 21 - 32 mmol/L    Anion gap 7 7 - 16 mmol/L    Glucose 199 (H) 65 - 100 mg/dL    BUN 18 8 - 23 MG/DL    Creatinine 0.88 0.8 - 1.5 MG/DL    GFR est AA >60 >60 ml/min/1.73m2    GFR est non-AA >60 >60 ml/min/1.73m2    Calcium 9.9 8.3 - 10.4 MG/DL    Bilirubin, total 0.8 0.2 - 1.1 MG/DL    ALT (SGPT) 37 12 - 65 U/L    AST (SGOT) 23 15 - 37 U/L    Alk.  phosphatase 59 50 - 136 U/L    Protein, total 7.3 6.3 - 8.2 g/dL Albumin 3.9 3.2 - 4.6 g/dL    Globulin 3.4 2.3 - 3.5 g/dL    A-G Ratio 1.1 (L) 1.2 - 3.5     LACTIC ACID    Collection Time: 03/11/22  2:43 PM   Result Value Ref Range    Lactic acid 1.8 0.4 - 2.0 MMOL/L   PROCALCITONIN    Collection Time: 03/11/22  2:43 PM   Result Value Ref Range    Procalcitonin <0.05 0.00 - 0.49 ng/mL   CK    Collection Time: 03/11/22  2:43 PM   Result Value Ref Range     21 - 215 U/L   COVID-19 RAPID TEST    Collection Time: 03/11/22  3:50 PM   Result Value Ref Range    Specimen source NASAL SWAB      COVID-19 rapid test Not detected NOTD     URINE MICROSCOPIC    Collection Time: 03/11/22  5:58 PM   Result Value Ref Range    WBC 10-20 0 /hpf    RBC >100 (H) 0 /hpf    Epithelial cells 0-3 0 /hpf    Bacteria 0 0 /hpf    Casts 0-3 0 /lpf   CULTURE, BLOOD    Collection Time: 03/11/22  6:47 PM    Specimen: Blood   Result Value Ref Range    Special Requests: LEFT  Antecubital        Culture result: NO GROWTH AFTER 12 HOURS     RESPIRATORY VIRUS PANEL W/COVID-19, PCR    Collection Time: 03/11/22  6:47 PM    Specimen: Nasopharyngeal   Result Value Ref Range    Adenovirus NOT DETECTED NOTDET      Coronavirus 229E NOT DETECTED NOTDET      Coronavirus HKU1 NOT DETECTED NOTDET      Coronavirus CVNL63 NOT DETECTED NOTDET      Coronavirus OC43 NOT DETECTED NOTDET      SARS-CoV-2, PCR NOT DETECTED NOTDET      Metapneumovirus NOT DETECTED NOTDET      Rhinovirus and Enterovirus NOT DETECTED NOTDET      Influenza A NOT DETECTED NOTDET      Influenza B NOT DETECTED NOTDET      Parainfluenza 1 NOT DETECTED NOTDET      Parainfluenza 2 NOT DETECTED NOTDET      Parainfluenza 3 NOT DETECTED NOTDET      Parainfluenza virus 4 NOT DETECTED NOTDET      RSV by PCR NOT DETECTED NOTDET      B. parapertussis, PCR NOT DETECTED NOTDET      Bordetella pertussis - PCR NOT DETECTED NOTDET      Chlamydophila pneumoniae DNA, QL, PCR NOT DETECTED NOTDET      Mycoplasma pneumoniae DNA, QL, PCR NOT DETECTED NOTDET     CULTURE, BLOOD    Collection Time: 03/11/22  6:53 PM    Specimen: Blood   Result Value Ref Range    Special Requests: NO SPECIAL REQUESTS  RIGHT  FOREARM        Culture result: NO GROWTH AFTER 12 HOURS     CULTURE, URINE    Collection Time: 03/11/22  6:58 PM    Specimen: Clean catch; Urine   Result Value Ref Range    Special Requests: NO SPECIAL REQUESTS      Culture result: NO GROWTH 1 DAY     GLUCOSE, POC    Collection Time: 03/11/22  9:13 PM   Result Value Ref Range    Glucose (POC) 145 (H) 65 - 100 mg/dL    Performed by CalinFABRICE    METABOLIC PANEL, BASIC    Collection Time: 03/12/22  2:48 AM   Result Value Ref Range    Sodium 136 136 - 145 mmol/L    Potassium 3.4 (L) 3.5 - 5.1 mmol/L    Chloride 106 98 - 107 mmol/L    CO2 24 21 - 32 mmol/L    Anion gap 6 (L) 7 - 16 mmol/L    Glucose 152 (H) 65 - 100 mg/dL    BUN 14 8 - 23 MG/DL    Creatinine 0.71 (L) 0.8 - 1.5 MG/DL    GFR est AA >60 >60 ml/min/1.73m2    GFR est non-AA >60 >60 ml/min/1.73m2    Calcium 9.3 8.3 - 10.4 MG/DL   CBC W/O DIFF    Collection Time: 03/12/22  2:48 AM   Result Value Ref Range    WBC 7.9 4.3 - 11.1 K/uL    RBC 4.14 (L) 4.23 - 5.6 M/uL    HGB 14.1 13.6 - 17.2 g/dL    HCT 39.6 (L) 41.1 - 50.3 %    MCV 95.7 79.6 - 97.8 FL    MCH 34.1 (H) 26.1 - 32.9 PG    MCHC 35.6 (H) 31.4 - 35.0 g/dL    RDW 12.1 11.9 - 14.6 %    PLATELET 451 047 - 406 K/uL    MPV 9.9 9.4 - 12.3 FL    ABSOLUTE NRBC 0.00 0.0 - 0.2 K/uL   GLUCOSE, POC    Collection Time: 03/12/22  8:44 AM   Result Value Ref Range    Glucose (POC) 156 (H) 65 - 100 mg/dL    Performed by Stephan    GLUCOSE, POC    Collection Time: 03/12/22 12:05 PM   Result Value Ref Range    Glucose (POC) 170 (H) 65 - 100 mg/dL    Performed by Emir    GLUCOSE, POC    Collection Time: 03/12/22  4:24 PM   Result Value Ref Range    Glucose (POC) 139 (H) 65 - 100 mg/dL    Performed by Nickie Lott 134, POC    Collection Time: 03/12/22  8:33 PM   Result Value Ref Range    Glucose (POC) 155 (H) 65 - 100 mg/dL    Performed by [facility]    CBC WITH AUTOMATED DIFF    Collection Time: 03/13/22  4:45 AM   Result Value Ref Range    WBC 6.0 4.3 - 11.1 K/uL    RBC 4.35 4.23 - 5.6 M/uL    HGB 14.5 13.6 - 17.2 g/dL    HCT 41.6 41.1 - 50.3 %    MCV 95.6 79.6 - 97.8 FL    MCH 33.3 (H) 26.1 - 32.9 PG    MCHC 34.9 31.4 - 35.0 g/dL    RDW 12.2 11.9 - 14.6 %    PLATELET 584 970 - 411 K/uL    MPV 9.6 9.4 - 12.3 FL    ABSOLUTE NRBC 0.00 0.0 - 0.2 K/uL    DF AUTOMATED      NEUTROPHILS 52 43 - 78 %    LYMPHOCYTES 30 13 - 44 %    MONOCYTES 12 4.0 - 12.0 %    EOSINOPHILS 5 0.5 - 7.8 %    BASOPHILS 1 0.0 - 2.0 %    IMMATURE GRANULOCYTES 0 0.0 - 5.0 %    ABS. NEUTROPHILS 3.1 1.7 - 8.2 K/UL    ABS. LYMPHOCYTES 1.8 0.5 - 4.6 K/UL    ABS. MONOCYTES 0.7 0.1 - 1.3 K/UL    ABS. EOSINOPHILS 0.3 0.0 - 0.8 K/UL    ABS. BASOPHILS 0.1 0.0 - 0.2 K/UL    ABS. IMM.  GRANS. 0.0 0.0 - 0.5 K/UL   METABOLIC PANEL, BASIC    Collection Time: 03/13/22  4:45 AM   Result Value Ref Range    Sodium 136 136 - 145 mmol/L    Potassium 3.9 3.5 - 5.1 mmol/L    Chloride 106 98 - 107 mmol/L    CO2 22 21 - 32 mmol/L    Anion gap 8 7 - 16 mmol/L    Glucose 141 (H) 65 - 100 mg/dL    BUN 12 8 - 23 MG/DL    Creatinine 0.82 0.8 - 1.5 MG/DL    GFR est AA >60 >60 ml/min/1.73m2    GFR est non-AA >60 >60 ml/min/1.73m2    Calcium 9.5 8.3 - 10.4 MG/DL   MAGNESIUM    Collection Time: 03/13/22  4:45 AM   Result Value Ref Range    Magnesium 2.2 1.8 - 2.4 mg/dL   PHOSPHORUS    Collection Time: 03/13/22  4:45 AM   Result Value Ref Range    Phosphorus 3.4 2.3 - 3.7 MG/DL   GLUCOSE, POC    Collection Time: 03/13/22  7:20 AM   Result Value Ref Range    Glucose (POC) 141 (H) 65 - 100 mg/dL    Performed by Cedric    GLUCOSE, POC    Collection Time: 03/13/22 11:33 AM   Result Value Ref Range    Glucose (POC) 194 (H) 65 - 100 mg/dL    Performed by Norberto    PTT    Collection Time: 03/13/22 11:55 AM   Result Value Ref Range    aPTT 27.7 24.1 - 35.1 SEC CBC WITH AUTOMATED DIFF    Collection Time: 03/13/22 11:55 AM   Result Value Ref Range    WBC 5.6 4.3 - 11.1 K/uL    RBC 4.23 4.23 - 5.6 M/uL    HGB 14.2 13.6 - 17.2 g/dL    HCT 40.7 (L) 41.1 - 50.3 %    MCV 96.2 79.6 - 97.8 FL    MCH 33.6 (H) 26.1 - 32.9 PG    MCHC 34.9 31.4 - 35.0 g/dL    RDW 12.3 11.9 - 14.6 %    PLATELET 858 153 - 608 K/uL    MPV 9.7 9.4 - 12.3 FL    ABSOLUTE NRBC 0.00 0.0 - 0.2 K/uL    DF AUTOMATED      NEUTROPHILS 69 43 - 78 %    LYMPHOCYTES 17 13 - 44 %    MONOCYTES 9 4.0 - 12.0 %    EOSINOPHILS 4 0.5 - 7.8 %    BASOPHILS 1 0.0 - 2.0 %    IMMATURE GRANULOCYTES 0 0.0 - 5.0 %    ABS. NEUTROPHILS 3.8 1.7 - 8.2 K/UL    ABS. LYMPHOCYTES 1.0 0.5 - 4.6 K/UL    ABS. MONOCYTES 0.5 0.1 - 1.3 K/UL    ABS. EOSINOPHILS 0.2 0.0 - 0.8 K/UL    ABS. BASOPHILS 0.0 0.0 - 0.2 K/UL    ABS. IMM.  GRANS. 0.0 0.0 - 0.5 K/UL       All Micro Results     Procedure Component Value Units Date/Time    CULTURE, URINE [[id number]] Collected: 03/11/22 1858    Order Status: Completed Specimen: Urine from Clean catch Updated: 03/13/22 0826     Special Requests: NO SPECIAL REQUESTS        Culture result: NO GROWTH 1 DAY       CULTURE, BLOOD [[id number]] Collected: 03/11/22 1847    Order Status: Completed Specimen: Blood Updated: 03/12/22 0818     Special Requests: --        LEFT  Antecubital       Culture result: NO GROWTH AFTER 12 HOURS       CULTURE, BLOOD [[id number]] Collected: 03/11/22 1853    Order Status: Completed Specimen: Blood Updated: 03/12/22 0818     Special Requests: --        NO SPECIAL REQUESTS  RIGHT  FOREARM       Culture result: NO GROWTH AFTER 12 HOURS       RESPIRATORY VIRUS PANEL W/COVID-19, PCR [[id number]] Collected: 03/11/22 1847    Order Status: Completed Specimen: Nasopharyngeal Updated: 03/11/22 2209     Adenovirus NOT DETECTED        Coronavirus 229E NOT DETECTED        Coronavirus HKU1 NOT DETECTED        Coronavirus CVNL63 NOT DETECTED        Coronavirus OC43 NOT DETECTED        SARS-CoV-2, PCR NOT DETECTED        Metapneumovirus NOT DETECTED        Rhinovirus and Enterovirus NOT DETECTED        Influenza A NOT DETECTED        Influenza B NOT DETECTED        Parainfluenza 1 NOT DETECTED        Parainfluenza 2 NOT DETECTED        Parainfluenza 3 NOT DETECTED        Parainfluenza virus 4 NOT DETECTED        RSV by PCR NOT DETECTED        B. parapertussis, PCR NOT DETECTED        Bordetella pertussis - PCR NOT DETECTED        Chlamydophila pneumoniae DNA, QL, PCR NOT DETECTED        Mycoplasma pneumoniae DNA, QL, PCR NOT DETECTED       COVID-19 RAPID TEST [[id number]] Collected: 03/11/22 1550    Order Status: Completed Specimen: Nasopharyngeal Updated: 03/11/22 1629     Specimen source NASAL SWAB        COVID-19 rapid test Not detected        Comment:      The specimen is NEGATIVE for SARS-CoV-2, the novel coronavirus associated with COVID-19. A negative result does not rule out COVID-19. This test has been authorized by the FDA under an Emergency Use Authorization (EUA) for use by authorized laboratories. Fact sheet for Healthcare Providers: [employer]  Fact sheet for Patients: [employer]       Methodology: Isothermal Nucleic Acid Amplification               Other Studies:  CTA HEAD NECK W WO CONT    Result Date: 3/12/2022  Title:  CT arteriogram of the neck and head. Indication: Cerebral aneurysm. Technique: Axial images of the neck and head were obtained after the uneventful administration of intravenous iodinated contrast media. Contrast was used to best identify the arterial structures. Images were reviewed on a separate, free standing, three-dimensional workstation as per the referring physicians request.  All stenosis percentages derived by comparing the narrowest segment with the distal Internal Carotid Artery luminal diameter, as described in the [place] American Symptomatic Carotid Endarterectomy Trial (NASCET) criteria.  All CT scans at this facility are performed using dose reduction/dose modulation techniques, as appropriate the performed exam, including the following: Automated Exposure Control; Adjustment of the mA and/or kV according to patient size (this includes techniques or standardized protocols for targeted exams where dose is matched to indication/reason for exam); and Use of Iterative Reconstruction Technique. Comparison: CTA 2/8/2011. Findings: Lungs:  No focal consolidation or pleural effusions. No suspicious pulmonary nodules. .  Bones:  No osseous destruction. .  Paranasal sinuses:  Paranasal sinuses are clear. .  Brain:  No midline shift. No enhancing mass lesion or hydrocephalus. . Encephalomalacia in the left MCA territory, unchanged. Chronic low-density extra-axial fluid collection overlying the left temporal and frontal bones measuring approximately 9.6 x 4.9 cm in diameter. This results in approximately 7 mm rightward midline shift. Soft tissues:  Within normal limits. .  Dural venous sinuses:  Patent. Aortic arch:  Non-aneurysmal. Arch vessels are patent. Moderate to advanced generalized atherosclerotic change. Generalized arteriomegaly, similar to the prior exam. ANTERIOR CIRCULATION Right common carotid artery:  No significant stenosis or occlusion. Right internal carotid artery:  No significant stenosis or occlusion. Right middle cerebral artery:  No significant stenosis, occlusion, or aneurysm. Right anterior cerebral artery:  No significant stenosis, occlusion, or aneurysm. Left common carotid artery: No significant stenosis or occlusion. Left internal carotid artery:  No significant stenosis or occlusion. Moderate to advanced atherosclerotic atherosclerotic changes of the left carotid bulb. Advanced tortuosity is present. The proximal left internal carotid artery there is atherosclerotic ulcer measuring 4 mm (series 3, image 307. Left supine on ICA saccular aneurysm measuring 1.8 x 2.1 cm in diameter.  Previously this measured 1.9 x 1.8 cm in diameter. Peripheral calcifications are present along the aneurysm sac. Left middle cerebral artery:  Chronic occlusion of the left MCA. Left anterior cerebral artery:  No significant stenosis, occlusion, or aneurysm. Anterior communicating artery: No significant stenosis, occlusion, or aneurysm. POSTERIOR CIRCULATION Right vertebral artery:  Aneurysmal enlargement of the proximal right vertebral artery slightly distal to its origin. This measures 2.2 x 2.0 cm in diameter (previously 1.7 x 1.7 cm in 2011). There is associated high-grade stenosis. There appears to be chronic mural thrombus with the patent portion only measuring 9 mm. Left vertebral artery:  No significant stenosis or occlusion. Basilar artery:  No significant stenosis. Thin linear filling defect in the proximal to mid basilar artery, new compared to the prior exam in 2011. Right posterior communicating artery: No significant stenosis, occlusion, or aneurysm. Left posterior communicating artery:  No significant stenosis, occlusion, or aneurysm. Right posterior cerebral artery: There appears to be occlusion at the expected origin of the left PCA where it arises from the basilar artery; however the, this appears unchanged. Fetal type left PCA. Left posterior cerebral artery:  No significant stenosis, occlusion, or aneurysm. 1.  Thin linear filling defect in the proximal to mid basilar artery, new compared to the prior exam in 2011. This finding is suspicious for thrombus. 2.  Chronic left suprasellar ICA aneurysm measuring 2.1 x 1.8 cm in diameter, similar to the prior exam in 2011. 3.  Chronic occlusion of the left MCA, unchanged. 4.  Aneurysmal enlargement of the proximal right vertebral artery slightly distal to its origin. This measures 2.2 x 2.0 cm in diameter (previously 1.7 x 1.7 cm in 2011). There is associated high-grade stenosis.   5.  Chronic low-density extra-axial fluid collection overlying the left temporal and frontal bones measuring approximately 9.6 x 4.9 cm in diameter. This results in approximately 7 mm rightward midline shift. Unchanged from 2011. 6.  Generalized arteriomegaly and advanced atherosclerotic changes. Findings were called to Dr. Gramajo Call by Dr. Carlos France on 3-12-22 at 1844 hours via telephone. Current Meds:  Current Facility-Administered Medications   Medication Dose Route Frequency    heparin 25,000 units in dextrose 500 mL infusion  18-36 Units/kg/hr IntraVENous TITRATE    heparin (porcine) 1,000 unit/mL injection 6,530 Units  80 Units/kg IntraVENous ONCE    sodium chloride (NS) flush 5-40 mL  5-40 mL IntraVENous Q8H    sodium chloride (NS) flush 5-40 mL  5-40 mL IntraVENous PRN    acetaminophen (TYLENOL) tablet 650 mg  650 mg Oral Q6H PRN    Or    acetaminophen (TYLENOL) suppository 650 mg  650 mg Rectal Q6H PRN    polyethylene glycol (MIRALAX) packet 17 g  17 g Oral DAILY PRN    ondansetron (ZOFRAN ODT) tablet 4 mg  4 mg Oral Q8H PRN    Or    ondansetron (ZOFRAN) injection 4 mg  4 mg IntraVENous Q6H PRN    insulin lispro (HUMALOG) injection   SubCUTAneous AC&HS    lactated Ringers infusion  100 mL/hr IntraVENous CONTINUOUS    cefTRIAXone (ROCEPHIN) 1 g in 0.9% sodium chloride (MBP/ADV) 50 mL MBP  1 g IntraVENous Q24H    aspirin tablet 325 mg  325 mg Oral QHS    atorvastatin (LIPITOR) tablet 40 mg  40 mg Oral QHS    ezetimibe (ZETIA) tablet 10 mg  10 mg Oral QHS    fenofibrate (LOFIBRA) tablet 160 mg  160 mg Oral QHS    linaCLOtide (LINZESS) capsule 145 mcg (Patient Supplied)  145 mcg Oral DAILY    oxybutynin chloride XL (DITROPAN XL) tablet 10 mg  10 mg Oral DAILY    senna-docusate (PERICOLACE) 8.6-50 mg per tablet 2 Tablet  2 Tablet Oral DAILY    tuberculin injection 5 Units  5 Units IntraDERMal ONCE       Signed:  Jackie Morejon MD    Part of this note may have been written by using a voice dictation software.   The note has been proof read but may still contain some grammatical/other typographical errors.

## 2022-03-14 LAB
ANION GAP SERPL CALC-SCNC: 7 MMOL/L (ref 7–16)
BACTERIA SPEC CULT: NORMAL
BASOPHILS # BLD: 0.1 K/UL (ref 0–0.2)
BASOPHILS NFR BLD: 1 % (ref 0–2)
BUN SERPL-MCNC: 16 MG/DL (ref 8–23)
CALCIUM SERPL-MCNC: 9.4 MG/DL (ref 8.3–10.4)
CHLORIDE SERPL-SCNC: 107 MMOL/L (ref 98–107)
CO2 SERPL-SCNC: 22 MMOL/L (ref 21–32)
CREAT SERPL-MCNC: 0.9 MG/DL (ref 0.8–1.5)
DIFFERENTIAL METHOD BLD: ABNORMAL
EOSINOPHIL # BLD: 0.3 K/UL (ref 0–0.8)
EOSINOPHIL NFR BLD: 5 % (ref 0.5–7.8)
ERYTHROCYTE [DISTWIDTH] IN BLOOD BY AUTOMATED COUNT: 12 % (ref 11.9–14.6)
GLUCOSE BLD STRIP.AUTO-MCNC: 135 MG/DL (ref 65–100)
GLUCOSE BLD STRIP.AUTO-MCNC: 136 MG/DL (ref 65–100)
GLUCOSE BLD STRIP.AUTO-MCNC: 167 MG/DL (ref 65–100)
GLUCOSE BLD STRIP.AUTO-MCNC: 200 MG/DL (ref 65–100)
GLUCOSE SERPL-MCNC: 121 MG/DL (ref 65–100)
HCT VFR BLD AUTO: 41 % (ref 41.1–50.3)
HGB BLD-MCNC: 14.3 G/DL (ref 13.6–17.2)
IMM GRANULOCYTES # BLD AUTO: 0 K/UL (ref 0–0.5)
IMM GRANULOCYTES NFR BLD AUTO: 1 % (ref 0–5)
LYMPHOCYTES # BLD: 1.8 K/UL (ref 0.5–4.6)
LYMPHOCYTES NFR BLD: 29 % (ref 13–44)
MAGNESIUM SERPL-MCNC: 2 MG/DL (ref 1.8–2.4)
MCH RBC QN AUTO: 33.4 PG (ref 26.1–32.9)
MCHC RBC AUTO-ENTMCNC: 34.9 G/DL (ref 31.4–35)
MCV RBC AUTO: 95.8 FL (ref 79.6–97.8)
MONOCYTES # BLD: 0.6 K/UL (ref 0.1–1.3)
MONOCYTES NFR BLD: 10 % (ref 4–12)
NEUTS SEG # BLD: 3.3 K/UL (ref 1.7–8.2)
NEUTS SEG NFR BLD: 54 % (ref 43–78)
NRBC # BLD: 0 K/UL (ref 0–0.2)
PHOSPHATE SERPL-MCNC: 4.1 MG/DL (ref 2.3–3.7)
PLATELET # BLD AUTO: 187 K/UL (ref 150–450)
PMV BLD AUTO: 9.7 FL (ref 9.4–12.3)
POTASSIUM SERPL-SCNC: 4.2 MMOL/L (ref 3.5–5.1)
RBC # BLD AUTO: 4.28 M/UL (ref 4.23–5.6)
SARS-COV-2, COV2: NORMAL
SERVICE CMNT-IMP: ABNORMAL
SERVICE CMNT-IMP: NORMAL
SODIUM SERPL-SCNC: 136 MMOL/L (ref 136–145)
UFH PPP CHRO-ACNC: 0.52 IU/ML (ref 0.3–0.7)
UFH PPP CHRO-ACNC: 0.64 IU/ML (ref 0.3–0.7)
WBC # BLD AUTO: 6.1 K/UL (ref 4.3–11.1)

## 2022-03-14 PROCEDURE — 74011636637 HC RX REV CODE- 636/637: Performed by: NURSE PRACTITIONER

## 2022-03-14 PROCEDURE — 36415 COLL VENOUS BLD VENIPUNCTURE: CPT

## 2022-03-14 PROCEDURE — 97530 THERAPEUTIC ACTIVITIES: CPT

## 2022-03-14 PROCEDURE — 82962 GLUCOSE BLOOD TEST: CPT

## 2022-03-14 PROCEDURE — 74011000250 HC RX REV CODE- 250: Performed by: STUDENT IN AN ORGANIZED HEALTH CARE EDUCATION/TRAINING PROGRAM

## 2022-03-14 PROCEDURE — 74011000250 HC RX REV CODE- 250: Performed by: NURSE PRACTITIONER

## 2022-03-14 PROCEDURE — 85025 COMPLETE CBC W/AUTO DIFF WBC: CPT

## 2022-03-14 PROCEDURE — 86580 TB INTRADERMAL TEST: CPT | Performed by: STUDENT IN AN ORGANIZED HEALTH CARE EDUCATION/TRAINING PROGRAM

## 2022-03-14 PROCEDURE — 84100 ASSAY OF PHOSPHORUS: CPT

## 2022-03-14 PROCEDURE — 74011000258 HC RX REV CODE- 258: Performed by: NURSE PRACTITIONER

## 2022-03-14 PROCEDURE — 85520 HEPARIN ASSAY: CPT

## 2022-03-14 PROCEDURE — 80048 BASIC METABOLIC PNL TOTAL CA: CPT

## 2022-03-14 PROCEDURE — 83735 ASSAY OF MAGNESIUM: CPT

## 2022-03-14 PROCEDURE — 74011250637 HC RX REV CODE- 250/637: Performed by: NURSE PRACTITIONER

## 2022-03-14 PROCEDURE — 74011250636 HC RX REV CODE- 250/636: Performed by: NURSE PRACTITIONER

## 2022-03-14 PROCEDURE — 65610000001 HC ROOM ICU GENERAL

## 2022-03-14 PROCEDURE — U0005 INFEC AGEN DETEC AMPLI PROBE: HCPCS

## 2022-03-14 PROCEDURE — 74011250636 HC RX REV CODE- 250/636: Performed by: STUDENT IN AN ORGANIZED HEALTH CARE EDUCATION/TRAINING PROGRAM

## 2022-03-14 RX ADMIN — OXYBUTYNIN CHLORIDE 10 MG: 10 TABLET, EXTENDED RELEASE ORAL at 08:50

## 2022-03-14 RX ADMIN — TUBERCULIN PURIFIED PROTEIN DERIVATIVE 5 UNITS: 5 INJECTION, SOLUTION INTRADERMAL at 17:03

## 2022-03-14 RX ADMIN — SODIUM CHLORIDE, PRESERVATIVE FREE 10 ML: 5 INJECTION INTRAVENOUS at 21:44

## 2022-03-14 RX ADMIN — DOCUSATE SODIUM 50MG AND SENNOSIDES 8.6MG 2 TABLET: 8.6; 5 TABLET, FILM COATED ORAL at 08:50

## 2022-03-14 RX ADMIN — HEPARIN SODIUM 16 UNITS/KG/HR: 5000 INJECTION, SOLUTION INTRAVENOUS at 07:00

## 2022-03-14 RX ADMIN — SODIUM CHLORIDE, PRESERVATIVE FREE 10 ML: 5 INJECTION INTRAVENOUS at 05:36

## 2022-03-14 RX ADMIN — Medication 2 UNITS: at 12:00

## 2022-03-14 RX ADMIN — ATORVASTATIN CALCIUM 40 MG: 40 TABLET, FILM COATED ORAL at 21:43

## 2022-03-14 RX ADMIN — ASPIRIN 325 MG: 325 TABLET, FILM COATED ORAL at 21:43

## 2022-03-14 RX ADMIN — Medication 4 UNITS: at 21:43

## 2022-03-14 RX ADMIN — EZETIMIBE 10 MG: 10 TABLET ORAL at 21:43

## 2022-03-14 RX ADMIN — SODIUM CHLORIDE, PRESERVATIVE FREE 10 ML: 5 INJECTION INTRAVENOUS at 17:05

## 2022-03-14 RX ADMIN — CEFTRIAXONE 1 G: 1 INJECTION, POWDER, FOR SOLUTION INTRAMUSCULAR; INTRAVENOUS at 20:25

## 2022-03-14 RX ADMIN — FENOFIBRATE 160 MG: 160 TABLET ORAL at 22:00

## 2022-03-14 NOTE — PROGRESS NOTES
Problem: Mobility Impaired (Adult and Pediatric)  Goal: *Acute Goals and Plan of Care (Insert Text)  Outcome: Progressing Towards Goal  Note: DISCHARGE GOALS MODIFIED BASED ON PROGRESS 3/13/12 :  (1.)Mr. Seth Gonzalez will move from supine to sit and sit to supine with STAND BY ASSIST, flat bed no rail. (2.)Mr. Seth Gonzalez will transfer from bed to chair and chair to bed with CONTACT GUARD ASSIST using a walker. (3.)Mr. Seth Gonzalez will ambulate with CONTACT GUARD ASSIST 25-30 ft using a rolling walker   (4.)Mr. Seth Gonzalez will perform LE AROM exercises on cue without physical help. PHYSICAL THERAPY: Daily Note and AM 3/14/2022  INPATIENT: PT Visit Days : 3  [place] / [place] B / Product Type: Medicare /       NAME/AGE/GENDER: Marisa Reyna is a 66 y.o. male   PRIMARY DIAGNOSIS: Weakness [R53.1] UTI (urinary tract infection) UTI (urinary tract infection)       ICD-10: Treatment Diagnosis:    · Generalized Muscle Weakness (M62.81)  · Difficulty in walking, Not elsewhere classified (R26.2)   Precaution/Allergies:  Penicillin g, Percocet [oxycodone-acetaminophen], and Sulfa (sulfonamide antibiotics)      ASSESSMENT:     Mr. Seth Gonzalez needed constant cues to stay on track with exercises & also to work through a task to completion. This pt when standing, has a tendency to sit down unpredictably, which extensive coaching to discourage pt from doing this. This will require extensive post acute rehab at SNF to become manageable, but will likely need Long Term placement. 3/14 supine upon arrival.  Therapist had to encourage pt to do exercises. Performs B UE/LE exercises with help. Therapist ask pt lets sit on eob , pt said no. Therapist told the pt why it would be important for him to sit on eob. Work on bed mobility as follows: rolling from L><R to get straight in bed. Pt still said no. Left in supine with needs in reach and instructed to call for assist, before getting up.     This section established at most recent assessment   PROBLEM LIST (Impairments causing functional limitations):  1. Decreased Strength  2. Decreased ADL/Functional Activities  3. Decreased Transfer Abilities  4. Decreased Ambulation Ability/Technique  5. Decreased Balance  6. Decreased Activity Tolerance   INTERVENTIONS PLANNED: (Benefits and precautions of physical therapy have been discussed with the patient.)  1. Balance Exercise  2. Bed Mobility  3. Gait Training  4. Therapeutic Activites  5. Therapeutic Exercise/Strengthening  6. Transfer Training     TREATMENT PLAN: Frequency/Duration: daily for duration of hospital stay  Rehabilitation Potential For Stated Goals: Good     REHAB RECOMMENDATIONS (at time of discharge pending progress):    Placement: It is my opinion, based on this patient's performance to date, that Mr. Vikash Lazo may benefit from intensive therapy at a [address] after discharge due to the functional deficits listed above that are likely to improve with skilled rehabilitation and concerns that he/she may be unsafe to be unsupervised at home due to weakness and debility, would be unsafe to be up without assist .  Equipment:    To be determined              HISTORY:   History of Present Injury/Illness (Reason for Referral):  Copied from MD note:   Ramon Quinteros is a 66 y.o. 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 He lives alone, does have a sitter 8 hours/day, his daughter lives out of state and essentially not involved. EMS was called related to inability to ambulate at all today. Found in feces and urine. Patient knows he is at the hospital knows his name. He is unsure of the date or day or year. Patient usually gets around with a Rollator. Also history of stroke with some residual left-sided weakness.     Urine in ED showed 10-20 WBC, he has poor hygiene. T max 99. Procal 0.05, no leukocytosis. CK ordered and pending. BC x2 and urine culture ordered. CT head shows chronic findings of right side lacunar infarct and supraclinoid artery. H/O AAA and iliac aneurysm that he is followed by Dr. Shane Sol for. Past Medical History/Comorbidities:   Mr. Celine Shaver  has a past medical history of Alcoholic pancreatitis (06/4013), Aneurysm [facility]), Aneurysm artery, celiac ([facility] [address] (8/6/2014), Chronic pain, CVA (cerebral infarction) (1998, 2008), Depression, Diabetes ([facility] [address] (2010), Diabetic ulcer of left great toe ([facility] [address] Enlarged aorta ([facility] [address] (8/6/2014), Gout, History of stroke (1998), Hypertension, Mixed hyperlipidemia, Obesity, Skin cancer, Stroke ([facility] [address] Unspecified constipation, and Unspecified vitamin D deficiency. He has no past medical history of Chronic kidney disease. Mr. Celine Shaver  has a past surgical history that includes hx tonsillectomy; hx adenoidectomy; pr appendectomy; hx orthopaedic (12/2012); hx orthopaedic (last 4/2013); hx orthopaedic (11/2013); hx cataract removal (OD-2013/OS-2014); hx urological (1980's); and hx colonoscopy (2007, 2010, 2014).   Social History/Living Environment:   Home Environment: Private residence  # Steps to Enter: 0  One/Two Story Residence: Two story, live on 1st floor  Living Alone: Yes  Support Systems: Caregiver/Home Care Staff,Friend/Neighbor  Patient Expects to be Discharged to[de-identified] Skilled nursing facility  Current DME Used/Available at Home: Walker, rollator  Prior Level of Function/Work/Activity:  Pt lives at home alone, getting weaker at home, used a rollator     Number of Personal Factors/Comorbidities that affect the Plan of Care: 0: LOW COMPLEXITY   EXAMINATION:   Most Recent Physical Functioning:   Gross Assessment:                  Posture:     Balance:  Sitting:  (refused to get up) Bed Mobility:  Rolling:  (work on [employer] from [employer] )  Wheelchair Mobility:     Transfers:  Sit to Stand:  (refused to get up)  Stand to Sit:  (refused to get up)  Duration: 30 Minutes  Gait:               Body Structures Involved:  1. Muscles Body Functions Affected:  1. Movement Related Activities and Participation Affected:  1. Mobility  2. Self Care   Number of elements that affect the Plan of Care: 4+: HIGH COMPLEXITY   CLINICAL PRESENTATION:   Presentation: Stable and uncomplicated: LOW COMPLEXITY   CLINICAL DECISION MAKIN[address] [place] [address] AM-PAC 6 Clicks   Basic Mobility Inpatient Short Form  How much difficulty does the patient currently have. .. Unable A Lot A Little None   1. Turning over in bed (including adjusting bedclothes, sheets and blankets)? [] 1   [] 2   [x] 3   [] 4   2. Sitting down on and standing up from a chair with arms ( e.g., wheelchair, bedside commode, etc.)   [] 1   [x] 2   [] 3   [] 4   3. Moving from lying on back to sitting on the side of the bed? [] 1   [] 2   [x] 3   [] 4   How much help from another person does the patient currently need. .. Total A Lot A Little None   4. Moving to and from a bed to a chair (including a wheelchair)? [] 1   [x] 2   [] 3   [] 4   5. Need to walk in hospital room? [] 1   [x] 2   [] 3   [] 4   6. Climbing 3-5 steps with a railing? [] 1   [x] 2   [] 3   [] 4   © , Trustees of [address] [place] [address], under license to [employer]. All rights reserved      Score:  Initial: 14 Most Recent: X (Date: -- )    Interpretation of Tool:  Represents activities that are increasingly more difficult (i.e. Bed mobility, Transfers, Gait). Medical Necessity:     · Patient is expected to demonstrate progress in   · strength, balance, coordination, and functional technique  ·  to   · decrease assistance required with functional mobility  · . Reason for Services/Other Comments:  · Patient continues to require skilled intervention due to   · Inability to complete functional mobility independently  · .    Use of outcome tool(s) and clinical judgement create a POC that gives a: Questionable prediction of patient's progress: MODERATE COMPLEXITY            TREATMENT:   (In addition to Assessment/Re-Assessment sessions the following treatments were rendered)   Pre-treatment Symptoms/Complaints:  Pt agreeable  Pain: Initial: numeric scale     Post Session:  0/10   Therapeutic Activity: (  30 Minutes ):  Therapeutic activities including LE AAROM as warm up, bed mobility, transfers & repeated sit<>stand with walker, repeated standing balance activity using a walker performing wt shift, short distance ambulation using a walker to improve mobility, strength, balance, coordination and dynamic movement of arm - bilateral, leg - bilateral and core to improve functional endurance & stability. .      Date:  3/14 B UE/LE Date:   Date:     Activity/Exercise Parameters Parameters Parameters   Ankle pumps 10     Quad sets 10     Hip abd/add 10     heelslides 10     Shoulder flex/ext 10     SAQ 10     Elbow flex/ext 10     10  Braces/Orthotics/Lines/Etc:   · IV  Treatment/Session Assessment:    · Response to Treatment: Pt refused to get up. · Interdisciplinary Collaboration:   o Registered Nurse  o   · After treatment position/precautions:   o Supine in bed  o Bed alarm/tab alert on  o Bed/Chair-wheels locked  o Call light within reach  o RN notified   · Compliance with Program/Exercises: Will assess as treatment progresses  · Recommendations/Intent for next treatment session: \"Next visit will focus on advancements to more challenging activities and reduction in assistance provided\".   Total Treatment Duration:  PT Patient Time In/Time Out  Time In: 1030  Time Out: [facility] Josiane Castellano PTA

## 2022-03-14 NOTE — PROGRESS NOTES
Heparin gtt    Heparin Xa: 0.90  Heparin gtt decreased by 2Units/kg to 16units/kg/hr    Repeat Heparin Xa ordered for 0200.

## 2022-03-14 NOTE — PROGRESS NOTES
Bedside and Verbal shift change report given to Javan Harris RN (oncoming nurse) by Marilu Sargent RN (offgoing nurse). Report included the following information SBAR, Kardex, Intake/Output, MAR, Recent Results and Cardiac Rhythm NSR/SB.       Drips: Heparin @ 16units/kg/hr next Xa @ 0800

## 2022-03-14 NOTE — PROGRESS NOTES
Problem: Pressure Injury - Risk of  Goal: *Prevention of pressure injury  Description: Document Malvin Scale and appropriate interventions in the flowsheet. Outcome: Progressing Towards Goal  Note: Pressure Injury Interventions:  Sensory Interventions: Assess changes in LOC,Assess need for specialty bed    Moisture Interventions: Absorbent underpads,Apply protective barrier, creams and emollients,Limit adult briefs,Maintain skin hydration (lotion/cream),Minimize layers    Activity Interventions: PT/OT evaluation,Pressure redistribution bed/mattress(bed type)    Mobility Interventions: Float heels,HOB 30 degrees or less,Turn and reposition approx. every two hours(pillow and wedges)    Nutrition Interventions: Document food/fluid/supplement intake    Friction and Shear Interventions: Apply protective barrier, creams and emollients,Feet elevated on foot rest,Minimize layers                Problem: Patient Education: Go to Patient Education Activity  Goal: Patient/Family Education  Outcome: Progressing Towards Goal     Problem: Falls - Risk of  Goal: *Absence of Falls  Description: Document Muna Fall Risk and appropriate interventions in the flowsheet.   Outcome: Progressing Towards Goal  Note: Fall Risk Interventions:  Mobility Interventions: Bed/chair exit alarm,OT consult for ADLs,Patient to call before getting OOB,Strengthening exercises (ROM-active/passive)    Mentation Interventions: Adequate sleep, hydration, pain control,Bed/chair exit alarm,Door open when patient unattended,More frequent rounding,Reorient patient    Medication Interventions: Bed/chair exit alarm,Evaluate medications/consider consulting pharmacy,Teach patient to arise slowly    Elimination Interventions: Bed/chair exit alarm,Call light in reach,Patient to call for help with toileting needs,Toilet paper/wipes in reach,Toileting schedule/hourly rounds    History of Falls Interventions: Bed/chair exit alarm,Assess for delayed presentation/identification of injury for 48 hrs (comment for end date),Door open when patient unattended         Problem: Patient Education: Go to Patient Education Activity  Goal: Patient/Family Education  Outcome: Progressing Towards Goal     Problem: Patient Education: Go to Patient Education Activity  Goal: Patient/Family Education  Outcome: Progressing Towards Goal     Problem: Patient Education: Go to Patient Education Activity  Goal: Patient/Family Education  Outcome: Progressing Towards Goal     Problem: Urinary Tract Infection - Adult  Goal: *Absence of infection signs and symptoms  Outcome: Progressing Towards Goal     Problem: Patient Education: Go to Patient Education Activity  Goal: Patient/Family Education  Outcome: Progressing Towards Goal

## 2022-03-14 NOTE — PROGRESS NOTES
Bedside and Verbal shift change report given to Brandie Burt RN (oncoming nurse) by Chester Ac (offgoing nurse). Report included the following information SBAR, Kardex, MAR, Med Rec Status, Cardiac Rhythm sr  and Dual Neuro Assessment.

## 2022-03-14 NOTE — PROGRESS NOTES
Hospitalist Progress Note   Admit Date:  3/11/2022  1:47 PM   Name:  Ami Kim   Age:  66 y.o. Sex:  male  :  1950   MRN:  [id number]   Room:  [facility]/    Presenting Complaint: Fatigue and Incontinence    Reason(s) for Admission: Weakness [R53.1]     Hospital Course & Interval History:   Ami Kim is a 66 y.o. 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 He lives alone, does have a sitter 8 hours/day, his daughter lives out of state and essentially not involved. EMS was called related to inability to ambulate at all today. Found in feces and urine.  Patient knows he is at the hospital knows his name. Jack Escobar is unsure of the date or day or year. Teri Li usually gets around with a Rollator.   Also history of stroke with some residual left-sided weakness. Urine in ED showed 10-20 WBC, he has poor hygiene. T max 99. Procal 0.05, no leukocytosis. CK ordered and pending. BC x2 and urine culture ordered. CT head shows chronic findings of right side lacunar infarct and supraclinoid artery. H/O AAA and iliac aneurysm that he is followed by Dr. Cox Friday. His CTA showed basilar thrombosis and started on Heparin drip. Neurologist was consulted and transferred to ICU for close monitoring and neurochecks     Subjective/24hr Events (22): Patient seen and examined at the bedside. He is AAO x 3. Patient is having breakfast and very comfortable. Patient denies chest pain, nausea, vomiting, headache, blurry vision, weakness, tingling. ROS:  10 systems reviewed and negative except as noted above.      Assessment & Plan:   Acute Encephalopathy due to UTI  Resolved and mental status is back to baseline    New basilar artery thrombosis, nonocclusive  Continue  heparin drip  Neurochecks  Neurologist Dr. Storm Florence spoke to endovascular team at [facility]. Any Change in mental status please call code stroke and contact the transfer center at [facility] ([phone]  Transferred to ICU for close monitoring  Neurology is following       UTI (urinary tract infection) (5/11/2020)  UA is positive    Continue Rocephin   urine and BC negative to date    Hypokalemia   Resolved     CVA, old, hemiparesis ([address] (5/24/2012) with left supraclinoid ICA occlusion    PT/OT recommending SNF  Continue normal strength ASA and statin    Urinary incontinence  Cruz placement and straight cath  is unsuccessful      Chronic Supraclinoid left ICA aneurysm. 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      Mixed hyperlipidemia ()    statin       Diabetes mellitus with complication (HCC) (2/38/4244)    SSI  Last A1C January 5.9          Benign essential HTN (3/11/2022)  Continue  home meds      Dispo/Discharge Planning: Pending clinical course and is on heparin gtt    Diet: ADULT DIET Regular; 3 carb choices (45 gm/meal)  VTE ppx:  Heparin GTT  Code status: Full Code      Hospital Problems as of 3/14/2022 Date Reviewed: 3/11/2022          Codes Class Noted - Resolved POA    Weakness ICD-10-CM: R53.1  ICD-9-CM: 780.79  3/11/2022 - Present Yes        Benign essential HTN ICD-10-CM: I10  ICD-9-CM: 401.1  3/11/2022 - Present Yes        * (Principal) UTI (urinary tract infection) ICD-10-CM: N39.0  ICD-9-CM: 599.0  5/11/2020 - Present Yes        Diabetes mellitus with complication ([facility] [address] YOY-12-BZ: E11.8  ICD-9-CM: 250.90  8/28/2015 - Present Yes        Mixed hyperlipidemia ICD-10-CM: E78.2  ICD-9-CM: 272.2  Unknown - Present Yes        Aneurysm artery, celiac (HCC) (Chronic) ICD-10-CM: I72.8  ICD-9-CM: 442.84  8/6/2014 - Present Yes        CVA, old, hemiparesis ([facility] [address] ICD-10-CM: A72.292  ICD-9-CM: 438.20  5/24/2012 - Present Yes              Objective:     Patient Vitals for the past 24 hrs:   Temp Pulse Resp BP SpO2   03/14/22 1538 98.6 °F (37 °C) 78 25 106/80 93 % 03/14/22 1200  87 19 124/84 94 %   03/14/22 1147 98.1 °F (36.7 °C) 86 18 118/85 100 %   03/14/22 1130  84 28  95 %   03/14/22 1100  92 21 118/85 93 %   03/14/22 1030  93 22  95 %   03/14/22 1000  90 20 109/82 93 %   03/14/22 0930  (!) 102 14  91 %   03/14/22 0900  91 21 116/84 95 %   03/14/22 0830  81 16  95 %   03/14/22 0800  70 15 116/87 (!) 81 %   03/14/22 0730 98.1 °F (36.7 °C) 63 14 113/74 95 %   03/14/22 0700 98.1 °F (36.7 °C) 78 24 113/74 92 %   03/14/22 0600  68 23 127/81 94 %   03/14/22 0550  (!) 55 17 102/61 93 %   03/14/22 0500  61 17 (!) 83/53 94 %   03/14/22 0400  77 16 103/73 91 %   03/14/22 0300 97.8 °F (36.6 °C) 64 18 108/76 95 %   03/14/22 0200  66 19 (!) 98/59 93 %   03/14/22 0100  64 21 92/60 94 %   03/14/22 0000  70 26 104/75 94 %   03/13/22 2300 97.8 °F (36.6 °C) 72 18 121/88 94 %   03/13/22 2200  73 18 91/70 93 %   03/13/22 2100  83 30 137/89 95 %   03/13/22 2000 97.5 °F (36.4 °C) 89 22 (!) 116/93 98 %   03/13/22 1900 97.5 °F (36.4 °C) 89 24 (!) 118/96 96 %   03/13/22 1815  87 21 (!) 130/90 91 %   03/13/22 1730  82 18 126/67 94 %   03/13/22 1700  80 19 (!) 104/90 93 %   03/13/22 1630  84 26 126/77 91 %   03/13/22 1600  79 18 129/86 92 %     Oxygen Therapy  O2 Sat (%): 93 % (03/14/22 1538)  Pulse via Oximetry: 90 beats per minute (03/14/22 1200)  O2 Device: None (Room air) (03/14/22 0800)    Estimated body mass index is 25.05 kg/m² as calculated from the following:    Height as of this encounter: 6' (1.829 m). Weight as of this encounter: 83.8 kg (184 lb 11.2 oz). Intake/Output Summary (Last 24 hours) at 3/14/2022 1550  Last data filed at 3/14/2022 1538  Gross per 24 hour   Intake 419.88 ml   Output 3300 ml   Net -2880.12 ml         Physical Exam:   Blood pressure 106/80, pulse 78, temperature 98.6 °F (37 °C), resp. rate 25, height 6' (1.829 m), weight 83.8 kg (184 lb 11.2 oz), SpO2 93 %. General:    Well nourished.   No overt distress, obese  Head:  Normocephalic, atraumatic  Eyes:  Sclerae appear normal.  Pupils equally round. ENT:  Nares appear normal, no drainage. Moist oral mucosa  Neck:  No restricted ROM. Trachea midline   CV:   RRR. No m/r/g. No jugular venous distension. Lungs:   CTAB. No wheezing, rhonchi, or rales. Respirations even, unlabored  Abdomen: Bowel sounds present. Soft, nontender, nondistended. Extremities: No cyanosis or clubbing. No edema  Skin:     No rashes and normal coloration. Warm and dry. Neuro:  CN II-XII grossly intact. Sensation intact. A&Ox3  Psych:  Normal mood and affect. I have reviewed ordered lab tests and independently visualized imaging below:    Recent Labs:  Recent Results (from the past 48 hour(s))   GLUCOSE, POC    Collection Time: 03/12/22  4:24 PM   Result Value Ref Range    Glucose (POC) 139 (H) 65 - 100 mg/dL    Performed by [employer] [facility], POC    Collection Time: 03/12/22  8:33 PM   Result Value Ref Range    Glucose (POC) 155 (H) 65 - 100 mg/dL    [facility]    CBC WITH AUTOMATED DIFF    Collection Time: 03/13/22  4:45 AM   Result Value Ref Range    WBC 6.0 4.3 - 11.1 K/uL    RBC 4.35 4.23 - 5.6 M/uL    HGB 14.5 13.6 - 17.2 g/dL    HCT 41.6 41.1 - 50.3 %    MCV 95.6 79.6 - 97.8 FL    MCH 33.3 (H) 26.1 - 32.9 PG    MCHC 34.9 31.4 - 35.0 g/dL    RDW 12.2 11.9 - 14.6 %    PLATELET 979 143 - 968 K/uL    MPV 9.6 9.4 - 12.3 FL    ABSOLUTE NRBC 0.00 0.0 - 0.2 K/uL    DF AUTOMATED      NEUTROPHILS 52 43 - 78 %    LYMPHOCYTES 30 13 - 44 %    MONOCYTES 12 4.0 - 12.0 %    EOSINOPHILS 5 0.5 - 7.8 %    BASOPHILS 1 0.0 - 2.0 %    IMMATURE GRANULOCYTES 0 0.0 - 5.0 %    ABS. NEUTROPHILS 3.1 1.7 - 8.2 K/UL    ABS. LYMPHOCYTES 1.8 0.5 - 4.6 K/UL    ABS. MONOCYTES 0.7 0.1 - 1.3 K/UL    ABS. EOSINOPHILS 0.3 0.0 - 0.8 K/UL    ABS. BASOPHILS 0.1 0.0 - 0.2 K/UL    ABS. IMM.  GRANS. 0.0 0.0 - 0.5 K/UL   METABOLIC PANEL, BASIC    Collection Time: 03/13/22  4:45 AM   Result Value Ref Range    Sodium 136 136 - 145 mmol/L    Potassium 3.9 3.5 - 5.1 mmol/L    Chloride 106 98 - 107 mmol/L    CO2 22 21 - 32 mmol/L    Anion gap 8 7 - 16 mmol/L    Glucose 141 (H) 65 - 100 mg/dL    BUN 12 8 - 23 MG/DL    Creatinine 0.82 0.8 - 1.5 MG/DL    GFR est AA >60 >60 ml/min/1.73m2    GFR est non-AA >60 >60 ml/min/1.73m2    Calcium 9.5 8.3 - 10.4 MG/DL   MAGNESIUM    Collection Time: 03/13/22  4:45 AM   Result Value Ref Range    Magnesium 2.2 1.8 - 2.4 mg/dL   PHOSPHORUS    Collection Time: 03/13/22  4:45 AM   Result Value Ref Range    Phosphorus 3.4 2.3 - 3.7 MG/DL   GLUCOSE, POC    Collection Time: 03/13/22  7:20 AM   Result Value Ref Range    Glucose (POC) 141 (H) 65 - 100 mg/dL    Performed by Cedric    GLUCOSE, POC    Collection Time: 03/13/22 11:33 AM   Result Value Ref Range    Glucose (POC) 194 (H) 65 - 100 mg/dL    Performed by Norberto    PTT    Collection Time: 03/13/22 11:55 AM   Result Value Ref Range    aPTT 27.7 24.1 - 35.1 SEC   CBC WITH AUTOMATED DIFF    Collection Time: 03/13/22 11:55 AM   Result Value Ref Range    WBC 5.6 4.3 - 11.1 K/uL    RBC 4.23 4.23 - 5.6 M/uL    HGB 14.2 13.6 - 17.2 g/dL    HCT 40.7 (L) 41.1 - 50.3 %    MCV 96.2 79.6 - 97.8 FL    MCH 33.6 (H) 26.1 - 32.9 PG    MCHC 34.9 31.4 - 35.0 g/dL    RDW 12.3 11.9 - 14.6 %    PLATELET 163 192 - 816 K/uL    MPV 9.7 9.4 - 12.3 FL    ABSOLUTE NRBC 0.00 0.0 - 0.2 K/uL    DF AUTOMATED      NEUTROPHILS 69 43 - 78 %    LYMPHOCYTES 17 13 - 44 %    MONOCYTES 9 4.0 - 12.0 %    EOSINOPHILS 4 0.5 - 7.8 %    BASOPHILS 1 0.0 - 2.0 %    IMMATURE GRANULOCYTES 0 0.0 - 5.0 %    ABS. NEUTROPHILS 3.8 1.7 - 8.2 K/UL    ABS. LYMPHOCYTES 1.0 0.5 - 4.6 K/UL    ABS. MONOCYTES 0.5 0.1 - 1.3 K/UL    ABS. EOSINOPHILS 0.2 0.0 - 0.8 K/UL    ABS. BASOPHILS 0.0 0.0 - 0.2 K/UL    ABS. IMM.  GRANS. 0.0 0.0 - 0.5 K/UL   GLUCOSE, POC    Collection Time: 03/13/22  4:57 PM   Result Value Ref Range    Glucose (POC) 212 (H) 65 - 100 mg/dL    Performed by Steven\"JUVE\"RNBSN    HEPARIN XA UFH    Collection Time: 03/13/22  7:21 PM   Result Value Ref Range    Heparin Xa UFH 0.90 (H) 0.3 - 0.7 IU/mL   GLUCOSE, POC    Collection Time: 03/13/22  9:21 PM   Result Value Ref Range    Glucose (POC) 181 (H) 65 - 100 mg/dL    Performed by Judy    CBC WITH AUTOMATED DIFF    Collection Time: 03/14/22  1:54 AM   Result Value Ref Range    WBC 6.1 4.3 - 11.1 K/uL    RBC 4.28 4.23 - 5.6 M/uL    HGB 14.3 13.6 - 17.2 g/dL    HCT 41.0 (L) 41.1 - 50.3 %    MCV 95.8 79.6 - 97.8 FL    MCH 33.4 (H) 26.1 - 32.9 PG    MCHC 34.9 31.4 - 35.0 g/dL    RDW 12.0 11.9 - 14.6 %    PLATELET 893 438 - 098 K/uL    MPV 9.7 9.4 - 12.3 FL    ABSOLUTE NRBC 0.00 0.0 - 0.2 K/uL    DF AUTOMATED      NEUTROPHILS 54 43 - 78 %    LYMPHOCYTES 29 13 - 44 %    MONOCYTES 10 4.0 - 12.0 %    EOSINOPHILS 5 0.5 - 7.8 %    BASOPHILS 1 0.0 - 2.0 %    IMMATURE GRANULOCYTES 1 0.0 - 5.0 %    ABS. NEUTROPHILS 3.3 1.7 - 8.2 K/UL    ABS. LYMPHOCYTES 1.8 0.5 - 4.6 K/UL    ABS. MONOCYTES 0.6 0.1 - 1.3 K/UL    ABS. EOSINOPHILS 0.3 0.0 - 0.8 K/UL    ABS. BASOPHILS 0.1 0.0 - 0.2 K/UL    ABS. IMM.  GRANS. 0.0 0.0 - 0.5 K/UL   METABOLIC PANEL, BASIC    Collection Time: 03/14/22  1:54 AM   Result Value Ref Range    Sodium 136 136 - 145 mmol/L    Potassium 4.2 3.5 - 5.1 mmol/L    Chloride 107 98 - 107 mmol/L    CO2 22 21 - 32 mmol/L    Anion gap 7 7 - 16 mmol/L    Glucose 121 (H) 65 - 100 mg/dL    BUN 16 8 - 23 MG/DL    Creatinine 0.90 0.8 - 1.5 MG/DL    GFR est AA >60 >60 ml/min/1.73m2    GFR est non-AA >60 >60 ml/min/1.73m2    Calcium 9.4 8.3 - 10.4 MG/DL   MAGNESIUM    Collection Time: 03/14/22  1:54 AM   Result Value Ref Range    Magnesium 2.0 1.8 - 2.4 mg/dL   PHOSPHORUS    Collection Time: 03/14/22  1:54 AM   Result Value Ref Range    Phosphorus 4.1 (H) 2.3 - 3.7 MG/DL   HEPARIN XA UFH    Collection Time: 03/14/22  1:54 AM   Result Value Ref Range    Heparin Xa UFH 0.64 0.3 - 0.7 IU/mL   GLUCOSE, POC Collection Time: 03/14/22  7:49 AM   Result Value Ref Range    Glucose (POC) 136 (H) 65 - 100 mg/dL    Performed by Herman    HEPARIN XA UFH    Collection Time: 03/14/22  7:59 AM   Result Value Ref Range    Heparin Xa UFH 0.52 0.3 - 0.7 IU/mL   GLUCOSE, POC    Collection Time: 03/14/22 11:54 AM   Result Value Ref Range    Glucose (POC) 167 (H) 65 - 100 mg/dL    Performed by Tc        All Micro Results     Procedure Component Value Units Date/Time    CULTURE, URINE [[id number]] Collected: 03/11/22 1858    Order Status: Completed Specimen: Urine from Clean catch Updated: 03/14/22 0718     Special Requests: NO SPECIAL REQUESTS        Culture result:       <10,000 COLONIES/mL MIXED SKIN DANE ISOLATED          CULTURE, BLOOD [[id number]] Collected: 03/11/22 1947    Order Status: Completed Specimen: Blood Updated: 03/13/22 1455     Special Requests: --        LEFT  Antecubital       Culture result: NO GROWTH 2 DAYS       CULTURE, BLOOD [[id number]] Collected: 03/11/22 1953    Order Status: Completed Specimen: Blood Updated: 03/13/22 1455     Special Requests: --        NO SPECIAL REQUESTS  RIGHT  FOREARM       Culture result: NO GROWTH 2 DAYS       RESPIRATORY VIRUS PANEL W/COVID-19, PCR [[id number]] Collected: 03/11/22 1847    Order Status: Completed Specimen: Nasopharyngeal Updated: 03/11/22 2209     Adenovirus NOT DETECTED        Coronavirus 229E NOT DETECTED        Coronavirus HKU1 NOT DETECTED        Coronavirus CVNL63 NOT DETECTED        Coronavirus OC43 NOT DETECTED        SARS-CoV-2, PCR NOT DETECTED        Metapneumovirus NOT DETECTED        Rhinovirus and Enterovirus NOT DETECTED        Influenza A NOT DETECTED        Influenza B NOT DETECTED        Parainfluenza 1 NOT DETECTED        Parainfluenza 2 NOT DETECTED        Parainfluenza 3 NOT DETECTED        Parainfluenza virus 4 NOT DETECTED        RSV by PCR NOT DETECTED        B. parapertussis, PCR NOT DETECTED        Bordetella pertussis - PCR NOT DETECTED        Chlamydophila pneumoniae DNA, QL, PCR NOT DETECTED        Mycoplasma pneumoniae DNA, QL, PCR NOT DETECTED       COVID-19 RAPID TEST [[id number]] Collected: 03/11/22 1550    Order Status: Completed Specimen: Nasopharyngeal Updated: 03/11/22 1625     Specimen source NASAL SWAB        COVID-19 rapid test Not detected        Comment:      The specimen is NEGATIVE for SARS-CoV-2, the novel coronavirus associated with COVID-19. A negative result does not rule out COVID-19. This test has been authorized by the FDA under an Emergency Use Authorization (EUA) for use by authorized laboratories. Fact sheet for Healthcare Providers: [employer]co.nz  Fact sheet for Patients: Buscatucancha.com.nz       Methodology: Isothermal Nucleic Acid Amplification               Other Studies:  No results found.     Current Meds:  Current Facility-Administered Medications   Medication Dose Route Frequency    heparin 25,000 units in dextrose 500 mL infusion  18-36 Units/kg/hr IntraVENous TITRATE    zinc oxide-cod liver oil (DESITIN) 40 % paste   Topical PRN    sodium chloride (NS) flush 5-40 mL  5-40 mL IntraVENous Q8H    sodium chloride (NS) flush 5-40 mL  5-40 mL IntraVENous PRN    acetaminophen (TYLENOL) tablet 650 mg  650 mg Oral Q6H PRN    Or    acetaminophen (TYLENOL) suppository 650 mg  650 mg Rectal Q6H PRN    polyethylene glycol (MIRALAX) packet 17 g  17 g Oral DAILY PRN    ondansetron (ZOFRAN ODT) tablet 4 mg  4 mg Oral Q8H PRN    Or    ondansetron (ZOFRAN) injection 4 mg  4 mg IntraVENous Q6H PRN    insulin lispro (HUMALOG) injection   SubCUTAneous AC&HS    cefTRIAXone (ROCEPHIN) 1 g in 0.9% sodium chloride (MBP/ADV) 50 mL MBP  1 g IntraVENous Q24H    aspirin tablet 325 mg  325 mg Oral QHS    atorvastatin (LIPITOR) tablet 40 mg  40 mg Oral QHS    ezetimibe (ZETIA) tablet 10 mg  10 mg Oral QHS    fenofibrate (LOFIBRA) tablet 160 mg  160 mg Oral QHS    linaCLOtide (LINZESS) capsule 145 mcg (Patient Supplied)  145 mcg Oral DAILY    oxybutynin chloride XL (DITROPAN XL) tablet 10 mg  10 mg Oral DAILY    senna-docusate (PERICOLACE) 8.6-50 mg per tablet 2 Tablet  2 Tablet Oral DAILY       Signed:  Milind Dorsey MD    Part of this note may have been written by using a voice dictation software. The note has been proof read but may still contain some grammatical/other typographical errors.

## 2022-03-14 NOTE — PROGRESS NOTES
Heparin gtt     Heparin Xa: 0.64  Heparin gtt therapeutic, no change     Repeat Heparin Xa ordered for 0800.

## 2022-03-15 PROBLEM — G93.41 ACUTE METABOLIC ENCEPHALOPATHY: Status: ACTIVE | Noted: 2022-03-15

## 2022-03-15 PROBLEM — I63.02 STROKE DUE TO THROMBOSIS OF BASILAR ARTERY (HCC): Status: ACTIVE | Noted: 2022-03-15

## 2022-03-15 PROBLEM — N30.90 CYSTITIS: Status: ACTIVE | Noted: 2022-03-15

## 2022-03-15 LAB
ANION GAP SERPL CALC-SCNC: 5 MMOL/L (ref 7–16)
BASOPHILS # BLD: 0.1 K/UL (ref 0–0.2)
BASOPHILS NFR BLD: 1 % (ref 0–2)
BUN SERPL-MCNC: 17 MG/DL (ref 8–23)
CALCIUM SERPL-MCNC: 10 MG/DL (ref 8.3–10.4)
CHLORIDE SERPL-SCNC: 105 MMOL/L (ref 98–107)
CO2 SERPL-SCNC: 27 MMOL/L (ref 21–32)
CREAT SERPL-MCNC: 0.85 MG/DL (ref 0.8–1.5)
DIFFERENTIAL METHOD BLD: ABNORMAL
EOSINOPHIL # BLD: 0.3 K/UL (ref 0–0.8)
EOSINOPHIL NFR BLD: 5 % (ref 0.5–7.8)
ERYTHROCYTE [DISTWIDTH] IN BLOOD BY AUTOMATED COUNT: 12.1 % (ref 11.9–14.6)
GLUCOSE BLD STRIP.AUTO-MCNC: 126 MG/DL (ref 65–100)
GLUCOSE BLD STRIP.AUTO-MCNC: 180 MG/DL (ref 65–100)
GLUCOSE BLD STRIP.AUTO-MCNC: 201 MG/DL (ref 65–100)
GLUCOSE BLD STRIP.AUTO-MCNC: 201 MG/DL (ref 65–100)
GLUCOSE SERPL-MCNC: 154 MG/DL (ref 65–100)
HCT VFR BLD AUTO: 42.9 % (ref 41.1–50.3)
HGB BLD-MCNC: 15 G/DL (ref 13.6–17.2)
IMM GRANULOCYTES # BLD AUTO: 0 K/UL (ref 0–0.5)
IMM GRANULOCYTES NFR BLD AUTO: 0 % (ref 0–5)
LYMPHOCYTES # BLD: 1.8 K/UL (ref 0.5–4.6)
LYMPHOCYTES NFR BLD: 27 % (ref 13–44)
MAGNESIUM SERPL-MCNC: 2.1 MG/DL (ref 1.8–2.4)
MCH RBC QN AUTO: 33.3 PG (ref 26.1–32.9)
MCHC RBC AUTO-ENTMCNC: 35 G/DL (ref 31.4–35)
MCV RBC AUTO: 95.1 FL (ref 79.6–97.8)
MM INDURATION POC: 0 MM (ref 0–5)
MONOCYTES # BLD: 0.7 K/UL (ref 0.1–1.3)
MONOCYTES NFR BLD: 10 % (ref 4–12)
NEUTS SEG # BLD: 3.8 K/UL (ref 1.7–8.2)
NEUTS SEG NFR BLD: 57 % (ref 43–78)
NRBC # BLD: 0 K/UL (ref 0–0.2)
PHOSPHATE SERPL-MCNC: 3.7 MG/DL (ref 2.3–3.7)
PLATELET # BLD AUTO: 190 K/UL (ref 150–450)
PMV BLD AUTO: 9.7 FL (ref 9.4–12.3)
POTASSIUM SERPL-SCNC: 3.7 MMOL/L (ref 3.5–5.1)
PPD POC: NEGATIVE NEGATIVE
RBC # BLD AUTO: 4.51 M/UL (ref 4.23–5.6)
SARS-COV-2, COV2: NOT DETECTED
SERVICE CMNT-IMP: ABNORMAL
SODIUM SERPL-SCNC: 137 MMOL/L (ref 136–145)
SPECIMEN SOURCE, FCOV2M: NORMAL
UFH PPP CHRO-ACNC: 0.39 IU/ML (ref 0.3–0.7)
WBC # BLD AUTO: 6.6 K/UL (ref 4.3–11.1)

## 2022-03-15 PROCEDURE — 74011250637 HC RX REV CODE- 250/637: Performed by: INTERNAL MEDICINE

## 2022-03-15 PROCEDURE — 65270000029 HC RM PRIVATE

## 2022-03-15 PROCEDURE — 74011000258 HC RX REV CODE- 258: Performed by: NURSE PRACTITIONER

## 2022-03-15 PROCEDURE — 80048 BASIC METABOLIC PNL TOTAL CA: CPT

## 2022-03-15 PROCEDURE — 74011250636 HC RX REV CODE- 250/636: Performed by: NURSE PRACTITIONER

## 2022-03-15 PROCEDURE — 74011636637 HC RX REV CODE- 636/637: Performed by: NURSE PRACTITIONER

## 2022-03-15 PROCEDURE — 85025 COMPLETE CBC W/AUTO DIFF WBC: CPT

## 2022-03-15 PROCEDURE — 85520 HEPARIN ASSAY: CPT

## 2022-03-15 PROCEDURE — 74011000250 HC RX REV CODE- 250: Performed by: NURSE PRACTITIONER

## 2022-03-15 PROCEDURE — 97530 THERAPEUTIC ACTIVITIES: CPT

## 2022-03-15 PROCEDURE — 2709999900 HC NON-CHARGEABLE SUPPLY

## 2022-03-15 PROCEDURE — 83735 ASSAY OF MAGNESIUM: CPT

## 2022-03-15 PROCEDURE — 74011250636 HC RX REV CODE- 250/636: Performed by: STUDENT IN AN ORGANIZED HEALTH CARE EDUCATION/TRAINING PROGRAM

## 2022-03-15 PROCEDURE — 84100 ASSAY OF PHOSPHORUS: CPT

## 2022-03-15 PROCEDURE — 97535 SELF CARE MNGMENT TRAINING: CPT

## 2022-03-15 PROCEDURE — 36415 COLL VENOUS BLD VENIPUNCTURE: CPT

## 2022-03-15 PROCEDURE — 74011250637 HC RX REV CODE- 250/637: Performed by: NURSE PRACTITIONER

## 2022-03-15 PROCEDURE — 82962 GLUCOSE BLOOD TEST: CPT

## 2022-03-15 RX ORDER — ATORVASTATIN CALCIUM 40 MG/1
80 TABLET, FILM COATED ORAL
Status: DISCONTINUED | OUTPATIENT
Start: 2022-03-15 | End: 2022-03-17 | Stop reason: HOSPADM

## 2022-03-15 RX ADMIN — EZETIMIBE 10 MG: 10 TABLET ORAL at 21:59

## 2022-03-15 RX ADMIN — DOCUSATE SODIUM 50MG AND SENNOSIDES 8.6MG 2 TABLET: 8.6; 5 TABLET, FILM COATED ORAL at 08:52

## 2022-03-15 RX ADMIN — HEPARIN SODIUM 16 UNITS/KG/HR: 5000 INJECTION, SOLUTION INTRAVENOUS at 22:11

## 2022-03-15 RX ADMIN — Medication 2 UNITS: at 07:46

## 2022-03-15 RX ADMIN — CEFTRIAXONE 1 G: 1 INJECTION, POWDER, FOR SOLUTION INTRAMUSCULAR; INTRAVENOUS at 21:59

## 2022-03-15 RX ADMIN — SODIUM CHLORIDE, PRESERVATIVE FREE 10 ML: 5 INJECTION INTRAVENOUS at 22:01

## 2022-03-15 RX ADMIN — SODIUM CHLORIDE, PRESERVATIVE FREE 10 ML: 5 INJECTION INTRAVENOUS at 19:16

## 2022-03-15 RX ADMIN — HEPARIN SODIUM 16 UNITS/KG/HR: 5000 INJECTION, SOLUTION INTRAVENOUS at 00:58

## 2022-03-15 RX ADMIN — ATORVASTATIN CALCIUM 80 MG: 40 TABLET, FILM COATED ORAL at 21:59

## 2022-03-15 RX ADMIN — OXYBUTYNIN CHLORIDE 10 MG: 10 TABLET, EXTENDED RELEASE ORAL at 08:52

## 2022-03-15 RX ADMIN — SODIUM CHLORIDE, PRESERVATIVE FREE 10 ML: 5 INJECTION INTRAVENOUS at 05:25

## 2022-03-15 RX ADMIN — FENOFIBRATE 160 MG: 160 TABLET ORAL at 22:00

## 2022-03-15 RX ADMIN — Medication 4 UNITS: at 12:16

## 2022-03-15 RX ADMIN — Medication 4 UNITS: at 22:00

## 2022-03-15 RX ADMIN — ASPIRIN 325 MG: 325 TABLET, FILM COATED ORAL at 21:59

## 2022-03-15 NOTE — PROGRESS NOTES
Hospitalist Progress Note   Admit Date:  3/11/2022  1:47 PM   Name:  Barb Rinaldi   Age:  66 y.o. Sex:  male  :  1950   MRN:  [id number]   Room:  [facility]/    Presenting Complaint: Fatigue and Incontinence    Reason(s) for Admission: Weakness [R53.1]     Hospital Course & Interval History:   Barb Rinaldi is a 66 y.o. 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 He lives alone, does have a sitter 8 hours/day, his daughter lives out of state and essentially not involved. EMS was called related to inability to ambulate at all today. Found in feces and urine.  Patient knows he is at the hospital knows his name. Seun Paulson is unsure of the date or day or year. Nael Bronson usually gets around with a Rollator.   Also history of stroke with some residual left-sided weakness. Urine in ED showed 10-20 WBC, he has poor hygiene. T max 99. Procal 0.05, no leukocytosis. CK ordered and pending. BC x2 and urine culture ordered. CT head shows chronic findings of right side lacunar infarct and supraclinoid artery. H/O AAA and iliac aneurysm that he is followed by Dr. Barry Files. His CTA showed basilar thrombosis and started on Heparin drip. Neurologist was consulted and transferred to ICU for close monitoring and neurochecks     Subjective/24hr Events (03/15/22): Patient seen and examined at bedside. No acute overnight events. He has not noticed any new or concerning symptoms. He denies fevers/chills, chest pain, shortness of breath, abdominal pain. He denies headache, blurry vision, difficulty with speech/swallowing. He denies changes in his arms or legs. ROS:  10 systems reviewed and negative except as noted above.      Assessment & Plan:   Acute metabolic encephalopathy   Resolved and mental status is back to baseline  - stop antibiotics   - avoid narcotics and benzos  - nonpharmacologic interventions    # New basilar artery thrombosis, nonocclusive  - frequent neuro checks  - heparin gtt with pharmacy to dose, will need to be changed to NOAC at discharge  - no overt signs of bleeding  - PT/OT/SLP consults  -  mg and high intensity statin/Zetia  - per neurology call [facility] transfer line if subsequent neuro changes    Hypokalemia   Resolved     CVA, old, hemiparesis ([facility] [address] (5/24/2012) with left supraclinoid ICA occlusion  - ASA and statin     Chronic Supraclinoid left ICA aneurysm. 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 PT/OT consults and PPD ordered. Needs NH placement at discharge, hopefully medically cleared in next 1-2 days.      Hospital Problems as of 3/15/2022 Date Reviewed: 3/11/2022          Codes Class Noted - Resolved POA    Stroke due to thrombosis of basilar artery ([facility] ICD-10-CM: I63.02  ICD-9-CM: 433.01  3/15/2022 - Present Unknown        Cystitis ICD-10-CM: N30.90  ICD-9-CM: 595.9  3/15/2022 - Present Unknown        Acute metabolic encephalopathy ZBM-44-CL: G93.41  ICD-9-CM: 348.31  3/15/2022 - Present Unknown        Weakness ICD-10-CM: R53.1  ICD-9-CM: 780.79  3/11/2022 - Present Yes        Benign essential HTN ICD-10-CM: I10  ICD-9-CM: 401.1  3/11/2022 - Present Yes        * (Principal) UTI (urinary tract infection) ICD-10-CM: N39.0  ICD-9-CM: 599.0  5/11/2020 - Present Yes        Diabetes mellitus with complication ([facility] VEJ-69-GM: E11.8  ICD-9-CM: 250.90  8/28/2015 - Present Yes        Mixed hyperlipidemia ICD-10-CM: E78.2  ICD-9-CM: 272.2  Unknown - Present Yes        Aneurysm artery, celiac (HCC) (Chronic) ICD-10-CM: I72.8  ICD-9-CM: 442.84  8/6/2014 - Present Yes        CVA, old, hemiparesis ([facility] [address] ICD-10-CM: I32.622  ICD-9-CM: 438.20  5/24/2012 - Present Yes              Objective:     Patient Vitals for the past 24 hrs:   Temp Pulse Resp BP SpO2   03/15/22 1702 98.1 °F (36.7 °C) 74  (!) 85/72 98 %   03/15/22 1101 97.7 °F (36.5 °C) 74 17 112/76 94 %   03/15/22 0903  69 17 108/66 94 %   03/15/22 0803  64 16 109/81 98 %   03/15/22 0742  70 16 (!) 88/67 92 %   03/15/22 0733 98.1 °F (36.7 °C) 66 17 (!) 90/58 95 %   03/15/22 0418  73 21 107/77 90 %   03/15/22 0348 97.6 °F (36.4 °C)       03/15/22 0300 97.6 °F (36.4 °C) 79 18 115/67 92 %   03/15/22 0200  78 17 127/81 94 %   03/15/22 0100  71 24 103/88 93 %   03/15/22 0000  69 18 (!) 119/97 97 %   03/14/22 2300 98.3 °F (36.8 °C) 83 22 104/75 91 %   03/14/22 2200  79 18 (!) 114/93 96 %   03/14/22 2100  95 (!) 36 102/65 91 %   03/14/22 2000 99.3 °F (37.4 °C) 94 17 103/74 91 %   03/14/22 1900  96 18 107/88 93 %   03/14/22 1830  100 17  94 %   03/14/22 1800  90 19 (!) 82/55 95 %   03/14/22 1730  86 19  (!) 77 %     Oxygen Therapy  O2 Sat (%): 98 % (03/15/22 1702)  Pulse via Oximetry: 68 beats per minute (03/15/22 0903)  O2 Device: None (Room air) (03/15/22 0743)    Estimated body mass index is 25.21 kg/m² as calculated from the following:    Height as of this encounter: 6' (1.829 m). Weight as of this encounter: 84.3 kg (185 lb 14.4 oz). Intake/Output Summary (Last 24 hours) at 3/15/2022 1710  Last data filed at 3/15/2022 0746  Gross per 24 hour   Intake 718.98 ml   Output 1150 ml   Net -431.02 ml         Physical Exam:   Blood pressure (!) 85/72, pulse 74, temperature 98.1 °F (36.7 °C), resp. rate 17, height 6' (1.829 m), weight 84.3 kg (185 lb 14.4 oz), SpO2 98 %. General:    Well nourished.   No overt distress, obese  Head:  Normocephalic, atraumatic  Eyes:  Sclerae appear normal.  Pupils equally round. ENT:  Nares appear normal, no drainage. Moist oral mucosa  Neck:  No restricted ROM. Trachea midline   CV:   RRR. No jugular venous distension. Lungs:   CTAB. No wheezing, rhonchi, or rales. Respirations even, unlabored  Abdomen: Bowel sounds present. Soft, nontender, nondistended. Extremities: No cyanosis or clubbing. No edema  Skin:     No rashes and normal coloration. Warm and dry. Neuro:  CN II-XII grossly intact. Sensation intact. A&Ox3  Psych:  Normal mood and affect. I have reviewed ordered lab tests and independently visualized imaging below:    Recent Labs:  Recent Results (from the past 48 hour(s))   HEPARIN XA UFH    Collection Time: 03/13/22  7:21 PM   Result Value Ref Range    Heparin Xa UFH 0.90 (H) 0.3 - 0.7 IU/mL   GLUCOSE, POC    Collection Time: 03/13/22  9:21 PM   Result Value Ref Range    Glucose (POC) 181 (H) 65 - 100 mg/dL    Performed by Judy    CBC WITH AUTOMATED DIFF    Collection Time: 03/14/22  1:54 AM   Result Value Ref Range    WBC 6.1 4.3 - 11.1 K/uL    RBC 4.28 4.23 - 5.6 M/uL    HGB 14.3 13.6 - 17.2 g/dL    HCT 41.0 (L) 41.1 - 50.3 %    MCV 95.8 79.6 - 97.8 FL    MCH 33.4 (H) 26.1 - 32.9 PG    MCHC 34.9 31.4 - 35.0 g/dL    RDW 12.0 11.9 - 14.6 %    PLATELET 643 111 - 714 K/uL    MPV 9.7 9.4 - 12.3 FL    ABSOLUTE NRBC 0.00 0.0 - 0.2 K/uL    DF AUTOMATED      NEUTROPHILS 54 43 - 78 %    LYMPHOCYTES 29 13 - 44 %    MONOCYTES 10 4.0 - 12.0 %    EOSINOPHILS 5 0.5 - 7.8 %    BASOPHILS 1 0.0 - 2.0 %    IMMATURE GRANULOCYTES 1 0.0 - 5.0 %    ABS. NEUTROPHILS 3.3 1.7 - 8.2 K/UL    ABS. LYMPHOCYTES 1.8 0.5 - 4.6 K/UL    ABS. MONOCYTES 0.6 0.1 - 1.3 K/UL    ABS. EOSINOPHILS 0.3 0.0 - 0.8 K/UL    ABS. BASOPHILS 0.1 0.0 - 0.2 K/UL    ABS. IMM.  GRANS. 0.0 0.0 - 0.5 K/UL   METABOLIC PANEL, BASIC    Collection Time: 03/14/22  1:54 AM   Result Value Ref Range    Sodium 136 136 - 145 mmol/L Potassium 4.2 3.5 - 5.1 mmol/L    Chloride 107 98 - 107 mmol/L    CO2 22 21 - 32 mmol/L    Anion gap 7 7 - 16 mmol/L    Glucose 121 (H) 65 - 100 mg/dL    BUN 16 8 - 23 MG/DL    Creatinine 0.90 0.8 - 1.5 MG/DL    GFR est AA >60 >60 ml/min/1.73m2    GFR est non-AA >60 >60 ml/min/1.73m2    Calcium 9.4 8.3 - 10.4 MG/DL   MAGNESIUM    Collection Time: 03/14/22  1:54 AM   Result Value Ref Range    Magnesium 2.0 1.8 - 2.4 mg/dL   PHOSPHORUS    Collection Time: 03/14/22  1:54 AM   Result Value Ref Range    Phosphorus 4.1 (H) 2.3 - 3.7 MG/DL   HEPARIN XA UFH    Collection Time: 03/14/22  1:54 AM   Result Value Ref Range    Heparin Xa UFH 0.64 0.3 - 0.7 IU/mL   GLUCOSE, POC    Collection Time: 03/14/22  7:49 AM   Result Value Ref Range    Glucose (POC) 136 (H) 65 - 100 mg/dL    Performed by Herman    HEPARIN XA UFH    Collection Time: 03/14/22  7:59 AM   Result Value Ref Range    Heparin Xa UFH 0.52 0.3 - 0.7 IU/mL   GLUCOSE, POC    Collection Time: 03/14/22 11:54 AM   Result Value Ref Range    Glucose (POC) 167 (H) 65 - 100 mg/dL    Performed by Tc    SARS-COV-2    Collection Time: 03/14/22  3:50 PM   Result Value Ref Range    SARS-CoV-2 Please find results under separate order     SARS-COV-2, PCR    Collection Time: 03/14/22  3:50 PM    Specimen: Nasopharyngeal   Result Value Ref Range    Specimen source Nasopharyngeal      SARS-CoV-2 Not detected NOTD     GLUCOSE, POC    Collection Time: 03/14/22  5:06 PM   Result Value Ref Range    Glucose (POC) 135 (H) 65 - 100 mg/dL    Performed by Tc    GLUCOSE, POC    Collection Time: 03/14/22  9:28 PM   Result Value Ref Range    Glucose (POC) 200 (H) 65 - 100 mg/dL    Performed by Kelsi    CBC WITH AUTOMATED DIFF    Collection Time: 03/15/22  6:37 AM   Result Value Ref Range    WBC 6.6 4.3 - 11.1 K/uL    RBC 4.51 4.23 - 5.6 M/uL    HGB 15.0 13.6 - 17.2 g/dL    HCT 42.9 41.1 - 50.3 %    MCV 95.1 79.6 - 97.8 FL    MCH 33.3 (H) 26.1 - 32.9 PG    MCHC 35.0 31.4 - 35.0 g/dL    RDW 12.1 11.9 - 14.6 %    PLATELET 703 136 - 027 K/uL    MPV 9.7 9.4 - 12.3 FL    ABSOLUTE NRBC 0.00 0.0 - 0.2 K/uL    DF AUTOMATED      NEUTROPHILS 57 43 - 78 %    LYMPHOCYTES 27 13 - 44 %    MONOCYTES 10 4.0 - 12.0 %    EOSINOPHILS 5 0.5 - 7.8 %    BASOPHILS 1 0.0 - 2.0 %    IMMATURE GRANULOCYTES 0 0.0 - 5.0 %    ABS. NEUTROPHILS 3.8 1.7 - 8.2 K/UL    ABS. LYMPHOCYTES 1.8 0.5 - 4.6 K/UL    ABS. MONOCYTES 0.7 0.1 - 1.3 K/UL    ABS. EOSINOPHILS 0.3 0.0 - 0.8 K/UL    ABS. BASOPHILS 0.1 0.0 - 0.2 K/UL    ABS. IMM.  GRANS. 0.0 0.0 - 0.5 K/UL   METABOLIC PANEL, BASIC    Collection Time: 03/15/22  6:37 AM   Result Value Ref Range    Sodium 137 136 - 145 mmol/L    Potassium 3.7 3.5 - 5.1 mmol/L    Chloride 105 98 - 107 mmol/L    CO2 27 21 - 32 mmol/L    Anion gap 5 (L) 7 - 16 mmol/L    Glucose 154 (H) 65 - 100 mg/dL    BUN 17 8 - 23 MG/DL    Creatinine 0.85 0.8 - 1.5 MG/DL    GFR est AA >60 >60 ml/min/1.73m2    GFR est non-AA >60 >60 ml/min/1.73m2    Calcium 10.0 8.3 - 10.4 MG/DL   MAGNESIUM    Collection Time: 03/15/22  6:37 AM   Result Value Ref Range    Magnesium 2.1 1.8 - 2.4 mg/dL   PHOSPHORUS    Collection Time: 03/15/22  6:37 AM   Result Value Ref Range    Phosphorus 3.7 2.3 - 3.7 MG/DL   HEPARIN XA UFH    Collection Time: 03/15/22  6:37 AM   Result Value Ref Range    Heparin Xa UFH 0.39 0.3 - 0.7 IU/mL   GLUCOSE, POC    Collection Time: 03/15/22  7:20 AM   Result Value Ref Range    Glucose (POC) 180 (H) 65 - 100 mg/dL    Performed by Alivia    GLUCOSE, POC    Collection Time: 03/15/22 12:13 PM   Result Value Ref Range    Glucose (POC) 201 (H) 65 - 100 mg/dL    Performed by Rena    GLUCOSE, POC    Collection Time: 03/15/22  4:44 PM   Result Value Ref Range    Glucose (POC) 126 (H) 65 - 100 mg/dL    Performed by [facility]        All Micro Results     Procedure Component Value Units Date/Time    CULTURE, BLOOD [[id number]] Collected: 03/11/22 1947 Order Status: Completed Specimen: Blood Updated: 03/15/22 1138     Special Requests: --        LEFT  Antecubital       Culture result: NO GROWTH 4 DAYS       CULTURE, BLOOD [[id number]] Collected: 03/11/22 1953    Order Status: Completed Specimen: Blood Updated: 03/15/22 1138     Special Requests: --        NO SPECIAL REQUESTS  RIGHT  FOREARM       Culture result: NO GROWTH 4 DAYS       SARS-COV-2, PCR [[id number]] Collected: 03/14/22 1550    Order Status: Completed Specimen: Nasopharyngeal Updated: 03/15/22 0802     Specimen source Nasopharyngeal        SARS-CoV-2 Not detected        Comment:      The specimen is NEGATIVE for SARS-CoV-2, the novel coronavirus associated with COVID-19. This test has been authorized by the FDA under an Emergency Use Authorization (EUA) for use by authorized laboratories.         Fact sheet for Healthcare Providers: MatchMinedate[employer]co.nz       Fact sheet for Patients: [employer]Mined[employer]co[employer]nz       Methodology: RT-PCR         CULTURE, URINE [[id number]] Collected: 03/11/22 1858    Order Status: Completed Specimen: Urine from Clean catch Updated: 03/14/22 0718     Special Requests: NO SPECIAL REQUESTS        Culture result:       <10,000 COLONIES/mL MIXED SKIN DANE ISOLATED          RESPIRATORY VIRUS PANEL W/COVID-19, PCR [[id number]] Collected: 03/11/22 1847    Order Status: Completed Specimen: Nasopharyngeal Updated: 03/11/22 2209     Adenovirus NOT DETECTED        Coronavirus 229E NOT DETECTED        Coronavirus HKU1 NOT DETECTED        Coronavirus CVNL63 NOT DETECTED        Coronavirus OC43 NOT DETECTED        SARS-CoV-2, PCR NOT DETECTED        Metapneumovirus NOT DETECTED        Rhinovirus and Enterovirus NOT DETECTED        Influenza A NOT DETECTED        Influenza B NOT DETECTED        Parainfluenza 1 NOT DETECTED        Parainfluenza 2 NOT DETECTED        Parainfluenza 3 NOT DETECTED        Parainfluenza virus 4 NOT DETECTED        RSV by PCR NOT DETECTED        B. parapertussis, PCR NOT DETECTED        Bordetella pertussis - PCR NOT DETECTED        Chlamydophila pneumoniae DNA, QL, PCR NOT DETECTED        Mycoplasma pneumoniae DNA, QL, PCR NOT DETECTED       COVID-19 RAPID TEST [[id number]] Collected: 03/11/22 1550    Order Status: Completed Specimen: Nasopharyngeal Updated: 03/11/22 162     Specimen source NASAL SWAB        COVID-19 rapid test Not detected        Comment:      The specimen is NEGATIVE for SARS-CoV-2, the novel coronavirus associated with COVID-19. A negative result does not rule out COVID-19. This test has been authorized by the FDA under an Emergency Use Authorization (EUA) for use by authorized laboratories. Fact sheet for Healthcare Providers: [employer]  Fact sheet for Patients: [employer]ada[employer]       Methodology: Isothermal Nucleic Acid Amplification               Other Studies:  No results found.     Current Meds:  Current Facility-Administered Medications   Medication Dose Route Frequency    heparin 25,000 units in dextrose 500 mL infusion  18-36 Units/kg/hr IntraVENous TITRATE    zinc oxide-cod liver oil (DESITIN) 40 % paste   Topical PRN    sodium chloride (NS) flush 5-40 mL  5-40 mL IntraVENous Q8H    sodium chloride (NS) flush 5-40 mL  5-40 mL IntraVENous PRN    acetaminophen (TYLENOL) tablet 650 mg  650 mg Oral Q6H PRN    Or    acetaminophen (TYLENOL) suppository 650 mg  650 mg Rectal Q6H PRN    polyethylene glycol (MIRALAX) packet 17 g  17 g Oral DAILY PRN    ondansetron (ZOFRAN ODT) tablet 4 mg  4 mg Oral Q8H PRN    Or    ondansetron (ZOFRAN) injection 4 mg  4 mg IntraVENous Q6H PRN    insulin lispro (HUMALOG) injection   SubCUTAneous AC&HS    cefTRIAXone (ROCEPHIN) 1 g in 0.9% sodium chloride (MBP/ADV) 50 mL MBP  1 g IntraVENous Q24H    aspirin tablet 325 mg  325 mg Oral QHS    atorvastatin (LIPITOR) tablet 40 mg  40 mg Oral QHS    ezetimibe (ZETIA) tablet 10 mg  10 mg Oral QHS    fenofibrate (LOFIBRA) tablet 160 mg  160 mg Oral QHS    linaCLOtide (LINZESS) capsule 145 mcg (Patient Supplied)  145 mcg Oral DAILY    oxybutynin chloride XL (DITROPAN XL) tablet 10 mg  10 mg Oral DAILY    senna-docusate (PERICOLACE) 8.6-50 mg per tablet 2 Tablet  2 Tablet Oral DAILY       Signed: Felicia Castillo DO    Part of this note may have been written by using a voice dictation software. The note has been proof read but may still contain some grammatical/other typographical errors.

## 2022-03-15 NOTE — PROGRESS NOTES
Problem: Self Care Deficits Care Plan (Adult)  Goal: *Acute Goals and Plan of Care (Insert Text)  Outcome: Progressing Towards Goal  Note:   1. Patient will complete toileting with moderate assist to increase self care independence. 2. Patient will improve dynamic sitting, static standing, and dynamic standing at edge of bed for 15 minutes to improve independence with transfers and self cares. 3. Patient will complete chair transfer with minimal assist using adaptive equipment as needed. 4. Patient will complete toilet transfer with minimal assist using adaptive equipment as needed. Timeframe: 7 visits       OCCUPATIONAL THERAPY: Daily Note 3/15/2022  INPATIENT: OT Visit Days: 2  [place] / Product Type: Medicare /      NAME/AGE/GENDER: Aleisha Seth is a 66 y.o. male   PRIMARY DIAGNOSIS:  Weakness [R53.1] UTI (urinary tract infection) UTI (urinary tract infection)       ICD-10: Treatment Diagnosis:    · Generalized Muscle Weakness (M62.81)  · Other lack of cordination (R27.8)  · Difficulty in walking, Not elsewhere classified (R26.2)  · History of falling (Z91.81)  · Hemiplegia and hemiparesis following cerebral infarction affecting   · left non-dominant side (H07.523)   Precautions/Allergies:   Penicillin g, Percocet [oxycodone-acetaminophen], and Sulfa (sulfonamide antibiotics)      ASSESSMENT:     Mr. Celine Shaver presents with the above diagnosis and overall deficits in strength, functional mobility, and ADL performance. At baseline, pt lives alone and has assistance from caregiver 8hrs/day. He is able to mobilize with rollator at baseline and has assistance with all ADLs. Anticipate further rehab upon discharge to return to his stated PLOF. 3/15 Patient needed encouragement to participate, he was cooperative but irritable during session. Able to stand a few time and eventually get to recliner.    Continue OT    This section established at most recent assessment   PROBLEM LIST (Impairments causing functional limitations):  1. Decreased Strength  2. Decreased ADL/Functional Activities  3. Decreased Transfer Abilities  4. Decreased Ambulation Ability/Technique  5. Decreased Balance  6. Decreased Activity Tolerance   INTERVENTIONS PLANNED: (Benefits and precautions of occupational therapy have been discussed with the patient.)  1. Activities of daily living training  2. Balance training  3. Neuromuscular re-eduation  4. Therapeutic activity  5. Therapeutic exercise     TREATMENT PLAN: Frequency/Duration: Follow patient 3x/week to address above goals. Rehabilitation Potential For Stated Goals: Good     REHAB RECOMMENDATIONS (at time of discharge pending progress):    Placement: It is my opinion, based on this patient's performance to date, that Mr. El Rodriguez may benefit from intensive therapy at a [address] [place] [address] after discharge due to the functional deficits listed above that are likely to improve with skilled rehabilitation and concerns that he/she may be unsafe to be unsupervised at home due to decline in functional mobility and increased confusion . Equipment:    None at this time              OCCUPATIONAL PROFILE AND HISTORY:   History of Present Injury/Illness (Reason for Referral):  See H&P  Past Medical History/Comorbidities:   Mr. El Rodriguez  has a past medical history of Alcoholic pancreatitis (96/5220), Aneurysm ([address] Aneurysm artery, celiac ([address] (8/6/2014), Chronic pain, CVA (cerebral infarction) (1998, 2008), Depression, Diabetes ([address] (2010), Diabetic ulcer of left great toe ([address] Enlarged aorta ([address] (8/6/2014), Gout, History of stroke (1998), Hypertension, Mixed hyperlipidemia, Obesity, Skin cancer, Stroke ([address] Unspecified constipation, and Unspecified vitamin D deficiency. He has no past medical history of Chronic kidney disease.   Mr. El Rodriguez  has a past surgical history that includes hx tonsillectomy; hx adenoidectomy; pr appendectomy; hx orthopaedic (12/2012); hx orthopaedic (last 4/2013); hx orthopaedic (11/2013); hx cataract removal (OD-2013/OS-2014); hx urological (1980's); and hx colonoscopy (2007, 2010, 2014). Social History/Living Environment:   Home Environment: Private residence  # Steps to Enter: 0  One/Two Story Residence: Two story, live on 1st floor  Living Alone: Yes  Support Systems: Caregiver/Home Care Staff,Friend/Neighbor  Patient Expects to be Discharged to[de-identified] Skilled nursing facility  Current DME Used/Available at Home: Walker, rollator  Prior Level of Function/Work/Activity:  Assistance with all ADLs from caregiver staff, rollator for mobility     Number of Personal Factors/Comorbidities that affect the Plan of Care: Expanded review of therapy/medical records (1-2):  MODERATE COMPLEXITY   ASSESSMENT OF OCCUPATIONAL PERFORMANCE[de-identified]   Activities of Daily Living:   Basic ADLs (From Assessment) Complex ADLs (From Assessment)   Feeding: Setup  Oral Facial Hygiene/Grooming: Setup,Minimum assistance  Bathing: Maximum assistance  Upper Body Dressing: Minimum assistance  Lower Body Dressing: Total assistance  Toileting: Total assistance     Grooming/Bathing/Dressing Activities of Daily Living                             Bed/Mat Mobility  Supine to Sit: Stand-by assistance  Sit to Stand: Contact guard assistance  Stand to Sit: Contact guard assistance  Bed to Chair: Contact guard assistance  Scooting: Moderate assistance; Additional time     Most Recent Physical Functioning:   Gross Assessment:                  Posture:  Posture (WDL): Exceptions to WDL  Posture Assessment: Forward head,Kyphosis,Rounded shoulders  Balance:  Sitting: Intact  Standing: Pull to stand; With support Bed Mobility:  Supine to Sit: Stand-by assistance  Scooting: Moderate assistance; Additional time  Wheelchair Mobility:     Transfers:  Sit to Stand: Contact guard assistance  Stand to Sit: Contact guard assistance  Bed to Chair: Contact guard assistance Patient Vitals for the past 6 hrs:   BP BP Patient Position SpO2 Pulse   03/15/22 0733 (!) 90/58 At rest 95 % 66   03/15/22 0742 (!) 88/67  92 % 70   03/15/22 0803 109/81  98 % 64   03/15/22 0903 108/66  94 % 69   03/15/22 1101 112/76 Supine 94 % 74       Mental Status  Neurologic State: Alert  Orientation Level: Oriented to person,Oriented to place  Cognition: Follows commands  Perception: Cues to maintain midline in sitting  Perseveration: No perseveration noted  Safety/Judgement: Fall prevention                          Physical Skills Involved:  1. Balance  2. Strength  3. Activity Tolerance Cognitive Skills Affected (resulting in the inability to perform in a timely and safe manner):  1. WFL (periodic confusion) Psychosocial Skills Affected:  1. Environmental Adaptation   Number of elements that affect the Plan of Care: 3-5:  MODERATE COMPLEXITY   CLINICAL DECISION MAKIN[address] [place] [id number] AM-PAC 6 Clicks   Daily Activity Inpatient Short Form  How much help from another person does the patient currently need. .. Total A Lot A Little None   1. Putting on and taking off regular lower body clothing? [x] 1   [] 2   [] 3   [] 4   2. Bathing (including washing, rinsing, drying)? [x] 1   [] 2   [] 3   [] 4   3. Toileting, which includes using toilet, bedpan or urinal?   [x] 1   [] 2   [] 3   [] 4   4. Putting on and taking off regular upper body clothing? [] 1   [x] 2   [] 3   [] 4   5. Taking care of personal grooming such as brushing teeth? [] 1   [] 2   [x] 3   [] 4   6. Eating meals? [] 1   [] 2   [x] 3   [] 4   © , Trustees of [address] [place] [address], under license to [employer]. All rights reserved      Score:  Initial: 11 Most Recent: X (Date: -- )    Interpretation of Tool:  Represents activities that are increasingly more difficult (i.e. Bed mobility, Transfers, Gait).     Medical Necessity:     · Skilled intervention continues to be required due to the above deficits. Reason for Services/Other Comments:  · Patient continues to require skilled intervention due to   · Increased confusion, decreased functional mobility and ADL performance  · . Use of outcome tool(s) and clinical judgement create a POC that gives a: LOW COMPLEXITY         TREATMENT:   (In addition to Assessment/Re-Assessment sessions the following treatments were rendered)     Pre-treatment Symptoms/Complaints:    Pain: Initial:      Post Session:  0     Co-treatment was necessary to improve patient's cognitive participation, ability to increase activity demands, and ability to return to normal functional activity. Self Care: (25): Procedure(s) (per grid) utilized to improve and/or restore self-care/home management as related to dressing, grooming and functional mobility. WIll progress with future transfers during ADL's. Braces/Orthotics/Lines/Etc:   · IV  Treatment/Session Assessment:    · Response to Treatment:  Good, supine in bed  · Interdisciplinary Collaboration:   o Physical Therapist  o Occupational Therapist  o Registered Nurse  o Certified Nursing Assistant/Patient Care Technician  · After treatment position/precautions:   o Up in chair  o Bed alarm/tab alert on  o Bed/Chair-wheels locked  o Bed in low position  o Call light within reach  o RN notified   · Compliance with Program/Exercises: Compliant all of the time, Will assess as treatment progresses. · Recommendations/Intent for next treatment session: \"Next visit will focus on advancements to more challenging activities and reduction in assistance provided\".   Total Treatment Duration:  OT Patient Time In/Time Out  Time In: 4805  Time Out: [address] [place], [place]

## 2022-03-15 NOTE — PROGRESS NOTES
Bedside and Verbal shift change report received from [facility], [address] [place] [address] (offgoing nurse). Report included the following information SBAR, Kardex, Intake/Output, MAR, Recent Results and Cardiac Rhythm NSR.

## 2022-03-15 NOTE — PROGRESS NOTES
Problem: Mobility Impaired (Adult and Pediatric)  Goal: *Acute Goals and Plan of Care (Insert Text)  Outcome: Progressing Towards Goal  Note: DISCHARGE GOALS MODIFIED BASED ON PROGRESS 3/13/12 :  (1.)Mr. Abe Parada will move from supine to sit and sit to supine with STAND BY ASSIST, flat bed no rail. (2.)Mr. Abe Parada will transfer from bed to chair and chair to bed with CONTACT GUARD ASSIST using a walker. (3.)Mr. Abe Parada will ambulate with CONTACT GUARD ASSIST 25-30 ft using a rolling walker   (4.)Mr. Abe Parada will perform LE AROM exercises on cue without physical help. PHYSICAL THERAPY: Daily Note and AM 3/15/2022  INPATIENT: PT Visit Days : 4  [place] / Product Type: Medicare /       NAME/AGE/GENDER: Chanelle Bray is a 66 y.o. male   PRIMARY DIAGNOSIS: Weakness [R53.1] UTI (urinary tract infection) UTI (urinary tract infection)       ICD-10: Treatment Diagnosis:    · Generalized Muscle Weakness (M62.81)  · Difficulty in walking, Not elsewhere classified (R26.2)   Precaution/Allergies:  Penicillin g, Percocet [oxycodone-acetaminophen], and Sulfa (sulfonamide antibiotics)      ASSESSMENT:     Mr. Abe Parada just transferred out of ICU to the floor. Needing lots of encouragement to get up with therapy-wanting a nap, wanting to eat breakfast (10:50 in the morning). He refused to get up yesterday so provided lots of encouragement. He didn't need physical assist for supine to sit but struggled to scoot to the edge of the bed. He pulled himself up to standing at the walker and stood with only CGA. No sudden buckling in standing. He took side steps along the bed, sat, and then stood again to take steps to the recliner. Seems to be doing better with mobility. Still needs SNF/STR at d/c as he lives alone. This section established at most recent assessment   PROBLEM LIST (Impairments causing functional limitations):  1. Decreased Strength  2.  Decreased ADL/Functional Activities  3. Decreased Transfer Abilities  4. Decreased Ambulation Ability/Technique  5. Decreased Balance  6. Decreased Activity Tolerance   INTERVENTIONS PLANNED: (Benefits and precautions of physical therapy have been discussed with the patient.)  1. Balance Exercise  2. Bed Mobility  3. Gait Training  4. Therapeutic Activites  5. Therapeutic Exercise/Strengthening  6. Transfer Training     TREATMENT PLAN: Frequency/Duration: daily for duration of hospital stay  Rehabilitation Potential For Stated Goals: Good     REHAB RECOMMENDATIONS (at time of discharge pending progress):    Placement: It is my opinion, based on this patient's performance to date, that Mr. Cyo Jerry may benefit from intensive therapy at a [facility] after discharge due to the functional deficits listed above that are likely to improve with skilled rehabilitation and concerns that he/she may be unsafe to be unsupervised at home due to weakness and debility, would be unsafe to be up without assist .  Equipment:    To be determined              HISTORY:   History of Present Injury/Illness (Reason for Referral):  Copied from MD note:   Smitha Oneill is a 66 y.o. 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 He lives alone, does have a sitter 8 hours/day, his daughter lives out of state and essentially not involved. EMS was called related to inability to ambulate at all today. Found in feces and urine. Patient knows he is at the hospital knows his name. He is unsure of the date or day or year. Patient usually gets around with a Rollator. Also history of stroke with some residual left-sided weakness. Urine in ED showed 10-20 WBC, he has poor hygiene. T max 99. Procal 0.05, no leukocytosis. CK ordered and pending. BC x2 and urine culture ordered.  CT head shows chronic findings of right side lacunar infarct and supraclinoid artery. H/O AAA and iliac aneurysm that he is followed by Dr. Roc Day for. Past Medical History/Comorbidities:   Mr. Kait Shin  has a past medical history of Alcoholic pancreatitis (02/0084), [facility] Aneurysm artery, celiac ([facility] (8/6/2014), Chronic pain, CVA (cerebral infarction) (1998, 2008), Depression, Diabetes ([facility] (2010), Diabetic ulcer of left great toe ([facility] Enlarged aorta ([facility] (8/6/2014), Gout, History of stroke (1998), Hypertension, Mixed hyperlipidemia, Obesity, Skin cancer, Stroke ([facility] Unspecified constipation, and Unspecified vitamin D deficiency. He has no past medical history of Chronic kidney disease. Mr. Kait Shin  has a past surgical history that includes hx tonsillectomy; hx adenoidectomy; pr appendectomy; hx orthopaedic (12/2012); hx orthopaedic (last 4/2013); hx orthopaedic (11/2013); hx cataract removal (OD-2013/OS-2014); hx urological (1980's); and hx colonoscopy (2007, 2010, 2014). Social History/Living Environment:   Home Environment: Private residence  # Steps to Enter: 0  One/Two Story Residence: Two story, live on 1st floor  Living Alone: Yes  Support Systems: Caregiver/Home Care Staff,Friend/Neighbor  Patient Expects to be Discharged to[de-identified] Skilled nursing facility  Current DME Used/Available at Home: Walker, rollator  Prior Level of Function/Work/Activity:  Pt lives at home alone, getting weaker at home, used a rollator     Number of Personal Factors/Comorbidities that affect the Plan of Care: 0: LOW COMPLEXITY   EXAMINATION:   Most Recent Physical Functioning:   Gross Assessment:  AROM: Generally decreased, functional  Strength: Generally decreased, functional               Posture:     Balance:  Sitting: Intact  Standing: Pull to stand; With support Bed Mobility:  Supine to Sit: Stand-by assistance  Scooting: Moderate assistance; Additional time  Wheelchair Mobility:     Transfers:  Sit to Stand: Contact guard assistance  Stand to Sit: Contact guard assistance  Bed to Chair: Contact guard assistance  Gait:     Speed/Lulú: Slow  Step Length: Left shortened;Right shortened  Gait Abnormalities: Decreased step clearance  Distance (ft): 2 Feet (ft) (plus 3-4 side steps along bed)  Assistive Device: Walker, rolling  Ambulation - Level of Assistance: Contact guard assistance  Interventions: Safety awareness training;Verbal cues         Body Structures Involved:  1. Muscles Body Functions Affected:  1. Movement Related Activities and Participation Affected:  1. Mobility  2. Self Care   Number of elements that affect the Plan of Care: 4+: HIGH COMPLEXITY   CLINICAL PRESENTATION:   Presentation: Stable and uncomplicated: LOW COMPLEXITY   CLINICAL DECISION MAKING:   [facility] AM-PAC 6 Clicks   Basic Mobility Inpatient Short Form  How much difficulty does the patient currently have. .. Unable A Lot A Little None   1. Turning over in bed (including adjusting bedclothes, sheets and blankets)? [] 1   [] 2   [x] 3   [] 4   2. Sitting down on and standing up from a chair with arms ( e.g., wheelchair, bedside commode, etc.)   [] 1   [x] 2   [] 3   [] 4   3. Moving from lying on back to sitting on the side of the bed? [] 1   [] 2   [x] 3   [] 4   How much help from another person does the patient currently need. .. Total A Lot A Little None   4. Moving to and from a bed to a chair (including a wheelchair)? [] 1   [x] 2   [] 3   [] 4   5. Need to walk in hospital room? [] 1   [x] 2   [] 3   [] 4   6. Climbing 3-5 steps with a railing? [] 1   [x] 2   [] 3   [] 4   © 2007, Trustees of [facility], under license to [employer]. All rights reserved      Score:  Initial: 14 Most Recent: X (Date: -- )    Interpretation of Tool:  Represents activities that are increasingly more difficult (i.e. Bed mobility, Transfers, Gait).     Medical Necessity:     · Patient is expected to demonstrate progress in · strength, balance, coordination, and functional technique  ·  to   · decrease assistance required with functional mobility  · . Reason for Services/Other Comments:  · Patient continues to require skilled intervention due to   · Inability to complete functional mobility independently  · . Use of outcome tool(s) and clinical judgement create a POC that gives a: Questionable prediction of patient's progress: MODERATE COMPLEXITY            TREATMENT:   (In addition to Assessment/Re-Assessment sessions the following treatments were rendered)   Pre-treatment Symptoms/Complaints:  Patient wanting breakfast and to nap. Pain: Initial: numeric scale  Pain Intensity 1: 4  Pain Location 1: Generalized  Post Session:  4/10   Therapeutic Activity: (   25 minutes ):  Therapeutic activities including bed mobility, sitting edge of bed, sit <> stand transitions at walker, standing balance, ambulation with RW, and chair transfer to improve mobility, strength, balance and coordination. Date:  3/14 B UE/LE Date:   Date:     Activity/Exercise Parameters Parameters Parameters   Ankle pumps 10     Quad sets 10     Hip abd/add 10     heelslides 10     Shoulder flex/ext 10     SAQ 10     Elbow flex/ext 10       Braces/Orthotics/Lines/Etc:   · IV  Treatment/Session Assessment:    · Response to Treatment: Patient needing lots of encouragement-not very motivated but more mobile. · Interdisciplinary Collaboration:   o Physical Therapist  o Occupational Therapist  o Registered Nurse  · After treatment position/precautions:   o Up in chair  o Supine in bed  o Bed/Chair-wheels locked  o Call light within reach   · Compliance with Program/Exercises: Will assess as treatment progresses  · Recommendations/Intent for next treatment session: \"Next visit will focus on advancements to more challenging activities and reduction in assistance provided\".   Total Treatment Duration:  PT Patient Time In/Time Out  Time In: 1050  Time Out: [address] [place] [address]

## 2022-03-15 NOTE — PROGRESS NOTES
TRANSFER - OUT REPORT:    Verbal report given to Aleida Forte RN (name) on Carlin Garrido  being transferred to Med/Surg (unit) for routine progression of care       Report consisted of patients Situation, Background, Assessment and   Recommendations(SBAR). Information from the following report(s) SBAR, Kardex, Intake/Output, MAR, Recent Results and Cardiac Rhythm NSR was reviewed with the receiving nurse. Lines:   Peripheral IV 03/12/22 Left;Posterior Hand (Active)   Site Assessment Clean, dry, & intact 03/15/22 0742   Phlebitis Assessment 0 03/15/22 0742   Infiltration Assessment 0 03/15/22 0742   Dressing Status Clean, dry, & intact 03/15/22 0742   Dressing Type Transparent;Tape 03/15/22 0742   Hub Color/Line Status Patent 03/15/22 0742   Action Taken Open ports on tubing capped 03/14/22 1538   Alcohol Cap Used Yes 03/15/22 0742       Peripheral IV 03/12/22 Distal;Left Antecubital (Active)   Site Assessment Clean, dry, & intact 03/15/22 0742   Phlebitis Assessment 0 03/15/22 0742   Infiltration Assessment 0 03/15/22 0742   Dressing Status Clean, dry, & intact 03/15/22 0742   Dressing Type Transparent;Tape 03/15/22 0742   Hub Color/Line Status Patent 03/15/22 0742   Alcohol Cap Used Yes 03/15/22 5163        Opportunity for questions and clarification was provided.       Patient transported with:   Registered Nurse  Tech

## 2022-03-15 NOTE — PROGRESS NOTES
TRANSFER - IN REPORT:    Verbal report received from [facility] RN(name) on Zebedee Grounds  being received from ICU(unit) for routine progression of care      Report consisted of patients Situation, Background, Assessment and   Recommendations(SBAR). Information from the following report(s) SBAR, Kardex and MAR was reviewed with the receiving nurse. Opportunity for questions and clarification was provided. Assessment completed upon patients arrival to unit and care assumed.

## 2022-03-16 LAB
BACTERIA SPEC CULT: NORMAL
BACTERIA SPEC CULT: NORMAL
BASOPHILS # BLD: 0.1 K/UL (ref 0–0.2)
BASOPHILS NFR BLD: 1 % (ref 0–2)
DIFFERENTIAL METHOD BLD: ABNORMAL
EOSINOPHIL # BLD: 0.3 K/UL (ref 0–0.8)
EOSINOPHIL NFR BLD: 5 % (ref 0.5–7.8)
ERYTHROCYTE [DISTWIDTH] IN BLOOD BY AUTOMATED COUNT: 12.1 % (ref 11.9–14.6)
GLUCOSE BLD STRIP.AUTO-MCNC: 156 MG/DL (ref 65–100)
GLUCOSE BLD STRIP.AUTO-MCNC: 158 MG/DL (ref 65–100)
GLUCOSE BLD STRIP.AUTO-MCNC: 170 MG/DL (ref 65–100)
GLUCOSE BLD STRIP.AUTO-MCNC: 177 MG/DL (ref 65–100)
HCT VFR BLD AUTO: 39.7 % (ref 41.1–50.3)
HGB BLD-MCNC: 13.8 G/DL (ref 13.6–17.2)
IMM GRANULOCYTES # BLD AUTO: 0 K/UL (ref 0–0.5)
IMM GRANULOCYTES NFR BLD AUTO: 0 % (ref 0–5)
LYMPHOCYTES # BLD: 1.8 K/UL (ref 0.5–4.6)
LYMPHOCYTES NFR BLD: 32 % (ref 13–44)
MCH RBC QN AUTO: 33.1 PG (ref 26.1–32.9)
MCHC RBC AUTO-ENTMCNC: 34.8 G/DL (ref 31.4–35)
MCV RBC AUTO: 95.2 FL (ref 79.6–97.8)
MM INDURATION POC: 0 MM (ref 0–5)
MONOCYTES # BLD: 0.5 K/UL (ref 0.1–1.3)
MONOCYTES NFR BLD: 9 % (ref 4–12)
NEUTS SEG # BLD: 3 K/UL (ref 1.7–8.2)
NEUTS SEG NFR BLD: 54 % (ref 43–78)
NRBC # BLD: 0 K/UL (ref 0–0.2)
PLATELET # BLD AUTO: 207 K/UL (ref 150–450)
PMV BLD AUTO: 9.5 FL (ref 9.4–12.3)
PPD POC: NEGATIVE NEGATIVE
RBC # BLD AUTO: 4.17 M/UL (ref 4.23–5.6)
SERVICE CMNT-IMP: ABNORMAL
SERVICE CMNT-IMP: NORMAL
SERVICE CMNT-IMP: NORMAL
UFH PPP CHRO-ACNC: 0.35 IU/ML (ref 0.3–0.7)
WBC # BLD AUTO: 5.5 K/UL (ref 4.3–11.1)

## 2022-03-16 PROCEDURE — 74011250637 HC RX REV CODE- 250/637: Performed by: NURSE PRACTITIONER

## 2022-03-16 PROCEDURE — 97530 THERAPEUTIC ACTIVITIES: CPT

## 2022-03-16 PROCEDURE — 85025 COMPLETE CBC W/AUTO DIFF WBC: CPT

## 2022-03-16 PROCEDURE — 82962 GLUCOSE BLOOD TEST: CPT

## 2022-03-16 PROCEDURE — 65270000029 HC RM PRIVATE

## 2022-03-16 PROCEDURE — 74011636637 HC RX REV CODE- 636/637: Performed by: NURSE PRACTITIONER

## 2022-03-16 PROCEDURE — 36415 COLL VENOUS BLD VENIPUNCTURE: CPT

## 2022-03-16 PROCEDURE — 74011250636 HC RX REV CODE- 250/636: Performed by: STUDENT IN AN ORGANIZED HEALTH CARE EDUCATION/TRAINING PROGRAM

## 2022-03-16 PROCEDURE — 74011250637 HC RX REV CODE- 250/637: Performed by: INTERNAL MEDICINE

## 2022-03-16 PROCEDURE — 85520 HEPARIN ASSAY: CPT

## 2022-03-16 PROCEDURE — 74011000250 HC RX REV CODE- 250: Performed by: NURSE PRACTITIONER

## 2022-03-16 RX ORDER — GUAIFENESIN 100 MG/5ML
81 LIQUID (ML) ORAL DAILY
Status: DISCONTINUED | OUTPATIENT
Start: 2022-03-16 | End: 2022-03-17 | Stop reason: HOSPADM

## 2022-03-16 RX ADMIN — HEPARIN SODIUM 16 UNITS/KG/HR: 5000 INJECTION, SOLUTION INTRAVENOUS at 07:14

## 2022-03-16 RX ADMIN — APIXABAN 10 MG: 5 TABLET, FILM COATED ORAL at 10:25

## 2022-03-16 RX ADMIN — Medication 2 UNITS: at 22:00

## 2022-03-16 RX ADMIN — SODIUM CHLORIDE, PRESERVATIVE FREE 10 ML: 5 INJECTION INTRAVENOUS at 05:37

## 2022-03-16 RX ADMIN — ATORVASTATIN CALCIUM 80 MG: 40 TABLET, FILM COATED ORAL at 21:22

## 2022-03-16 RX ADMIN — Medication 2 UNITS: at 12:32

## 2022-03-16 RX ADMIN — ASPIRIN 81 MG: 81 TABLET, CHEWABLE ORAL at 10:25

## 2022-03-16 RX ADMIN — EZETIMIBE 10 MG: 10 TABLET ORAL at 21:22

## 2022-03-16 RX ADMIN — Medication 2 UNITS: at 17:05

## 2022-03-16 RX ADMIN — DOCUSATE SODIUM 50MG AND SENNOSIDES 8.6MG 2 TABLET: 8.6; 5 TABLET, FILM COATED ORAL at 08:20

## 2022-03-16 RX ADMIN — Medication 2 UNITS: at 08:21

## 2022-03-16 RX ADMIN — SODIUM CHLORIDE, PRESERVATIVE FREE 10 ML: 5 INJECTION INTRAVENOUS at 21:22

## 2022-03-16 RX ADMIN — OXYBUTYNIN CHLORIDE 10 MG: 10 TABLET, EXTENDED RELEASE ORAL at 08:20

## 2022-03-16 RX ADMIN — FENOFIBRATE 160 MG: 160 TABLET ORAL at 21:22

## 2022-03-16 RX ADMIN — APIXABAN 10 MG: 5 TABLET, FILM COATED ORAL at 22:18

## 2022-03-16 NOTE — PROGRESS NOTES
Problem: Self Care Deficits Care Plan (Adult)  Goal: *Acute Goals and Plan of Care (Insert Text)  Outcome: Progressing Towards Goal  Note:   1. Patient will complete toileting with moderate assist to increase self care independence. 2. Patient will improve dynamic sitting, static standing, and dynamic standing at edge of bed for 15 minutes to improve independence with transfers and self cares. 3. Patient will complete chair transfer with minimal assist using adaptive equipment as needed. 4. Patient will complete toilet transfer with minimal assist using adaptive equipment as needed. Timeframe: 7 visits       OCCUPATIONAL THERAPY: Daily Note 3/16/2022  INPATIENT: OT Visit Days: 3  [place] / [place] / Product Type: Medicare /      NAME/AGE/GENDER: Ulysses Elena is a 66 y.o. 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 Dick Vargas presents with the above diagnosis and overall deficits in strength, functional mobility, and ADL performance. At baseline, pt lives alone and has assistance from caregiver 8hrs/day. He is able to mobilize with rollator at baseline and has assistance with all ADLs. Anticipate further rehab upon discharge to return to his stated PLOF.     3/16/22: Pt sitting EOB and requesting to sit up in recliner. He required CGA for scooting to edge and sit<>stand with RW. Able to take a few steps over to recliner and left with all needs met and in reach.  Alarm in place. He reports getting up to bathroom with RW and assistance of one earlier this date. This section established at most recent assessment   PROBLEM LIST (Impairments causing functional limitations):  1. Decreased Strength  2. Decreased ADL/Functional Activities  3. Decreased Transfer Abilities  4. Decreased Ambulation Ability/Technique  5. Decreased Balance  6. Decreased Activity Tolerance   INTERVENTIONS PLANNED: (Benefits and precautions of occupational therapy have been discussed with the patient.)  1. Activities of daily living training  2. Balance training  3. Neuromuscular re-eduation  4. Therapeutic activity  5. Therapeutic exercise     TREATMENT PLAN: Frequency/Duration: Follow patient 3x/week to address above goals. Rehabilitation Potential For Stated Goals: Good     REHAB RECOMMENDATIONS (at time of discharge pending progress):    Placement: It is my opinion, based on this patient's performance to date, that Mr. Jennifer Tejeda may benefit from intensive therapy at a [address] [place] [address] after discharge due to the functional deficits listed above that are likely to improve with skilled rehabilitation and concerns that he/she may be unsafe to be unsupervised at home due to decline in functional mobility and increased confusion . Equipment:    None at this time              OCCUPATIONAL PROFILE AND HISTORY:   History of Present Injury/Illness (Reason for Referral):  See H&P  Past Medical History/Comorbidities:   Mr. 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     He has no past medical history of Chronic kidney disease. Mr. Seth Gonzalez  has a past surgical history that includes hx tonsillectomy; hx adenoidectomy; pr appendectomy; hx orthopaedic (12/2012); hx orthopaedic (last 4/2013); hx orthopaedic (11/2013); hx cataract removal (OD-2013/OS-2014); hx urological (1980's); and hx colonoscopy (2007, 2010, 2014). Social History/Living Environment:   Home Environment: Private residence  # Steps to Enter: 0  One/Two Story Residence: Two story, live on 1st floor  Living Alone: Yes  Support Systems: Caregiver/Home Care Staff,Friend/Neighbor  Patient Expects to be Discharged to[de-identified] Skilled nursing facility  Current DME Used/Available at Home: Walker, rollator  Prior Level of Function/Work/Activity:  Assistance with all ADLs from caregiver staff, rollator for mobility     Number of Personal Factors/Comorbidities that affect the Plan of Care: Expanded review of therapy/medical records (1-2):  MODERATE COMPLEXITY   ASSESSMENT OF OCCUPATIONAL PERFORMANCE[de-identified]   Activities of Daily Living:   Basic ADLs (From Assessment) Complex ADLs (From Assessment)   Feeding: Setup  Oral Facial Hygiene/Grooming: Setup,Minimum assistance  Bathing: Maximum assistance  Upper Body Dressing: Minimum assistance  Lower Body Dressing: Total assistance  Toileting: Total assistance     Grooming/Bathing/Dressing Activities of Daily Living                             Bed/Mat Mobility  Supine to Sit: Contact guard assistance  Sit to Supine: Contact guard assistance  Sit to Stand: Contact guard assistance  Stand to Sit: Contact guard assistance  Bed to Chair: Contact guard assistance  Scooting: Minimum assistance; Additional time     Most Recent Physical Functioning:   Gross Assessment:                  Posture:  Posture (WDL): Exceptions to WDL  Posture Assessment: Forward head,Kyphosis,Rounded shoulders  Balance:  Sitting: Intact  Sitting - Static: Good (unsupported)  Sitting - Dynamic: Occassional  Standing: Pull to stand; With support  Standing - Static: Constant support  Standing - Dynamic : Constant support Bed Mobility:  Supine to Sit: Contact guard assistance  Sit to Supine: Contact guard assistance  Scooting: Minimum assistance; Additional time  Level of Assistance: Minimum assistance  Wheelchair Mobility:     Transfers:  Sit to Stand: Contact guard assistance  Stand to Sit: Contact guard assistance  Bed to Chair: Contact guard assistance  Duration: 23 Minutes            Patient Vitals for the past 6 hrs:   BP BP Patient Position SpO2 Pulse   22 1210 99/74 Sitting 95 % 84       Mental Status  Neurologic State: Alert  Orientation Level: Oriented to person,Oriented to place,Disoriented to time,Disoriented to situation  Cognition: Follows commands  Perception: Cues to maintain midline in sitting  Perseveration: No perseveration noted  Safety/Judgement: Fall prevention                          Physical Skills Involved:  1. Balance  2. Strength  3. Activity Tolerance Cognitive Skills Affected (resulting in the inability to perform in a timely and safe manner):  1. WFL (periodic confusion) Psychosocial Skills Affected:  1. Environmental Adaptation   Number of elements that affect the Plan of Care: 3-5:  MODERATE COMPLEXITY   CLINICAL DECISION MAKIN [facility] [address] [id number] AM-PAC 6 Clicks   Daily Activity Inpatient Short Form  How much help from another person does the patient currently need. .. Total A Lot A Little None   1. Putting on and taking off regular lower body clothing? [] 1   [x] 2   [] 3   [] 4   2. Bathing (including washing, rinsing, drying)? [] 1   [x] 2   [] 3   [] 4   3. Toileting, which includes using toilet, bedpan or urinal?   [] 1   [] 2   [x] 3   [] 4   4. Putting on and taking off regular upper body clothing? [] 1   [] 2   [x] 3   [] 4   5. Taking care of personal grooming such as brushing teeth? [] 1   [] 2   [x] 3   [] 4   6. Eating meals?    [] 1   [] 2   [x] 3   [] 4   © , Trustees of [facility], under license to [employer]. All rights reserved      Score:  Initial: 11 Most Recent: 16 (Date: 3/16/2022 )    Interpretation of Tool:  Represents activities that are increasingly more difficult (i.e. Bed mobility, Transfers, Gait). Medical Necessity:     · Skilled intervention continues to be required due to the above deficits. Reason for Services/Other Comments:  · Patient continues to require skilled intervention due to   · Increased confusion, decreased functional mobility and ADL performance  · . Use of outcome tool(s) and clinical judgement create a POC that gives a: LOW COMPLEXITY         TREATMENT:   (In addition to Assessment/Re-Assessment sessions the following treatments were rendered)     Pre-treatment Symptoms/Complaints:    Pain: Initial:   Pain Intensity 1: 0  Post Session:  0     Therapeutic Activity (  15): Therapeutic activities per grid below to improve mobility, balance and coordination. Required minimal verbal and tactile cues to promote static and dynamic balance in standing. Braces/Orthotics/Lines/Etc:   · none  Treatment/Session Assessment:    · Response to Treatment:  Good, up in chair  · Interdisciplinary Collaboration:   o Physical Therapist  o Occupational Therapist  o Registered Nurse  o Certified Nursing Assistant/Patient Care Technician  · After treatment position/precautions:   o Up in chair  o Bed alarm/tab alert on  o Bed/Chair-wheels locked  o Bed in low position  o Call light within reach  o RN notified   · Compliance with Program/Exercises: Compliant all of the time, Will assess as treatment progresses. · Recommendations/Intent for next treatment session: \"Next visit will focus on advancements to more challenging activities and reduction in assistance provided\".   Total Treatment Duration:  OT Patient Time In/Time Out  Time In: 1345  Time Out: [place], Jake Schirmer

## 2022-03-16 NOTE — PROGRESS NOTES
OUTREACH NURSE PROGRESS REPORT    Subjective: In to assess patient r/t recent transfer from [address] [place] [address]  was seen and a focused assessment completed    MEWS Score: 2   Vitals:    03/15/22 2310 03/16/22 0340 03/16/22 0848 03/16/22 1210   BP: 119/83 103/69 107/78 99/74   Pulse: 82 70 71 84   Resp:  15 20 20   Temp: 98.2 °F (36.8 °C) 98.2 °F (36.8 °C) 97.5 °F (36.4 °C) 97.4 °F (36.3 °C)   SpO2: 95% 90% 94% 95%   Weight:       Height:            Objective: On arrival to room, I found patient to be seated upright in the chair watching TV    Physical Exam  Constitutional:       Appearance: Normal appearance. Cardiovascular:      Rate and Rhythm: Normal rate and regular rhythm. Pulmonary:      Effort: Pulmonary effort is normal.      Breath sounds: Normal breath sounds. Neurological:      Mental Status: He is alert. Mental status is at baseline. GCS: GCS eye subscore is 4. GCS verbal subscore is 5. GCS motor subscore is 6. Pain assessment:  Pain Intensity 1: 0   Pain Location 1: Generalized     Patient Stated Pain Goal: 0      Plan: Continue to follow until 03/17/22 @ 0800 unless any changes to patient status.

## 2022-03-16 NOTE — PROGRESS NOTES
Late Entry    Spiritual Care Visit, follow up visit. Patient was wishing to be moved to a non critical room. Visited with patient at bedside. Prayed for patient's healing and health. Visit by Marylin Lubin, Staff .  Gosia., Herman.B., B.A.

## 2022-03-16 NOTE — PROGRESS NOTES
Hospitalist Progress Note   Admit Date:  3/11/2022  1:47 PM   Name:  Scooter Gonzalez   Age:  66 y.o. Sex:  male  :  1950   MRN:  [id number]   Room:  [facility]/    Presenting Complaint: Fatigue and Incontinence    Reason(s) for Admission: Weakness [R53.1]     Hospital Course & Interval History:   Scooter Gonzalez is a 66 y.o. 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 He lives alone, does have a sitter 8 hours/day, his daughter lives out of state and essentially not involved. EMS was called related to inability to ambulate at all today. Found in feces and urine.  Patient knows he is at the hospital knows his name. [facility] is unsure of the date or day or year. Abraham Lagunas usually gets around with a Rollator.   Also history of stroke with some residual left-sided weakness. Urine in ED showed 10-20 WBC, he has poor hygiene. T max 99. Procal 0.05, no leukocytosis. CK ordered and pending. BC x2 and urine culture ordered. CT head shows chronic findings of right side lacunar infarct and supraclinoid artery. H/O AAA and iliac aneurysm that he is followed by Dr. Hakeem Barone. His CTA showed basilar thrombosis and started on Heparin drip. Neurologist was consulted and transferred to ICU for close monitoring and neurochecks     Subjective/24hr Events (22): Patient seen and examined at bedside. No acute overnight events. He has not noticed any new or concerning symptoms when directly asked. He denies fevers/chills, chest pain, shortness of breath, abdominal pain. He denies headache, blurry vision, difficulty with speech/swallowing. He denies changes in his arms or legs. ROS:  10 systems reviewed and negative except as noted above.      Assessment & Plan:   Acute metabolic encephalopathy   Resolved and mental status is back to baseline  - stopped antibiotics   - avoid narcotics and benzos  - nonpharmacologic interventions    # New basilar artery thrombosis, nonocclusive  - neuro checks per unit protocol  - change heparin gtt to Eliquis  - no overt signs of bleeding  - PT/OT/SLP consults  - ASA 81 mg and high intensity statin/Zetia  - per neurology call [facility] transfer line if subsequent neuro changes    Hypokalemia   Resolved     CVA, old, hemiparesis ([facility] [address] (5/24/2012) with left supraclinoid ICA occlusion  - ASA and statin     Chronic Supraclinoid left ICA aneurysm. 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 Discussed with patient at bedside. All questions answered.      Hospital Problems as of 3/16/2022 Date Reviewed: 3/11/2022          Codes Class Noted - Resolved POA    Stroke due to thrombosis [facility] ICD-10-CM: I63.02  ICD-9-CM: 433.01  3/15/2022 - Present Unknown        Cystitis ICD-10-CM: N30.90  ICD-9-CM: 595.9  3/15/2022 - Present Unknown        Acute metabolic encephalopathy ROSETTA-29-PC: G93.41  ICD-9-CM: 348.31  3/15/2022 - Present Unknown        Weakness ICD-10-CM: R53.1  ICD-9-CM: 780.79  3/11/2022 - Present Yes        Benign essential HTN ICD-10-CM: I10  ICD-9-CM: 401.1  3/11/2022 - Present Yes        * (Principal) UTI (urinary tract infection) ICD-10-CM: N39.0  ICD-9-CM: 599.0  5/11/2020 - Present Yes        Diabetes mellitus with complication ([facility] [address] CPC-23-GL: E11.8  ICD-9-CM: 250.90  8/28/2015 - Present Yes        Mixed hyperlipidemia ICD-10-CM: E78.2  ICD-9-CM: 272.2  Unknown - Present Yes        Aneurysm artery, celiac (HCC) (Chronic) ICD-10-CM: I72.8  ICD-9-CM: 442.84  8/6/2014 - Present Yes        CVA, old, hemiparesis ([facility] [address] ICD-10-CM: Q28.471  ICD-9-CM: 438.20  5/24/2012 - Present Yes              Objective:     Patient Vitals for the past 24 hrs:   Temp Pulse Resp BP SpO2   03/16/22 1210 97.4 °F (36.3 °C) 84 20 99/74 95 %   03/16/22 0848 97.5 °F (36.4 °C) 71 20 107/78 94 %   03/16/22 0340 98.2 °F (36.8 °C) 70 15 103/69 90 %   03/15/22 2310 98.2 °F (36.8 °C) 82  119/83 95 %   03/15/22 2001 97.1 °F (36.2 °C) 81  (!) 109/91 97 %   03/15/22 1702 98.1 °F (36.7 °C) 74 17 (!) 85/72 98 %     Oxygen Therapy  O2 Sat (%): 95 % (03/16/22 1210)  Pulse via Oximetry: 68 beats per minute (03/15/22 0903)  O2 Device: None (Room air) (03/16/22 1210)    Estimated body mass index is 25.21 kg/m² as calculated from the following:    Height as of this encounter: 6' (1.829 m). Weight as of this encounter: 84.3 kg (185 lb 14.4 oz). Intake/Output Summary (Last 24 hours) at 3/16/2022 1555  Last data filed at 3/16/2022 0340  Gross per 24 hour   Intake    Output 450 ml   Net -450 ml         Physical Exam:   Blood pressure 99/74, pulse 84, temperature 97.4 °F (36.3 °C), resp. rate 20, height 6' (1.829 m), weight 84.3 kg (185 lb 14.4 oz), SpO2 95 %. General:    Well nourished. No overt distress, obese  Head:  Normocephalic, atraumatic  Eyes:  Sclerae appear normal.  Pupils equally round. ENT:  Nares appear normal, no drainage. Moist oral mucosa  Neck:  No restricted ROM. Trachea midline   CV:   RRR. No jugular venous distension. Lungs:   CTAB. No wheezing, rhonchi, or rales. Respirations even, unlabored  Abdomen: Bowel sounds present. Soft, nontender, nondistended. Extremities: No cyanosis or clubbing. No edema  Skin:     No rashes and normal coloration. Warm and dry. Neuro:  CN II-XII grossly intact. Sensation intact. A&Ox3  Psych:  Normal mood and affect.       I have reviewed ordered lab tests and independently visualized imaging below:    Recent Labs:  Recent Results (from the past 48 hour(s))   GLUCOSE, POC    Collection Time: 03/14/22  5:06 PM   Result Value Ref Range    Glucose (POC) 135 (H) 65 - 100 mg/dL    Performed by Tc    GLUCOSE, POC    Collection Time: 03/14/22  9:28 PM   Result Value Ref Range    Glucose (POC) 200 (H) 65 - 100 mg/dL    Performed by Kelsi    CBC WITH AUTOMATED DIFF    Collection Time: 03/15/22  6:37 AM   Result Value Ref Range    WBC 6.6 4.3 - 11.1 K/uL    RBC 4.51 4.23 - 5.6 M/uL    HGB 15.0 13.6 - 17.2 g/dL    HCT 42.9 41.1 - 50.3 %    MCV 95.1 79.6 - 97.8 FL    MCH 33.3 (H) 26.1 - 32.9 PG    MCHC 35.0 31.4 - 35.0 g/dL    RDW 12.1 11.9 - 14.6 %    PLATELET 182 673 - 148 K/uL    MPV 9.7 9.4 - 12.3 FL    ABSOLUTE NRBC 0.00 0.0 - 0.2 K/uL    DF AUTOMATED      NEUTROPHILS 57 43 - 78 %    LYMPHOCYTES 27 13 - 44 %    MONOCYTES 10 4.0 - 12.0 %    EOSINOPHILS 5 0.5 - 7.8 %    BASOPHILS 1 0.0 - 2.0 %    IMMATURE GRANULOCYTES 0 0.0 - 5.0 %    ABS. NEUTROPHILS 3.8 1.7 - 8.2 K/UL    ABS. LYMPHOCYTES 1.8 0.5 - 4.6 K/UL    ABS. MONOCYTES 0.7 0.1 - 1.3 K/UL    ABS. EOSINOPHILS 0.3 0.0 - 0.8 K/UL    ABS. BASOPHILS 0.1 0.0 - 0.2 K/UL    ABS. IMM.  GRANS. 0.0 0.0 - 0.5 K/UL   METABOLIC PANEL, BASIC    Collection Time: 03/15/22  6:37 AM   Result Value Ref Range    Sodium 137 136 - 145 mmol/L    Potassium 3.7 3.5 - 5.1 mmol/L    Chloride 105 98 - 107 mmol/L    CO2 27 21 - 32 mmol/L    Anion gap 5 (L) 7 - 16 mmol/L    Glucose 154 (H) 65 - 100 mg/dL    BUN 17 8 - 23 MG/DL    Creatinine 0.85 0.8 - 1.5 MG/DL    GFR est AA >60 >60 ml/min/1.73m2    GFR est non-AA >60 >60 ml/min/1.73m2    Calcium 10.0 8.3 - 10.4 MG/DL   MAGNESIUM    Collection Time: 03/15/22  6:37 AM   Result Value Ref Range    Magnesium 2.1 1.8 - 2.4 mg/dL   PHOSPHORUS    Collection Time: 03/15/22  6:37 AM   Result Value Ref Range    Phosphorus 3.7 2.3 - 3.7 MG/DL   HEPARIN XA UFH Collection Time: 03/15/22  6:37 AM   Result Value Ref Range    Heparin Xa UFH 0.39 0.3 - 0.7 IU/mL   GLUCOSE, POC    Collection Time: 03/15/22  7:20 AM   Result Value Ref Range    Glucose (POC) 180 (H) 65 - 100 mg/dL    Performed by EstuardoHolairazeferino    GLUCOSE, POC    Collection Time: 03/15/22 12:13 PM   Result Value Ref Range    Glucose (POC) 201 (H) 65 - 100 mg/dL    Performed by Rena    GLUCOSE, POC    Collection Time: 03/15/22  4:44 PM   Result Value Ref Range    Glucose (POC) 126 (H) 65 - 100 mg/dL    Performed by Romel Solano PPD TEST IN 24 HRS    Collection Time: 03/15/22  5:00 PM   Result Value Ref Range    PPD Negative Negative    mm Induration 0 0 - 5 mm   GLUCOSE, POC    Collection Time: 03/15/22  9:59 PM   Result Value Ref Range    Glucose (POC) 201 (H) 65 - 100 mg/dL    Performed by Wayne    CBC WITH AUTOMATED DIFF    Collection Time: 03/16/22  5:44 AM   Result Value Ref Range    WBC 5.5 4.3 - 11.1 K/uL    RBC 4.17 (L) 4.23 - 5.6 M/uL    HGB 13.8 13.6 - 17.2 g/dL    HCT 39.7 (L) 41.1 - 50.3 %    MCV 95.2 79.6 - 97.8 FL    MCH 33.1 (H) 26.1 - 32.9 PG    MCHC 34.8 31.4 - 35.0 g/dL    RDW 12.1 11.9 - 14.6 %    PLATELET 650 249 - 334 K/uL    MPV 9.5 9.4 - 12.3 FL    ABSOLUTE NRBC 0.00 0.0 - 0.2 K/uL    DF AUTOMATED      NEUTROPHILS 54 43 - 78 %    LYMPHOCYTES 32 13 - 44 %    MONOCYTES 9 4.0 - 12.0 %    EOSINOPHILS 5 0.5 - 7.8 %    BASOPHILS 1 0.0 - 2.0 %    IMMATURE GRANULOCYTES 0 0.0 - 5.0 %    ABS. NEUTROPHILS 3.0 1.7 - 8.2 K/UL    ABS. LYMPHOCYTES 1.8 0.5 - 4.6 K/UL    ABS. MONOCYTES 0.5 0.1 - 1.3 K/UL    ABS. EOSINOPHILS 0.3 0.0 - 0.8 K/UL    ABS. BASOPHILS 0.1 0.0 - 0.2 K/UL    ABS. IMM.  GRANS. 0.0 0.0 - 0.5 K/UL   HEPARIN XA UFH    Collection Time: 03/16/22  5:44 AM   Result Value Ref Range    Heparin Xa UFH 0.35 0.3 - 0.7 IU/mL   GLUCOSE, POC    Collection Time: 03/16/22  6:15 AM   Result Value Ref Range    Glucose (POC) 158 (H) 65 - 100 mg/dL    Performed by Brandon Hendrix, POC    Collection Time: 03/16/22 12:10 PM   Result Value Ref Range    Glucose (POC) 170 (H) 65 - 100 mg/dL    Performed by Esther        All Micro Results     Procedure Component Value Units Date/Time    CULTURE, BLOOD [[id number]] Collected: 03/11/22 1947    Order Status: Completed Specimen: Blood Updated: 03/16/22 0749     Special Requests: --        LEFT  Antecubital       Culture result: NO GROWTH 5 DAYS       CULTURE, BLOOD [[id number]] Collected: 03/11/22 1953    Order Status: Completed Specimen: Blood Updated: 03/16/22 0749     Special Requests: --        NO SPECIAL REQUESTS  RIGHT  FOREARM       Culture result: NO GROWTH 5 DAYS       SARS-COV-2, PCR [[id number]] Collected: 03/14/22 1550    Order Status: Completed Specimen: Nasopharyngeal Updated: 03/15/22 0802     Specimen source Nasopharyngeal        SARS-CoV-2 by PCR Not detected        Comment:      The specimen is NEGATIVE for SARS-CoV-2, the novel coronavirus associated with COVID-19. This test has been authorized by the FDA under an Emergency Use Authorization (EUA) for use by authorized laboratories.         Fact sheet for Healthcare Providers: ConventionUpdate.co.nz       Fact sheet for Patients: ConventionUpdate.co.nz       Methodology: RT-PCR         CULTURE, URINE [[id number]] Collected: 03/11/22 1858    Order Status: Completed Specimen: Urine from Clean catch Updated: 03/14/22 0718     Special Requests: NO SPECIAL REQUESTS        Culture result:       <10,000 COLONIES/mL MIXED SKIN DANE ISOLATED          RESPIRATORY VIRUS PANEL W/COVID-19, PCR [[id number]] Collected: 03/11/22 1847    Order Status: Completed Specimen: Nasopharyngeal Updated: 03/11/22 2209     Adenovirus NOT DETECTED        Coronavirus 229E NOT DETECTED        Coronavirus HKU1 NOT DETECTED        Coronavirus CVNL63 NOT DETECTED        Coronavirus OC43 NOT DETECTED SARS-CoV-2, PCR NOT DETECTED        Metapneumovirus NOT DETECTED        Rhinovirus and Enterovirus NOT DETECTED        Influenza A NOT DETECTED        Influenza B NOT DETECTED        Parainfluenza 1 NOT DETECTED        Parainfluenza 2 NOT DETECTED        Parainfluenza 3 NOT DETECTED        Parainfluenza virus 4 NOT DETECTED        RSV by PCR NOT DETECTED        B. parapertussis, PCR NOT DETECTED        Bordetella pertussis - PCR NOT DETECTED        Chlamydophila pneumoniae DNA, QL, PCR NOT DETECTED        Mycoplasma pneumoniae DNA, QL, PCR NOT DETECTED       COVID-19 RAPID TEST [[id number]] Collected: 03/11/22 1550    Order Status: Completed Specimen: Nasopharyngeal Updated: 03/11/22 1622     Specimen source NASAL SWAB        COVID-19 rapid test Not detected        Comment:      The specimen is NEGATIVE for SARS-CoV-2, the novel coronavirus associated with COVID-19. A negative result does not rule out COVID-19. This test has been authorized by the FDA under an Emergency Use Authorization (EUA) for use by authorized laboratories. Fact sheet for Healthcare Providers: ConventionUpdate.co.nz  Fact sheet for Patients: ConventionUpdate.co.nz       Methodology: Isothermal Nucleic Acid Amplification               Other Studies:  No results found.     Current Meds:  Current Facility-Administered Medications   Medication Dose Route Frequency    apixaban (ELIQUIS) tablet 10 mg  10 mg Oral Q12H    aspirin chewable tablet 81 mg  81 mg Oral DAILY    atorvastatin (LIPITOR) tablet 80 mg  80 mg Oral QHS    zinc oxide-cod liver oil (DESITIN) 40 % paste   Topical PRN    sodium chloride (NS) flush 5-40 mL  5-40 mL IntraVENous Q8H    sodium chloride (NS) flush 5-40 mL  5-40 mL IntraVENous PRN    acetaminophen (TYLENOL) tablet 650 mg  650 mg Oral Q6H PRN    Or    acetaminophen (TYLENOL) suppository 650 mg  650 mg Rectal Q6H PRN    polyethylene glycol (MIRALAX) packet 17 g  17 g Oral DAILY PRN    ondansetron (ZOFRAN ODT) tablet 4 mg  4 mg Oral Q8H PRN    Or    ondansetron (ZOFRAN) injection 4 mg  4 mg IntraVENous Q6H PRN    insulin lispro (HUMALOG) injection   SubCUTAneous AC&HS    ezetimibe (ZETIA) tablet 10 mg  10 mg Oral QHS    fenofibrate (LOFIBRA) tablet 160 mg  160 mg Oral QHS    linaCLOtide (LINZESS) capsule 145 mcg (Patient Supplied)  145 mcg Oral DAILY    oxybutynin chloride XL (DITROPAN XL) tablet 10 mg  10 mg Oral DAILY    senna-docusate (PERICOLACE) 8.6-50 mg per tablet 2 Tablet  2 Tablet Oral DAILY       Signed: Minor Hernandez DO    Part of this note may have been written by using a voice dictation software. The note has been proof read but may still contain some grammatical/other typographical errors.

## 2022-03-17 VITALS
BODY MASS INDEX: 25.18 KG/M2 | DIASTOLIC BLOOD PRESSURE: 72 MMHG | OXYGEN SATURATION: 91 % | HEIGHT: 72 IN | HEART RATE: 80 BPM | TEMPERATURE: 97.4 F | RESPIRATION RATE: 16 BRPM | WEIGHT: 185.9 LBS | SYSTOLIC BLOOD PRESSURE: 116 MMHG

## 2022-03-17 LAB
GLUCOSE BLD STRIP.AUTO-MCNC: 145 MG/DL (ref 65–100)
GLUCOSE BLD STRIP.AUTO-MCNC: 179 MG/DL (ref 65–100)
SERVICE CMNT-IMP: ABNORMAL
SERVICE CMNT-IMP: ABNORMAL

## 2022-03-17 PROCEDURE — 82962 GLUCOSE BLOOD TEST: CPT

## 2022-03-17 PROCEDURE — 74011250637 HC RX REV CODE- 250/637: Performed by: INTERNAL MEDICINE

## 2022-03-17 PROCEDURE — 74011000250 HC RX REV CODE- 250: Performed by: NURSE PRACTITIONER

## 2022-03-17 PROCEDURE — 74011250637 HC RX REV CODE- 250/637: Performed by: NURSE PRACTITIONER

## 2022-03-17 RX ORDER — ATORVASTATIN CALCIUM 80 MG/1
80 TABLET, FILM COATED ORAL
Qty: 30 TABLET | Refills: 0 | Status: SHIPPED
Start: 2022-03-17 | End: 2022-04-16

## 2022-03-17 RX ORDER — GUAIFENESIN 100 MG/5ML
81 LIQUID (ML) ORAL DAILY
Qty: 30 TABLET | Refills: 0 | Status: SHIPPED
Start: 2022-03-18 | End: 2022-04-17

## 2022-03-17 RX ADMIN — SODIUM CHLORIDE, PRESERVATIVE FREE 10 ML: 5 INJECTION INTRAVENOUS at 05:33

## 2022-03-17 RX ADMIN — DOCUSATE SODIUM 50MG AND SENNOSIDES 8.6MG 2 TABLET: 8.6; 5 TABLET, FILM COATED ORAL at 09:03

## 2022-03-17 RX ADMIN — ASPIRIN 81 MG: 81 TABLET, CHEWABLE ORAL at 09:02

## 2022-03-17 RX ADMIN — OXYBUTYNIN CHLORIDE 10 MG: 10 TABLET, EXTENDED RELEASE ORAL at 09:02

## 2022-03-17 NOTE — PROGRESS NOTES
Outreach Follow Up Note          MEWS Score: 1 (03/17/22 0059)  Vitals:    03/16/22 1950 03/16/22 2311 03/17/22 0258 03/17/22 0723   BP: 101/66  106/74 106/83   Pulse: 96 76 74 70   Resp: 16 16 16 16   Temp: 98.2 °F (36.8 °C) 98 °F (36.7 °C) 97.9 °F (36.6 °C) 98 °F (36.7 °C)   SpO2: 92% 96% 92% 94%   Weight:       Height:             Pain Assessment  Pain Intensity 1: 0 (03/17/22 0800)  Pain Location 1: Generalized     Patient Stated Pain Goal: 0      Patient reviewed and discussed with primary nurse. There have been no significant clinical changes since the completion of the last dated Outreach assessment. Will continue to follow up per outreach protocol.     Signed By:   Hawa Perera RN    March 17, 2022 11:29 AM

## 2022-03-17 NOTE — DISCHARGE SUMMARY
Hospitalist Discharge Summary     Patient ID:  Gil Milner  [id number]  8 y.o.  1950  Admit date: 3/11/2022  1:47 PM  Discharge date and time: 3/17/2022  Attending: Lee Lindsay DO  PCP:  Christopher Priest MD  Treatment Team: Attending Provider: Lee Lindsay DO; : Osmel Elizondo; Utilization Review: Abe Lacy RN; Consulting Provider: Vangie Tsai MD; Consulting Provider: Victor M Caicedo MD; Hospitalist: Lee Lindsay DO; Physical Therapist: Herrera Martinez PT; Occupational Therapist: Kyler Rosales OT; Utilization Review: Nik Garcia RN; Care Manager: Saran Hardy LMSW    Principal Diagnosis UTI (urinary tract infection)   Principal Problem:    UTI (urinary tract infection) (5/11/2020)    Active Problems:    CVA, old, hemiparesis ([facility] (5/24/2012)      Aneurysm artery, celiac ([facility] (8/6/2014)      Mixed hyperlipidemia ()      Diabetes mellitus with complication ([facility] (4/91/8573)      Weakness (3/11/2022)      Benign essential HTN (3/11/2022)      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 o. male with medical history of supraclinoid aneurysm, old CVA with left side weakness, HTN, HLD, DM who presented with frequent falls, weakness that has progressively worsened over the past few months. He lives alone, does have a sitter 8 hours/day, his daughter lives out of state and essentially not involved. EMS was called related to inability to ambulate at all today. Found in feces and urine.  Patient knows he is at the hospital knows his name.  He is unsure of the date or day or year. Enrrique Blunt usually gets around with a Rollator.   Also history of stroke with some residual left-sided weakness.    Urine in ED showed 10-20 WBC, he has poor hygiene. T max 99. Procal 0.05, no leukocytosis. CK ordered and pending.  BC x2 and urine culture ordered. CT head shows chronic findings of right side lacunar infarct and supraclinoid artery. H/O AAA and iliac aneurysm that he is followed by Dr. Marcio Lr. His CTA showed basilar thrombosis and started on Heparin drip. Neurologist was consulted and transferred to ICU for close monitoring and neurochecks     Interval History (3/17): patient seen and examined at bedside. No acute overnight events. No new or concerning symptoms at this time. Denies fevers/chills, hcest pain, shortness of breath, abdominal pain, nausea/vomiting or diarrhea. Denies headache, blurry vision, difficulty with speech/swallowing or weakness/numbness in arms or legs. Hospital Course:  Please refer to the admission H&P for details of presentation. In summary, the patient is admitted for new basilar artery thrombosis seen on imaging. Evaluated by neurology with recommendations for starting systemic anticoagulation. His case was also reviewed by [facility] neurointerventional team as well as neurosurgery. He was also evaluated for acute metabolic encephalopathy and started on empiric antibiotics with unremarkable sepsis workup. He was switched to Eliquis. He has had new new focal findings and no further hospital events. He has improved clinically. He is currently hemodynamically stable for hospital discharge. He has not had overt signs of active bleeding. Details of hospitalization have been discussed with patient at bedside who understands and agrees with plan of care. Return precautions provided. All questions answered.      Significant Diagnostic Studies:     CT head without contrast     History: Worsening weakness.     Technique: 5mm axial images were obtained from the skull base to the vertex  without intravenous contrast.  Radiation dose reduction techniques were used for  this study:  Our CT scanners use one or all of the following: Automated exposure  control, adjustment of the mA and/or kVp according to patient's size, iterative  reconstruction. Sagittal and coronal reformatted images were submitted.     Comparison: 04/29/2019. 09/07/2010.     Findings: Prominent extra-axial CSF density collection along the left  frontotemporal region has not significantly changed dating back to prior remote  imaging in 2010. There are associated dystrophic calcifications. There is a  large left supraclinoid aneurysm measuring up to 2.2 cm, unchanged. There is  approximately 5 mm of left right midline shift. Ventricles an sulci are  prominent but stable. There are no acute extra-axial fluid collections. Patchy  areas of decreased attenuation are present within the supratentorial white  matter. These are nonspecific findings but would be most compatible with mild   chronic small vessel ischemic change. There is a remote right basal ganglia  lacunar infarction. No evidence of acute intraparenchymal hemorrhage is present. There is no evidence to suggest an acute major territorial infarct.     IMPRESSION  Stable chronic findings including left supraclinoid aneurysm. Title:  CT arteriogram of the neck and head.       Indication: Cerebral aneurysm.     Technique: Axial images of the neck and head were obtained after the uneventful   administration of intravenous iodinated contrast media. Contrast was used to  best identify the arterial structures. Images were reviewed on a separate, free  standing, three-dimensional workstation as per the referring physicians request.        All stenosis percentages derived by comparing the narrowest segment with the  distal Internal Carotid Artery luminal diameter, as described in the Lisa  American Symptomatic Carotid Endarterectomy Trial (NASCET) criteria.      All CT scans at this facility are performed using dose reduction/dose modulation  techniques, as appropriate the performed exam, including the following:   Automated Exposure Control;  Adjustment of the mA and/or kV according to patient  size (this includes techniques or standardized protocols for targeted exams  where dose is matched to indication/reason for exam); and Use of Iterative  Reconstruction Technique.        Comparison: CTA 2/8/2011.     Findings:      Lungs:  No focal consolidation or pleural effusions. No suspicious pulmonary  nodules. .    Bones:  No osseous destruction. .    Paranasal sinuses:  Paranasal sinuses are clear. .    Brain:  No midline shift. No enhancing mass lesion or hydrocephalus. .  Encephalomalacia in the left MCA territory, unchanged. Chronic low-density  extra-axial fluid collection overlying the left temporal and frontal bones  measuring approximately 9.6 x 4.9 cm in diameter. This results in approximately  7 mm rightward midline shift. Soft tissues:  Within normal limits. .       Dural venous sinuses:  Patent.      Aortic arch:  Non-aneurysmal. Arch vessels are patent. Moderate to advanced  generalized atherosclerotic change. Generalized arteriomegaly, similar to the  prior exam.     ANTERIOR CIRCULATION     Right common carotid artery:  No significant stenosis or occlusion. Right internal carotid artery:  No significant stenosis or occlusion.     Right middle cerebral artery:  No significant stenosis, occlusion, or aneurysm. Right anterior cerebral artery:  No significant stenosis, occlusion, or  aneurysm.     Left common carotid artery: No significant stenosis or occlusion. Left internal carotid artery:  No significant stenosis or occlusion. Moderate to  advanced atherosclerotic atherosclerotic changes of the left carotid bulb. Advanced tortuosity is present. The proximal left internal carotid artery there  is atherosclerotic ulcer measuring 4 mm (series 3, image 307. Left supine on ICA  saccular aneurysm measuring 1.8 x 2.1 cm in diameter. Previously this measured  1.9 x 1.8 cm in diameter.  Peripheral calcifications are present along the  aneurysm sac.     Left middle cerebral artery:  Chronic occlusion of the left MCA.  Left anterior cerebral artery:  No significant stenosis, occlusion, or aneurysm.     Anterior communicating artery: No significant stenosis, occlusion, or aneurysm.     POSTERIOR CIRCULATION     Right vertebral artery:  Aneurysmal enlargement of the proximal right vertebral  artery slightly distal to its origin. This measures 2.2 x 2.0 cm in diameter  (previously 1.7 x 1.7 cm in 2011). There is associated high-grade stenosis. There appears to be chronic mural thrombus with the patent portion only  measuring 9 mm. Left vertebral artery:  No significant stenosis or occlusion. Basilar artery:  No significant stenosis. Thin linear filling defect in the  proximal to mid basilar artery, new compared to the prior exam in 2011.     Right posterior communicating artery: No significant stenosis, occlusion, or  aneurysm. Left posterior communicating artery:  No significant stenosis, occlusion, or  aneurysm.     Right posterior cerebral artery: There appears to be occlusion at the expected  origin of the left PCA where it arises from the basilar artery; however the,  this appears unchanged. Fetal type left PCA. Left posterior cerebral artery:  No significant stenosis, occlusion, or  aneurysm.     IMPRESSION  1. Thin linear filling defect in the proximal to mid basilar artery, new  compared to the prior exam in 2011. This finding is suspicious for thrombus. 2.  Chronic left suprasellar ICA aneurysm measuring 2.1 x 1.8 cm in diameter,  similar to the prior exam in 2011. 3.  Chronic occlusion of the left MCA, unchanged. 4.  Aneurysmal enlargement of the proximal right vertebral artery slightly  distal to its origin. This measures 2.2 x 2.0 cm in diameter (previously 1.7 x  1.7 cm in 2011). There is associated high-grade stenosis. 5.  Chronic low-density extra-axial fluid collection overlying the left temporal  and frontal bones measuring approximately 9.6 x 4.9 cm in diameter.  This results  in approximately 7 mm rightward midline shift. Unchanged from 2011.  6.  Generalized arteriomegaly and advanced atherosclerotic changes.     Findings were called to Dr. Ml Jose by Dr. Sandhya Thompson on 3-12-22 at 1844 hours via  telephone. Labs: Results:       Chemistry Recent Labs     03/15/22  0637   *      K 3.7      CO2 27   BUN 17   CREA 0.85   CA 10.0   AGAP 5*      CBC w/Diff Recent Labs     03/16/22  0544 03/15/22  0637   WBC 5.5 6.6   RBC 4.17* 4.51   HGB 13.8 15.0   HCT 39.7* 42.9    190   GRANS 54 57   LYMPH 32 27   EOS 5 5      Cardiac Enzymes No results for input(s): CPK, CKND1, AYSHA in the last 72 hours. No lab exists for component: CKRMB, TROIP   Coagulation No results for input(s): PTP, INR, APTT, INREXT in the last 72 hours. Lipid Panel Lab Results   Component Value Date/Time    Cholesterol, total 124 09/01/2017 10:17 AM    HDL Cholesterol 32 (L) 09/01/2017 10:17 AM    LDL, calculated 64 09/01/2017 10:17 AM    VLDL, calculated 28 09/01/2017 10:17 AM    Triglyceride 139 09/01/2017 10:17 AM      BNP No results for input(s): BNPP in the last 72 hours. Liver Enzymes No results for input(s): TP, ALB, TBIL, AP in the last 72 hours. No lab exists for component: SGOT, GPT, DBIL   Thyroid Studies Lab Results   Component Value Date/Time    TSH 2.340 09/01/2017 10:17 AM            Discharge Exam:  Visit Vitals  /83 (BP 1 Location: Right upper arm, BP Patient Position: Supine)   Pulse 70   Temp 98 °F (36.7 °C)   Resp 16   Ht 6' (1.829 m)   Wt 84.3 kg (185 lb 14.4 oz)   SpO2 94%   BMI 25.21 kg/m²     General appearance: alert, cooperative, no distress, appears stated age  Lungs: clear to auscultation bilaterally. Nonlabored. No wheezing. Heart: regular rate and rhythm, S1, S2 normal  Abdomen: soft, non-tender.  Bowel sounds normal. No masses,  no organomegaly  Extremities: no cyanosis or edema  Neurologic: Grossly normal    Disposition: long term care facility  Discharge Condition: stable  Patient Instructions:   Current Discharge Medication List      START taking these medications    Details   aspirin 81 mg chewable tablet Take 1 Tablet by mouth daily for 30 days. Qty: 30 Tablet, Refills: 0  Start date: 3/18/2022, End date: 4/17/2022      apixaban (ELIQUIS) 5 mg tablet 10 mg po q12h x5 days then 5 mg q12h po BID  Qty: 30 Tablet, Refills: 0  Start date: 3/17/2022         CONTINUE these medications which have CHANGED    Details   atorvastatin (LIPITOR) 80 mg tablet Take 1 Tablet by mouth nightly for 30 days. Qty: 30 Tablet, Refills: 0  Start date: 3/17/2022, End date: 4/16/2022         CONTINUE these medications which have NOT CHANGED    Details   senna-docusate (PERICOLACE) 8.6-50 mg per tablet Take 2 Tabs by mouth daily. Qty: 30 Tab, Refills: 1      oxybutynin chloride XL (DITROPAN XL) 10 mg CR tablet Take 10 mg by mouth daily. dulaglutide (TRULICITY) 1.89 FR/0.8 mL sub-q pen 0.5 mL by SubCUTAneous route every seven (7) days. Qty: 4 Syringe, Refills: 3    Associated Diagnoses: Diabetes mellitus with complication ([address]      metFORMIN ER (GLUCOPHAGE XR) 500 mg tablet Take 2 Tabs by mouth two (2) times a day. Qty: 120 Tab, Refills: 3    Associated Diagnoses: Diabetes mellitus with complication (HCC)      Blood-Glucose Meter monitoring kit Use as directed  DX: E11.8  Qty: 1 Kit, Refills: 0      glucose blood VI test strips (BLOOD GLUCOSE TEST) strip Test twice daily  DX: E11.8  Qty: 100 Strip, Refills: 11      Lancets misc Use twice daily   DX: E11.8  Qty: 100 Each, Refills: 11      mirabegron ER (MYRBETRIQ) 50 mg ER tablet Take 1 Tab by mouth daily. Qty: 30 Tab, Refills: 3    Associated Diagnoses: Urinary incontinence, unspecified type      ezetimibe (ZETIA) 10 mg tablet Take 1 Tab by mouth nightly. Qty: 90 Tab, Refills: 1    Associated Diagnoses: Mixed hyperlipidemia      L-Mfolate-B6 Phos-Methyl-B12 (METANX) 3-35-2 mg tab tab Take 1 Tab by mouth daily.  Indications: VITAMIN DEFICIENCY PREVENTION  Qty: 90 Tab, Refills: 1    Associated Diagnoses: Mixed hyperlipidemia      linaclotide (LINZESS) 145 mcg cap capsule Take 1 Cap by mouth Daily (before breakfast). Qty: 90 Cap, Refills: 1    Associated Diagnoses: Other constipation      ergocalciferol (VITAMIN D2) 50,000 unit capsule Take 50,000 Units by mouth every seven (7) days. fenofibric acid (TRILIPIX) 135 mg capsule Take 135 mg by mouth nightly. STOP taking these medications       cyclobenzaprine (FLEXERIL) 10 mg tablet Comments:   Reason for Stopping:         ibuprofen (MOTRIN) 600 mg tablet Comments:   Reason for Stopping:         aspirin (ASPIRIN) 325 mg tablet Comments:   Reason for Stopping:               Activity: Activity as tolerated  Diet: Resume previous diet  Wound Care: None needed    Follow-up  ·   With PCP within 1 week. With neurology in 1-2 weeks. Time spent to discharge patient 35 minutes  Signed:   Mimi Yancey DO  3/17/2022  11:32 AM

## 2022-03-17 NOTE — PROGRESS NOTES
Care Management Interventions  PCP Verified by CM: Yes  Mode of Transport at Discharge: BLS  Transition of Care Consult (CM Consult): SNF  Partner SNF: Yes  Physical Therapy Consult: Yes  Occupational Therapy Consult: Yes  Support Systems: Caregiver/Home Care Staff,Friend/Neighbor  Confirm Follow Up Transport: Family  The Plan for Transition of Care is Related to the Following Treatment Goals : STR  The Patient and/or Patient Representative was Provided with a Choice of Provider and Agrees with the Discharge Plan?: Yes  Name of the Patient Representative Who was Provided with a Choice of Provider and Agrees with the Discharge Plan: Carlin Brambila of Choice List was Provided with Basic Dialogue that Supports the Patient's Individualized Plan of Care/Goals, Treatment Preferences and Shares the Quality Data Associated with the Providers?: Yes  Discharge Location  Patient Expects to be Discharged to[de-identified] Skilled nursing facility    Joel spoke with Zakia rios am about bed avail for pt. Bed is avail today. 220B. Joel called pt's friend Suze Yi to inform. We talked about him bringing pt some clothes, toiletries as well as assisting in signing pt into rehab. We discussed how long pt may stay and his need for additional care at home. Joel talked with pt to complete PASARR and inform he was going to rehab today. Pt seemed to understand and was in agreement with the plan. Packet being completed. Rn called report and ambulance was called.   time 2pm.   Mirta Lambert/ASIA

## 2022-03-18 PROBLEM — N39.0 UTI (URINARY TRACT INFECTION): Status: ACTIVE | Noted: 2020-05-11

## 2022-03-18 PROBLEM — A41.9 SEPSIS (HCC): Status: ACTIVE | Noted: 2020-05-11

## 2022-03-19 PROBLEM — R32 URINARY INCONTINENCE: Status: ACTIVE | Noted: 2017-11-13

## 2022-03-19 PROBLEM — R26.81 UNSTEADY GAIT: Status: ACTIVE | Noted: 2017-12-13

## 2022-03-19 PROBLEM — R53.1 WEAKNESS: Status: ACTIVE | Noted: 2022-03-11
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
## 2022-03-19 PROBLEM — I10 BENIGN ESSENTIAL HTN: Status: ACTIVE | Noted: 2022-03-11

## 2022-03-19 PROBLEM — G47.00 INSOMNIA: Status: ACTIVE | Noted: 2017-10-18

## 2022-03-20 PROBLEM — R29.898 WEAKNESS OF LEFT UPPER EXTREMITY: Status: ACTIVE | Noted: 2017-04-13

## 2022-03-24 PROBLEM — G93.41 ACUTE METABOLIC ENCEPHALOPATHY: Status: ACTIVE | Noted: 2022-03-15

## 2022-03-24 PROBLEM — N30.90 CYSTITIS: Status: ACTIVE | Noted: 2022-03-15

## 2022-03-24 PROBLEM — I63.02 STROKE DUE TO THROMBOSIS OF BASILAR ARTERY (HCC): Status: ACTIVE | Noted: 2022-03-15

## 2022-04-05 NOTE — PROGRESS NOTES
Physician Progress Note      PATIENT:               Martin Melendrez  CSN #:                  [id number]  :                       1950  ADMIT DATE:       3/11/2022 1:47 PM  [address] [place] [facility] [place] DATE:        3/17/2022 2:35 PM  RESPONDING  PROVIDER #:        RAÚL HAYWARD DO          QUERY TEXT:    Patient admitted with weakness and AMS. In order to support the diagnosis of acute CVA, please include additional clinical indicators in your documentation. Or please document if the diagnosis of acute CVA has been ruled out after further study. The medical record reflects the following:  Risk Factors: medical history of supraclinoid aneurysm, old CVA with left side weakness, HTN, HLD, DM  Clinical Indicators:  Discharge summary states \"stroke due to thrombus of basilar artery\" in the problem list and \"new basilar artery thrombosis\" in  hospital course (no mention of stroke/CVA/infarct)  Treatment: reviewed by [facility] neurointerventional team as well as neurosurgery, CT head without contrast  Options provided:  -- Acute CVA was involved with the new basilar artery thrombosis as evidenced by, Please document evidence. -- Acute CVA was ruled out and only new basilar artery thrombosis  -- Other - I will add my own diagnosis  -- Disagree - Not applicable / Not valid  -- Disagree - Clinically unable to determine / Unknown  -- Refer to Clinical Documentation Reviewer    PROVIDER RESPONSE TEXT:    Acute CVA was involved with the new basilar artery thrombosis as evidenced by thombosis seen on CTA imaging with acute encephalopathy and weakness upon admission.     Query created by: Ambrose Gatica on 2022 8:22 AM      Electronically signed by:  Nico Tamayo DO 2022 11:28 AM

## 2022-04-10 PROBLEM — N39.0 UTI (URINARY TRACT INFECTION): Status: RESOLVED | Noted: 2020-05-11 | Resolved: 2022-04-10

## 2022-04-15 ENCOUNTER — PATIENT OUTREACH (OUTPATIENT)
Dept: CASE MANAGEMENT | Age: 72
End: 2022-04-15

## 2022-04-15 NOTE — PROGRESS NOTES
No transition of care outreach indicated due to patient discharge to a [address] [place] [address].

## 2023-04-02 ENCOUNTER — APPOINTMENT (OUTPATIENT)
Dept: CT IMAGING | Age: 73
DRG: 690 | End: 2023-04-02
Payer: MEDICARE

## 2023-04-02 ENCOUNTER — HOSPITAL ENCOUNTER (INPATIENT)
Age: 73
LOS: 1 days | Discharge: OTHER INSTITUTION | DRG: 690 | End: 2023-04-03
Attending: EMERGENCY MEDICINE | Admitting: INTERNAL MEDICINE
Payer: MEDICARE

## 2023-04-02 ENCOUNTER — APPOINTMENT (OUTPATIENT)
Dept: GENERAL RADIOLOGY | Age: 73
DRG: 690 | End: 2023-04-02
Payer: MEDICARE

## 2023-04-02 DIAGNOSIS — T83.9XXA DIFFICULT FOLEY CATHETER PLACEMENT (HCC): ICD-10-CM

## 2023-04-02 DIAGNOSIS — R41.82 ALTERED MENTAL STATUS, UNSPECIFIED ALTERED MENTAL STATUS TYPE: Primary | ICD-10-CM

## 2023-04-02 PROBLEM — I10 BENIGN ESSENTIAL HTN: Status: ACTIVE | Noted: 2022-03-11

## 2023-04-02 LAB
ALBUMIN SERPL-MCNC: 3.7 G/DL (ref 3.2–4.6)
ALBUMIN/GLOB SERPL: 1.1 (ref 0.4–1.6)
ALP SERPL-CCNC: 42 U/L (ref 50–136)
ALT SERPL-CCNC: 24 U/L (ref 12–65)
AMMONIA PLAS-SCNC: <10 UMOL/L (ref 24.9–68)
AMPHET UR QL SCN: NEGATIVE
ANION GAP SERPL CALC-SCNC: 5 MMOL/L (ref 2–11)
APPEARANCE UR: ABNORMAL
AST SERPL-CCNC: 24 U/L (ref 15–37)
BACTERIA URNS QL MICRO: NEGATIVE /HPF
BARBITURATES UR QL SCN: NEGATIVE
BASOPHILS # BLD: 0 K/UL (ref 0–0.2)
BASOPHILS NFR BLD: 0 % (ref 0–2)
BENZODIAZ UR QL: NEGATIVE
BILIRUB SERPL-MCNC: 0.8 MG/DL (ref 0.2–1.1)
BILIRUB UR QL: NEGATIVE
BUN SERPL-MCNC: 19 MG/DL (ref 8–23)
CALCIUM SERPL-MCNC: 9.6 MG/DL (ref 8.3–10.4)
CANNABINOIDS UR QL SCN: NEGATIVE
CASTS URNS QL MICRO: ABNORMAL /LPF
CHLORIDE SERPL-SCNC: 108 MMOL/L (ref 101–110)
CO2 SERPL-SCNC: 25 MMOL/L (ref 21–32)
COCAINE UR QL SCN: NEGATIVE
COLOR UR: ABNORMAL
CREAT SERPL-MCNC: 0.93 MG/DL (ref 0.8–1.5)
CRP SERPL-MCNC: 4.5 MG/DL (ref 0–0.9)
DIFFERENTIAL METHOD BLD: ABNORMAL
EKG ATRIAL RATE: 84 BPM
EKG DIAGNOSIS: NORMAL
EKG P AXIS: 34 DEGREES
EKG P-R INTERVAL: 176 MS
EKG Q-T INTERVAL: 401 MS
EKG QRS DURATION: 101 MS
EKG QTC CALCULATION (BAZETT): 474 MS
EKG R AXIS: 22 DEGREES
EKG T AXIS: 20 DEGREES
EKG VENTRICULAR RATE: 84 BPM
EOSINOPHIL # BLD: 0.1 K/UL (ref 0–0.8)
EOSINOPHIL NFR BLD: 1 % (ref 0.5–7.8)
EPI CELLS #/AREA URNS HPF: ABNORMAL /HPF
ERYTHROCYTE [DISTWIDTH] IN BLOOD BY AUTOMATED COUNT: 12.6 % (ref 11.9–14.6)
ETHANOL SERPL-MCNC: <3 MG/DL (ref 0–0.08)
FLUAV RNA SPEC QL NAA+PROBE: NOT DETECTED
FLUBV RNA SPEC QL NAA+PROBE: NOT DETECTED
GLOBULIN SER CALC-MCNC: 3.3 G/DL (ref 2.8–4.5)
GLUCOSE BLD STRIP.AUTO-MCNC: 118 MG/DL (ref 65–100)
GLUCOSE BLD STRIP.AUTO-MCNC: 139 MG/DL (ref 65–100)
GLUCOSE SERPL-MCNC: 148 MG/DL (ref 65–100)
GLUCOSE UR STRIP.AUTO-MCNC: NEGATIVE MG/DL
HCT VFR BLD AUTO: 45.2 % (ref 41.1–50.3)
HGB BLD-MCNC: 15.5 G/DL (ref 13.6–17.2)
HGB UR QL STRIP: ABNORMAL
IMM GRANULOCYTES # BLD AUTO: 0 K/UL (ref 0–0.5)
IMM GRANULOCYTES NFR BLD AUTO: 1 % (ref 0–5)
KETONES UR QL STRIP.AUTO: NEGATIVE MG/DL
LACTATE SERPL-SCNC: 1.5 MMOL/L (ref 0.4–2)
LEUKOCYTE ESTERASE UR QL STRIP.AUTO: ABNORMAL
LYMPHOCYTES # BLD: 0.5 K/UL (ref 0.5–4.6)
LYMPHOCYTES NFR BLD: 10 % (ref 13–44)
MAGNESIUM SERPL-MCNC: 1.7 MG/DL (ref 1.8–2.4)
MCH RBC QN AUTO: 31.9 PG (ref 26.1–32.9)
MCHC RBC AUTO-ENTMCNC: 34.3 G/DL (ref 31.4–35)
MCV RBC AUTO: 93 FL (ref 82–102)
METHADONE UR QL: NEGATIVE
MONOCYTES # BLD: 0.4 K/UL (ref 0.1–1.3)
MONOCYTES NFR BLD: 8 % (ref 4–12)
MUCOUS THREADS URNS QL MICRO: 0 /LPF
NEUTS SEG # BLD: 4 K/UL (ref 1.7–8.2)
NEUTS SEG NFR BLD: 80 % (ref 43–78)
NITRITE UR QL STRIP.AUTO: NEGATIVE
NRBC # BLD: 0 K/UL (ref 0–0.2)
OPIATES UR QL: NEGATIVE
PCP UR QL: NEGATIVE
PH UR STRIP: 8.5 (ref 5–9)
PLATELET # BLD AUTO: 215 K/UL (ref 150–450)
PMV BLD AUTO: 9.2 FL (ref 9.4–12.3)
POTASSIUM SERPL-SCNC: 3.8 MMOL/L (ref 3.5–5.1)
PROCALCITONIN SERPL-MCNC: <0.05 NG/ML (ref 0–0.49)
PROT SERPL-MCNC: 7 G/DL (ref 6.3–8.2)
PROT UR STRIP-MCNC: ABNORMAL MG/DL
RBC # BLD AUTO: 4.86 M/UL (ref 4.23–5.6)
RBC #/AREA URNS HPF: >100 /HPF
SARS-COV-2 RDRP RESP QL NAA+PROBE: NOT DETECTED
SERVICE CMNT-IMP: ABNORMAL
SERVICE CMNT-IMP: ABNORMAL
SODIUM SERPL-SCNC: 138 MMOL/L (ref 133–143)
SOURCE: NORMAL
SP GR UR REFRACTOMETRY: 1.02 (ref 1–1.02)
TSH W FREE THYROID IF ABNORMAL: 0.54 UIU/ML (ref 0.36–3.74)
URINE CULTURE IF INDICATED: ABNORMAL
UROBILINOGEN UR QL STRIP.AUTO: 1 EU/DL (ref 0.2–1)
WBC # BLD AUTO: 5.1 K/UL (ref 4.3–11.1)
WBC URNS QL MICRO: ABNORMAL /HPF

## 2023-04-02 PROCEDURE — 51798 US URINE CAPACITY MEASURE: CPT

## 2023-04-02 PROCEDURE — 71045 X-RAY EXAM CHEST 1 VIEW: CPT

## 2023-04-02 PROCEDURE — 84443 ASSAY THYROID STIM HORMONE: CPT

## 2023-04-02 PROCEDURE — 6360000002 HC RX W HCPCS: Performed by: NURSE PRACTITIONER

## 2023-04-02 PROCEDURE — 99285 EMERGENCY DEPT VISIT HI MDM: CPT

## 2023-04-02 PROCEDURE — 70450 CT HEAD/BRAIN W/O DYE: CPT

## 2023-04-02 PROCEDURE — 80307 DRUG TEST PRSMV CHEM ANLYZR: CPT

## 2023-04-02 PROCEDURE — 82962 GLUCOSE BLOOD TEST: CPT

## 2023-04-02 PROCEDURE — 83735 ASSAY OF MAGNESIUM: CPT

## 2023-04-02 PROCEDURE — 2500000003 HC RX 250 WO HCPCS: Performed by: NURSE PRACTITIONER

## 2023-04-02 PROCEDURE — 2580000003 HC RX 258: Performed by: NURSE PRACTITIONER

## 2023-04-02 PROCEDURE — 83605 ASSAY OF LACTIC ACID: CPT

## 2023-04-02 PROCEDURE — 1100000000 HC RM PRIVATE

## 2023-04-02 PROCEDURE — 80053 COMPREHEN METABOLIC PANEL: CPT

## 2023-04-02 PROCEDURE — 85025 COMPLETE CBC W/AUTO DIFF WBC: CPT

## 2023-04-02 PROCEDURE — 84145 PROCALCITONIN (PCT): CPT

## 2023-04-02 PROCEDURE — 2580000003 HC RX 258: Performed by: EMERGENCY MEDICINE

## 2023-04-02 PROCEDURE — 51702 INSERT TEMP BLADDER CATH: CPT

## 2023-04-02 PROCEDURE — 36415 COLL VENOUS BLD VENIPUNCTURE: CPT

## 2023-04-02 PROCEDURE — 87502 INFLUENZA DNA AMP PROBE: CPT

## 2023-04-02 PROCEDURE — 6370000000 HC RX 637 (ALT 250 FOR IP): Performed by: NURSE PRACTITIONER

## 2023-04-02 PROCEDURE — 87040 BLOOD CULTURE FOR BACTERIA: CPT

## 2023-04-02 PROCEDURE — 82140 ASSAY OF AMMONIA: CPT

## 2023-04-02 PROCEDURE — 93005 ELECTROCARDIOGRAM TRACING: CPT | Performed by: EMERGENCY MEDICINE

## 2023-04-02 PROCEDURE — 87635 SARS-COV-2 COVID-19 AMP PRB: CPT

## 2023-04-02 PROCEDURE — 82077 ASSAY SPEC XCP UR&BREATH IA: CPT

## 2023-04-02 PROCEDURE — 86140 C-REACTIVE PROTEIN: CPT

## 2023-04-02 PROCEDURE — 81001 URINALYSIS AUTO W/SCOPE: CPT

## 2023-04-02 RX ORDER — CIPROFLOXACIN 2 MG/ML
400 INJECTION, SOLUTION INTRAVENOUS EVERY 12 HOURS
Status: DISCONTINUED | OUTPATIENT
Start: 2023-04-02 | End: 2023-04-03 | Stop reason: HOSPADM

## 2023-04-02 RX ORDER — POTASSIUM CHLORIDE 7.45 MG/ML
10 INJECTION INTRAVENOUS PRN
Status: DISCONTINUED | OUTPATIENT
Start: 2023-04-02 | End: 2023-04-03

## 2023-04-02 RX ORDER — MAGNESIUM HYDROXIDE/ALUMINUM HYDROXICE/SIMETHICONE 120; 1200; 1200 MG/30ML; MG/30ML; MG/30ML
30 SUSPENSION ORAL EVERY 6 HOURS PRN
Status: DISCONTINUED | OUTPATIENT
Start: 2023-04-02 | End: 2023-04-03 | Stop reason: HOSPADM

## 2023-04-02 RX ORDER — FAMOTIDINE 20 MG/1
10 TABLET, FILM COATED ORAL DAILY PRN
Status: DISCONTINUED | OUTPATIENT
Start: 2023-04-02 | End: 2023-04-03 | Stop reason: HOSPADM

## 2023-04-02 RX ORDER — ATORVASTATIN CALCIUM 80 MG/1
80 TABLET, FILM COATED ORAL DAILY
COMMUNITY

## 2023-04-02 RX ORDER — DEXTROSE MONOHYDRATE 100 MG/ML
INJECTION, SOLUTION INTRAVENOUS CONTINUOUS PRN
Status: DISCONTINUED | OUTPATIENT
Start: 2023-04-02 | End: 2023-04-03 | Stop reason: HOSPADM

## 2023-04-02 RX ORDER — POTASSIUM CHLORIDE 20 MEQ/1
40 TABLET, EXTENDED RELEASE ORAL PRN
Status: DISCONTINUED | OUTPATIENT
Start: 2023-04-02 | End: 2023-04-03

## 2023-04-02 RX ORDER — ACETAMINOPHEN 650 MG/1
650 SUPPOSITORY RECTAL EVERY 6 HOURS PRN
Status: DISCONTINUED | OUTPATIENT
Start: 2023-04-02 | End: 2023-04-03 | Stop reason: HOSPADM

## 2023-04-02 RX ORDER — SODIUM CHLORIDE 9 MG/ML
INJECTION, SOLUTION INTRAVENOUS PRN
Status: DISCONTINUED | OUTPATIENT
Start: 2023-04-02 | End: 2023-04-03 | Stop reason: HOSPADM

## 2023-04-02 RX ORDER — SODIUM CHLORIDE, SODIUM LACTATE, POTASSIUM CHLORIDE, AND CALCIUM CHLORIDE .6; .31; .03; .02 G/100ML; G/100ML; G/100ML; G/100ML
1000 INJECTION, SOLUTION INTRAVENOUS ONCE
Status: COMPLETED | OUTPATIENT
Start: 2023-04-02 | End: 2023-04-02

## 2023-04-02 RX ORDER — SODIUM CHLORIDE 0.9 % (FLUSH) 0.9 %
5-40 SYRINGE (ML) INJECTION EVERY 12 HOURS SCHEDULED
Status: DISCONTINUED | OUTPATIENT
Start: 2023-04-02 | End: 2023-04-03 | Stop reason: HOSPADM

## 2023-04-02 RX ORDER — SODIUM CHLORIDE 0.9 % (FLUSH) 0.9 %
5-40 SYRINGE (ML) INJECTION PRN
Status: DISCONTINUED | OUTPATIENT
Start: 2023-04-02 | End: 2023-04-03 | Stop reason: HOSPADM

## 2023-04-02 RX ORDER — FENOFIBRATE 54 MG/1
54 TABLET ORAL DAILY
Status: DISCONTINUED | OUTPATIENT
Start: 2023-04-02 | End: 2023-04-03 | Stop reason: HOSPADM

## 2023-04-02 RX ORDER — ACETAMINOPHEN 325 MG/1
650 TABLET ORAL EVERY 6 HOURS PRN
Status: DISCONTINUED | OUTPATIENT
Start: 2023-04-02 | End: 2023-04-03 | Stop reason: HOSPADM

## 2023-04-02 RX ORDER — INSULIN GLARGINE 100 [IU]/ML
5 INJECTION, SOLUTION SUBCUTANEOUS NIGHTLY
Status: DISCONTINUED | OUTPATIENT
Start: 2023-04-02 | End: 2023-04-03 | Stop reason: HOSPADM

## 2023-04-02 RX ORDER — AMOXICILLIN 250 MG
2 CAPSULE ORAL DAILY
Status: DISCONTINUED | OUTPATIENT
Start: 2023-04-03 | End: 2023-04-03 | Stop reason: SDUPTHER

## 2023-04-02 RX ORDER — TROSPIUM CHLORIDE 20 MG/1
20 TABLET, FILM COATED ORAL
Status: DISCONTINUED | OUTPATIENT
Start: 2023-04-03 | End: 2023-04-03 | Stop reason: HOSPADM

## 2023-04-02 RX ORDER — INSULIN LISPRO 100 [IU]/ML
0-4 INJECTION, SOLUTION INTRAVENOUS; SUBCUTANEOUS NIGHTLY
Status: DISCONTINUED | OUTPATIENT
Start: 2023-04-02 | End: 2023-04-03 | Stop reason: HOSPADM

## 2023-04-02 RX ORDER — INSULIN LISPRO 100 [IU]/ML
0-4 INJECTION, SOLUTION INTRAVENOUS; SUBCUTANEOUS
Status: DISCONTINUED | OUTPATIENT
Start: 2023-04-02 | End: 2023-04-03 | Stop reason: HOSPADM

## 2023-04-02 RX ORDER — ONDANSETRON 4 MG/1
4 TABLET, ORALLY DISINTEGRATING ORAL EVERY 8 HOURS PRN
Status: DISCONTINUED | OUTPATIENT
Start: 2023-04-02 | End: 2023-04-03 | Stop reason: HOSPADM

## 2023-04-02 RX ORDER — OXYBUTYNIN CHLORIDE 10 MG/1
10 TABLET, EXTENDED RELEASE ORAL DAILY
Status: DISCONTINUED | OUTPATIENT
Start: 2023-04-02 | End: 2023-04-03 | Stop reason: HOSPADM

## 2023-04-02 RX ORDER — MAGNESIUM SULFATE IN WATER 40 MG/ML
2000 INJECTION, SOLUTION INTRAVENOUS PRN
Status: DISCONTINUED | OUTPATIENT
Start: 2023-04-02 | End: 2023-04-03 | Stop reason: HOSPADM

## 2023-04-02 RX ORDER — ATORVASTATIN CALCIUM 40 MG/1
80 TABLET, FILM COATED ORAL DAILY
Status: DISCONTINUED | OUTPATIENT
Start: 2023-04-02 | End: 2023-04-03 | Stop reason: HOSPADM

## 2023-04-02 RX ORDER — MECOBAL/LEVOMEFOLAT CA/B6 PHOS 2-3-35 MG
TABLET ORAL
COMMUNITY

## 2023-04-02 RX ORDER — BISACODYL 10 MG
10 SUPPOSITORY, RECTAL RECTAL DAILY PRN
Status: DISCONTINUED | OUTPATIENT
Start: 2023-04-02 | End: 2023-04-03 | Stop reason: HOSPADM

## 2023-04-02 RX ORDER — POLYETHYLENE GLYCOL 3350 17 G/17G
17 POWDER, FOR SOLUTION ORAL DAILY PRN
Status: DISCONTINUED | OUTPATIENT
Start: 2023-04-02 | End: 2023-04-03 | Stop reason: HOSPADM

## 2023-04-02 RX ORDER — HYDRALAZINE HYDROCHLORIDE 20 MG/ML
5 INJECTION INTRAMUSCULAR; INTRAVENOUS EVERY 8 HOURS PRN
Status: DISCONTINUED | OUTPATIENT
Start: 2023-04-02 | End: 2023-04-03 | Stop reason: HOSPADM

## 2023-04-02 RX ORDER — EZETIMIBE 10 MG/1
10 TABLET ORAL NIGHTLY
Status: DISCONTINUED | OUTPATIENT
Start: 2023-04-02 | End: 2023-04-03 | Stop reason: HOSPADM

## 2023-04-02 RX ORDER — ONDANSETRON 2 MG/ML
4 INJECTION INTRAMUSCULAR; INTRAVENOUS EVERY 6 HOURS PRN
Status: DISCONTINUED | OUTPATIENT
Start: 2023-04-02 | End: 2023-04-03 | Stop reason: HOSPADM

## 2023-04-02 RX ADMIN — EZETIMIBE 10 MG: 10 TABLET ORAL at 21:33

## 2023-04-02 RX ADMIN — SODIUM CHLORIDE, PRESERVATIVE FREE 10 ML: 5 INJECTION INTRAVENOUS at 21:31

## 2023-04-02 RX ADMIN — ATORVASTATIN CALCIUM 80 MG: 40 TABLET, FILM COATED ORAL at 15:33

## 2023-04-02 RX ADMIN — CIPROFLOXACIN 400 MG: 2 INJECTION, SOLUTION INTRAVENOUS at 15:31

## 2023-04-02 RX ADMIN — OXYBUTYNIN CHLORIDE 10 MG: 10 TABLET, EXTENDED RELEASE ORAL at 15:33

## 2023-04-02 RX ADMIN — APIXABAN 5 MG: 5 TABLET, FILM COATED ORAL at 21:33

## 2023-04-02 RX ADMIN — INSULIN GLARGINE 5 UNITS: 100 INJECTION, SOLUTION SUBCUTANEOUS at 21:28

## 2023-04-02 RX ADMIN — SODIUM CHLORIDE, POTASSIUM CHLORIDE, SODIUM LACTATE AND CALCIUM CHLORIDE 1000 ML: 600; 310; 30; 20 INJECTION, SOLUTION INTRAVENOUS at 11:01

## 2023-04-02 RX ADMIN — TUBERCULIN PURIFIED PROTEIN DERIVATIVE 5 UNITS: 5 INJECTION, SOLUTION INTRADERMAL at 15:33

## 2023-04-02 RX ADMIN — FENOFIBRATE 54 MG: 54 TABLET ORAL at 15:33

## 2023-04-02 RX ADMIN — SODIUM CHLORIDE, POTASSIUM CHLORIDE, SODIUM LACTATE AND CALCIUM CHLORIDE 1000 ML: 600; 310; 30; 20 INJECTION, SOLUTION INTRAVENOUS at 09:35

## 2023-04-02 ASSESSMENT — PAIN SCALES - GENERAL: PAINLEVEL_OUTOF10: 0

## 2023-04-02 ASSESSMENT — ENCOUNTER SYMPTOMS
BACK PAIN: 0
VOMITING: 0
NAUSEA: 0

## 2023-04-02 ASSESSMENT — PAIN - FUNCTIONAL ASSESSMENT: PAIN_FUNCTIONAL_ASSESSMENT: 0-10

## 2023-04-02 NOTE — ED PROVIDER NOTES
[address] [facility] PPD TEST IN 48 HRS    PLEASE READ & DOCUMENT PPD TEST IN 72 HRS    EKG 12 Lead    EKG 12 Lead    ADMIT TO INPATIENT        Medications   atorvastatin (LIPITOR) tablet 80 mg (has no administration in time range)   glucose chewable tablet 16 g (has no administration in time range)   dextrose bolus 10% 125 mL (has no administration in time range)     Or   dextrose bolus 10% 250 mL (has no administration in time range)   glucagon (rDNA) injection 1 mg (has no administration in time range)   dextrose 10 % infusion (has no administration in time range)   tuberculin injection 5 Units (has no administration in time range)   ciprofloxacin (CIPRO) IVPB 400 mg (has no administration in time range)   lactated ringers bolus (0 mLs IntraVENous Stopped 4/2/23 1203)   lactated ringers bolus (0 mLs IntraVENous Stopped 4/2/23 1203)       New Prescriptions    No medications on file        Past Medical History:   Diagnosis Date    Alcoholic pancreatitis 64/8790    after mother's death, states he slowed down on his drinking after this episode    Aneurysm ([address]     3 small of aorta, 2.4 x 2.3  cm in diameter is largest    Aneurysm artery, celiac ([address] 8/6/2014    Chronic pain     left foot    CVA (cerebral infarction) 1998, 2008    left sided weakness(TIA) stroke causing weakness    Depression     Diabetes ([address] 2010     typell, Avg fasting ; denies s/s hypo does not know last HA1C    Diabetic ulcer of left great toe (HCC)     Enlarged aorta ([address] 8/6/2014    arteriomegaly 2.9cm 7/30/14     Gout     History of stroke 1998    Residual left hemiparesis    Hypertension     Mixed hyperlipidemia     Obesity     Skin cancer     neck    Stroke ([address]     Unspecified constipation     Unspecified vitamin D deficiency         Past Surgical History:   Procedure Laterality Date    ADENOIDECTOMY      childhood    CATARACT REMOVAL  OD-2013/OS-2014    with IOL placement    COLONOSCOPY  2007, 2010, 2014    with

## 2023-04-02 NOTE — ED NOTES
TRANSFER - OUT REPORT:    Verbal report given to [facility] [place] TREVON on Manju Holliday  being transferred to [facility] for urgent transfer       Report consisted of patient's Situation, Background, Assessment and   Recommendations(SBAR). Information from the following report(s) Nurse Handoff Report, Index, ED Encounter Summary, ED SBAR, Adult Overview, Intake/Output, MAR, and Recent Results was reviewed with the receiving nurse. [place] Assessment: Presents to emergency department  because of falls (Syncope, seizure, or loss of consciousness): No, Age > 79: Yes, Altered Mental Status, Intoxication with alcohol or substance confusion (Disorientation, impaired judgment, poor safety awaremess, or inability to follow instructions): Yes, Impaired Mobility: Ambulates or transfers with assistive devices or assistance; Unable to ambulate or transer.: Yes, Nursing Judgement: Yes  Lines:   Peripheral IV 04/02/23 Left Antecubital (Active)       Peripheral IV 04/02/23 Left;Dorsal Forearm (Active)        Opportunity for questions and clarification was provided.       Patient transported with:  Registered Nurse          Georgine Poser, RN  04/02/23 1400

## 2023-04-02 NOTE — ED TRIAGE NOTES
Patient brought into ER via EMS coming from [facility] Per EMS patient c\o AMS that began yesterday. EMS states last night he was febrile. Patient was given 1 gram of Rocephin. Per EMS patient is not at his baseline.      90 HR  250mL of NS via 18 in L forearm

## 2023-04-03 VITALS
BODY MASS INDEX: 25.17 KG/M2 | WEIGHT: 185.85 LBS | DIASTOLIC BLOOD PRESSURE: 85 MMHG | HEART RATE: 79 BPM | HEIGHT: 72 IN | TEMPERATURE: 98.4 F | SYSTOLIC BLOOD PRESSURE: 108 MMHG | OXYGEN SATURATION: 95 % | RESPIRATION RATE: 18 BRPM

## 2023-04-03 PROBLEM — R41.82 AMS (ALTERED MENTAL STATUS): Status: RESOLVED | Noted: 2023-04-02 | Resolved: 2023-04-03

## 2023-04-03 PROBLEM — R41.82 ALTERED MENTAL STATUS: Status: RESOLVED | Noted: 2023-04-02 | Resolved: 2023-04-03

## 2023-04-03 PROBLEM — T83.9XXA DIFFICULT FOLEY CATHETER PLACEMENT (HCC): Status: RESOLVED | Noted: 2023-04-02 | Resolved: 2023-04-03

## 2023-04-03 LAB
ANION GAP SERPL CALC-SCNC: 5 MMOL/L (ref 2–11)
BASOPHILS # BLD: 0 K/UL (ref 0–0.2)
BASOPHILS NFR BLD: 0 % (ref 0–2)
BUN SERPL-MCNC: 12 MG/DL (ref 8–23)
CALCIUM SERPL-MCNC: 9.4 MG/DL (ref 8.3–10.4)
CHLORIDE SERPL-SCNC: 106 MMOL/L (ref 101–110)
CHOLEST SERPL-MCNC: 183 MG/DL
CO2 SERPL-SCNC: 23 MMOL/L (ref 21–32)
CREAT SERPL-MCNC: 0.81 MG/DL (ref 0.8–1.5)
DIFFERENTIAL METHOD BLD: ABNORMAL
EOSINOPHIL # BLD: 0.2 K/UL (ref 0–0.8)
EOSINOPHIL NFR BLD: 4 % (ref 0.5–7.8)
ERYTHROCYTE [DISTWIDTH] IN BLOOD BY AUTOMATED COUNT: 12.4 % (ref 11.9–14.6)
EST. AVERAGE GLUCOSE BLD GHB EST-MCNC: 126 MG/DL
GLUCOSE BLD STRIP.AUTO-MCNC: 115 MG/DL (ref 65–100)
GLUCOSE BLD STRIP.AUTO-MCNC: 160 MG/DL (ref 65–100)
GLUCOSE SERPL-MCNC: 137 MG/DL (ref 65–100)
HBA1C MFR BLD: 6 % (ref 4.8–5.6)
HCT VFR BLD AUTO: 41.2 % (ref 41.1–50.3)
HDLC SERPL-MCNC: 22 MG/DL (ref 40–60)
HDLC SERPL: 8.3
HGB BLD-MCNC: 14.1 G/DL (ref 13.6–17.2)
IMM GRANULOCYTES # BLD AUTO: 0 K/UL (ref 0–0.5)
IMM GRANULOCYTES NFR BLD AUTO: 1 % (ref 0–5)
LACTATE SERPL-SCNC: 1.1 MMOL/L (ref 0.4–2)
LDLC SERPL CALC-MCNC: 118.6 MG/DL
LYMPHOCYTES # BLD: 0.8 K/UL (ref 0.5–4.6)
LYMPHOCYTES NFR BLD: 14 % (ref 13–44)
MAGNESIUM SERPL-MCNC: 1.8 MG/DL (ref 1.8–2.4)
MCH RBC QN AUTO: 31.8 PG (ref 26.1–32.9)
MCHC RBC AUTO-ENTMCNC: 34.2 G/DL (ref 31.4–35)
MCV RBC AUTO: 92.8 FL (ref 82–102)
MONOCYTES # BLD: 0.6 K/UL (ref 0.1–1.3)
MONOCYTES NFR BLD: 11 % (ref 4–12)
NEUTS SEG # BLD: 3.8 K/UL (ref 1.7–8.2)
NEUTS SEG NFR BLD: 70 % (ref 43–78)
NRBC # BLD: 0 K/UL (ref 0–0.2)
PLATELET # BLD AUTO: 202 K/UL (ref 150–450)
PMV BLD AUTO: 9.3 FL (ref 9.4–12.3)
POTASSIUM SERPL-SCNC: 3.4 MMOL/L (ref 3.5–5.1)
RBC # BLD AUTO: 4.44 M/UL (ref 4.23–5.6)
SERVICE CMNT-IMP: ABNORMAL
SERVICE CMNT-IMP: ABNORMAL
SODIUM SERPL-SCNC: 134 MMOL/L (ref 133–143)
TRIGL SERPL-MCNC: 212 MG/DL (ref 35–150)
VLDLC SERPL CALC-MCNC: 42.4 MG/DL (ref 6–23)
WBC # BLD AUTO: 5.4 K/UL (ref 4.3–11.1)

## 2023-04-03 PROCEDURE — 83605 ASSAY OF LACTIC ACID: CPT

## 2023-04-03 PROCEDURE — 2580000003 HC RX 258: Performed by: NURSE PRACTITIONER

## 2023-04-03 PROCEDURE — 36415 COLL VENOUS BLD VENIPUNCTURE: CPT

## 2023-04-03 PROCEDURE — 97535 SELF CARE MNGMENT TRAINING: CPT

## 2023-04-03 PROCEDURE — 6370000000 HC RX 637 (ALT 250 FOR IP): Performed by: INTERNAL MEDICINE

## 2023-04-03 PROCEDURE — 97530 THERAPEUTIC ACTIVITIES: CPT

## 2023-04-03 PROCEDURE — 6370000000 HC RX 637 (ALT 250 FOR IP): Performed by: NURSE PRACTITIONER

## 2023-04-03 PROCEDURE — 97165 OT EVAL LOW COMPLEX 30 MIN: CPT

## 2023-04-03 PROCEDURE — 80061 LIPID PANEL: CPT

## 2023-04-03 PROCEDURE — 6360000002 HC RX W HCPCS: Performed by: NURSE PRACTITIONER

## 2023-04-03 PROCEDURE — 83036 HEMOGLOBIN GLYCOSYLATED A1C: CPT

## 2023-04-03 PROCEDURE — 85025 COMPLETE CBC W/AUTO DIFF WBC: CPT

## 2023-04-03 PROCEDURE — 82962 GLUCOSE BLOOD TEST: CPT

## 2023-04-03 PROCEDURE — 83735 ASSAY OF MAGNESIUM: CPT

## 2023-04-03 PROCEDURE — 97161 PT EVAL LOW COMPLEX 20 MIN: CPT

## 2023-04-03 PROCEDURE — 80048 BASIC METABOLIC PNL TOTAL CA: CPT

## 2023-04-03 RX ORDER — CIPROFLOXACIN 500 MG/1
500 TABLET, FILM COATED ORAL 2 TIMES DAILY
Qty: 10 TABLET | Refills: 0 | Status: SHIPPED | OUTPATIENT
Start: 2023-04-03 | End: 2023-04-08

## 2023-04-03 RX ORDER — POTASSIUM CHLORIDE 7.45 MG/ML
10 INJECTION INTRAVENOUS EVERY 4 HOURS
Status: DISCONTINUED | OUTPATIENT
Start: 2023-04-03 | End: 2023-04-03 | Stop reason: HOSPADM

## 2023-04-03 RX ORDER — MAGNESIUM SULFATE IN WATER 40 MG/ML
2000 INJECTION, SOLUTION INTRAVENOUS ONCE
Status: COMPLETED | OUTPATIENT
Start: 2023-04-03 | End: 2023-04-03

## 2023-04-03 RX ORDER — SENNA AND DOCUSATE SODIUM 50; 8.6 MG/1; MG/1
2 TABLET, FILM COATED ORAL DAILY
Status: DISCONTINUED | OUTPATIENT
Start: 2023-04-03 | End: 2023-04-03 | Stop reason: HOSPADM

## 2023-04-03 RX ADMIN — MAGNESIUM SULFATE HEPTAHYDRATE 2000 MG: 40 INJECTION, SOLUTION INTRAVENOUS at 08:59

## 2023-04-03 RX ADMIN — OXYBUTYNIN CHLORIDE 10 MG: 10 TABLET, EXTENDED RELEASE ORAL at 09:05

## 2023-04-03 RX ADMIN — TROSPIUM CHLORIDE 20 MG: 20 TABLET, FILM COATED ORAL at 06:18

## 2023-04-03 RX ADMIN — SODIUM CHLORIDE, PRESERVATIVE FREE 10 ML: 5 INJECTION INTRAVENOUS at 10:43

## 2023-04-03 RX ADMIN — POTASSIUM CHLORIDE 10 MEQ: 7.46 INJECTION, SOLUTION INTRAVENOUS at 12:48

## 2023-04-03 RX ADMIN — CIPROFLOXACIN 400 MG: 2 INJECTION, SOLUTION INTRAVENOUS at 02:42

## 2023-04-03 RX ADMIN — SENNOSIDES AND DOCUSATE SODIUM 2 TABLET: 50; 8.6 TABLET ORAL at 10:38

## 2023-04-03 RX ADMIN — FENOFIBRATE 54 MG: 54 TABLET ORAL at 09:05

## 2023-04-03 RX ADMIN — APIXABAN 5 MG: 5 TABLET, FILM COATED ORAL at 09:05

## 2023-04-03 RX ADMIN — POTASSIUM CHLORIDE 10 MEQ: 7.46 INJECTION, SOLUTION INTRAVENOUS at 09:00

## 2023-04-03 RX ADMIN — ATORVASTATIN CALCIUM 80 MG: 40 TABLET, FILM COATED ORAL at 09:05

## 2023-04-03 ASSESSMENT — PAIN SCALES - GENERAL: PAINLEVEL_OUTOF10: 0

## 2023-04-03 NOTE — DISCHARGE SUMMARY
04/02/23  3:39 PM    Specimen: Urine   Result Value Ref Range    Color, UA YELLOW/STRAW      Appearance TURBID      Specific Gravity, UA 1.016 1.001 - 1.023      pH, Urine 8.5 5.0 - 9.0      Protein, UA TRACE (A) NEG mg/dL    Glucose, UA Negative mg/dL    Ketones, Urine Negative NEG mg/dL    Bilirubin Urine Negative NEG      Blood, Urine LARGE (A) NEG      Urobilinogen, Urine 1.0 0.2 - 1.0 EU/dL    Nitrite, Urine Negative NEG      Leukocyte Esterase, Urine MODERATE (A) NEG      Urine Culture if Indicated CULTURE NOT INDICATED BY UA RESULT      WBC, UA 5-10 (A) U4 /hpf    RBC, UA >100 (A) U5 /hpf    BACTERIA, URINE Negative NEG /hpf    Epithelial Cells UA 0-5 U5 /hpf    Casts 0-2 U2 /lpf    Mucus, UA 0 0 /lpf   Urine Drug Screen    Collection Time: 04/02/23  3:39 PM   Result Value Ref Range    PCP, Urine Negative      Benzodiazepines, Urine Negative      Cocaine, Urine Negative      Amphetamine, Urine Negative      Methadone, Urine Negative      THC, TH-Cannabinol, Urine Negative      Opiates, Urine Negative      Barbiturates, Urine Negative     POCT Glucose    Collection Time: 04/02/23  4:06 PM   Result Value Ref Range    POC Glucose 139 (H) 65 - 100 mg/dL    Performed by: Kristan    POCT Glucose    Collection Time: 04/02/23  9:07 PM   Result Value Ref Range    POC Glucose 118 (H) 65 - 100 mg/dL    Performed by: Antonio    Basic Metabolic Panel w/ Reflex to MG    Collection Time: 04/03/23  5:00 AM   Result Value Ref Range    Sodium 134 133 - 143 mmol/L    Potassium 3.4 (L) 3.5 - 5.1 mmol/L    Chloride 106 101 - 110 mmol/L    CO2 23 21 - 32 mmol/L    Anion Gap 5 2 - 11 mmol/L    Glucose 137 (H) 65 - 100 mg/dL    BUN 12 8 - 23 MG/DL    Creatinine 0.81 0.8 - 1.5 MG/DL    Est, Glom Filt Rate >60 >60 ml/min/1.73m2    Calcium 9.4 8.3 - 10.4 MG/DL   CBC with Auto Differential    Collection Time: 04/03/23  5:00 AM   Result Value Ref Range    WBC 5.4 4.3 - 11.1 K/uL    RBC 4.44 4.23 - 5.6 M/uL

## 2023-04-03 NOTE — PLAN OF CARE
Problem: Discharge Planning  Goal: Discharge to home or other facility with appropriate resources  Outcome: Progressing  Flowsheets (Taken 4/2/2023 1936)  Discharge to home or other facility with appropriate resources: Identify discharge learning needs (meds, wound care, etc)

## 2023-04-03 NOTE — PLAN OF CARE
Problem: Discharge Planning  Goal: Discharge to home or other facility with appropriate resources  4/3/2023 1405 by Carmen Stephens RN  Outcome: Progressing  4/3/2023 1352 by Carmen Stephens RN  Outcome: Progressing     Problem: Safety - Adult  Goal: Free from fall injury  4/3/2023 1405 by Carmen Stephens RN  Outcome: Progressing  4/3/2023 1352 by Carmen Stephens RN  Outcome: Progressing

## 2023-04-05 NOTE — PROGRESS NOTES
Pt only oriented to self and birthday and place. Unable to answer any admission questions. In report was told family would be here tonight, will wait for them for assistance.
TRANSFER - IN REPORT:    Verbal report received from Jami(name) on Braxton Sacks  being received from ED(unit) for routine progression of care      Report consisted of patient's Situation, Background, Assessment and   Recommendations(SBAR). Information from the following report(s) SBAR, ED Summary, MAR, and Recent Results was reviewed with the receiving nurse. Opportunity for questions and clarification was provided. Assessment completed upon patient's arrival to unit and care assumed.
TRANSFER - OUT REPORT:    Verbal report given to [facility] on Nohelia Rees  being transferred to [facility] for routine progression of patient care       Report consisted of patient's Situation, Background, Assessment and   Recommendations(SBAR). Information from the following report(s) Nurse Handoff Report was reviewed with the receiving nurse. [place] Assessment: Presents to emergency department  because of falls (Syncope, seizure, or loss of consciousness): No, Age > 79: Yes, Altered Mental Status, Intoxication with alcohol or substance confusion (Disorientation, impaired judgment, poor safety awaremess, or inability to follow instructions): Yes, Impaired Mobility: Ambulates or transfers with assistive devices or assistance; Unable to ambulate or transer.: Yes, Nursing Judgement: Yes  Lines:   Peripheral IV 04/02/23 Left;Dorsal Forearm (Active)   Site Assessment Clean, dry & intact 04/03/23 0840   Line Status Normal saline locked 04/03/23 0840   Line Care Connections checked and tightened 04/03/23 0840   Phlebitis Assessment No symptoms 04/03/23 0840   Infiltration Assessment 0 04/03/23 0840   Alcohol Cap Used Yes 04/03/23 0840   Dressing Status Clean, dry & intact 04/03/23 0840   Dressing Type Transparent 04/03/23 0840        Opportunity for questions and clarification was provided.       Patient transported with:  Monitor
Clinical Documentation Reviewer    PROVIDER RESPONSE TEXT:    Patient has delirium due to UTI    Query created by: Constantino Buck on 4/3/2023 3:09 PM      Electronically signed by:  Corine Sorto MD 4/4/2023 8:06 PM
to supine and total to scoot to hob. He was max assist to don a gown and total for socks. Unclear if he is a LTC resident at [facility]. Will follow. Bed alarm on,     [employer] [facility] 6 Clicks Daily Activity Inpatient Short Form:    AM-PAC Daily Activity - Inpatient   How much help is needed for putting on and taking off regular lower body clothing?: Total  How much help is needed for bathing (which includes washing, rinsing, drying)?: A Lot  How much help is needed for toileting (which includes using toilet, bedpan, or urinal)?: Total  How much help is needed for putting on and taking off regular upper body clothing?: A Lot  How much help is needed for taking care of personal grooming?: A Little  How much help for eating meals?: A Little  [facility] Inpatient Daily Activity Raw Score: 12  AM-PAC Inpatient ADL T-Scale Score : 30.6  ADL Inpatient CMS 0-100% Score: 66.57  ADL Inpatient CMS G-Code Modifier : CL           SUBJECTIVE:     Mr. Chanda Cohen stated he was at [address] Lives With:  ([facility]?  LTC)    OBJECTIVE:     Ramesh Merlin / Adrianne Tabatha / AIRWAY: IV and Purewick    RESTRICTIONS/PRECAUTIONS:   Fall     PAIN: VITALS / O2:   Pre Treatment:      0/10    Post Treatment: 0/10       Vitals          Oxygen            GROSS EVALUATION: INTACT IMPAIRED   (See Comments)   UE AROM [] []LUE flexor tone but uses a support UE during sitting   UE PROM [] []   Strength []  Generally decreased, poor sitting balance     Posture / Balance []  CGA to max assist.posterior lean   Sensation []     Coordination []  Impaired L UE and LE     Tone []  LUE and LE with flexor tone present     Edema []    Activity Tolerance []    Fair- with sitting balane and standing balance    Hand Dominance R [] L []      COGNITION/  PERCEPTION: INTACT IMPAIRED   (See Comments)   Orientation []     Vision []     Hearing []     Cognition  []     Perception []       MOBILITY: I Mod I S SBA CGA Min Mod Max Total  NT x2 Comments:   Bed
TREATMENT:   EVALUATION: LOW COMPLEXITY: (Untimed Charge)    TREATMENT:   Therapeutic Activity (25 Minutes): Therapeutic activity included Supine to Sit, Transfer Training, Ambulation on level ground, Sitting balance , and Standing balance to improve functional Activity tolerance, Balance, Coordination, Mobility, and Strength. TREATMENT GRID:  N/A    AFTER TREATMENT PRECAUTIONS: Bed, Bed/Chair Locked, Call light within reach, and Needs within reach    INTERDISCIPLINARY COLLABORATION:  RN/ PCT, PT/ PTA, and OT/ SMITH    EDUCATION: Education Given To: Patient; Family  Education Provided: Role of Therapy;Plan of Care;Transfer Training  Education Method: Demonstration;Verbal  Education Outcome: Verbalized understanding;Demonstrated understanding    TIME IN/OUT:  Time In: 1030  Time Out: [address]  Minutes: YULIANA De León

## 2023-04-07 LAB
BACTERIA SPEC CULT: NORMAL
BACTERIA SPEC CULT: NORMAL
SERVICE CMNT-IMP: NORMAL
SERVICE CMNT-IMP: NORMAL

## 2024-09-17 NOTE — PROGRESS NOTES
Interdisciplinary rounds were completed with case management, , nursing, hospitalist, and physical therapy. The patient is still requiring ICU level of care. The patient is planning to discharge to a skilled nursing facility. No

## 2025-02-15 ENCOUNTER — HOSPITAL ENCOUNTER (INPATIENT)
Age: 75
LOS: 15 days | Discharge: HOSPICE | DRG: 871 | End: 2025-03-02
Attending: EMERGENCY MEDICINE | Admitting: STUDENT IN AN ORGANIZED HEALTH CARE EDUCATION/TRAINING PROGRAM
Payer: MEDICARE

## 2025-02-15 ENCOUNTER — APPOINTMENT (OUTPATIENT)
Dept: GENERAL RADIOLOGY | Age: 75
DRG: 871 | End: 2025-02-15
Payer: MEDICARE

## 2025-02-15 DIAGNOSIS — A41.9 SEPTICEMIA (HCC): Primary | ICD-10-CM

## 2025-02-15 DIAGNOSIS — E87.0 HYPERNATREMIA: ICD-10-CM

## 2025-02-15 DIAGNOSIS — N17.9 ACUTE RENAL FAILURE, UNSPECIFIED ACUTE RENAL FAILURE TYPE: ICD-10-CM

## 2025-02-15 PROBLEM — E11.9 DIABETES (HCC): Status: ACTIVE | Noted: 2025-02-15

## 2025-02-15 LAB
ALBUMIN SERPL-MCNC: 3.2 G/DL (ref 3.2–4.6)
ALBUMIN/GLOB SERPL: 0.8 (ref 1–1.9)
ALP SERPL-CCNC: 117 U/L (ref 40–129)
ALT SERPL-CCNC: 21 U/L (ref 8–55)
ANION GAP SERPL CALC-SCNC: 11 MMOL/L (ref 7–16)
ANION GAP SERPL CALC-SCNC: 14 MMOL/L (ref 7–16)
ANION GAP SERPL CALC-SCNC: 16 MMOL/L (ref 7–16)
AST SERPL-CCNC: 66 U/L (ref 15–37)
BASOPHILS # BLD: 0.05 K/UL (ref 0–0.2)
BASOPHILS NFR BLD: 0.5 % (ref 0–2)
BILIRUB SERPL-MCNC: 0.8 MG/DL (ref 0–1.2)
BUN SERPL-MCNC: 58 MG/DL (ref 8–23)
BUN SERPL-MCNC: 61 MG/DL (ref 8–23)
BUN SERPL-MCNC: 66 MG/DL (ref 8–23)
CALCIUM SERPL-MCNC: 9.2 MG/DL (ref 8.8–10.2)
CALCIUM SERPL-MCNC: 9.4 MG/DL (ref 8.8–10.2)
CALCIUM SERPL-MCNC: 9.9 MG/DL (ref 8.8–10.2)
CHLORIDE SERPL-SCNC: 119 MMOL/L (ref 98–107)
CHLORIDE SERPL-SCNC: 123 MMOL/L (ref 98–107)
CHLORIDE SERPL-SCNC: 123 MMOL/L (ref 98–107)
CO2 SERPL-SCNC: 24 MMOL/L (ref 20–29)
CO2 SERPL-SCNC: 25 MMOL/L (ref 20–29)
CO2 SERPL-SCNC: 25 MMOL/L (ref 20–29)
CREAT SERPL-MCNC: 1.82 MG/DL (ref 0.8–1.3)
CREAT SERPL-MCNC: 1.94 MG/DL (ref 0.8–1.3)
CREAT SERPL-MCNC: 2.38 MG/DL (ref 0.8–1.3)
DIFFERENTIAL METHOD BLD: ABNORMAL
EKG ATRIAL RATE: 80 BPM
EKG DIAGNOSIS: NORMAL
EKG P AXIS: 41 DEGREES
EKG P-R INTERVAL: 157 MS
EKG Q-T INTERVAL: 515 MS
EKG QRS DURATION: 88 MS
EKG QTC CALCULATION (BAZETT): 595 MS
EKG R AXIS: 37 DEGREES
EKG T AXIS: 6 DEGREES
EKG VENTRICULAR RATE: 80 BPM
EOSINOPHIL # BLD: 0.13 K/UL (ref 0–0.8)
EOSINOPHIL NFR BLD: 1.3 % (ref 0.5–7.8)
ERYTHROCYTE [DISTWIDTH] IN BLOOD BY AUTOMATED COUNT: 14.6 % (ref 11.9–14.6)
GLOBULIN SER CALC-MCNC: 3.8 G/DL (ref 2.3–3.5)
GLUCOSE BLD STRIP.AUTO-MCNC: 186 MG/DL (ref 65–100)
GLUCOSE BLD STRIP.AUTO-MCNC: 97 MG/DL (ref 65–100)
GLUCOSE SERPL-MCNC: 102 MG/DL (ref 70–99)
GLUCOSE SERPL-MCNC: 229 MG/DL (ref 70–99)
GLUCOSE SERPL-MCNC: 233 MG/DL (ref 70–99)
HCT VFR BLD AUTO: 54.4 % (ref 41.1–50.3)
HGB BLD-MCNC: 17.1 G/DL (ref 13.6–17.2)
IMM GRANULOCYTES # BLD AUTO: 0.04 K/UL (ref 0–0.5)
IMM GRANULOCYTES NFR BLD AUTO: 0.4 % (ref 0–5)
LACTATE SERPL-SCNC: 2.3 MMOL/L (ref 0.5–2)
LACTATE SERPL-SCNC: 3.5 MMOL/L (ref 0.5–2)
LACTATE SERPL-SCNC: 4.2 MMOL/L (ref 0.5–2)
LYMPHOCYTES # BLD: 1.07 K/UL (ref 0.5–4.6)
LYMPHOCYTES NFR BLD: 10.4 % (ref 13–44)
MCH RBC QN AUTO: 31.1 PG (ref 26.1–32.9)
MCHC RBC AUTO-ENTMCNC: 31.4 G/DL (ref 31.4–35)
MCV RBC AUTO: 99.1 FL (ref 82–102)
MONOCYTES # BLD: 0.63 K/UL (ref 0.1–1.3)
MONOCYTES NFR BLD: 6.1 % (ref 4–12)
NEUTS SEG # BLD: 8.41 K/UL (ref 1.7–8.2)
NEUTS SEG NFR BLD: 81.3 % (ref 43–78)
NRBC # BLD: 0 K/UL (ref 0–0.2)
NT PRO BNP: 386 PG/ML (ref 0–450)
PLATELET # BLD AUTO: 254 K/UL (ref 150–450)
PMV BLD AUTO: 11.1 FL (ref 9.4–12.3)
POTASSIUM SERPL-SCNC: 3.5 MMOL/L (ref 3.5–5.1)
POTASSIUM SERPL-SCNC: 3.6 MMOL/L (ref 3.5–5.1)
POTASSIUM SERPL-SCNC: 5.1 MMOL/L (ref 3.5–5.1)
PROT SERPL-MCNC: 7 G/DL (ref 6.3–8.2)
RBC # BLD AUTO: 5.49 M/UL (ref 4.23–5.6)
SERVICE CMNT-IMP: ABNORMAL
SERVICE CMNT-IMP: NORMAL
SODIUM SERPL-SCNC: 158 MMOL/L (ref 136–145)
SODIUM SERPL-SCNC: 159 MMOL/L (ref 136–145)
SODIUM SERPL-SCNC: 163 MMOL/L (ref 136–145)
TROPONIN T SERPL HS-MCNC: 57 NG/L (ref 0–22)
TROPONIN T SERPL HS-MCNC: 66 NG/L (ref 0–22)
WBC # BLD AUTO: 10.3 K/UL (ref 4.3–11.1)

## 2025-02-15 PROCEDURE — 2580000003 HC RX 258: Performed by: HOSPITALIST

## 2025-02-15 PROCEDURE — 84484 ASSAY OF TROPONIN QUANT: CPT

## 2025-02-15 PROCEDURE — 93005 ELECTROCARDIOGRAM TRACING: CPT | Performed by: EMERGENCY MEDICINE

## 2025-02-15 PROCEDURE — 71045 X-RAY EXAM CHEST 1 VIEW: CPT

## 2025-02-15 PROCEDURE — 93005 ELECTROCARDIOGRAM TRACING: CPT | Performed by: STUDENT IN AN ORGANIZED HEALTH CARE EDUCATION/TRAINING PROGRAM

## 2025-02-15 PROCEDURE — 2500000003 HC RX 250 WO HCPCS: Performed by: STUDENT IN AN ORGANIZED HEALTH CARE EDUCATION/TRAINING PROGRAM

## 2025-02-15 PROCEDURE — 80053 COMPREHEN METABOLIC PANEL: CPT

## 2025-02-15 PROCEDURE — 2580000003 HC RX 258: Performed by: EMERGENCY MEDICINE

## 2025-02-15 PROCEDURE — 6370000000 HC RX 637 (ALT 250 FOR IP): Performed by: STUDENT IN AN ORGANIZED HEALTH CARE EDUCATION/TRAINING PROGRAM

## 2025-02-15 PROCEDURE — 82962 GLUCOSE BLOOD TEST: CPT

## 2025-02-15 PROCEDURE — 83880 ASSAY OF NATRIURETIC PEPTIDE: CPT

## 2025-02-15 PROCEDURE — 36415 COLL VENOUS BLD VENIPUNCTURE: CPT

## 2025-02-15 PROCEDURE — 85025 COMPLETE CBC W/AUTO DIFF WBC: CPT

## 2025-02-15 PROCEDURE — 51798 US URINE CAPACITY MEASURE: CPT

## 2025-02-15 PROCEDURE — 80048 BASIC METABOLIC PNL TOTAL CA: CPT

## 2025-02-15 PROCEDURE — 2580000003 HC RX 258: Performed by: STUDENT IN AN ORGANIZED HEALTH CARE EDUCATION/TRAINING PROGRAM

## 2025-02-15 PROCEDURE — 93010 ELECTROCARDIOGRAM REPORT: CPT | Performed by: INTERNAL MEDICINE

## 2025-02-15 PROCEDURE — 6360000002 HC RX W HCPCS: Performed by: STUDENT IN AN ORGANIZED HEALTH CARE EDUCATION/TRAINING PROGRAM

## 2025-02-15 PROCEDURE — 1100000000 HC RM PRIVATE

## 2025-02-15 PROCEDURE — 87040 BLOOD CULTURE FOR BACTERIA: CPT

## 2025-02-15 PROCEDURE — 83605 ASSAY OF LACTIC ACID: CPT

## 2025-02-15 PROCEDURE — 99285 EMERGENCY DEPT VISIT HI MDM: CPT

## 2025-02-15 RX ORDER — MIRTAZAPINE 15 MG/1
7.5 TABLET, FILM COATED ORAL NIGHTLY
Status: DISCONTINUED | OUTPATIENT
Start: 2025-02-15 | End: 2025-02-21

## 2025-02-15 RX ORDER — 0.9 % SODIUM CHLORIDE 0.9 %
1000 INTRAVENOUS SOLUTION INTRAVENOUS ONCE
Status: DISCONTINUED | OUTPATIENT
Start: 2025-02-15 | End: 2025-02-15

## 2025-02-15 RX ORDER — SODIUM CHLORIDE 0.9 % (FLUSH) 0.9 %
5-40 SYRINGE (ML) INJECTION PRN
Status: DISCONTINUED | OUTPATIENT
Start: 2025-02-15 | End: 2025-03-02 | Stop reason: HOSPADM

## 2025-02-15 RX ORDER — HYDROCODONE BITARTRATE AND ACETAMINOPHEN 5; 325 MG/1; MG/1
1 TABLET ORAL EVERY 12 HOURS
COMMUNITY

## 2025-02-15 RX ORDER — HYDROCODONE BITARTRATE AND ACETAMINOPHEN 5; 325 MG/1; MG/1
1 TABLET ORAL EVERY 6 HOURS PRN
COMMUNITY

## 2025-02-15 RX ORDER — MIRTAZAPINE 7.5 MG/1
7.5 TABLET, FILM COATED ORAL NIGHTLY
COMMUNITY

## 2025-02-15 RX ORDER — LANOLIN ALCOHOL/MO/W.PET/CERES
400 CREAM (GRAM) TOPICAL 2 TIMES DAILY
COMMUNITY

## 2025-02-15 RX ORDER — ONDANSETRON 2 MG/ML
4 INJECTION INTRAMUSCULAR; INTRAVENOUS EVERY 6 HOURS PRN
Status: DISCONTINUED | OUTPATIENT
Start: 2025-02-15 | End: 2025-02-28

## 2025-02-15 RX ORDER — 0.9 % SODIUM CHLORIDE 0.9 %
1000 INTRAVENOUS SOLUTION INTRAVENOUS ONCE
Status: COMPLETED | OUTPATIENT
Start: 2025-02-15 | End: 2025-02-15

## 2025-02-15 RX ORDER — LUBIPROSTONE 8 UG/1
8 CAPSULE ORAL 2 TIMES DAILY WITH MEALS
COMMUNITY

## 2025-02-15 RX ORDER — ERTAPENEM 1 G/1
1000 INJECTION, POWDER, LYOPHILIZED, FOR SOLUTION INTRAMUSCULAR; INTRAVENOUS DAILY
COMMUNITY

## 2025-02-15 RX ORDER — INSULIN LISPRO 100 [IU]/ML
0-4 INJECTION, SOLUTION INTRAVENOUS; SUBCUTANEOUS
Status: DISCONTINUED | OUTPATIENT
Start: 2025-02-15 | End: 2025-02-28

## 2025-02-15 RX ORDER — SODIUM CHLORIDE 450 MG/100ML
INJECTION, SOLUTION INTRAVENOUS CONTINUOUS
Status: DISCONTINUED | OUTPATIENT
Start: 2025-02-15 | End: 2025-02-16

## 2025-02-15 RX ORDER — MAGNESIUM SULFATE IN WATER 40 MG/ML
2000 INJECTION, SOLUTION INTRAVENOUS PRN
Status: DISCONTINUED | OUTPATIENT
Start: 2025-02-15 | End: 2025-02-28

## 2025-02-15 RX ORDER — EZETIMIBE 10 MG/1
10 TABLET ORAL NIGHTLY
Status: DISCONTINUED | OUTPATIENT
Start: 2025-02-16 | End: 2025-02-21

## 2025-02-15 RX ORDER — FEXOFENADINE HCL 60 MG/1
60 TABLET, FILM COATED ORAL 2 TIMES DAILY
COMMUNITY

## 2025-02-15 RX ORDER — DEXTROSE MONOHYDRATE 100 MG/ML
INJECTION, SOLUTION INTRAVENOUS CONTINUOUS PRN
Status: DISCONTINUED | OUTPATIENT
Start: 2025-02-15 | End: 2025-02-28

## 2025-02-15 RX ORDER — AZELASTINE HYDROCHLORIDE 0.5 MG/ML
1 SOLUTION/ DROPS OPHTHALMIC 2 TIMES DAILY PRN
COMMUNITY

## 2025-02-15 RX ORDER — SODIUM CHLORIDE 0.9 % (FLUSH) 0.9 %
5-40 SYRINGE (ML) INJECTION EVERY 12 HOURS SCHEDULED
Status: DISCONTINUED | OUTPATIENT
Start: 2025-02-15 | End: 2025-03-02 | Stop reason: HOSPADM

## 2025-02-15 RX ORDER — POLYETHYLENE GLYCOL 3350 17 G/17G
17 POWDER, FOR SOLUTION ORAL DAILY PRN
Status: DISCONTINUED | OUTPATIENT
Start: 2025-02-15 | End: 2025-02-28

## 2025-02-15 RX ORDER — ACETAMINOPHEN 325 MG/1
650 TABLET ORAL EVERY 6 HOURS PRN
COMMUNITY

## 2025-02-15 RX ORDER — POTASSIUM CHLORIDE 7.45 MG/ML
10 INJECTION INTRAVENOUS PRN
Status: DISCONTINUED | OUTPATIENT
Start: 2025-02-15 | End: 2025-02-28

## 2025-02-15 RX ORDER — CETIRIZINE HYDROCHLORIDE 10 MG/1
10 TABLET ORAL DAILY
Status: DISCONTINUED | OUTPATIENT
Start: 2025-02-16 | End: 2025-02-21

## 2025-02-15 RX ORDER — ONDANSETRON 4 MG/1
4 TABLET, ORALLY DISINTEGRATING ORAL EVERY 8 HOURS PRN
Status: DISCONTINUED | OUTPATIENT
Start: 2025-02-15 | End: 2025-02-28

## 2025-02-15 RX ORDER — ENOXAPARIN SODIUM 100 MG/ML
40 INJECTION SUBCUTANEOUS DAILY
Status: DISCONTINUED | OUTPATIENT
Start: 2025-02-15 | End: 2025-02-15

## 2025-02-15 RX ORDER — LANOLIN ALCOHOL/MO/W.PET/CERES
400 CREAM (GRAM) TOPICAL 2 TIMES DAILY
Status: DISCONTINUED | OUTPATIENT
Start: 2025-02-15 | End: 2025-02-19

## 2025-02-15 RX ORDER — LEVOFLOXACIN 5 MG/ML
750 INJECTION, SOLUTION INTRAVENOUS
Status: DISCONTINUED | OUTPATIENT
Start: 2025-02-15 | End: 2025-02-15

## 2025-02-15 RX ORDER — KETOTIFEN FUMARATE 0.35 MG/ML
1 SOLUTION/ DROPS OPHTHALMIC 2 TIMES DAILY
Status: DISCONTINUED | OUTPATIENT
Start: 2025-02-15 | End: 2025-02-28

## 2025-02-15 RX ORDER — POTASSIUM CHLORIDE 1500 MG/1
40 TABLET, EXTENDED RELEASE ORAL PRN
Status: DISCONTINUED | OUTPATIENT
Start: 2025-02-15 | End: 2025-02-28

## 2025-02-15 RX ORDER — ACETAMINOPHEN 650 MG/1
650 SUPPOSITORY RECTAL EVERY 6 HOURS PRN
Status: DISCONTINUED | OUTPATIENT
Start: 2025-02-15 | End: 2025-03-02 | Stop reason: HOSPADM

## 2025-02-15 RX ORDER — SODIUM CHLORIDE 9 MG/ML
INJECTION, SOLUTION INTRAVENOUS PRN
Status: DISCONTINUED | OUTPATIENT
Start: 2025-02-15 | End: 2025-03-02 | Stop reason: HOSPADM

## 2025-02-15 RX ORDER — IBUPROFEN 600 MG/1
1 TABLET ORAL PRN
Status: DISCONTINUED | OUTPATIENT
Start: 2025-02-15 | End: 2025-02-28

## 2025-02-15 RX ORDER — ACETAMINOPHEN 325 MG/1
650 TABLET ORAL EVERY 6 HOURS PRN
Status: DISCONTINUED | OUTPATIENT
Start: 2025-02-15 | End: 2025-03-02 | Stop reason: HOSPADM

## 2025-02-15 RX ORDER — ATORVASTATIN CALCIUM 40 MG/1
80 TABLET, FILM COATED ORAL DAILY
Status: DISCONTINUED | OUTPATIENT
Start: 2025-02-16 | End: 2025-02-15

## 2025-02-15 RX ORDER — OXYBUTYNIN CHLORIDE 10 MG/1
10 TABLET, EXTENDED RELEASE ORAL DAILY
Status: DISCONTINUED | OUTPATIENT
Start: 2025-02-16 | End: 2025-02-15

## 2025-02-15 RX ORDER — LUBIPROSTONE 8 UG/1
8 CAPSULE ORAL 2 TIMES DAILY WITH MEALS
Status: DISCONTINUED | OUTPATIENT
Start: 2025-02-15 | End: 2025-02-28

## 2025-02-15 RX ORDER — SENNA AND DOCUSATE SODIUM 50; 8.6 MG/1; MG/1
2 TABLET, FILM COATED ORAL DAILY
Status: DISCONTINUED | OUTPATIENT
Start: 2025-02-16 | End: 2025-02-21

## 2025-02-15 RX ADMIN — INSULIN LISPRO 1 UNITS: 100 INJECTION, SOLUTION INTRAVENOUS; SUBCUTANEOUS at 16:36

## 2025-02-15 RX ADMIN — SODIUM CHLORIDE: 4.5 INJECTION, SOLUTION INTRAVENOUS at 18:24

## 2025-02-15 RX ADMIN — SODIUM CHLORIDE: 4.5 INJECTION, SOLUTION INTRAVENOUS at 21:11

## 2025-02-15 RX ADMIN — SODIUM CHLORIDE, PRESERVATIVE FREE 10 ML: 5 INJECTION INTRAVENOUS at 19:36

## 2025-02-15 RX ADMIN — DEXTROSE MONOHYDRATE 500 ML: 50 INJECTION, SOLUTION INTRAVENOUS at 13:21

## 2025-02-15 RX ADMIN — SODIUM CHLORIDE 1000 ML: 9 INJECTION, SOLUTION INTRAVENOUS at 20:02

## 2025-02-15 RX ADMIN — LEVOFLOXACIN 750 MG: 5 INJECTION, SOLUTION INTRAVENOUS at 16:24

## 2025-02-15 RX ADMIN — MEROPENEM 1000 MG: 1 INJECTION INTRAVENOUS at 19:38

## 2025-02-15 NOTE — H&P
Hospitalist History and Physical   Admit Date:  2/15/2025 11:56 AM   Name:  Justin Gabriel   Age:  74 y.o.  Sex:  male  :  1950   MRN:  [id number]   Room:  ER06/    Presenting/Chief Complaint: Shortness of Breath and Altered Mental Status     Reason(s) for Admission: Acute kidney injury (HCC) [N17.9]     History of Present Illness:   Justin Gabriel is a 74 y.o. male with medical history of CVA, hyperlipidemia, constipation, diabetes not on insulin who presented with altered mentation noticed by staff at nursing facility.  Patient was noted to be hypotensive and hypoxemic at nursing facility and patient given nasal cannula oxygen and EMS called.  Patient was reportedly being treated for UTI at nursing facility.    On arrival to the ED, notable labs include sodium 163, creatinine 2.38 (baseline appears less than 1), lactic acid 4.2, troponin 66, blood glucose 233.  Patient noted to be hypotensive on arrival however blood pressure improved with 1 L normal saline.  Patient given 500 cc of D5 in ED given hypernatremia.  Chest x-ray reviewed and shows patchy perihilar haziness which is unchanged from chest x-ray in .  Will monitor every 4 hours BMPs.    Patient evaluated at bedside.  Patient lying in bed.  Patient opens eyes to voice but does not speak.  Patient squeezes hands bilaterally on command.  On 5 L oxygen via nasal cannula.      Assessment & Plan:     Acute metabolic encephalopathy:  -Patient reported baseline is alert and oriented x 3, morning of admission was confused and not speaking at nursing facility prompting ED visit  -In setting of hypernatremia and JALEN, as well as possible UTI   -Monitor for improvement with treatment of above    Hypernatremia:  -Sodium 163 on admission  -1 L normal saline given by EMS, 500 cc D5W given in ED  -Every 4 hours BMP  -Goal sodium correction 6-8 every 24 hours  -Free water deficit on admission 6.9 L    JALEN:  -Creatinine 2.38 on admission (baseline  m (6').    Weight as of 4/2/23: 84.3 kg (185 lb 13.6 oz).  No intake or output data in the 24 hours ending 02/15/25 1432      Physical Exam:  General:    Elderly, frail  Head:  Normocephalic, atraumatic  Eyes:  Sclerae appear normal.  Pupils equally round.  ENT:  Nares appear normal.  Moist oral mucosa  Neck:  No restricted ROM.  Trachea midline   CV:   RRR.  No m/r/g.   Lungs:   Decreased breath sounds bilaterally.  No wheezing, rhonchi, or rales.  Symmetric expansion.  5 L via nasal cannula  Abdomen:   Soft, nontender, nondistended.  Extremities: No cyanosis or clubbing.  No edema  Skin:     No rashes.  Normal coloration.   Warm and dry.    Neuro:  CN II-XII grossly intact.  Squeezes hands bilaterally to command, does not speak, difficult to fully assess given altered mentation      Orders Placed This Encounter   Medications    DISCONTD: sodium chloride 0.9 % bolus 1,000 mL    dextrose 5% bolus 500 mL    sodium chloride flush 0.9 % injection 5-40 mL    sodium chloride flush 0.9 % injection 5-40 mL    0.9 % sodium chloride infusion    OR Linked Order Group     potassium chloride (KLOR-CON M) extended release tablet 40 mEq     potassium bicarb-citric acid (EFFER-K) effervescent tablet 40 mEq     potassium chloride 10 mEq/100 mL IVPB (Peripheral Line)    magnesium sulfate 2000 mg in 50 mL IVPB premix    enoxaparin (LOVENOX) injection 40 mg     Order Specific Question:   Indication of Use     Answer:   Prophylaxis-DVT/PE    OR Linked Order Group     ondansetron (ZOFRAN-ODT) disintegrating tablet 4 mg     ondansetron (ZOFRAN) injection 4 mg    polyethylene glycol (GLYCOLAX) packet 17 g    OR Linked Order Group     acetaminophen (TYLENOL) tablet 650 mg     acetaminophen (TYLENOL) suppository 650 mg       Prior to Admit Medications:  Current Outpatient Medications   Medication Instructions    apixaban (ELIQUIS) 5 MG TABS tablet 10 mg po q12h x5 days then 5 mg q12h po BID    atorvastatin (LIPITOR) 80 mg, Oral, DAILY     choline fenofibrate (TRILIPIX) 135 mg, Oral    dulaglutide (TRULICITY) 0.75 mg, SubCUTAneous, EVERY 7 DAYS    ergocalciferol (ERGOCALCIFEROL) 50,000 Units, Oral, EVERY 7 DAYS    ezetimibe (ZETIA) 10 mg, Oral    L-Methylfolate-B6-B12 3-35-2 MG TABS Oral    Lancets MISC Use twice daily DX: E11.8    linaclotide (LINZESS) 145 mcg, Oral, DAILY BEFORE BREAKFAST    metFORMIN (GLUCOPHAGE-XR) 1,000 mg, Oral, 2 TIMES DAILY    mirabegron (MYRBETRIQ) 50 mg, Oral, DAILY    oxyBUTYnin (DITROPAN-XL) 10 mg, Oral, DAILY    senna-docusate (PERICOLACE) 8.6-50 MG per tablet 2 tablets, Oral, DAILY       I have personally reviewed labs and tests:  Recent Results (from the past 24 hour(s))   CBC with Auto Differential    Collection Time: 02/15/25 12:07 PM   Result Value Ref Range    WBC 10.3 4.3 - 11.1 K/uL    RBC 5.49 4.23 - 5.6 M/uL    Hemoglobin 17.1 13.6 - 17.2 g/dL    Hematocrit 54.4 (H) 41.1 - 50.3 %    MCV 99.1 82.0 - 102.0 FL    MCH 31.1 26.1 - 32.9 PG    MCHC 31.4 31.4 - 35.0 g/dL    RDW 14.6 11.9 - 14.6 %    Platelets 254 150 - 450 K/uL    MPV 11.1 9.4 - 12.3 FL    nRBC 0.00 0.0 - 0.2 K/uL    Differential Type AUTOMATED      Neutrophils % 81.3 (H) 43.0 - 78.0 %    Lymphocytes % 10.4 (L) 13.0 - 44.0 %    Monocytes % 6.1 4.0 - 12.0 %    Eosinophils % 1.3 0.5 - 7.8 %    Basophils % 0.5 0.0 - 2.0 %    Immature Granulocytes % 0.4 0.0 - 5.0 %    Neutrophils Absolute 8.41 (H) 1.70 - 8.20 K/UL    Lymphocytes Absolute 1.07 0.50 - 4.60 K/UL    Monocytes Absolute 0.63 0.10 - 1.30 K/UL    Eosinophils Absolute 0.13 0.00 - 0.80 K/UL    Basophils Absolute 0.05 0.00 - 0.20 K/UL    Immature Granulocytes Absolute 0.04 0.0 - 0.5 K/UL   CMP    Collection Time: 02/15/25 12:07 PM   Result Value Ref Range    Sodium 163 (HH) 136 - 145 mmol/L    Potassium 5.1 3.5 - 5.1 mmol/L    Chloride 123 (H) 98 - 107 mmol/L    CO2 24 20 - 29 mmol/L    Anion Gap 16 7 - 16 mmol/L    Glucose 233 (H) 70 - 99 mg/dL    BUN 66 (H) 8 - 23 MG/DL    Creatinine 2.38 (H) 0.80 -

## 2025-02-15 NOTE — ED NOTES
TRANSFER - OUT REPORT:    Verbal report given to Re on Justin Gabriel  being transferred to [facility] for routine progression of patient care       Report consisted of patient's Situation, Background, Assessment and   Recommendations(SBAR).     Information from the following report(s) Nurse Handoff Report was reviewed with the receiving nurse.    [place] Fall Assessment:                           Lines:   Peripheral IV 02/15/25 Distal;Left Forearm (Active)       Peripheral IV 02/15/25 Right Antecubital (Active)       Peripheral IV 02/15/25 Right Forearm (Active)   Site Assessment Clean, dry & intact 02/15/25 1421   Line Status Blood return noted 02/15/25 1421   Phlebitis Assessment No symptoms 02/15/25 1421   Dressing Status New dressing applied;Clean, dry & intact 02/15/25 1421   Dressing Type Transparent 02/15/25 1421        Opportunity for questions and clarification was provided.      Patient transported with:  Registered Nurse

## 2025-02-15 NOTE — PLAN OF CARE
Problem: Chronic Conditions and Co-morbidities  Goal: Patient's chronic conditions and co-morbidity symptoms are monitored and maintained or improved  Outcome: Progressing     Problem: Discharge Planning  Goal: Discharge to home or other facility with appropriate resources  Outcome: Progressing     Problem: Pain  Goal: Verbalizes/displays adequate comfort level or baseline comfort level  Outcome: Progressing     Problem: Skin/Tissue Integrity  Goal: Skin integrity remains intact  Description: 1.  Monitor for areas of redness and/or skin breakdown  2.  Assess vascular access sites hourly  3.  Every 4-6 hours minimum:  Change oxygen saturation probe site  4.  Every 4-6 hours:  If on nasal continuous positive airway pressure, respiratory therapy assess nares and determine need for appliance change or resting period  Outcome: Progressing     Problem: ABCDS Injury Assessment  Goal: Absence of physical injury  Outcome: Progressing     Problem: Confusion  Goal: Confusion, delirium, dementia, or psychosis is improved or at baseline  Description: INTERVENTIONS:  1. Assess for possible contributors to thought disturbance, including medications, impaired vision or hearing, underlying metabolic abnormalities, dehydration, psychiatric diagnoses, and notify attending LIP  2. [place] high risk fall precautions, as indicated  3. Provide frequent short contacts to provide reality reorientation, refocusing and direction  4. Decrease environmental stimuli, including noise as appropriate  5. Monitor and intervene to maintain adequate nutrition, hydration, elimination, sleep and activity  6. If unable to ensure safety without constant attention obtain sitter and review sitter guidelines with assigned personnel  7. Initiate Psychosocial CNS and Spiritual Care consult, as indicated  Outcome: Progressing     Problem: Safety - Adult  Goal: Free from fall injury  Outcome: Progressing

## 2025-02-15 NOTE — PROGRESS NOTES
This RN was unable to complete patient's admission data base questionnaire due to patient's altered mental status, and inability to communicate or answer questions asked.

## 2025-02-15 NOTE — PROGRESS NOTES
TRANSFER - IN REPORT:    Verbal report received from TREVON Perdue on Justin Gabriel  being received from ED for routine progression of patient care      Report consisted of patient's Situation, Background, Assessment and   Recommendations(SBAR).     Information from the following report(s) ED SBAR was reviewed with the receiving nurse.    Opportunity for questions and clarification was provided.      Assessment completed upon patient's arrival to unit and care assumed.

## 2025-02-15 NOTE — ED TRIAGE NOTES
Last know well was last night 7pm last night    Called for SOB. Staff came to see him this morning and he was not talking to them.     Tx him for UTI     He was 4L NC with EMS on arrival 90%  6L he has been around 94%  -baseline not on O2     Sitting up 70/40  400 mL with fluid 90/60 laying flat.    Sepsis   HR- PVC and irregular heart beats    T96.3  RR24

## 2025-02-15 NOTE — ED PROVIDER NOTES
Emergency Department Provider Note       PCP: Armen Amin Jr., MD   Age: 74 y.o.   Sex: male     DISPOSITION Decision To Admit 02/15/2025 01:24:38 PM    ICD-10-CM    1. Septicemia (HCC)  A41.9       2. Acute renal failure, unspecified acute renal failure type (HCC)  N17.9       3. Hypernatremia  E87.0           Medical Decision Making     Patient is being evaluated for possible sepsis.  He reportedly is being treated for urinary tract infection however he is currently having pulmonary symptoms with a new hypoxia.  Consideration of pneumonia as well as development of sepsis from urinary tract infection that was resistant to what ever antibiotic had upon.  Patient's blood pressures responded to the IV fluids that were hung by EMS.  We will continue those fluids.  Will order additional IV fluids as needed.  Concern for the possibility of volume overload so do not want to give him too much at once.  Will empirically cover with antibiotics.  ED Course as of 02/15/25 1326   Sat Feb 15, 2025   1303 CMP(!!):    Sodium 163(!!)   Potassium 5.1   Chloride 123(!)   CARBON DIOXIDE 24   Anion Gap 16   Glucose 233(!)   BUN,BUNPL 66(!)   Creatinine 2.38(!)   Est, Glom Filt Rate 28(!)   Calcium 9.9   Total Bilirubin 0.8   ALT 21   AST 66(!)   Alkaline Phosphatase 117   Total Protein 7.0   Albumin 3.2   Globulin 3.8(!)   Albumin/Globulin Ratio 0.8(!)  Patient has acute renal failure with creatinine of 2.38.  Sodium is 163.  I ordered the patient D5W. [MINNIE]   1325 Discussed case with the hospitalist.  They will admit for further treatment. [MINNIE]      ED Course User Index  [MINNIE] Kurt Ellis, DO     1 or more acute illnesses that pose a threat to life or bodily function.   Discussion with external consultants.  I independently ordered and reviewed each unique test.       The patients assessment required an independent historian: EMS.  The reason they were needed is  an altered level of consciousness.    I interpreted  stridor. Examination of the right-lower field reveals decreased breath sounds. Examination of the left-lower field reveals decreased breath sounds. Decreased breath sounds present. No wheezing, rhonchi or rales.   Abdominal:      General: Abdomen is flat. Bowel sounds are normal. There is no distension.      Palpations: Abdomen is soft. There is no mass.      Tenderness: There is no abdominal tenderness. There is no guarding or rebound.   Musculoskeletal:         General: No swelling, tenderness, deformity or signs of injury. Normal range of motion.      Cervical back: Normal range of motion and neck supple. No rigidity or tenderness.   Skin:     General: Skin is warm and dry.      Capillary Refill: Capillary refill takes less than 2 seconds.      Coloration: Skin is not jaundiced.      Findings: No bruising.   Neurological:      Mental Status: He is alert. Mental status is at baseline.      Sensory: No sensory deficit.      Comments: Patient awake and alert.  Will focus to voice but does not answer any questions.   Psychiatric:         Thought Content: Thought content normal.         Judgment: Judgment normal.        Procedures     Procedures    Orders placed during this emergency department visit:     Orders Placed This Encounter   Procedures    Blood Culture 1    Blood Culture 2    XR CHEST PORTABLE    CBC with Auto Differential    CMP    Lactate, Sepsis    Urinalysis with Reflex to Culture    Brain Natriuretic Peptide    Troponin    EKG 12 Lead (SOB)        Medications given during this emergency department visit:     Medications   dextrose 5% bolus 500 mL (500 mLs IntraVENous New Bag 2/15/25 1321)       New prescriptions:     New Prescriptions    No medications on file        Past History and Complexity:     Past Medical History:   Diagnosis Date    Alcoholic pancreatitis 03/2008    after mother's death, states he slowed down on his drinking after this episode    Aneurysm (HCC)     3 small of aorta, 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   ml/min/1.73m2    Calcium 9.9 8.8 - 10.2 MG/DL    Total Bilirubin 0.8 0.0 - 1.2 MG/DL    ALT 21 8 - 55 U/L    AST 66 (H) 15 - 37 U/L    Alk Phosphatase 117 40 - 129 U/L    Total Protein 7.0 6.3 - 8.2 g/dL    Albumin 3.2 3.2 - 4.6 g/dL    Globulin 3.8 (H) 2.3 - 3.5 g/dL    Albumin/Globulin Ratio 0.8 (L) 1.0 - 1.9     Lactate, Sepsis   Result Value Ref Range    Lactic Acid, Sepsis 4.2 (HH) 0.5 - 2.0 MMOL/L   Brain Natriuretic Peptide   Result Value Ref Range    NT Pro- 0 - 450 PG/ML   Troponin   Result Value Ref Range    Troponin T 66.0 (H) 0 - 22 ng/L         XR CHEST PORTABLE   Final Result   No acute interval change compared to the prior study of 4/2/2023.         Electronically signed by Rand Confer                   No results for input(s): \"COVID19\" in the last 72 hours.     Voice dictation software was used during the making of this note.  This software is not perfect and grammatical and other typographical errors may be present.  This note has not been completely proofread for errors.      Kurt Ellis,   02/15/25 2479

## 2025-02-15 NOTE — ICUWATCH
RRT Clinical Rounding Nurse Progress Report      SUBJECTIVE: Patient assessed secondary to elevated Deterioration Index Score.      Vitals:    02/15/25 1216 02/15/25 1301 02/15/25 1520 02/15/25 1558   BP: 101/68 100/69  104/66   Pulse: 88 81  80   Resp: 30 14  17   Temp:    97.7 °F (36.5 °C)   TempSrc:    Axillary   SpO2: 95% 91%  94%   Weight:   73 kg (161 lb)         DETERIORATION INDEX SCORE: 75    ASSESSMENT:  Physical Exam  Constitutional:       General: He is awake.      Appearance: He is ill-appearing. He is not toxic-appearing.   Cardiovascular:      Rate and Rhythm: Normal rate and regular rhythm.   Pulmonary:      Effort: Pulmonary effort is normal.      Breath sounds: Normal breath sounds.   Abdominal:      General: Abdomen is flat. Bowel sounds are normal.      Palpations: Abdomen is soft.   Musculoskeletal:      Right lower leg: No edema.      Left lower leg: No edema.        Feet:    Feet:      Comments: Patient has BL heel protectors from facility leading me to believe patient is bed bound at baseline.  Neurological:      Mental Status: He is alert.      GCS: GCS eye subscore is 4. GCS verbal subscore is 1. GCS motor subscore is 6.      Motor: Weakness and atrophy present.      Comments: Baseline mental status unknown. Hx of CVA. Left side appears contracted, but when you pull patients arm and ask him to \"relax it\" he will place it on his left hip.   Psychiatric:         Attention and Perception: He is inattentive.         Mood and Affect: Affect is flat.         Behavior: Behavior is slowed. Behavior is cooperative.         Cognition and Memory: Cognition is impaired.           PLAN:  Will follow per RRT Clinical Rounding Program protocol.    Fortino Sanders RN  [facility]: [phone]  [facility]: [phone]

## 2025-02-16 ENCOUNTER — APPOINTMENT (OUTPATIENT)
Dept: CT IMAGING | Age: 75
DRG: 871 | End: 2025-02-16
Payer: MEDICARE

## 2025-02-16 LAB
ANION GAP SERPL CALC-SCNC: 10 MMOL/L (ref 7–16)
ANION GAP SERPL CALC-SCNC: 11 MMOL/L (ref 7–16)
ANION GAP SERPL CALC-SCNC: 12 MMOL/L (ref 7–16)
ANION GAP SERPL CALC-SCNC: 14 MMOL/L (ref 7–16)
APPEARANCE UR: ABNORMAL
B PERT DNA SPEC QL NAA+PROBE: NOT DETECTED
BACTERIA URNS QL MICRO: ABNORMAL /HPF
BASOPHILS # BLD: 0.03 K/UL (ref 0–0.2)
BASOPHILS NFR BLD: 0.3 % (ref 0–2)
BILIRUB UR QL: NEGATIVE
BORDETELLA PARAPERTUSSIS BY PCR: NOT DETECTED
BUN SERPL-MCNC: 36 MG/DL (ref 8–23)
BUN SERPL-MCNC: 37 MG/DL (ref 8–23)
BUN SERPL-MCNC: 43 MG/DL (ref 8–23)
BUN SERPL-MCNC: 48 MG/DL (ref 8–23)
BUN SERPL-MCNC: 48 MG/DL (ref 8–23)
BUN SERPL-MCNC: 52 MG/DL (ref 8–23)
C PNEUM DNA SPEC QL NAA+PROBE: NOT DETECTED
CALCIUM SERPL-MCNC: 8.7 MG/DL (ref 8.8–10.2)
CALCIUM SERPL-MCNC: 8.8 MG/DL (ref 8.8–10.2)
CALCIUM SERPL-MCNC: 8.8 MG/DL (ref 8.8–10.2)
CALCIUM SERPL-MCNC: 9 MG/DL (ref 8.8–10.2)
CALCIUM SERPL-MCNC: 9.1 MG/DL (ref 8.8–10.2)
CALCIUM SERPL-MCNC: 9.1 MG/DL (ref 8.8–10.2)
CASTS URNS QL MICRO: ABNORMAL /LPF
CHLORIDE SERPL-SCNC: 122 MMOL/L (ref 98–107)
CHLORIDE SERPL-SCNC: 123 MMOL/L (ref 98–107)
CHLORIDE SERPL-SCNC: 123 MMOL/L (ref 98–107)
CO2 SERPL-SCNC: 21 MMOL/L (ref 20–29)
CO2 SERPL-SCNC: 23 MMOL/L (ref 20–29)
CO2 SERPL-SCNC: 24 MMOL/L (ref 20–29)
CO2 SERPL-SCNC: 25 MMOL/L (ref 20–29)
COLOR UR: ABNORMAL
CREAT SERPL-MCNC: 1.03 MG/DL (ref 0.8–1.3)
CREAT SERPL-MCNC: 1.14 MG/DL (ref 0.8–1.3)
CREAT SERPL-MCNC: 1.27 MG/DL (ref 0.8–1.3)
CREAT SERPL-MCNC: 1.28 MG/DL (ref 0.8–1.3)
CREAT SERPL-MCNC: 1.42 MG/DL (ref 0.8–1.3)
CREAT SERPL-MCNC: 1.62 MG/DL (ref 0.8–1.3)
CRYSTALS URNS QL MICRO: ABNORMAL /LPF
DIFFERENTIAL METHOD BLD: ABNORMAL
EKG ATRIAL RATE: 83 BPM
EKG DIAGNOSIS: NORMAL
EKG P AXIS: 32 DEGREES
EKG P-R INTERVAL: 158 MS
EKG Q-T INTERVAL: 398 MS
EKG QRS DURATION: 80 MS
EKG QTC CALCULATION (BAZETT): 467 MS
EKG R AXIS: 9 DEGREES
EKG T AXIS: -32 DEGREES
EKG VENTRICULAR RATE: 83 BPM
EOSINOPHIL # BLD: 0.31 K/UL (ref 0–0.8)
EOSINOPHIL NFR BLD: 3 % (ref 0.5–7.8)
EPI CELLS #/AREA URNS HPF: 0 /HPF
ERYTHROCYTE [DISTWIDTH] IN BLOOD BY AUTOMATED COUNT: 14.5 % (ref 11.9–14.6)
FLUAV SUBTYP SPEC NAA+PROBE: NOT DETECTED
FLUBV RNA SPEC QL NAA+PROBE: NOT DETECTED
GLUCOSE BLD STRIP.AUTO-MCNC: 103 MG/DL (ref 65–100)
GLUCOSE BLD STRIP.AUTO-MCNC: 76 MG/DL (ref 65–100)
GLUCOSE BLD STRIP.AUTO-MCNC: 84 MG/DL (ref 65–100)
GLUCOSE BLD STRIP.AUTO-MCNC: 90 MG/DL (ref 65–100)
GLUCOSE BLD STRIP.AUTO-MCNC: 96 MG/DL (ref 65–100)
GLUCOSE SERPL-MCNC: 102 MG/DL (ref 70–99)
GLUCOSE SERPL-MCNC: 102 MG/DL (ref 70–99)
GLUCOSE SERPL-MCNC: 106 MG/DL (ref 70–99)
GLUCOSE SERPL-MCNC: 84 MG/DL (ref 70–99)
GLUCOSE SERPL-MCNC: 90 MG/DL (ref 70–99)
GLUCOSE SERPL-MCNC: 99 MG/DL (ref 70–99)
GLUCOSE UR STRIP.AUTO-MCNC: NEGATIVE MG/DL
HADV DNA SPEC QL NAA+PROBE: NOT DETECTED
HCOV 229E RNA SPEC QL NAA+PROBE: NOT DETECTED
HCOV HKU1 RNA SPEC QL NAA+PROBE: NOT DETECTED
HCOV NL63 RNA SPEC QL NAA+PROBE: NOT DETECTED
HCOV OC43 RNA SPEC QL NAA+PROBE: NOT DETECTED
HCT VFR BLD AUTO: 44.6 % (ref 41.1–50.3)
HGB BLD-MCNC: 14 G/DL (ref 13.6–17.2)
HGB UR QL STRIP: ABNORMAL
HMPV RNA SPEC QL NAA+PROBE: NOT DETECTED
HPIV1 RNA SPEC QL NAA+PROBE: NOT DETECTED
HPIV2 RNA SPEC QL NAA+PROBE: NOT DETECTED
HPIV3 RNA SPEC QL NAA+PROBE: NOT DETECTED
HPIV4 RNA SPEC QL NAA+PROBE: NOT DETECTED
IMM GRANULOCYTES # BLD AUTO: 0.05 K/UL (ref 0–0.5)
IMM GRANULOCYTES NFR BLD AUTO: 0.5 % (ref 0–5)
KETONES UR QL STRIP.AUTO: ABNORMAL MG/DL
LACTATE SERPL-SCNC: 1.5 MMOL/L (ref 0.5–2)
LEUKOCYTE ESTERASE UR QL STRIP.AUTO: ABNORMAL
LYMPHOCYTES # BLD: 1.01 K/UL (ref 0.5–4.6)
LYMPHOCYTES NFR BLD: 9.7 % (ref 13–44)
M PNEUMO DNA SPEC QL NAA+PROBE: NOT DETECTED
MCH RBC QN AUTO: 31 PG (ref 26.1–32.9)
MCHC RBC AUTO-ENTMCNC: 31.4 G/DL (ref 31.4–35)
MCV RBC AUTO: 98.9 FL (ref 82–102)
MONOCYTES # BLD: 0.54 K/UL (ref 0.1–1.3)
MONOCYTES NFR BLD: 5.2 % (ref 4–12)
MUCOUS THREADS URNS QL MICRO: 0 /LPF
NEUTS SEG # BLD: 8.45 K/UL (ref 1.7–8.2)
NEUTS SEG NFR BLD: 81.3 % (ref 43–78)
NITRITE UR QL STRIP.AUTO: NEGATIVE
NRBC # BLD: 0 K/UL (ref 0–0.2)
PH UR STRIP: 5 (ref 5–9)
PLATELET # BLD AUTO: 190 K/UL (ref 150–450)
PMV BLD AUTO: 11 FL (ref 9.4–12.3)
POTASSIUM SERPL-SCNC: 3.7 MMOL/L (ref 3.5–5.1)
POTASSIUM SERPL-SCNC: 3.8 MMOL/L (ref 3.5–5.1)
POTASSIUM SERPL-SCNC: 3.8 MMOL/L (ref 3.5–5.1)
PROT UR STRIP-MCNC: ABNORMAL MG/DL
RBC # BLD AUTO: 4.51 M/UL (ref 4.23–5.6)
RBC #/AREA URNS HPF: ABNORMAL /HPF
RSV RNA SPEC QL NAA+PROBE: NOT DETECTED
RV+EV RNA SPEC QL NAA+PROBE: NOT DETECTED
SARS-COV-2 RNA RESP QL NAA+PROBE: NOT DETECTED
SERVICE CMNT-IMP: ABNORMAL
SERVICE CMNT-IMP: NORMAL
SODIUM SERPL-SCNC: 156 MMOL/L (ref 136–145)
SODIUM SERPL-SCNC: 157 MMOL/L (ref 136–145)
SODIUM SERPL-SCNC: 158 MMOL/L (ref 136–145)
SP GR UR REFRACTOMETRY: 1.03 (ref 1–1.02)
TROPONIN T SERPL HS-MCNC: 37 NG/L (ref 0–22)
URINE CULTURE IF INDICATED: ABNORMAL
UROBILINOGEN UR QL STRIP.AUTO: 0.2 EU/DL (ref 0.2–1)
WBC # BLD AUTO: 10.4 K/UL (ref 4.3–11.1)
WBC URNS QL MICRO: ABNORMAL /HPF

## 2025-02-16 PROCEDURE — 2500000003 HC RX 250 WO HCPCS: Performed by: STUDENT IN AN ORGANIZED HEALTH CARE EDUCATION/TRAINING PROGRAM

## 2025-02-16 PROCEDURE — 93010 ELECTROCARDIOGRAM REPORT: CPT | Performed by: INTERNAL MEDICINE

## 2025-02-16 PROCEDURE — 71260 CT THORAX DX C+: CPT

## 2025-02-16 PROCEDURE — 2580000003 HC RX 258: Performed by: INTERNAL MEDICINE

## 2025-02-16 PROCEDURE — 2700000000 HC OXYGEN THERAPY PER DAY

## 2025-02-16 PROCEDURE — 2580000003 HC RX 258: Performed by: STUDENT IN AN ORGANIZED HEALTH CARE EDUCATION/TRAINING PROGRAM

## 2025-02-16 PROCEDURE — 0202U NFCT DS 22 TRGT SARS-COV-2: CPT

## 2025-02-16 PROCEDURE — 6360000002 HC RX W HCPCS: Performed by: STUDENT IN AN ORGANIZED HEALTH CARE EDUCATION/TRAINING PROGRAM

## 2025-02-16 PROCEDURE — 51702 INSERT TEMP BLADDER CATH: CPT

## 2025-02-16 PROCEDURE — 85025 COMPLETE CBC W/AUTO DIFF WBC: CPT

## 2025-02-16 PROCEDURE — 81001 URINALYSIS AUTO W/SCOPE: CPT

## 2025-02-16 PROCEDURE — 6370000000 HC RX 637 (ALT 250 FOR IP): Performed by: STUDENT IN AN ORGANIZED HEALTH CARE EDUCATION/TRAINING PROGRAM

## 2025-02-16 PROCEDURE — 94761 N-INVAS EAR/PLS OXIMETRY MLT: CPT

## 2025-02-16 PROCEDURE — 36415 COLL VENOUS BLD VENIPUNCTURE: CPT

## 2025-02-16 PROCEDURE — 6360000004 HC RX CONTRAST MEDICATION: Performed by: INTERNAL MEDICINE

## 2025-02-16 PROCEDURE — 92523 SPEECH SOUND LANG COMPREHEN: CPT

## 2025-02-16 PROCEDURE — 51798 US URINE CAPACITY MEASURE: CPT

## 2025-02-16 PROCEDURE — 2000000000 HC ICU R&B

## 2025-02-16 PROCEDURE — 82962 GLUCOSE BLOOD TEST: CPT

## 2025-02-16 PROCEDURE — 80048 BASIC METABOLIC PNL TOTAL CA: CPT

## 2025-02-16 PROCEDURE — 2580000003 HC RX 258: Performed by: PHYSICIAN ASSISTANT

## 2025-02-16 PROCEDURE — 70450 CT HEAD/BRAIN W/O DYE: CPT

## 2025-02-16 PROCEDURE — 83605 ASSAY OF LACTIC ACID: CPT

## 2025-02-16 PROCEDURE — 87086 URINE CULTURE/COLONY COUNT: CPT

## 2025-02-16 PROCEDURE — 92610 EVALUATE SWALLOWING FUNCTION: CPT

## 2025-02-16 PROCEDURE — 84484 ASSAY OF TROPONIN QUANT: CPT

## 2025-02-16 RX ORDER — 0.9 % SODIUM CHLORIDE 0.9 %
1000 INTRAVENOUS SOLUTION INTRAVENOUS ONCE
Status: COMPLETED | OUTPATIENT
Start: 2025-02-16 | End: 2025-02-16

## 2025-02-16 RX ORDER — NOREPINEPHRINE BITARTRATE 0.02 MG/ML
1-100 INJECTION, SOLUTION INTRAVENOUS CONTINUOUS
Status: DISCONTINUED | OUTPATIENT
Start: 2025-02-16 | End: 2025-02-17

## 2025-02-16 RX ORDER — IOPAMIDOL 755 MG/ML
100 INJECTION, SOLUTION INTRAVASCULAR
Status: COMPLETED | OUTPATIENT
Start: 2025-02-16 | End: 2025-02-16

## 2025-02-16 RX ORDER — DEXTROSE MONOHYDRATE AND SODIUM CHLORIDE 5; .45 G/100ML; G/100ML
INJECTION, SOLUTION INTRAVENOUS CONTINUOUS
Status: DISCONTINUED | OUTPATIENT
Start: 2025-02-16 | End: 2025-02-16

## 2025-02-16 RX ORDER — DEXTROSE MONOHYDRATE AND SODIUM CHLORIDE 5; .9 G/100ML; G/100ML
INJECTION, SOLUTION INTRAVENOUS CONTINUOUS
Status: DISCONTINUED | OUTPATIENT
Start: 2025-02-16 | End: 2025-02-17

## 2025-02-16 RX ADMIN — KETOTIFEN FUMARATE 1 DROP: 0.25 SOLUTION/ DROPS OPHTHALMIC at 08:25

## 2025-02-16 RX ADMIN — MIRTAZAPINE 7.5 MG: 15 TABLET, FILM COATED ORAL at 20:56

## 2025-02-16 RX ADMIN — APIXABAN 5 MG: 5 TABLET, FILM COATED ORAL at 08:24

## 2025-02-16 RX ADMIN — APIXABAN 5 MG: 5 TABLET, FILM COATED ORAL at 20:56

## 2025-02-16 RX ADMIN — KETOTIFEN FUMARATE 1 DROP: 0.25 SOLUTION/ DROPS OPHTHALMIC at 20:56

## 2025-02-16 RX ADMIN — Medication 400 MG: at 20:56

## 2025-02-16 RX ADMIN — SODIUM CHLORIDE, PRESERVATIVE FREE 10 ML: 5 INJECTION INTRAVENOUS at 08:26

## 2025-02-16 RX ADMIN — IOPAMIDOL 100 ML: 755 INJECTION, SOLUTION INTRAVENOUS at 15:46

## 2025-02-16 RX ADMIN — Medication 400 MG: at 08:24

## 2025-02-16 RX ADMIN — EZETIMIBE 10 MG: 10 TABLET ORAL at 20:56

## 2025-02-16 RX ADMIN — MEROPENEM 1000 MG: 1 INJECTION INTRAVENOUS at 08:34

## 2025-02-16 RX ADMIN — CETIRIZINE HYDROCHLORIDE 10 MG: 10 TABLET, FILM COATED ORAL at 08:24

## 2025-02-16 RX ADMIN — DEXTROSE AND SODIUM CHLORIDE: 5; 900 INJECTION, SOLUTION INTRAVENOUS at 16:41

## 2025-02-16 RX ADMIN — SENNOSIDES AND DOCUSATE SODIUM 2 TABLET: 50; 8.6 TABLET ORAL at 08:24

## 2025-02-16 RX ADMIN — DEXTROSE AND SODIUM CHLORIDE: 5; 900 INJECTION, SOLUTION INTRAVENOUS at 20:27

## 2025-02-16 RX ADMIN — MEROPENEM 1000 MG: 1 INJECTION INTRAVENOUS at 20:27

## 2025-02-16 RX ADMIN — SODIUM CHLORIDE 1000 ML: 9 INJECTION, SOLUTION INTRAVENOUS at 14:23

## 2025-02-16 NOTE — PROGRESS NOTES
02/16/25 0815   Oxygen Therapy/Pulse Ox   O2 Therapy Oxygen   $Oxygen $Daily Charge   O2 Device Nasal cannula   O2 Flow Rate (L/min) 5 L/min  (weaned to 3L)   Pulse 70   SpO2 97 %   Pulse Oximeter Device Mode Intermittent   Pulse Oximeter Device Location Finger   $Pulse Oximeter $Spot check (multiple/continuous)

## 2025-02-16 NOTE — PROGRESS NOTES
2121  Patient has been unable to void since arriving to med-surg. Bladder scan showed 299 ml. MD notified. Per MD Chyna recheck bladder scan in 2 hours and straight cath if volume is more than 400 ml.    2/16/25 0209  MD notified of bladder scan results. New orders received to place shepherd catheter.

## 2025-02-16 NOTE — PROGRESS NOTES
Hospitalist Rapid Response or Critical Care Event   Admit Date:  2/15/2025 11:56 AM   Name:  Justin Gabreil   Age:  74 y.o.  Sex:  male  :  1950   MRN:  [id number]   Room:  [facility]    Presenting Complaint: Shortness of Breath and Altered Mental Status    Reason(s) for Admission: Hypernatremia [E87.0]  Septicemia (HCC) [A41.9]  Acute kidney injury (HCC) [N17.9]  Acute renal failure, unspecified acute renal failure type (HCC) [N17.9]     Rapid Response or Critical Care Event:   The patient has been having hypotension since this morning.  He has received multiple boluses with improvement in blood pressure, that was then dropped back down to MAP <65.  I was paged about a blood pressure of 77/61 so started D5-0.9% saline at 125 mL/h and gave a bolus.  Blood pressure initially came up to 96/68, but dropped back down within the hour.  The patient is more alert than earlier this morning, but seems confused.  Denies lightheadedness.  Denies chest pain.    At this point, I will start the patient on Levophed to keep MAP >65 move to the ICU.  We will have to monitor him closely.      There is a high probability of acute organ impairment or life-threatening deterioration in the patient's condition from:   Hypovolemic shock    Critical care and other interventions:   Fluid boluses  Levophed infusion    Total critical care time spent: 31 minutes.      Advance Care Planning note done:  Patient is a DNR.  I tried calling his HC-POA, Yamini, but she did not .      Objective:   Patient Vitals for the past 24 hrs:   Temp Pulse Resp BP SpO2   25 1719 -- 88 20 (!) 77/64 94 %   25 1708 -- 86 -- (!) 80/59 93 %   25 1614 97.7 °F (36.5 °C) 83 16 96/68 96 %   25 1415 97.9 °F (36.6 °C) 86 18 (!) 77/61 --   25 0815 -- 70 -- -- 97 %   25 0723 97.5 °F (36.4 °C) 76 18 (!) 82/59 96 %   25 0516 97.9 °F (36.6 °C) 79 16 92/63 94 %   25 0101 97.9 °F (36.6 °C) 80 20 (!) 83/66 94 %  0-4 Units SubCUTAneous 4x Daily AC & HS    glucose chewable tablet 16 g  4 tablet Oral PRN    dextrose bolus 10% 125 mL  125 mL IntraVENous PRN    Or    dextrose bolus 10% 250 mL  250 mL IntraVENous PRN    Glucagon Emergency KIT 1 mg  1 mg SubCUTAneous PRN    dextrose 10 % infusion   IntraVENous Continuous PRN    ketotifen fumarate (ZADITOR) 0.035 % ophthalmic solution 1 drop  1 drop Both Eyes BID    cetirizine (ZYRTEC) tablet 10 mg  10 mg Oral Daily    lubiprostone (AMITIZA) capsule 8 mcg  (Patient Supplied)  8 mcg Oral BID WC    magnesium oxide (MAG-OX) tablet 400 mg  400 mg Oral BID    mirtazapine (REMERON) tablet 7.5 mg  7.5 mg Oral Nightly    meropenem (MERREM) 1,000 mg in sodium chloride 0.9 % 100 mL IVPB (mini-bag)  1,000 mg IntraVENous Q12H       Signed:  Jennifer Rodriguez DO

## 2025-02-16 NOTE — CARE COORDINATION
RNCM met with patient in room 348 to discuss discharge planning. Patient unable to provide assessment details. RNCM contacted Yamini Rosenberg and Venkat Thakkar, friends of patient [phone]. Please see HCPOA ACP documents.      Patient resides at [facility]. Patient is wheelchair bound at baseline. Yamini and Venkat report inability to visit patient often lately and were unsure of particulars of patient's current baseline status. Venkat states patient was ambulatory three years ago and has declined since that time.    Demographics verified. Patient has Medicare and [facility] insurance.  PCP is Dr. Armen Amin.    Patient will require transportation assistance at discharge.  PT/OT evaluations pending. Case management will continue to follow.  Please notify if there are any changes.          02/16/25 3653   Service Assessment   Patient Orientation Unable to Assess   Cognition Severely Impaired   History Provided By Friend   Primary Caregiver Other (Comment)  ([facility] Staff)   Accompanied By/Relationship n/a   Support Systems Friends/Neighbors   Patient's Healthcare Decision Maker is: Legal Next of Kin  (Yamini Rosenberg, friend, [phone])   PCP Verified by CM Yes  (Primarly seen by staff at [facility] currently, but PCP still follows)   Prior Functional Level Assistance with the following:;Bathing;Dressing;Toileting;Feeding;Cooking;Housework;Shopping;Mobility   Current Functional Level Assistance with the following:;Bathing;Dressing;Toileting;Feeding;Cooking;Housework;Shopping;Mobility   Can patient return to prior living arrangement Yes   Financial Resources Medicare;Other (Comment)  ([facility])   Community Resources None   CM/SW Referral Other (see comment)  (discharge planning)   Social/Functional History   Lives With Other (Comment)  ([facility])   Occupation Retired   Discharge Planning   Type of Residence Assisted living   Services At/After Discharge   Transition of Care Consult (CM Consult) Discharge  Planning   Mode of Transport at Discharge BLS   Condition of Participation: Discharge Planning   The Plan for Transition of Care is related to the following treatment goals: return to [facility] for continued supportive care

## 2025-02-16 NOTE — PROGRESS NOTES
Hospitalist Progress Note   Admit Date:  2/15/2025 11:56 AM   Name:  Justin Gabriel   Age:  74 y.o.  Sex:  male  :  1950   MRN:  [id number]   Room:  [facility]/    Presenting/Chief Complaint: Shortness of Breath and Altered Mental Status     Reason(s) for Admission: Hypernatremia [E87.0]  Septicemia (HCC) [A41.9]  Acute kidney injury (HCC) [N17.9]  Acute renal failure, unspecified acute renal failure type (HCC) [N17.9]     Hospital Course:   Justin Gabriel is a 74 y.o. male with medical history of CVA with residual left-sided hemiplegia, hyperlipidemia, recurrent constipation, DM2, hypertension, hx of recurrent UTIs who was sent in from NH secondary to increased lethargy, hypotension and hypoxia.  Patient was actively getting treated for another UTI at NH.    In ER, VS with T97.9, HR 70, RR 24, BP 87/66 -> 101/68, 92% on 5L via NC.  Labwork remarkable for sodium 163, creatinine 2.38 (baseline appears less than 1), lactic acid 4.2, troponin 66, blood glucose 233.  S/p 1L NS bolus with improved BP ranges; Hospitalist consulted for inpatient admission.    Subjective & 24hr Events:   \"I'm at the hospital.\"  Tired-appearing 74y.o. WM laying in bed in NAD    Unable to fully review systems with this encephalopathic patient      Assessment & Plan:     Principal Problem:    Acute kidney injury (HCC)  - pre-renal; secondary to dehydration  - renal fxn improved with IVF  - holding nephrotoxic agents     Active Problems:    Hypernatremia  - secondary to dehydration  - slowly improving; Na+ 163 -> 157      Sepsis  - hypotension with lactic acidosis, hypothermia and possible acute cystitis  - cont IVF   - LA flattened  - cont empiric abx  - ucx / bcx results pending       Acute hypoxic respiratory failure  - cxray without acute pulmonary edema or infiltrates  - CT chest PE study now  - cont wean supplemental o2 as tolerated      Rule out acute cystitis   - f/u ucx  - cont empiric merrem      Hx of CVA / cognitive    Intake 500 ml   Output 980 ml   Net -480 ml         Physical Exam:     General:    Frail appearing male laying in bed in NAD  Head:  Temporal atrophy, atraumatic  Eyes:  Sclerae appear normal.  Pupils equally round.  ENT:  Nares appear normal.  Dry oral mucosa  Neck:  Trachea midline   CV:   RRR.  No gross murmur  Lungs:   CTAB.  No wheezing, rhonchi, or rales.  Symmetric expansion.  Abdomen:   Soft, nontender, nondistended. +BSx4  Extremities: Weaker LUE compared to RUE; + mvmt x 4   Skin:     No rashes.  Normal coloration.   Warm and dry.    Neuro:  Awake, Ox2; residual left sided deficits; no gross seizure activities   Psych:  Flat ffect.      I have personally reviewed labs and tests:  Recent Labs:  Recent Results (from the past 48 hour(s))   CBC with Auto Differential    Collection Time: 02/15/25 12:07 PM   Result Value Ref Range    WBC 10.3 4.3 - 11.1 K/uL    RBC 5.49 4.23 - 5.6 M/uL    Hemoglobin 17.1 13.6 - 17.2 g/dL    Hematocrit 54.4 (H) 41.1 - 50.3 %    MCV 99.1 82.0 - 102.0 FL    MCH 31.1 26.1 - 32.9 PG    MCHC 31.4 31.4 - 35.0 g/dL    RDW 14.6 11.9 - 14.6 %    Platelets 254 150 - 450 K/uL    MPV 11.1 9.4 - 12.3 FL    nRBC 0.00 0.0 - 0.2 K/uL    Differential Type AUTOMATED      Neutrophils % 81.3 (H) 43.0 - 78.0 %    Lymphocytes % 10.4 (L) 13.0 - 44.0 %    Monocytes % 6.1 4.0 - 12.0 %    Eosinophils % 1.3 0.5 - 7.8 %    Basophils % 0.5 0.0 - 2.0 %    Immature Granulocytes % 0.4 0.0 - 5.0 %    Neutrophils Absolute 8.41 (H) 1.70 - 8.20 K/UL    Lymphocytes Absolute 1.07 0.50 - 4.60 K/UL    Monocytes Absolute 0.63 0.10 - 1.30 K/UL    Eosinophils Absolute 0.13 0.00 - 0.80 K/UL    Basophils Absolute 0.05 0.00 - 0.20 K/UL    Immature Granulocytes Absolute 0.04 0.0 - 0.5 K/UL   CMP    Collection Time: 02/15/25 12:07 PM   Result Value Ref Range    Sodium 163 (HH) 136 - 145 mmol/L    Potassium 5.1 3.5 - 5.1 mmol/L    Chloride 123 (H) 98 - 107 mmol/L    CO2 24 20 - 29 mmol/L    Anion Gap 16 7 - 16 mmol/L

## 2025-02-16 NOTE — ICUWATCH
RRT Clinical Rounding Nurse Update    Vitals:    02/15/25 2112 02/16/25 0101 02/16/25 0516 02/16/25 0723   BP: 96/70 (!) 83/66 92/63 (!) 82/59   Pulse: 81 80 79 76   Resp:  20 16 18   Temp:  97.9 °F (36.6 °C) 97.9 °F (36.6 °C) 97.5 °F (36.4 °C)   TempSrc:  Axillary Axillary Axillary   SpO2: 94% 94% 94% 96%   Weight:       Height:            DETERIORATION INDEX SCORE: 87    ASSESSMENT:  Previous outreach assessment was reviewed. There have been no significant changes since previous assessment. Patient sodium trending down, but still significantly elevated. Patients baseline mentation still unknown. Resting comfortably at this time. LUE completely relaxed, and outstretched. Does not appear to be contracted. Patient in poor health overall.     PLAN:  Will follow per RRT Clinical Rounding Program protocol.    Fortino Sanders RN  [facility]: [phone]  [facility]: [phone]

## 2025-02-16 NOTE — PROGRESS NOTES
Patient's family member Venkat Garrison called, stating that both his wife himself are patient's decision makers/power of , and that he has the documents. He confirmed his phone number as [phone] and requested to be called if needed.

## 2025-02-16 NOTE — PLAN OF CARE
Problem: Chronic Conditions and Co-morbidities  Goal: Patient's chronic conditions and co-morbidity symptoms are monitored and maintained or improved  Outcome: Progressing     Problem: Discharge Planning  Goal: Discharge to home or other facility with appropriate resources  Outcome: Progressing     Problem: Pain  Goal: Verbalizes/displays adequate comfort level or baseline comfort level  Outcome: Progressing     Problem: Skin/Tissue Integrity  Goal: Skin integrity remains intact  Description: 1.  Monitor for areas of redness and/or skin breakdown  2.  Assess vascular access sites hourly  3.  Every 4-6 hours minimum:  Change oxygen saturation probe site  4.  Every 4-6 hours:  If on nasal continuous positive airway pressure, respiratory therapy assess nares and determine need for appliance change or resting period  Outcome: Progressing     Problem: ABCDS Injury Assessment  Goal: Absence of physical injury  Outcome: Progressing     Problem: Confusion  Goal: Confusion, delirium, dementia, or psychosis is improved or at baseline  Description: INTERVENTIONS:  1. Assess for possible contributors to thought disturbance, including medications, impaired vision or hearing, underlying metabolic abnormalities, dehydration, psychiatric diagnoses, and notify attending LIP  2. [place] high risk fall precautions, as indicated  3. Provide frequent short contacts to provide reality reorientation, refocusing and direction  4. Decrease environmental stimuli, including noise as appropriate  5. Monitor and intervene to maintain adequate nutrition, hydration, elimination, sleep and activity  6. If unable to ensure safety without constant attention obtain sitter and review sitter guidelines with assigned personnel  7. Initiate Psychosocial CNS and Spiritual Care consult, as indicated  Outcome: Progressing     Problem: Safety - Adult  Goal: Free from fall injury  Outcome: Progressing

## 2025-02-16 NOTE — PROGRESS NOTES
PM vitals taken include BP 84/62, HR 83 BPM, and MAP 69. Most recent NA+ level was 158 at 1605. MD Chyna notified of all above information and new orders received for NS 1 L bolus to be given over 1 hour.

## 2025-02-16 NOTE — PROGRESS NOTES
GOALS:  LTG: Patient will tolerate least restrictive diet without overt signs or symptoms of airway compromise.   STG: Patient will tolerate puree and mildly thick/nectar thick liquids without overt signs or symptoms of airway compromise.   STG: Patient will participate in modified barium swallow study as clinically indicated.      LTG: Patient will improve cognitive- communicative function to perform daily activities at highest possible level.   STG: Patient will answer open ended questions related to self/status with 90% accuracy given min A.   STG: Patient will sustain attention to therapy task for 10 minute interval with min A provided.     SPEECH LANGUAGE PATHOLOGY: COMBINED Dysphagia and Cognitive- Communicative Initial Assessment    Acknowledge Order  I  Therapy Time  I   Charges     I  Rehab Caseload Tracker    NAME: Justin Gabriel  : 1950  MRN: [id number]    ADMISSION DATE: 2/15/2025  PRIMARY DIAGNOSIS: Acute kidney injury (HCC)    ICD-10: Treatment Diagnosis: R13.12 Dysphagia, Oropharyngeal Phase  R41.841 Cognitive-Communication Deficit  F80.2 Mixed Receptive-Expressive Language Disorder    RECOMMENDATIONS   Diet:    Pureed  Mildly Thick Liquids (Nectar)    Medication:  Crushed, as medically appropriate. Or provided individually whole in puree bolus.    Compensatory Swallowing Strategies:   Assist feed  Small bites/sips  Upright as possible for all oral intake   Therapeutic Intervention:   Patient/family education  Dysphagia treatment  Language treatment  Cognitive-linguistic treatment   Patient continues to require skilled intervention:  Yes. Recommend ongoing speech therapy services during this hospitalization.     Anticipated Discharge Needs: Ongoing speech therapy is recommended at next level of care.      ASSESSMENT    Dysphagia: Patient does exhibit moderate oral and suspect pharyngeal phase dysphagia. Function further impacted by patient's altered mental status. Recommend puree diet with  Potential For Stated Goals: Good    Interdisciplinary Collaboration: RN/ PCT    Medical Necessity    Skilled intervention continues to be required due to medical complications.    Education:   Patient and/or family/caregiver educated on Results of evaluation, Role of speech therapy, Diet recommendations, SLP recommendations, and SLP plan  Education response: Needs reinforcement    Safety:   Call light within reach  In Bed  RN notified     Therapy Time:  Time In: 1255  Time Out: 1319  Minutes: 24    JOELLE BOCANEGRA, SLP  2/16/2025 2:11 PM

## 2025-02-16 NOTE — ACP (ADVANCE CARE PLANNING)
Advance Care Planning Note   Admit Date:  2/15/2025 11:56 AM   Name:  Jusitn Gabriel   Age:  74 y.o.  Sex:  male  :  1950   MRN:  [id number]   Room:  [facility]/01    Justin Gabriel has capacity to make his own decisions:   No    If pt unable to make decisions, POA/surrogate decision maker:  Yamini Chet  [phone]     Other people present:   Venkat Rosenberg (spouse of Yamini Rosenberg)   [phone]     Patient / surrogate decision-maker directed code status:  DNR/DNI    Other ACP topics discussed, if applicable:   We discussed patient with sepsis picture, possibly from acute cystitis though acute respiratory process need to be ruled out.  Further discussed possible need for ICU transfer if sepsis progressed to septic shock (if not responsive to IVF resuscitation) and POA agreed with plans to treat as aggressively as possible within the provisions of DNR.  If patient continued to declined despite medical intervention and worsened quality of life, we did discussed possibility of hospice or comfort measures.      Patient or surrogate consented to discussion of the current conditions, workup, management plans, prognosis, and the risk for further deterioration.  Time spent: 25 minutes in direct discussion.      Signed:  DARYL Oneal

## 2025-02-17 ENCOUNTER — APPOINTMENT (OUTPATIENT)
Dept: GENERAL RADIOLOGY | Age: 75
DRG: 871 | End: 2025-02-17
Payer: MEDICARE

## 2025-02-17 PROBLEM — F09 COGNITIVE DISORDER: Status: ACTIVE | Noted: 2025-02-17

## 2025-02-17 PROBLEM — E87.6 HYPOKALEMIA: Status: ACTIVE | Noted: 2025-02-17

## 2025-02-17 PROBLEM — Z66 DNR (DO NOT RESUSCITATE): Status: ACTIVE | Noted: 2025-02-17

## 2025-02-17 PROBLEM — E87.0 ACUTE HYPERNATREMIA: Status: ACTIVE | Noted: 2025-02-17

## 2025-02-17 PROBLEM — R90.89 MIDLINE SHIFT OF BRAIN: Status: ACTIVE | Noted: 2025-02-17

## 2025-02-17 PROBLEM — R57.1 HYPOVOLEMIC SHOCK (HCC): Status: ACTIVE | Noted: 2025-02-17

## 2025-02-17 PROBLEM — E87.20 LACTIC ACIDOSIS: Status: ACTIVE | Noted: 2025-02-17

## 2025-02-17 LAB
AMMONIA PLAS-SCNC: 48 UMOL/L (ref 16–60)
ANION GAP SERPL CALC-SCNC: 11 MMOL/L (ref 7–16)
ANION GAP SERPL CALC-SCNC: 7 MMOL/L (ref 7–16)
ANION GAP SERPL CALC-SCNC: 8 MMOL/L (ref 7–16)
ANION GAP SERPL CALC-SCNC: 9 MMOL/L (ref 7–16)
BUN SERPL-MCNC: 20 MG/DL (ref 8–23)
BUN SERPL-MCNC: 23 MG/DL (ref 8–23)
BUN SERPL-MCNC: 24 MG/DL (ref 8–23)
BUN SERPL-MCNC: 27 MG/DL (ref 8–23)
BUN SERPL-MCNC: 27 MG/DL (ref 8–23)
BUN SERPL-MCNC: 30 MG/DL (ref 8–23)
CALCIUM SERPL-MCNC: 8.8 MG/DL (ref 8.8–10.2)
CALCIUM SERPL-MCNC: 8.9 MG/DL (ref 8.8–10.2)
CALCIUM SERPL-MCNC: 8.9 MG/DL (ref 8.8–10.2)
CHLORIDE SERPL-SCNC: 124 MMOL/L (ref 98–107)
CHLORIDE SERPL-SCNC: 125 MMOL/L (ref 98–107)
CHLORIDE SERPL-SCNC: 126 MMOL/L (ref 98–107)
CHLORIDE SERPL-SCNC: 126 MMOL/L (ref 98–107)
CO2 SERPL-SCNC: 19 MMOL/L (ref 20–29)
CO2 SERPL-SCNC: 21 MMOL/L (ref 20–29)
CO2 SERPL-SCNC: 22 MMOL/L (ref 20–29)
CO2 SERPL-SCNC: 24 MMOL/L (ref 20–29)
CREAT SERPL-MCNC: 0.7 MG/DL (ref 0.8–1.3)
CREAT SERPL-MCNC: 0.74 MG/DL (ref 0.8–1.3)
CREAT SERPL-MCNC: 0.77 MG/DL (ref 0.8–1.3)
CREAT SERPL-MCNC: 0.85 MG/DL (ref 0.8–1.3)
CREAT SERPL-MCNC: 0.85 MG/DL (ref 0.8–1.3)
CREAT SERPL-MCNC: 0.91 MG/DL (ref 0.8–1.3)
ERYTHROCYTE [DISTWIDTH] IN BLOOD BY AUTOMATED COUNT: 14.2 % (ref 11.9–14.6)
EST. AVERAGE GLUCOSE BLD GHB EST-MCNC: 133 MG/DL
GLUCOSE BLD STRIP.AUTO-MCNC: 102 MG/DL (ref 65–100)
GLUCOSE BLD STRIP.AUTO-MCNC: 111 MG/DL (ref 65–100)
GLUCOSE BLD STRIP.AUTO-MCNC: 117 MG/DL (ref 65–100)
GLUCOSE BLD STRIP.AUTO-MCNC: 170 MG/DL (ref 65–100)
GLUCOSE BLD STRIP.AUTO-MCNC: 200 MG/DL (ref 65–100)
GLUCOSE SERPL-MCNC: 106 MG/DL (ref 70–99)
GLUCOSE SERPL-MCNC: 111 MG/DL (ref 70–99)
GLUCOSE SERPL-MCNC: 136 MG/DL (ref 70–99)
GLUCOSE SERPL-MCNC: 157 MG/DL (ref 70–99)
GLUCOSE SERPL-MCNC: 169 MG/DL (ref 70–99)
GLUCOSE SERPL-MCNC: 218 MG/DL (ref 70–99)
HBA1C MFR BLD: 6.3 % (ref 0–5.6)
HCT VFR BLD AUTO: 40.4 % (ref 41.1–50.3)
HGB BLD-MCNC: 12.7 G/DL (ref 13.6–17.2)
MCH RBC QN AUTO: 31.9 PG (ref 26.1–32.9)
MCHC RBC AUTO-ENTMCNC: 31.4 G/DL (ref 31.4–35)
MCV RBC AUTO: 101.5 FL (ref 82–102)
NRBC # BLD: 0 K/UL (ref 0–0.2)
PLATELET # BLD AUTO: 167 K/UL (ref 150–450)
PMV BLD AUTO: 11.6 FL (ref 9.4–12.3)
POTASSIUM SERPL-SCNC: 3.2 MMOL/L (ref 3.5–5.1)
POTASSIUM SERPL-SCNC: 3.4 MMOL/L (ref 3.5–5.1)
POTASSIUM SERPL-SCNC: 3.5 MMOL/L (ref 3.5–5.1)
POTASSIUM SERPL-SCNC: 3.7 MMOL/L (ref 3.5–5.1)
POTASSIUM SERPL-SCNC: 3.8 MMOL/L (ref 3.5–5.1)
POTASSIUM SERPL-SCNC: 3.9 MMOL/L (ref 3.5–5.1)
RBC # BLD AUTO: 3.98 M/UL (ref 4.23–5.6)
SERVICE CMNT-IMP: ABNORMAL
SODIUM SERPL-SCNC: 155 MMOL/L (ref 136–145)
SODIUM SERPL-SCNC: 155 MMOL/L (ref 136–145)
SODIUM SERPL-SCNC: 157 MMOL/L (ref 136–145)
T4 FREE SERPL-MCNC: 1.1 NG/DL (ref 0.9–1.7)
TSH, 3RD GENERATION: 0.62 UIU/ML (ref 0.27–4.2)
WBC # BLD AUTO: 7.3 K/UL (ref 4.3–11.1)

## 2025-02-17 PROCEDURE — 6370000000 HC RX 637 (ALT 250 FOR IP): Performed by: STUDENT IN AN ORGANIZED HEALTH CARE EDUCATION/TRAINING PROGRAM

## 2025-02-17 PROCEDURE — 2500000003 HC RX 250 WO HCPCS: Performed by: STUDENT IN AN ORGANIZED HEALTH CARE EDUCATION/TRAINING PROGRAM

## 2025-02-17 PROCEDURE — 84443 ASSAY THYROID STIM HORMONE: CPT

## 2025-02-17 PROCEDURE — 71045 X-RAY EXAM CHEST 1 VIEW: CPT

## 2025-02-17 PROCEDURE — 83036 HEMOGLOBIN GLYCOSYLATED A1C: CPT

## 2025-02-17 PROCEDURE — 2700000000 HC OXYGEN THERAPY PER DAY

## 2025-02-17 PROCEDURE — 80048 BASIC METABOLIC PNL TOTAL CA: CPT

## 2025-02-17 PROCEDURE — 82140 ASSAY OF AMMONIA: CPT

## 2025-02-17 PROCEDURE — 97166 OT EVAL MOD COMPLEX 45 MIN: CPT

## 2025-02-17 PROCEDURE — 6360000002 HC RX W HCPCS: Performed by: STUDENT IN AN ORGANIZED HEALTH CARE EDUCATION/TRAINING PROGRAM

## 2025-02-17 PROCEDURE — 82962 GLUCOSE BLOOD TEST: CPT

## 2025-02-17 PROCEDURE — 2580000003 HC RX 258: Performed by: STUDENT IN AN ORGANIZED HEALTH CARE EDUCATION/TRAINING PROGRAM

## 2025-02-17 PROCEDURE — 97530 THERAPEUTIC ACTIVITIES: CPT

## 2025-02-17 PROCEDURE — 94761 N-INVAS EAR/PLS OXIMETRY MLT: CPT

## 2025-02-17 PROCEDURE — 2500000003 HC RX 250 WO HCPCS: Performed by: INTERNAL MEDICINE

## 2025-02-17 PROCEDURE — 84439 ASSAY OF FREE THYROXINE: CPT

## 2025-02-17 PROCEDURE — 2000000000 HC ICU R&B

## 2025-02-17 PROCEDURE — 85027 COMPLETE CBC AUTOMATED: CPT

## 2025-02-17 PROCEDURE — 2580000003 HC RX 258: Performed by: INTERNAL MEDICINE

## 2025-02-17 PROCEDURE — 97161 PT EVAL LOW COMPLEX 20 MIN: CPT

## 2025-02-17 PROCEDURE — 36415 COLL VENOUS BLD VENIPUNCTURE: CPT

## 2025-02-17 RX ORDER — DEXTROSE MONOHYDRATE AND SODIUM CHLORIDE 5; .45 G/100ML; G/100ML
INJECTION, SOLUTION INTRAVENOUS CONTINUOUS
Status: DISCONTINUED | OUTPATIENT
Start: 2025-02-17 | End: 2025-02-18

## 2025-02-17 RX ORDER — NOREPINEPHRINE BITARTRATE 0.02 MG/ML
2-100 INJECTION, SOLUTION INTRAVENOUS CONTINUOUS
Status: DISCONTINUED | OUTPATIENT
Start: 2025-02-17 | End: 2025-02-17

## 2025-02-17 RX ADMIN — MEROPENEM 1000 MG: 1 INJECTION INTRAVENOUS at 07:50

## 2025-02-17 RX ADMIN — EZETIMIBE 10 MG: 10 TABLET ORAL at 21:06

## 2025-02-17 RX ADMIN — APIXABAN 5 MG: 5 TABLET, FILM COATED ORAL at 21:06

## 2025-02-17 RX ADMIN — INSULIN LISPRO 1 UNITS: 100 INJECTION, SOLUTION INTRAVENOUS; SUBCUTANEOUS at 07:39

## 2025-02-17 RX ADMIN — DEXTROSE AND SODIUM CHLORIDE: 5; 450 INJECTION, SOLUTION INTRAVENOUS at 23:05

## 2025-02-17 RX ADMIN — MIRTAZAPINE 7.5 MG: 15 TABLET, FILM COATED ORAL at 21:06

## 2025-02-17 RX ADMIN — CETIRIZINE HYDROCHLORIDE 10 MG: 10 TABLET, FILM COATED ORAL at 10:00

## 2025-02-17 RX ADMIN — KETOTIFEN FUMARATE 1 DROP: 0.25 SOLUTION/ DROPS OPHTHALMIC at 10:00

## 2025-02-17 RX ADMIN — KETOTIFEN FUMARATE 1 DROP: 0.25 SOLUTION/ DROPS OPHTHALMIC at 21:06

## 2025-02-17 RX ADMIN — DEXTROSE AND SODIUM CHLORIDE: 5; 900 INJECTION, SOLUTION INTRAVENOUS at 02:01

## 2025-02-17 RX ADMIN — SODIUM CHLORIDE, PRESERVATIVE FREE 10 ML: 5 INJECTION INTRAVENOUS at 21:07

## 2025-02-17 RX ADMIN — DEXTROSE AND SODIUM CHLORIDE: 5; 900 INJECTION, SOLUTION INTRAVENOUS at 19:03

## 2025-02-17 RX ADMIN — Medication 400 MG: at 21:06

## 2025-02-17 RX ADMIN — SENNOSIDES AND DOCUSATE SODIUM 2 TABLET: 50; 8.6 TABLET ORAL at 09:59

## 2025-02-17 RX ADMIN — POTASSIUM CHLORIDE 40 MEQ: 1500 TABLET, EXTENDED RELEASE ORAL at 16:53

## 2025-02-17 RX ADMIN — Medication 400 MG: at 10:00

## 2025-02-17 RX ADMIN — NOREPINEPHRINE BITARTRATE 5 MCG/MIN: 16 SOLUTION INTRAVENOUS at 04:31

## 2025-02-17 RX ADMIN — DEXTROSE AND SODIUM CHLORIDE: 5; 900 INJECTION, SOLUTION INTRAVENOUS at 10:30

## 2025-02-17 RX ADMIN — APIXABAN 5 MG: 5 TABLET, FILM COATED ORAL at 10:00

## 2025-02-17 ASSESSMENT — PAIN SCALES - GENERAL
PAINLEVEL_OUTOF10: 0

## 2025-02-17 NOTE — PROGRESS NOTES
Spiritual Health History and Assessment/Progress Note  [facility]    Initial Encounter, Interdisciplinary rounds,  ,  ,      Name: Justin Gabriel MRN: [id number]    Age: 74 y.o.     Sex: male   Language: English   Baptist: Sikh   Acute kidney injury (HCC)     Date: 2/17/2025            Total Time Calculated: 10 min              Spiritual Assessment began in SFE 3 ICU            Encounter Overview/Reason: Initial Encounter, Interdisciplinary rounds  Service Provided For: Patient    Jana, Belief, Meaning:   Patient unable to assess at this time  Family/Friends No family/friends present      Importance and Influence:  Patient unable to assess at this time  Family/Friends No family/friends present    Community:  Patient Other: n/a  Family/Friends No family/friends present    Assessment and Plan of Care:     Patient Interventions include: Other: n/a  Family/Friends Interventions include: No family/friends present    Patient Plan of Care: Spiritual Care available upon further referral  Family/Friends Plan of Care: Spiritual Care available upon further referral    Electronically signed by JESSIE Vega on 2/17/2025 at 11:32 AM

## 2025-02-17 NOTE — CONSULTS
Nutrition Assessment  Assessment Type: Initial, Consult  Reason for visit:  Poor Intake/Appetite 5 or More Days (Hospitalists)      Nutrition Intervention:   Food and/or Nutrient Delivery:   Meals and Snacks:  Diet: Continue current diet per SLP evaluation  Medical Food Supplements:   Medical food supplement therapy:  Initiate Magic Cup (frozen oral supplement) 290 calories, 9 grams protein per 4 ounce serving       Malnutrition Assessment:  [facility]/A.S.P[facility]N Clinical Malnutrition Criteria  Malnutrition Status: Insufficient data (No diet recall or weight hx, limited visual NFPE)  Nutrition Focused Physical Exam, visual: Unremarkable     Nutrition Assessment:  Food/Nutrition Related History: Limited hx obtained at this time d/t pt lethargy with mumbled responses to queries. However he says he was not a consistency modified diet PTA. He indicates he was on a regular diet with thin liquids.     Do You Have Any Cultural, Pentecostal, or Ethnic Food Preferences?:  (Unable to communicate)   Weight History: No recent weight in EMR. No NH records in paper chart.   Last recorded weight in EMR was 185# in 2023.  Nutrition Background:       Pertinent PMH/PSH: CVA with residual left-sided hemiplegia , HLD, DM, constipation, recurrent UTI's   Presented from NH with AMS, hypotension, hypoxemia, being treated for UTI  Admitted with acute metabolic encephalopathy, hypernatremia, JALEN, acute hypoxic respiratory failure, hypotension,  UTI    Nutrition Monitoring/Evaluation:  SLP eval: 2/16-pureed with mildly thick liquids   Pt seen sitting in bed with eyes closed. Responds to greeting and able to answer queries though with mumbled but mostly understandable responses and able to answer yes no questions. He is agreeable to supplemental ice cream.  Discussed with TREVON Bruossard. She reports she fed him 25% of breakfast this morning but pt was too lethargic to eat lunch today. However he did ask for and consume thickened water independently

## 2025-02-17 NOTE — PROGRESS NOTES
TRANSFER - OUT REPORT:    Verbal report given to TREVON Sousa on Justin Gabriel  being transferred to ICU for routine progression of patient care       Report consisted of patient's Situation, Background, Assessment and   Recommendations(SBAR).     Information from the following report(s) Nurse Handoff Report, ED Encounter Summary, ED SBAR, Intake/Output, Recent Results, Quality Measures, and Neuro Assessment was reviewed with the receiving nurse.           Lines:   Peripheral IV 02/15/25 Distal;Left Forearm (Active)   Site Assessment Clean, dry & intact 02/16/25 0824   Line Status Infusing 02/16/25 0824   Line Care Connections checked and tightened 02/16/25 0824   Phlebitis Assessment No symptoms 02/16/25 0824   Infiltration Assessment 0 02/16/25 0824   Alcohol Cap Used Yes 02/16/25 0824   Dressing Status Clean, dry & intact 02/16/25 0824   Dressing Type Transparent 02/16/25 0824       Peripheral IV 02/15/25 Right Antecubital (Active)   Site Assessment Clean, dry & intact 02/16/25 0824   Line Status Flushed;Capped 02/16/25 0824   Line Care Cap changed 02/16/25 0824   Phlebitis Assessment No symptoms 02/16/25 0824   Infiltration Assessment 0 02/16/25 0824   Alcohol Cap Used Yes 02/16/25 0824   Dressing Status Clean, dry & intact 02/16/25 0824   Dressing Type Transparent 02/16/25 0824       Peripheral IV 02/15/25 Right Forearm (Active)   Site Assessment Clean, dry & intact 02/16/25 0824   Line Status Infusing 02/16/25 0824   Line Care Connections checked and tightened 02/16/25 0824   Phlebitis Assessment No symptoms 02/16/25 0824   Infiltration Assessment 0 02/16/25 0824   Alcohol Cap Used Yes 02/16/25 0824   Dressing Status Clean, dry & intact 02/16/25 0824   Dressing Type Transparent 02/16/25 0824        Opportunity for questions and clarification was provided.      Patient transported with:  Tech

## 2025-02-17 NOTE — PROGRESS NOTES
Hospitalist Progress Note   Admit Date:  2/15/2025 11:56 AM   Name:  Justin Gabriel   Age:  74 y.o.  Sex:  male  :  1950   MRN:  [id number]   Room:  [facility]/    Presenting/Chief Complaint: Shortness of Breath and Altered Mental Status     Reason(s) for Admission: Hypernatremia [E87.0]  Septicemia (HCC) [A41.9]  Acute kidney injury (HCC) [N17.9]  Acute renal failure, unspecified acute renal failure type (HCC) [N17.9]     Hospital Course:   Justin Gabriel is a 74 y.o. 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  On  he developed hypotension and despite multiple boluses, had to be moved to the ICU on Levophed drip.    Subjective & 24hr Events:   He is alert this morning.  Remains confused.  States he feels cold.  Denies pain.  Denies chest pain.  Denies shortness of breath.  Denies abdominal pain.    Assessment & Plan:     Principal Problem:    Hypovolemic shock (HCC)    Lactic acidosis (Resolved)  Patient with JALEN and high specific gravity on UA consistent with dehydration   sepsis ruled out with WBC normal and cultures negative => stop meropenem and monitor  Despite multiple fluid boluses, patient remained with MAP <65 so moved to the ICU on Levophed drip  Continue Levophed drip to keep MAP >65  Continue D5-0.9% saline at 125 mL/h  Wean Levophed as appropriate  All cultures remain negative  Lactic acid normalized    Active Problems:    Acute metabolic encephalopathy    Cognitive disorder  I suspect this is multifactorial due to dehydration, JALEN, and cognitive disorder in place  Patient's mental status and confusion appears to be waxing and waning  Continue D5-0.9% saline at 125 mL/h  Replace electrolytes per protocol  Check  7 - 16 mmol/L    Glucose 90 70 - 99 mg/dL    BUN 52 (H) 8 - 23 MG/DL    Creatinine 1.62 (H) 0.80 - 1.30 MG/DL    Est, Glom Filt Rate 44 (L) >60 ml/min/1.73m2    Calcium 8.8 8.8 - 10.2 MG/DL   Lactic Acid    Collection Time: 02/16/25 12:22 AM   Result Value Ref Range    Lactic Acid 1.5 0.5 - 2.0 mmol/L   Urinalysis with Reflex to Culture    Collection Time: 02/16/25  2:49 AM    Specimen: Urine   Result Value Ref Range    Color, UA DARK YELLOW      Appearance TURBID      Specific Gravity, UA 1.026 (H) 1.001 - 1.023      pH, Urine 5.0 5.0 - 9.0      Protein, UA TRACE (A) NEG mg/dL    Glucose, Ur Negative NEG mg/dL    Ketones, Urine TRACE (A) NEG mg/dL    Bilirubin, Urine Negative NEG      Blood, Urine SMALL (A) NEG      Urobilinogen, Urine 0.2 0.2 - 1.0 EU/dL    Nitrite, Urine Negative NEG      Leukocyte Esterase, Urine TRACE (A) NEG      WBC, UA 0-3 0 /hpf    RBC, UA 0-3 0 /hpf    BACTERIA, URINE 1+ (H) 0 /hpf    Urine Culture if Indicated CULTURE NOT INDICATED BY UA RESULT      Epithelial Cells, UA 0 0 /hpf    Casts 10-20 0 /lpf    Crystals MARKED 0 /LPF    Mucus, UA 0 0 /lpf   Basic Metabolic Panel    Collection Time: 02/16/25  4:02 AM   Result Value Ref Range    Sodium 157 (H) 136 - 145 mmol/L    Potassium 3.7 3.5 - 5.1 mmol/L    Chloride 123 (H) 98 - 107 mmol/L    CO2 23 20 - 29 mmol/L    Anion Gap 11 7 - 16 mmol/L    Glucose 99 70 - 99 mg/dL    BUN 48 (H) 8 - 23 MG/DL    Creatinine 1.42 (H) 0.80 - 1.30 MG/DL    Est, Glom Filt Rate 52 (L) >60 ml/min/1.73m2    Calcium 8.8 8.8 - 10.2 MG/DL   CBC with Auto Differential    Collection Time: 02/16/25  4:02 AM   Result Value Ref Range    WBC 10.4 4.3 - 11.1 K/uL    RBC 4.51 4.23 - 5.6 M/uL    Hemoglobin 14.0 13.6 - 17.2 g/dL    Hematocrit 44.6 41.1 - 50.3 %    MCV 98.9 82.0 - 102.0 FL    MCH 31.0 26.1 - 32.9 PG    MCHC 31.4 31.4 - 35.0 g/dL    RDW 14.5 11.9 - 14.6 %    Platelets 190 150 - 450 K/uL    MPV 11.0 9.4 - 12.3 FL    nRBC 0.00 0.0 - 0.2 K/uL    Differential Type  5.1 mmol/L    Chloride 122 (H) 98 - 107 mmol/L    CO2 23 20 - 29 mmol/L    Anion Gap 11 7 - 16 mmol/L    Glucose 84 70 - 99 mg/dL    BUN 37 (H) 8 - 23 MG/DL    Creatinine 1.14 0.80 - 1.30 MG/DL    Est, Glom Filt Rate 67 >60 ml/min/1.73m2    Calcium 8.7 (L) 8.8 - 10.2 MG/DL   Troponin    Collection Time: 02/16/25  4:09 PM   Result Value Ref Range    Troponin T 37.0 (H) 0 - 22 ng/L   POCT Glucose    Collection Time: 02/16/25  4:54 PM   Result Value Ref Range    POC Glucose 84 65 - 100 mg/dL    Performed by: Delonte    Respiratory Panel, Molecular, with COVID-19 (Restricted: peds pts or suitable admitted adults)    Collection Time: 02/16/25  8:31 PM    Specimen: Nasopharyngeal   Result Value Ref Range    Adenovirus by PCR NOT DETECTED NOTDET      Coronavirus 229E by PCR NOT DETECTED NOTDET      Coronavirus HKU1 by PCR NOT DETECTED NOTDET      Coronavirus NL63 by PCR NOT DETECTED NOTDET      Coronavirus OC43 by PCR NOT DETECTED NOTDET      SARS-CoV-2, PCR NOT DETECTED NOTDET      Human Metapneumovirus by PCR NOT DETECTED NOTDET      Rhinovirus Enterovirus PCR NOT DETECTED NOTDET      Influenza A by PCR NOT DETECTED NOTDET      Influenza B PCR NOT DETECTED NOTDET      Parainfluenza 1 PCR NOT DETECTED NOTDET      Parainfluenza 2 PCR NOT DETECTED NOTDET      Parainfluenza 3 PCR NOT DETECTED NOTDET      Parainfluenza 4 PCR NOT DETECTED NOTDET      Respiratory Syncytial Virus by PCR NOT DETECTED NOTDET      Bordetella parapertussis by PCR NOT DETECTED NOTDET      Bordetella pertussis by PCR NOT DETECTED NOTDET      Chlamydophila Pneumonia PCR NOT DETECTED NOTDET      Mycoplasma pneumo by PCR NOT DETECTED NOTDET     Basic Metabolic Panel    Collection Time: 02/16/25  8:50 PM   Result Value Ref Range    Sodium 157 (H) 136 - 145 mmol/L    Potassium 3.8 3.5 - 5.1 mmol/L    Chloride 122 (H) 98 - 107 mmol/L    CO2 23 20 - 29 mmol/L    Anion Gap 12 7 - 16 mmol/L    Glucose 102 (H) 70 - 99 mg/dL    BUN 36 (H) 8 - 23

## 2025-02-17 NOTE — PROGRESS NOTES
ACUTE OCCUPATIONAL THERAPY GOALS:   (Developed with and agreed upon by patient and/or caregiver.)  Patient will complete rolling left/right with mod assist to decrease assistance needed from caregiver during hygiene.  Patient will complete supine to sit with max assist in prep for ADL tasks.   Patient will complete grooming with min assist.   Patient will complete self feeding with min assist.   Patient will participate in 15 + minutes of ADL/ therapeutic exercise/therapeutic activity with min rest breaks to increase activity tolerance for self care.      OCCUPATIONAL THERAPY Initial Assessment       OT Visit Days: 1  Acknowledge Orders  Time  OT Charge Capture  Rehab Caseload Tracker      Justin Gabriel is a 74 y.o. male   PRIMARY DIAGNOSIS: Hypovolemic shock (HCC)  Hypernatremia [E87.0]  Septicemia (HCC) [A41.9]  Acute kidney injury (HCC) [N17.9]  Acute renal failure, unspecified acute renal failure type (HCC) [N17.9]       Reason for Referral: Generalized Muscle Weakness (M62.81)  Other lack of cordination (R27.8)  Inpatient: Payor: GENERIC MANAGED MEDICARE / Plan: GENERIC MANAGED MEDICARE / Product Type: *No Product type* /     ASSESSMENT:     REHAB RECOMMENDATIONS:   Recommendation to date pending progress:  Setting:  Return to facility     Equipment:    To Be Determined     ASSESSMENT:  Justin Gabriel is a 74 y.o. admitted for hypovolemic shock. Patient had a previous CVA with left sided deficits. Patient minimally conversational today, unsure of prior level of function. Per chart he is a long term resident at [facility] and is wheelchair bound. Based on OT evaluation completed this date, his current level of function is total assist for ADL tasks and total assist x2 for bed mobility and static sitting balance-unable to tolerate sitting edge of bed for more than a few seconds. Patient presents with deficits in ADL performance, functional mobility, balance, endurance and coordination. Unsure if  patient is at his baseline level for mobility or functional status, will see in acute care setting for a trial of skilled OT services. At discharge, recommend patient returns to his long term care facility for 24/7 continued care.        [facility] AM-PAC™ “6 Clicks” Daily Activity Inpatient Short Form:    AM-PAC Daily Activity - Inpatient   How much help is needed for putting on and taking off regular lower body clothing?: Total  How much help is needed for bathing (which includes washing, rinsing, drying)?: Total  How much help is needed for toileting (which includes using toilet, bedpan, or urinal)?: Total  How much help is needed for putting on and taking off regular upper body clothing?: Total  How much help is needed for taking care of personal grooming?: Total  How much help for eating meals?: Total  AM-PAC Inpatient Daily Activity Raw Score: 6  AM-PAC Inpatient ADL T-Scale Score : 17.07  ADL Inpatient CMS 0-100% Score: 100  ADL Inpatient CMS G-Code Modifier : CN           SUBJECTIVE:     Mr. Gabriel minimally conversational.     Social/Functional Lives With:  (facility staff)  Type of Home:  (LTC)  Home Layout: One level  Home Access: Level entry  Prior Level of Assist for Ambulation:  (needs assist)  Prior Level of Assist for Transfers:  (needs assist)    OBJECTIVE:     LINES / DRAINS / AIRWAY: Continuous Pulse Oximetry, Lock Catheter, IV, and Telemetry     RESTRICTIONS/PRECAUTIONS:  Restrictions/Precautions: Fall Risk    PAIN: VITALS / O2:   Pre Treatment: unable to report          Post Treatment: same        Vitals          Oxygen   6L         GROSS EVALUATION: INTACT IMPAIRED   (See Comments)   UE AROM [] []L UE: decreased- previous CVA   R UE: greater AROM compared to L yet decreased    UE PROM [] []   Strength []  Generalized weakness, non-functional      Posture / Balance []  Poor sitting balance   Sensation []     Coordination []  Decreased, non-functional      Tone []       Edema []   Assistance, Total=Total Assistance, NT=Not Tested  **ADLs without a comment were not performed during initial evaluation-assistance levels determined through clinical reasoning     PLAN:   FREQUENCY/DURATION   OT Plan of Care: 2 times/week (trial of services) for duration of hospital stay or until stated goals are met, whichever comes first.    PROBLEM LIST:   (Skilled intervention is medically necessary to address:)  Decreased ADL/Functional Activities  Decreased Activity Tolerance  Decreased AROM/PROM  Decreased Balance  Decreased Cognition  Decreased Coordination  Decreased Safety Awareness  Decreased Strength  Decreased Transfer Abilities   INTERVENTIONS PLANNED:  (Benefits and precautions of occupational therapy have been discussed with the patient.)  Self Care Training  Therapeutic Activity  Therapeutic Exercise/HEP  Neuromuscular Re-education  Manual Therapy  Education         TREATMENT:     EVALUATION: MODERATE COMPLEXITY: (Untimed Charge)  The initial evaluation charge encompasses clinical chart review, objective assessment, interpretation of assessment, and skilled monitoring of the patient's response to treatment in order to develop a plan of care.     TREATMENT:   Evaluation only. No treatment provided today.    TREATMENT GRID:  N/A    AFTER TREATMENT PRECAUTIONS: Bed, Bed/Chair Locked, Call light within reach, Heels floated, Needs within reach, and RN notified    INTERDISCIPLINARY COLLABORATION:  RN/ PCT and PT/ PTA    EDUCATION:  Education Given To: Patient  Education Provided: Role of Therapy;Plan of Care  Education Outcome: Unable to verbalize;Continued education needed    TOTAL TREATMENT DURATION AND TIME:  Time In: 1135  Time Out: 1145  Minutes: 10    Iris Billingsley OT

## 2025-02-17 NOTE — PLAN OF CARE
Problem: Chronic Conditions and Co-morbidities  Goal: Patient's chronic conditions and co-morbidity symptoms are monitored and maintained or improved  Outcome: Progressing  Flowsheets (Taken 2/16/2025 1945)  Care Plan - Patient's Chronic Conditions and Co-Morbidity Symptoms are Monitored and Maintained or Improved:   Monitor and assess patient's chronic conditions and comorbid symptoms for stability, deterioration, or improvement   Collaborate with multidisciplinary team to address chronic and comorbid conditions and prevent exacerbation or deterioration   Update acute care plan with appropriate goals if chronic or comorbid symptoms are exacerbated and prevent overall improvement and discharge     Problem: Discharge Planning  Goal: Discharge to home or other facility with appropriate resources  Outcome: Progressing  Flowsheets (Taken 2/16/2025 1945)  Discharge to home or other facility with appropriate resources:   Identify barriers to discharge with patient and caregiver   Arrange for needed discharge resources and transportation as appropriate   Identify discharge learning needs (meds, wound care, etc)     Problem: Pain  Goal: Verbalizes/displays adequate comfort level or baseline comfort level  Outcome: Progressing  Flowsheets (Taken 2/16/2025 2000)  Verbalizes/displays adequate comfort level or baseline comfort level:   Encourage patient to monitor pain and request assistance   Assess pain using appropriate pain scale   Administer analgesics based on type and severity of pain and evaluate response     Problem: Skin/Tissue Integrity  Goal: Skin integrity remains intact  Description: 1.  Monitor for areas of redness and/or skin breakdown  2.  Assess vascular access sites hourly  3.  Every 4-6 hours minimum:  Change oxygen saturation probe site  4.  Every 4-6 hours:  If on nasal continuous positive airway pressure, respiratory therapy assess nares and determine need for appliance change or resting

## 2025-02-17 NOTE — PROGRESS NOTES
4 Eyes Skin Assessment     NAME:  Justin Gabriel  YOB: 1950  MEDICAL RECORD NUMBER:  [id number]    The patient is being assessed for  Admission    I agree that at least one RN has performed a thorough Head to Toe Skin Assessment on the patient. ALL assessment sites listed below have been assessed.      Areas assessed by both nurses:    Head, Face, Ears, Shoulders, Back, Chest, Arms, Elbows, Hands, Sacrum. Buttock, Coccyx, Ischium, and Legs. Feet and Heels        Does the Patient have a Wound? Yes wound(s) were present on assessment. LDA wound assessment was Initiated and completed by RN       [place] Prevention initiated by RN: Yes  Wound Care Orders initiated by RN: Yes    Pressure Injury (Stage 3,4, Unstageable, DTI, NWPT, and Complex wounds) if present, place Wound referral order by RN under : Yes    New Ostomies, if present place, Ostomy referral order under : No     Nurse 1 eSignature: Electronically signed by Lars Tolbert RN on 2/16/25 at 9:25 PM EST    **SHARE this note so that the co-signing nurse can place an eSignature**    Nurse 2 eSignature: Electronically signed by Mildred Harden RN on 2/17/25 at 6:35 AM EST

## 2025-02-17 NOTE — PROGRESS NOTES
Physician Progress Note      PATIENT:               BJ MALAGON  [facility] #:                  [id number]  :                       1950  ADMIT DATE:       2/15/2025 11:56 AM  DISCH DATE:  RESPONDING  PROVIDER #:        Jennifer Rodriguez DO          QUERY TEXT:    Patient admitted with UTI. Noted documentation of sepsis in IM progress notes.   In order to support the diagnosis of sepsis, please include additional   clinical indicators in your documentation.  Or please document if the   diagnosis of sepsis has been ruled out after further study    The medical record reflects the following:  Risk Factors: UTI  Clinical Indicators: presented with UTI, noted to have on arrival WBC 10.3,   lactic acid 4.2, temp 97.9, HR , RR 14-30 with metabolic encephalopathy   and acute hypoxic respiratory failure documented in progress notes; urine   culture is in process  Treatment: Labs, imaging, monitoring, Merrem, IVF bolus, Levophed  Options provided:  -- Sepsis present as evidenced by, Please document evidence.  -- Sepsis was ruled out after study  -- Other - I will add my own diagnosis  -- Disagree - Not applicable / Not valid  -- Disagree - Clinically unable to determine / Unknown  -- Refer to Clinical Documentation Reviewer    PROVIDER RESPONSE TEXT:    Sepsis was ruled out after study.    Query created by: Martha Lopez on 2025 1:11 PM      Electronically signed by:  Jennifer Rodriguez DO 2025 3:08 PM

## 2025-02-17 NOTE — PLAN OF CARE
Problem: Chronic Conditions and Co-morbidities  Goal: Patient's chronic conditions and co-morbidity symptoms are monitored and maintained or improved  2/17/2025 1609 by Aarti Dahl RN  Outcome: Progressing  Flowsheets (Taken 2/17/2025 0745)  Care Plan - Patient's Chronic Conditions and Co-Morbidity Symptoms are Monitored and Maintained or Improved:   Monitor and assess patient's chronic conditions and comorbid symptoms for stability, deterioration, or improvement   Collaborate with multidisciplinary team to address chronic and comorbid conditions and prevent exacerbation or deterioration   Update acute care plan with appropriate goals if chronic or comorbid symptoms are exacerbated and prevent overall improvement and discharge  2/17/2025 0532 by Lars Tolbert RN  Outcome: Progressing  Flowsheets (Taken 2/16/2025 1945)  Care Plan - Patient's Chronic Conditions and Co-Morbidity Symptoms are Monitored and Maintained or Improved:   Monitor and assess patient's chronic conditions and comorbid symptoms for stability, deterioration, or improvement   Collaborate with multidisciplinary team to address chronic and comorbid conditions and prevent exacerbation or deterioration   Update acute care plan with appropriate goals if chronic or comorbid symptoms are exacerbated and prevent overall improvement and discharge     Problem: Discharge Planning  Goal: Discharge to home or other facility with appropriate resources  2/17/2025 1609 by Aarti Dahl RN  Flowsheets (Taken 2/16/2025 1945 by Lars Tolbert RN)  Discharge to home or other facility with appropriate resources:   Identify barriers to discharge with patient and caregiver   Arrange for needed discharge resources and transportation as appropriate   Identify discharge learning needs (meds, wound care, etc)  2/17/2025 0532 by Lars Tolbert RN  Outcome: Progressing  Flowsheets (Taken 2/16/2025 1945)  Discharge to home or other facility with appropriate  resources:   Identify barriers to discharge with patient and caregiver   Arrange for needed discharge resources and transportation as appropriate   Identify discharge learning needs (meds, wound care, etc)     Problem: Pain  Goal: Verbalizes/displays adequate comfort level or baseline comfort level  2/17/2025 1609 by Aarti Dahl RN  Outcome: Progressing  Flowsheets (Taken 2/17/2025 0745)  Verbalizes/displays adequate comfort level or baseline comfort level:   Encourage patient to monitor pain and request assistance   Assess pain using appropriate pain scale  2/17/2025 0532 by Lars Tolbert RN  Outcome: Progressing  Flowsheets (Taken 2/16/2025 2000)  Verbalizes/displays adequate comfort level or baseline comfort level:   Encourage patient to monitor pain and request assistance   Assess pain using appropriate pain scale   Administer analgesics based on type and severity of pain and evaluate response     Problem: Skin/Tissue Integrity  Goal: Skin integrity remains intact  Description: 1.  Monitor for areas of redness and/or skin breakdown  2.  Assess vascular access sites hourly  3.  Every 4-6 hours minimum:  Change oxygen saturation probe site  4.  Every 4-6 hours:  If on nasal continuous positive airway pressure, respiratory therapy assess nares and determine need for appliance change or resting period  2/17/2025 1609 by Aarti Dahl RN  Outcome: Progressing  Flowsheets (Taken 2/17/2025 0745)  Skin Integrity Remains Intact: Monitor for areas of redness and/or skin breakdown  2/17/2025 0532 by Lars Tolbert RN  Outcome: Progressing  Flowsheets (Taken 2/16/2025 1945)  Skin Integrity Remains Intact:   Monitor for areas of redness and/or skin breakdown   Assess vascular access sites hourly     Problem: ABCDS Injury Assessment  Goal: Absence of physical injury  2/17/2025 1609 by Aarti Dahl RN  Outcome: Progressing  2/17/2025 0532 by Lars Tolbert RN  Outcome: Progressing     Problem:

## 2025-02-17 NOTE — CARE COORDINATION
Pt chart reviewed and discussed in Critical Care Interdisciplinary Rounds. LOS 2 days. Pt admitted with hypernatremia, septicemia, JALEN, and acute renal failure. Per MD, pt with chronic extra axial fluid collection in the brain with midline shift, mulitiple CVA's; IV levo, Na 163.     Pt from [facility] LTC. HCPOA paying privately for bed. [facility] reports pt requires assistance with ADLs and requires a lift to get in and out of bed.     PT/OT recommending return to LTC at discharge.     DC/POC LTC at [facility] via [facility] when medically stable.       CM team will continue to follow for potential discharge needs that may arise.     Daylin Brewster, MSW, LBSW  ICU   [facility]

## 2025-02-17 NOTE — PROGRESS NOTES
TRANSFER - IN REPORT:    Verbal report received from Mirella LESTER on Justin Gabriel  being received from [facility] for routine progression of patient care      Report consisted of patient's Situation, Background, Assessment and   Recommendations(SBAR).     Information from the following report(s) Nurse Handoff Report, Adult Overview, MAR, and Recent Results was reviewed with the receiving nurse.    Opportunity for questions and clarification was provided.      Assessment completed upon patient's arrival to unit and care assumed.

## 2025-02-17 NOTE — PROGRESS NOTES
ACUTE PHYSICAL THERAPY GOALS:   (Developed with and agreed upon by patient and/or caregiver.)  TRIAL GOALS 2/17/25:  (1.)Mr. Gabriel will roll R<>L & move from supine to sit and sit to supine  with MODERATE MURTAZA, bed modified PRN..   (2.)Mr. Gabriel will perform EOB static sitting balance for 20-30 sec, also pt will wt shift on cue with CGA while sitting EOB on out stretched arms.  3) pt will perform scooting EOB with MODERATE ASSIST.    ________________________________________________________________________________________________     PHYSICAL THERAPY Initial Assessment and AM  (Link to Caseload Tracking: PT Visit Days : 1  Acknowledge Orders  Time In/Out  PT Charge Capture  Rehab Caseload Tracker    Justin Gabriel is a 74 y.o. male   PRIMARY DIAGNOSIS: Acute kidney injury (HCC)  Hypernatremia [E87.0]  Septicemia (HCC) [A41.9]  Acute kidney injury (HCC) [N17.9]  Acute renal failure, unspecified acute renal failure type (HCC) [N17.9]       Reason for Referral: Generalized Muscle Weakness (M62.81)  Other lack of cordination (R27.8)  Inpatient: Payor: GENERIC MANAGED MEDICARE / Plan: GENERIC MANAGED MEDICARE / Product Type: *No Product type* /     ASSESSMENT:     REHAB RECOMMENDATIONS:   Recommendation to date pending progress:  Setting:  Long Term Acute Care Facility    Equipment:    To Be Determined     ASSESSMENT:  Mr. Gabriel presented as awake with eyes open on cue, but very lethargic. Pt was mostly non-verbal other than offering his name & saying \" I want back to bed \". Pt is a long term resident at [facility] & according to POA has been non-ambulatory for the last 3 years. MSW is trying to determine how he was transferring in & out of WC from his facility.  Pt followed commands but inconsistently & showed volitional movement in all limbs. Pt had impaired sitting balance & could only hold static balance for brief periods. PT will work with pt on a trail basis since he was able to engage & participate  []    Activity Tolerance []  poor    []      COGNITION/  PERCEPTION: Intact Impaired (Comments):   Orientation []  To self only   Vision []  Unable to assess   Hearing []  Unable to assess   Cognition  []  Follows very simple 1 step command, can not care for self or express his needs     MOBILITY: I Mod I S SBA CGA Min Mod Max Total  NT x2 Comments:   Bed Mobility    Rolling [] [] [] [] [] [] [] [] [x] [] [x]    Supine to Sit [] [] [] [] [] [] [] [x] [] [] []    Scooting [] [] [] [] [] [] [] [] [x] [] []    Sit to Supine [] [] [] [] [] [] [] [] [x] [] [x]    Transfers    Sit to Stand [] [] [] [] [] [] [] [] [] [x] []    Bed to Chair [] [] [] [] [] [] [] [] [] [x] []    Stand to Sit [] [] [] [] [] [] [] [] [] [x] []     [] [] [] [] [] [] [] [] [] [x] []    I=Independent, Mod I=Modified Independent, S=Supervision, SBA=Standby Assistance, CGA=Contact Guard Assistance,   Min=Minimal Assistance, Mod=Moderate Assistance, Max=Maximal Assistance, Total=Total Assistance, NT=Not Tested    GAIT: I Mod I S SBA CGA Min Mod Max Total  NT x2 Comments:   Level of Assistance [] [] [] [] [] [] [] [] [] [x] []    Distance   N/A    DME N/A    Gait Quality N/A    Weightbearing Status Restrictions/Precautions  Restrictions/Precautions: Fall Risk    Stairs  N/A    I=Independent, Mod I=Modified Independent, S=Supervision, SBA=Standby Assistance, CGA=Contact Guard Assistance,   Min=Minimal Assistance, Mod=Moderate Assistance, Max=Maximal Assistance, Total=Total Assistance, NT=Not Tested    PLAN:   FREQUENCY AND DURATION: 5 times/week for duration of hospital stay or until stated goals are met, whichever comes first.    THERAPY PROGNOSIS: Guarded    PROBLEM LIST:   (Skilled intervention is medically necessary to address:)  Decreased Activity Tolerance  Decreased AROM/PROM  Decreased Balance  Decreased Cognition  Decreased Coordination  Decreased Safety Awareness  Decreased Strength INTERVENTIONS PLANNED:   (Benefits and precautions of  physical therapy have been discussed with the patient.)  Therapeutic Activity  Therapeutic Exercise/HEP  Neuromuscular Re-education  Education       TREATMENT:   EVALUATION: LOW COMPLEXITY: (Untimed Charge)  The initial evaluation charge encompasses clinical chart review, objective assessment, interpretation of assessment, and skilled monitoring of the patient's response to treatment in order to develop a plan of care.     TREATMENT:   Therapeutic Activity (24 Minutes): Therapeutic activity included attempting to orient pt to PT activity, facilitation of purposeful movement in all 4 limbs, repeated rolling, prolonged sitting balance EOB , assisted wt shift R<>L & F<>B, to facilitate core stability & volitional trunk muscle activity, attempted lift off EOB without success, repeated bed mobility (sit>supine), extra time to position pt in bed to improve functional Activity tolerance, Balance, Coordination, Mobility, Strength, and ROM.    TREATMENT GRID:  N/A    AFTER TREATMENT PRECAUTIONS: Bed, Bed/Chair Locked, Call light within reach, Needs within reach, and RN notified    INTERDISCIPLINARY COLLABORATION:  MD/ PA/ NP , RN/ PCT, OT/ SMITH, and RN Case Manager/      EDUCATION:    Educated patient and/or family/caregiver on the following: Fall prevention strategies and Positioning    TIME IN/OUT:  Time In: 0830  Time Out: 0901  Minutes: 31    Gerald Smith, PT

## 2025-02-17 NOTE — PROGRESS NOTES
Pt became diaphoretic and tachypneic while desatting to 89% on 2L NC. Breath sounds equal and diminished. Pt increased to 5L NC with no success, respiratory called and MD notified. Pt placed on high flow nasal cannula at 6L, satting 93%.

## 2025-02-18 LAB
ANION GAP SERPL CALC-SCNC: 10 MMOL/L (ref 7–16)
ANION GAP SERPL CALC-SCNC: 11 MMOL/L (ref 7–16)
ANION GAP SERPL CALC-SCNC: 12 MMOL/L (ref 7–16)
ANION GAP SERPL CALC-SCNC: 8 MMOL/L (ref 7–16)
BACTERIA SPEC CULT: NORMAL
BUN SERPL-MCNC: 16 MG/DL (ref 8–23)
BUN SERPL-MCNC: 16 MG/DL (ref 8–23)
BUN SERPL-MCNC: 17 MG/DL (ref 8–23)
BUN SERPL-MCNC: 18 MG/DL (ref 8–23)
CALCIUM SERPL-MCNC: 8.5 MG/DL (ref 8.8–10.2)
CALCIUM SERPL-MCNC: 8.6 MG/DL (ref 8.8–10.2)
CALCIUM SERPL-MCNC: 8.8 MG/DL (ref 8.8–10.2)
CALCIUM SERPL-MCNC: 8.9 MG/DL (ref 8.8–10.2)
CHLORIDE SERPL-SCNC: 123 MMOL/L (ref 98–107)
CHLORIDE SERPL-SCNC: 123 MMOL/L (ref 98–107)
CHLORIDE SERPL-SCNC: 125 MMOL/L (ref 98–107)
CHLORIDE SERPL-SCNC: 126 MMOL/L (ref 98–107)
CO2 SERPL-SCNC: 21 MMOL/L (ref 20–29)
CO2 SERPL-SCNC: 21 MMOL/L (ref 20–29)
CO2 SERPL-SCNC: 22 MMOL/L (ref 20–29)
CO2 SERPL-SCNC: 24 MMOL/L (ref 20–29)
CREAT SERPL-MCNC: 0.53 MG/DL (ref 0.8–1.3)
CREAT SERPL-MCNC: 0.61 MG/DL (ref 0.8–1.3)
CREAT SERPL-MCNC: 0.66 MG/DL (ref 0.8–1.3)
CREAT SERPL-MCNC: 0.76 MG/DL (ref 0.8–1.3)
GLUCOSE BLD STRIP.AUTO-MCNC: 124 MG/DL (ref 65–100)
GLUCOSE BLD STRIP.AUTO-MCNC: 129 MG/DL (ref 65–100)
GLUCOSE BLD STRIP.AUTO-MCNC: 145 MG/DL (ref 65–100)
GLUCOSE BLD STRIP.AUTO-MCNC: 88 MG/DL (ref 65–100)
GLUCOSE SERPL-MCNC: 129 MG/DL (ref 70–99)
GLUCOSE SERPL-MCNC: 138 MG/DL (ref 70–99)
GLUCOSE SERPL-MCNC: 149 MG/DL (ref 70–99)
GLUCOSE SERPL-MCNC: 160 MG/DL (ref 70–99)
POTASSIUM SERPL-SCNC: 3 MMOL/L (ref 3.5–5.1)
POTASSIUM SERPL-SCNC: 3.3 MMOL/L (ref 3.5–5.1)
POTASSIUM SERPL-SCNC: 3.7 MMOL/L (ref 3.5–5.1)
POTASSIUM SERPL-SCNC: 3.9 MMOL/L (ref 3.5–5.1)
SERVICE CMNT-IMP: ABNORMAL
SERVICE CMNT-IMP: NORMAL
SERVICE CMNT-IMP: NORMAL
SODIUM SERPL-SCNC: 156 MMOL/L (ref 136–145)
SODIUM SERPL-SCNC: 158 MMOL/L (ref 136–145)

## 2025-02-18 PROCEDURE — 82962 GLUCOSE BLOOD TEST: CPT

## 2025-02-18 PROCEDURE — 2000000000 HC ICU R&B

## 2025-02-18 PROCEDURE — 2580000003 HC RX 258: Performed by: STUDENT IN AN ORGANIZED HEALTH CARE EDUCATION/TRAINING PROGRAM

## 2025-02-18 PROCEDURE — 6370000000 HC RX 637 (ALT 250 FOR IP): Performed by: STUDENT IN AN ORGANIZED HEALTH CARE EDUCATION/TRAINING PROGRAM

## 2025-02-18 PROCEDURE — 80048 BASIC METABOLIC PNL TOTAL CA: CPT

## 2025-02-18 PROCEDURE — 2500000003 HC RX 250 WO HCPCS

## 2025-02-18 PROCEDURE — 2500000003 HC RX 250 WO HCPCS: Performed by: STUDENT IN AN ORGANIZED HEALTH CARE EDUCATION/TRAINING PROGRAM

## 2025-02-18 PROCEDURE — 2400000000

## 2025-02-18 PROCEDURE — 36415 COLL VENOUS BLD VENIPUNCTURE: CPT

## 2025-02-18 PROCEDURE — 92526 ORAL FUNCTION THERAPY: CPT

## 2025-02-18 RX ORDER — MIDODRINE HYDROCHLORIDE 5 MG/1
5 TABLET ORAL
Status: DISCONTINUED | OUTPATIENT
Start: 2025-02-18 | End: 2025-02-19

## 2025-02-18 RX ORDER — NOREPINEPHRINE BITARTRATE 0.02 MG/ML
2-100 INJECTION, SOLUTION INTRAVENOUS CONTINUOUS
Status: DISCONTINUED | OUTPATIENT
Start: 2025-02-18 | End: 2025-02-18

## 2025-02-18 RX ORDER — POTASSIUM CHLORIDE 1500 MG/1
40 TABLET, EXTENDED RELEASE ORAL ONCE
Status: COMPLETED | OUTPATIENT
Start: 2025-02-18 | End: 2025-02-18

## 2025-02-18 RX ORDER — NOREPINEPHRINE BITARTRATE 0.02 MG/ML
2-100 INJECTION, SOLUTION INTRAVENOUS CONTINUOUS
Status: DISCONTINUED | OUTPATIENT
Start: 2025-02-18 | End: 2025-02-20 | Stop reason: SDUPTHER

## 2025-02-18 RX ORDER — DEXTROSE MONOHYDRATE 50 MG/ML
INJECTION, SOLUTION INTRAVENOUS CONTINUOUS
Status: ACTIVE | OUTPATIENT
Start: 2025-02-18 | End: 2025-02-19

## 2025-02-18 RX ORDER — NOREPINEPHRINE BITARTRATE 0.02 MG/ML
INJECTION, SOLUTION INTRAVENOUS
Status: COMPLETED
Start: 2025-02-18 | End: 2025-02-18

## 2025-02-18 RX ADMIN — MIRTAZAPINE 7.5 MG: 15 TABLET, FILM COATED ORAL at 21:18

## 2025-02-18 RX ADMIN — APIXABAN 5 MG: 5 TABLET, FILM COATED ORAL at 08:51

## 2025-02-18 RX ADMIN — NOREPINEPHRINE BITARTRATE 4 MCG/MIN: 0.02 INJECTION, SOLUTION INTRAVENOUS at 12:25

## 2025-02-18 RX ADMIN — KETOTIFEN FUMARATE 1 DROP: 0.25 SOLUTION/ DROPS OPHTHALMIC at 21:12

## 2025-02-18 RX ADMIN — APIXABAN 5 MG: 5 TABLET, FILM COATED ORAL at 21:18

## 2025-02-18 RX ADMIN — EZETIMIBE 10 MG: 10 TABLET ORAL at 21:18

## 2025-02-18 RX ADMIN — SODIUM CHLORIDE, PRESERVATIVE FREE 10 ML: 5 INJECTION INTRAVENOUS at 21:19

## 2025-02-18 RX ADMIN — SODIUM CHLORIDE, PRESERVATIVE FREE 10 ML: 5 INJECTION INTRAVENOUS at 08:52

## 2025-02-18 RX ADMIN — SENNOSIDES AND DOCUSATE SODIUM 2 TABLET: 50; 8.6 TABLET ORAL at 08:50

## 2025-02-18 RX ADMIN — Medication 400 MG: at 21:19

## 2025-02-18 RX ADMIN — DEXTROSE MONOHYDRATE: 50 INJECTION, SOLUTION INTRAVENOUS at 08:00

## 2025-02-18 RX ADMIN — NOREPINEPHRINE BITARTRATE 4 MCG/MIN: 16 SOLUTION INTRAVENOUS at 12:25

## 2025-02-18 RX ADMIN — KETOTIFEN FUMARATE 1 DROP: 0.25 SOLUTION/ DROPS OPHTHALMIC at 09:30

## 2025-02-18 RX ADMIN — CETIRIZINE HYDROCHLORIDE 10 MG: 10 TABLET, FILM COATED ORAL at 08:49

## 2025-02-18 RX ADMIN — DEXTROSE MONOHYDRATE: 50 INJECTION, SOLUTION INTRAVENOUS at 21:18

## 2025-02-18 RX ADMIN — POTASSIUM CHLORIDE 40 MEQ: 1500 TABLET, EXTENDED RELEASE ORAL at 08:51

## 2025-02-18 RX ADMIN — Medication 400 MG: at 08:50

## 2025-02-18 ASSESSMENT — PAIN SCALES - GENERAL
PAINLEVEL_OUTOF10: 0

## 2025-02-18 NOTE — PROGRESS NOTES
Noted leaking from shepherd. De-flated balloon and noted only 8ml. Pushed catheter futher into bladder and re-inflated balloon with full 10ml NS. Patient tolerated well. Patient cleaned and repositioned in bed for comfort. Will cont to monitor urinary status.

## 2025-02-18 NOTE — PLAN OF CARE
Problem: Chronic Conditions and Co-morbidities  Goal: Patient's chronic conditions and co-morbidity symptoms are monitored and maintained or improved  2/17/2025 2259 by Mildred Burk RN  Outcome: Progressing  Flowsheets (Taken 2/17/2025 1915)  Care Plan - Patient's Chronic Conditions and Co-Morbidity Symptoms are Monitored and Maintained or Improved:   Monitor and assess patient's chronic conditions and comorbid symptoms for stability, deterioration, or improvement   Collaborate with multidisciplinary team to address chronic and comorbid conditions and prevent exacerbation or deterioration   Update acute care plan with appropriate goals if chronic or comorbid symptoms are exacerbated and prevent overall improvement and discharge  2/17/2025 1609 by Aarti Dahl RN  Outcome: Progressing  Flowsheets (Taken 2/17/2025 0745)  Care Plan - Patient's Chronic Conditions and Co-Morbidity Symptoms are Monitored and Maintained or Improved:   Monitor and assess patient's chronic conditions and comorbid symptoms for stability, deterioration, or improvement   Collaborate with multidisciplinary team to address chronic and comorbid conditions and prevent exacerbation or deterioration   Update acute care plan with appropriate goals if chronic or comorbid symptoms are exacerbated and prevent overall improvement and discharge     Problem: Discharge Planning  Goal: Discharge to home or other facility with appropriate resources  2/17/2025 2259 by Mildred Burk RN  Outcome: Progressing  Flowsheets (Taken 2/17/2025 1915)  Discharge to home or other facility with appropriate resources: Identify barriers to discharge with patient and caregiver  2/17/2025 1609 by Aarti Dahl RN  Flowsheets (Taken 2/16/2025 1945 by Lars Tolbert RN)  Discharge to home or other facility with appropriate resources:   Identify barriers to discharge with patient and caregiver   Arrange for needed discharge resources and  contributors to thought disturbance, including medications, impaired vision or hearing, underlying metabolic abnormalities, dehydration, psychiatric diagnoses, and notify attending LIP  2. [place] high risk fall precautions, as indicated  3. Provide frequent short contacts to provide reality reorientation, refocusing and direction  4. Decrease environmental stimuli, including noise as appropriate  5. Monitor and intervene to maintain adequate nutrition, hydration, elimination, sleep and activity  6. If unable to ensure safety without constant attention obtain sitter and review sitter guidelines with assigned personnel  7. Initiate Psychosocial CNS and Spiritual Care consult, as indicated  2/17/2025 2259 by Mildred Burk RN  Outcome: Progressing  Flowsheets (Taken 2/17/2025 1915)  Effect of thought disturbance (confusion, delirium, dementia, or psychosis) are managed with adequate functional status: Assess for contributors to thought disturbance, including medications, impaired vision or hearing, underlying metabolic abnormalities, dehydration, psychiatric diagnoses, notify LIP  2/17/2025 1609 by Aarti Dahl RN  Outcome: Progressing  Flowsheets (Taken 2/17/2025 0745)  Effect of thought disturbance (confusion, delirium, dementia, or psychosis) are managed with adequate functional status:   Assess for contributors to thought disturbance, including medications, impaired vision or hearing, underlying metabolic abnormalities, dehydration, psychiatric diagnoses, notify LIP   [place] high risk fall precautions, as indicated   Provide frequent short contacts to provide reality reorientation, refocusing and direction  Note: Mentation and conversation improved this afternoon.       Problem: Safety - Adult  Goal: Free from fall injury  2/17/2025 2259 by Mildred Burk RN  Outcome: Progressing  2/17/2025 1609 by Aarti Dahl RN  Outcome: Progressing

## 2025-02-18 NOTE — PROGRESS NOTES
In to give patient medication, patient eyes open to voice. When this RN stated patient name patient stated, \"yeah\". Patient assisted to upright position and attempted to administer meds crushed in pudding. Patient not following any commands, not swallowing, and not spitting medication out. This RN did a finger sweep to get pudding/meds out of patient's mouth, and Patient left in upright position until this RN able to provide oral care

## 2025-02-18 NOTE — PROGRESS NOTES
Hospitalist Progress Note   Admit Date:  2/15/2025 11:56 AM   Name:  Justin Gabriel   Age:  74 y.o.  Sex:  male  :  1950   MRN:  [id number]   Room:  [facility]/    Presenting/Chief Complaint: Shortness of Breath and Altered Mental Status     Reason(s) for Admission: Hypernatremia [E87.0]  Septicemia (HCC) [A41.9]  Acute kidney injury (HCC) [N17.9]  Acute renal failure, unspecified acute renal failure type (HCC) [N17.9]     Hospital Course:   Justin Gabriel is a 74 y.o. 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  On  he developed hypotension and despite multiple boluses, had to be moved to the ICU on Levophed drip.    Subjective & 24hr Events:   He is alert this morning.  He was hypotensive overnight and was on Levophed.  He was having breakfast and is more alert today.  ROS is limited due to confusion.  Assessment & Plan:       Hypovolemic shock (HCC)    Lactic acidosis (Resolved)  Continue Levophed drip to keep MAP >65. Wean Levophed as appropriate  Added midodrine 5 Mg 3 times daily  Discontinued D5-0.9% saline at 125 mL/h      Acute hypernatremia  Started on D5 and fluid boluses  Check BMP every 6 hours until consistently improving    Hypokalemia  Potassium was 3 and repleted  Follow-up with repeat BMP      Acute metabolic encephalopathy in the setting of hypernatremia    Cognitive disorder    Hypoxia  On 2L NC oxygen and wean as tolerated      Acute kidney injury (HCC)  Resolved with fluids      Benign essential HTN  Hold all blood pressure meds for now      Type 2 diabetes mellitus (HCC)  HbA1c 6.3  Monitor blood glucose losely      Iliac artery aneurysm, bilateral (HCC)    Enlarged aorta (HCC)    Aneurysm artery, celiac (HCC)       100 mg/dL    Performed by: [facility]er[facility]PCT    Basic Metabolic Panel    Collection Time: 02/17/25  8:14 AM   Result Value Ref Range    Sodium 155 (H) 136 - 145 mmol/L    Potassium 3.4 (L) 3.5 - 5.1 mmol/L    Chloride 125 (H) 98 - 107 mmol/L    CO2 22 20 - 29 mmol/L    Anion Gap 8 7 - 16 mmol/L    Glucose 218 (H) 70 - 99 mg/dL    BUN 27 (H) 8 - 23 MG/DL    Creatinine 0.85 0.80 - 1.30 MG/DL    Est, Glom Filt Rate >90 >60 ml/min/1.73m2    Calcium 8.9 8.8 - 10.2 MG/DL   Hemoglobin A1C    Collection Time: 02/17/25  8:14 AM   Result Value Ref Range    Hemoglobin A1C 6.3 (H) 0 - 5.6 %    Estimated Avg Glucose 133 mg/dL   POCT Glucose    Collection Time: 02/17/25 11:04 AM   Result Value Ref Range    POC Glucose 170 (H) 65 - 100 mg/dL    Performed by: [facility]    Basic Metabolic Panel    Collection Time: 02/17/25 12:23 PM   Result Value Ref Range    Sodium 157 (H) 136 - 145 mmol/L    Potassium 3.2 (L) 3.5 - 5.1 mmol/L    Chloride 126 (H) 98 - 107 mmol/L    CO2 22 20 - 29 mmol/L    Anion Gap 9 7 - 16 mmol/L    Glucose 169 (H) 70 - 99 mg/dL    BUN 24 (H) 8 - 23 MG/DL    Creatinine 0.70 (L) 0.80 - 1.30 MG/DL    Est, Glom Filt Rate >90 >60 ml/min/1.73m2    Calcium 8.8 8.8 - 10.2 MG/DL   POCT Glucose    Collection Time: 02/17/25  4:25 PM   Result Value Ref Range    POC Glucose 102 (H) 65 - 100 mg/dL    Performed by: [facility]    Basic Metabolic Panel    Collection Time: 02/17/25  4:27 PM   Result Value Ref Range    Sodium 155 (H) 136 - 145 mmol/L    Potassium 3.9 3.5 - 5.1 mmol/L    Chloride 125 (H) 98 - 107 mmol/L    CO2 19 (L) 20 - 29 mmol/L    Anion Gap 11 7 - 16 mmol/L    Glucose 111 (H) 70 - 99 mg/dL    BUN 23 8 - 23 MG/DL    Creatinine 0.74 (L) 0.80 - 1.30 MG/DL    Est, Glom Filt Rate >90 >60 ml/min/1.73m2    Calcium 8.8 8.8 - 10.2 MG/DL   Ammonia    Collection Time: 02/17/25  4:27 PM   Result Value Ref Range    Ammonia 48 16 - 60 umol/L   TSH    Collection Time: 02/17/25  4:27 PM   Result Value Ref Range     glucose chewable tablet 16 g  4 tablet Oral PRN    dextrose bolus 10% 125 mL  125 mL IntraVENous PRN    Or    dextrose bolus 10% 250 mL  250 mL IntraVENous PRN    Glucagon Emergency KIT 1 mg  1 mg SubCUTAneous PRN    dextrose 10 % infusion   IntraVENous Continuous PRN    ketotifen fumarate (ZADITOR) 0.035 % ophthalmic solution 1 drop  1 drop Both Eyes BID    cetirizine (ZYRTEC) tablet 10 mg  10 mg Oral Daily    lubiprostone (AMITIZA) capsule 8 mcg  (Patient Supplied)  8 mcg Oral BID WC    magnesium oxide (MAG-OX) tablet 400 mg  400 mg Oral BID    mirtazapine (REMERON) tablet 7.5 mg  7.5 mg Oral Nightly       Signed:  Rama Gilbert MD    Part of this note may have been written by using a voice dictation software.  The note has been proof read but may still contain some grammatical/other typographical errors.

## 2025-02-18 NOTE — PROGRESS NOTES
GOALS:  LTG: Patient will tolerate least restrictive diet without overt signs or symptoms of airway compromise.   STG: Patient will consume puree diet with nectar thick liquids without adverse pulmonary events. Ongoing 2025.   STG: Patient will participate in modified barium swallow study as clinically indicated.      LTG: Patient will improve cognitive- communicative function to perform daily activities at highest possible level.   STG: Patient will answer open ended questions related to self/status with 90% accuracy given min A. Ongoing 2025  STG: Patient will sustain attention to therapy task for 10 minute interval with min A provided. Ongoing 2025    SPEECH LANGUAGE PATHOLOGY: COMBINED Dysphagia and Cognitive- Communicative Daily Note #1    Acknowledge Order  I  Therapy Time  I   Charges     I  Rehab Caseload Tracker    NAME: Justin Gabriel  : 1950  MRN: [id number]    ADMISSION DATE: 2/15/2025  PRIMARY DIAGNOSIS: Hypovolemic shock (HCC)    ICD-10: Treatment Diagnosis: R13.12 Dysphagia, Oropharyngeal Phase  R41.841 Cognitive-Communication Deficit  F80.2 Mixed Receptive-Expressive Language Disorder    RECOMMENDATIONS   Diet:    Puree Diet  Mildly Thick/Nectar Thick Liquids    Medication:  Crushed, as medically appropriate. Or provided individually whole in puree bolus.   Compensatory Swallowing Strategies:   Assist feed  Small bites/sips  Upright as possible for all oral intake   Therapeutic Intervention:   Patient/family education  Dysphagia treatment  Language treatment  Cognitive-linguistic treatment   Patient continues to require skilled intervention:  Yes. Recommend ongoing speech therapy services during this hospitalization.     Anticipated Discharge Needs: Ongoing speech therapy is recommended at next level of care.      ASSESSMENT    Dysphagia: Patient with limited ability to participate in PO trials on this date. Mildly Thick/ Nectar thick liquids provided via spoon x's 1 and  purees provided x's 1. With all PO trials, patient with prolonged holding and after 1-2 minutes of holding bolus in oral cavity clinician provided oral care to remove bolus. Additional PO trials deferred due to patient's high risk for aspiration. Recommend NPO at this time and consider alternate source for patient's nutrition, hydration, and medication needs (See Addendum Below).     Of note, nursing reports patient held puree presentations in oral cavity during breakfast this AM before speech therapy services. Upon clinician's arrival patient found to have puree bolus remaining in oral cavity which was spilled anteriorly to the left side of patient's mouth and onto patient's bedding. Oral care provided via dental swab with inline suction and patient cleaned by clinician. No difficulty noted when clinician provided oral care to clear food from patient's oral cavity.     ADDENDUM (10:45): Discusses status with physician and nursing post speech therapy treatment. Nursing reporting post speech therapy patient more alert and able to consume puree meal with mildly thick/nectar thick liquids without difficulty. Recommend to continue puree diet with mildly thick/nectar thick liquids. Physician in agreement with recommendations. Speech therapy will follow for dysphagia and cognitive- communicative function.     GENERAL    Subjective: Patient somnolent. Able to achieve wakeful state though limited interactions throughout session.     Reason for Consult: coughing during swallow screening.     CT of brain on order. 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    Large area of encephalomalacia noted in L hemisphere.     History of Present Injury/Illness: Mr. Gabriel  has a past medical history  Bed  RN notified     Therapy Time:  Time In: 0810  Time Out: 0824  Minutes: 14    JOELLE BOCANEGRA SLP  2/18/2025 9:29 AM

## 2025-02-18 NOTE — PROGRESS NOTES
Patient now awake and able to state name for RN, patient following all commands, and also stated he was at [facility]. Patient asking for drink of water. Small test sip given at this time to see how patient would tolerate. Patient tolerated well. Asked patient if he felt able to take medication, and patient stated yes. Meds crushed and given in pudding, patient swallowed and tolerated well. Assisted patient at this time with puree breakfast and thickened drink. Will cont to monitor mentation

## 2025-02-18 NOTE — PLAN OF CARE
Problem: Chronic Conditions and Co-morbidities  Goal: Patient's chronic conditions and co-morbidity symptoms are monitored and maintained or improved  2/18/2025 1014 by Zulma Yu RN  Outcome: Progressing  Flowsheets (Taken 2/18/2025 0850)  Care Plan - Patient's Chronic Conditions and Co-Morbidity Symptoms are Monitored and Maintained or Improved:   Monitor and assess patient's chronic conditions and comorbid symptoms for stability, deterioration, or improvement   Collaborate with multidisciplinary team to address chronic and comorbid conditions and prevent exacerbation or deterioration  2/17/2025 2259 by Mildred Burk, RN  Outcome: Progressing  Flowsheets (Taken 2/17/2025 1915)  Care Plan - Patient's Chronic Conditions and Co-Morbidity Symptoms are Monitored and Maintained or Improved:   Monitor and assess patient's chronic conditions and comorbid symptoms for stability, deterioration, or improvement   Collaborate with multidisciplinary team to address chronic and comorbid conditions and prevent exacerbation or deterioration   Update acute care plan with appropriate goals if chronic or comorbid symptoms are exacerbated and prevent overall improvement and discharge     Problem: Discharge Planning  Goal: Discharge to home or other facility with appropriate resources  2/18/2025 1014 by Zulma Yu RN  Outcome: Progressing  Flowsheets (Taken 2/18/2025 0850)  Discharge to home or other facility with appropriate resources:   Identify barriers to discharge with patient and caregiver   Arrange for needed discharge resources and transportation as appropriate   Identify discharge learning needs (meds, wound care, etc)   Refer to discharge planning if patient needs post-hospital services based on physician order or complex needs related to functional status, cognitive ability or social support system  2/17/2025 2259 by Mildred Burk RN  Outcome: Progressing  Flowsheets (Taken 2/17/2025  breakdown     Problem: ABCDS Injury Assessment  Goal: Absence of physical injury  2/18/2025 1014 by Zulma Yu RN  Outcome: Progressing  2/17/2025 2259 by Mildred Burk RN  Outcome: Progressing     Problem: Confusion  Goal: Confusion, delirium, dementia, or psychosis is improved or at baseline  Description: INTERVENTIONS:  1. Assess for possible contributors to thought disturbance, including medications, impaired vision or hearing, underlying metabolic abnormalities, dehydration, psychiatric diagnoses, and notify attending LIP  2. [place] high risk fall precautions, as indicated  3. Provide frequent short contacts to provide reality reorientation, refocusing and direction  4. Decrease environmental stimuli, including noise as appropriate  5. Monitor and intervene to maintain adequate nutrition, hydration, elimination, sleep and activity  6. If unable to ensure safety without constant attention obtain sitter and review sitter guidelines with assigned personnel  7. Initiate Psychosocial CNS and Spiritual Care consult, as indicated  2/18/2025 1014 by Zulma Yu RN  Outcome: Progressing  Flowsheets (Taken 2/18/2025 0850)  Effect of thought disturbance (confusion, delirium, dementia, or psychosis) are managed with adequate functional status:   Assess for contributors to thought disturbance, including medications, impaired vision or hearing, underlying metabolic abnormalities, dehydration, psychiatric diagnoses, notify LIP   [place] high risk fall precautions, as indicated   Provide frequent short contacts to provide reality reorientation, refocusing and direction   Monitor and intervene to maintain adequate nutrition, hydration, elimination, sleep and activity  2/17/2025 2259 by Mildred Burk, RN  Outcome: Progressing  Flowsheets (Taken 2/17/2025 1915)  Effect of thought disturbance (confusion, delirium, dementia, or psychosis) are managed with adequate functional status: Assess for

## 2025-02-18 NOTE — WOUND CARE
Consulted for Sacrum, R buttock, and Bilat heels.     Coccyx likely demarcating DTI; open area 2.5x1x0.2cm, pink/red, granular, purple/maroon, surrounding blanchable erythema, scant serosanguinous, no odor. Recommend wound cleanser, pink silicone border foam every other day&PRN. Continue frequent turning/repositioning.      R hip 6x3cm, intact non-blanchable erythema, no drainage/odor. Recommend normal bathing, clear Optiview gel dressing or pink silicone border foam every other day&PRN.      Bilat heels (not pictured) intact w/ blanchable erythema, boggy, no drainage/odor. Recommend pink silicone border or continue padded heel protectors.

## 2025-02-19 LAB
ANION GAP SERPL CALC-SCNC: 11 MMOL/L (ref 7–16)
BASOPHILS # BLD: 0.04 K/UL (ref 0–0.2)
BASOPHILS NFR BLD: 0.7 % (ref 0–2)
BUN SERPL-MCNC: 13 MG/DL (ref 8–23)
CALCIUM SERPL-MCNC: 8.7 MG/DL (ref 8.8–10.2)
CHLORIDE SERPL-SCNC: 114 MMOL/L (ref 98–107)
CO2 SERPL-SCNC: 21 MMOL/L (ref 20–29)
CREAT SERPL-MCNC: 0.62 MG/DL (ref 0.8–1.3)
DIFFERENTIAL METHOD BLD: ABNORMAL
EOSINOPHIL # BLD: 0.27 K/UL (ref 0–0.8)
EOSINOPHIL NFR BLD: 5 % (ref 0.5–7.8)
ERYTHROCYTE [DISTWIDTH] IN BLOOD BY AUTOMATED COUNT: 13.7 % (ref 11.9–14.6)
GLUCOSE BLD STRIP.AUTO-MCNC: 124 MG/DL (ref 65–100)
GLUCOSE BLD STRIP.AUTO-MCNC: 148 MG/DL (ref 65–100)
GLUCOSE BLD STRIP.AUTO-MCNC: 85 MG/DL (ref 65–100)
GLUCOSE BLD STRIP.AUTO-MCNC: 94 MG/DL (ref 65–100)
GLUCOSE SERPL-MCNC: 176 MG/DL (ref 70–99)
HCT VFR BLD AUTO: 37.5 % (ref 41.1–50.3)
HGB BLD-MCNC: 12.2 G/DL (ref 13.6–17.2)
IMM GRANULOCYTES # BLD AUTO: 0.05 K/UL (ref 0–0.5)
IMM GRANULOCYTES NFR BLD AUTO: 0.9 % (ref 0–5)
LYMPHOCYTES # BLD: 1.12 K/UL (ref 0.5–4.6)
LYMPHOCYTES NFR BLD: 20.9 % (ref 13–44)
MAGNESIUM SERPL-MCNC: 1.6 MG/DL (ref 1.8–2.4)
MAGNESIUM SERPL-MCNC: 2.1 MG/DL (ref 1.8–2.4)
MCH RBC QN AUTO: 31.6 PG (ref 26.1–32.9)
MCHC RBC AUTO-ENTMCNC: 32.5 G/DL (ref 31.4–35)
MCV RBC AUTO: 97.2 FL (ref 82–102)
MONOCYTES # BLD: 0.3 K/UL (ref 0.1–1.3)
MONOCYTES NFR BLD: 5.6 % (ref 4–12)
NEUTS SEG # BLD: 3.57 K/UL (ref 1.7–8.2)
NEUTS SEG NFR BLD: 66.9 % (ref 43–78)
NRBC # BLD: 0 K/UL (ref 0–0.2)
PHOSPHATE SERPL-MCNC: 1.5 MG/DL (ref 2.5–4.5)
PHOSPHATE SERPL-MCNC: 2.7 MG/DL (ref 2.5–4.5)
PLATELET # BLD AUTO: 168 K/UL (ref 150–450)
PMV BLD AUTO: 10.9 FL (ref 9.4–12.3)
POTASSIUM SERPL-SCNC: 3.9 MMOL/L (ref 3.5–5.1)
RBC # BLD AUTO: 3.86 M/UL (ref 4.23–5.6)
SERVICE CMNT-IMP: ABNORMAL
SERVICE CMNT-IMP: ABNORMAL
SERVICE CMNT-IMP: NORMAL
SERVICE CMNT-IMP: NORMAL
SODIUM SERPL-SCNC: 146 MMOL/L (ref 136–145)
WBC # BLD AUTO: 5.4 K/UL (ref 4.3–11.1)

## 2025-02-19 PROCEDURE — 6370000000 HC RX 637 (ALT 250 FOR IP): Performed by: STUDENT IN AN ORGANIZED HEALTH CARE EDUCATION/TRAINING PROGRAM

## 2025-02-19 PROCEDURE — 36415 COLL VENOUS BLD VENIPUNCTURE: CPT

## 2025-02-19 PROCEDURE — 83735 ASSAY OF MAGNESIUM: CPT

## 2025-02-19 PROCEDURE — 51798 US URINE CAPACITY MEASURE: CPT

## 2025-02-19 PROCEDURE — 92526 ORAL FUNCTION THERAPY: CPT

## 2025-02-19 PROCEDURE — 97530 THERAPEUTIC ACTIVITIES: CPT

## 2025-02-19 PROCEDURE — 80048 BASIC METABOLIC PNL TOTAL CA: CPT

## 2025-02-19 PROCEDURE — 92507 TX SP LANG VOICE COMM INDIV: CPT

## 2025-02-19 PROCEDURE — 2500000003 HC RX 250 WO HCPCS: Performed by: HOSPITALIST

## 2025-02-19 PROCEDURE — 2000000000 HC ICU R&B

## 2025-02-19 PROCEDURE — 82962 GLUCOSE BLOOD TEST: CPT

## 2025-02-19 PROCEDURE — 84100 ASSAY OF PHOSPHORUS: CPT

## 2025-02-19 PROCEDURE — 2500000003 HC RX 250 WO HCPCS: Performed by: STUDENT IN AN ORGANIZED HEALTH CARE EDUCATION/TRAINING PROGRAM

## 2025-02-19 PROCEDURE — 6360000002 HC RX W HCPCS: Performed by: STUDENT IN AN ORGANIZED HEALTH CARE EDUCATION/TRAINING PROGRAM

## 2025-02-19 PROCEDURE — 2700000000 HC OXYGEN THERAPY PER DAY

## 2025-02-19 PROCEDURE — 99221 1ST HOSP IP/OBS SF/LOW 40: CPT | Performed by: NURSE PRACTITIONER

## 2025-02-19 PROCEDURE — 51702 INSERT TEMP BLADDER CATH: CPT

## 2025-02-19 PROCEDURE — 2580000003 HC RX 258: Performed by: HOSPITALIST

## 2025-02-19 PROCEDURE — 85025 COMPLETE CBC W/AUTO DIFF WBC: CPT

## 2025-02-19 RX ORDER — LANOLIN ALCOHOL/MO/W.PET/CERES
400 CREAM (GRAM) TOPICAL DAILY
Status: DISCONTINUED | OUTPATIENT
Start: 2025-02-19 | End: 2025-02-20

## 2025-02-19 RX ORDER — MAGNESIUM SULFATE IN WATER 40 MG/ML
2000 INJECTION, SOLUTION INTRAVENOUS ONCE
Status: DISCONTINUED | OUTPATIENT
Start: 2025-02-19 | End: 2025-02-19

## 2025-02-19 RX ORDER — MIDODRINE HYDROCHLORIDE 5 MG/1
10 TABLET ORAL
Status: DISCONTINUED | OUTPATIENT
Start: 2025-02-19 | End: 2025-02-21

## 2025-02-19 RX ADMIN — MIRTAZAPINE 7.5 MG: 15 TABLET, FILM COATED ORAL at 22:00

## 2025-02-19 RX ADMIN — EZETIMIBE 10 MG: 10 TABLET ORAL at 22:00

## 2025-02-19 RX ADMIN — MIDODRINE HYDROCHLORIDE 10 MG: 5 TABLET ORAL at 17:16

## 2025-02-19 RX ADMIN — KETOTIFEN FUMARATE 1 DROP: 0.25 SOLUTION/ DROPS OPHTHALMIC at 08:33

## 2025-02-19 RX ADMIN — APIXABAN 5 MG: 5 TABLET, FILM COATED ORAL at 21:59

## 2025-02-19 RX ADMIN — APIXABAN 5 MG: 5 TABLET, FILM COATED ORAL at 08:21

## 2025-02-19 RX ADMIN — KETOTIFEN FUMARATE 1 DROP: 0.25 SOLUTION/ DROPS OPHTHALMIC at 22:10

## 2025-02-19 RX ADMIN — MIDODRINE HYDROCHLORIDE 10 MG: 5 TABLET ORAL at 12:08

## 2025-02-19 RX ADMIN — SODIUM CHLORIDE, PRESERVATIVE FREE 10 ML: 5 INJECTION INTRAVENOUS at 08:25

## 2025-02-19 RX ADMIN — CETIRIZINE HYDROCHLORIDE 10 MG: 10 TABLET, FILM COATED ORAL at 08:21

## 2025-02-19 RX ADMIN — POTASSIUM & SODIUM PHOSPHATES POWDER PACK 280-160-250 MG 250 MG: 280-160-250 PACK at 22:11

## 2025-02-19 RX ADMIN — MAGNESIUM SULFATE HEPTAHYDRATE 2000 MG: 40 INJECTION, SOLUTION INTRAVENOUS at 05:19

## 2025-02-19 RX ADMIN — POTASSIUM PHOSPHATE, MONOBASIC POTASSIUM PHOSPHATE, DIBASIC 20 MMOL: 224; 236 INJECTION, SOLUTION, CONCENTRATE INTRAVENOUS at 05:17

## 2025-02-19 RX ADMIN — SODIUM CHLORIDE, PRESERVATIVE FREE 10 ML: 5 INJECTION INTRAVENOUS at 22:11

## 2025-02-19 ASSESSMENT — PAIN SCALES - GENERAL
PAINLEVEL_OUTOF10: 1
PAINLEVEL_OUTOF10: 0

## 2025-02-19 NOTE — PROGRESS NOTES
Physician Progress Note      PATIENT:               BJ MALAGON  CSN #:                  [id number]  :                       1950  ADMIT DATE:       2/15/2025 11:56 AM  DISCH DATE:  RESPONDING  PROVIDER #:        Rama Gilbert MD          QUERY TEXT:    Pt admitted with hypovolemic shock.  Pt noted to have midline shift on CT head   that has increased since .  If clinically significant, please document in   progress notes and discharge summary if you are evaluating/treating any of   the following:    The medical record reflects the following:  Risk Factors: fluid collection on CT head  Clinical Indicators: presented with hypovolemic shock, noted to have CT head   showing large left extra-axial fluid collection with rightward midline shift;   patient has confusion that is waxing and waning  Treatment: Imaging, monitoring  Options provided:  -- Brain compression  -- Other - I will add my own diagnosis  -- Disagree - Not applicable / Not valid  -- Disagree - Clinically unable to determine / Unknown  -- Refer to Clinical Documentation Reviewer    PROVIDER RESPONSE TEXT:    This patient has brain compression.    Query created by: Martha Lopez on 2025 12:02 PM      Electronically signed by:  Rama Gilbert MD 2025 12:05 PM

## 2025-02-19 NOTE — PROGRESS NOTES
This RN into room to wake patient up and to attempt to give breakfast and medications. Patient alert to person and place, answering questions appropriately, but still a bit drowsy. Attempted to give patient oral intake, patient not swallowing food or drink even with frequent cues. Food cleared from mouth and oral care provided. Patient made NPO at this time. Will cont to monitor.

## 2025-02-19 NOTE — PROGRESS NOTES
Critical Care Interdisciplinary Rounds with staff.     Nicole Shanks M.Div, [facility] / / Bereavement Coordinator  [facility] Care Department   c: [phone]/ [phone] / [email or web address]  [email or web address]

## 2025-02-19 NOTE — PROGRESS NOTES
GOALS:  LTG: Patient will tolerate least restrictive diet without overt signs or symptoms of airway compromise.   STG: Patient will consume puree diet with nectar thick liquids without adverse pulmonary events. Ongoing 2025.   STG: Patient will participate in modified barium swallow study as clinically indicated.      LTG: Patient will improve cognitive- communicative function to perform daily activities at highest possible level.   STG: Patient will answer open ended questions related to self/status with 90% accuracy given min A. Ongoing 2025  STG: Patient will sustain attention to therapy task for 10 minute interval with min A provided. Not progressing as of 2025    SPEECH LANGUAGE PATHOLOGY: COMBINED Dysphagia and Cognitive- Communicative Daily Note #2    Acknowledge Order  I  Therapy Time  I   Charges     I  Rehab Caseload Tracker    NAME: Justin Gabriel  : 1950  MRN: [id number]    ADMISSION DATE: 2/15/2025  PRIMARY DIAGNOSIS: Hypovolemic shock (HCC)    ICD-10: Treatment Diagnosis:   R13.12 Dysphagia, Oropharyngeal Phase  R41.841 Cognitive-Communication Deficit  F80.2 Mixed Receptive-Expressive Language Disorder    RECOMMENDATIONS   Diet:    Puree Diet  Mildly Thick/Nectar Thick Liquids BY SPOON     Medication:  Crushed, as medically appropriate. Or provided individually whole in puree bolus.   Compensatory Swallowing Strategies:   Assist feed  Small bites/sips  Upright as possible for all oral intake   Therapeutic Intervention:   Patient/family education  Dysphagia treatment  Language treatment  Cognitive-linguistic treatment   Patient continues to require skilled intervention:  Yes. Recommend ongoing speech therapy services during this hospitalization.     Anticipated Discharge Needs: Ongoing speech therapy is recommended at next level of care.      ASSESSMENT    Dysphagia: Patient continues to exhibit waxing and waning which limits functional tolerance of oral intake. Observed with

## 2025-02-19 NOTE — CONSULTS
Palliative Care    Patient: Justin Gabriel MRN: [id number]  SSN: [id number]    YOB: 1950  Age: 74 y.o.  Sex: male       Date of Request: 02/18/2025  Date of Consult:  2/19/2025  Reason for Consult:   Overwhelming symptoms  Requesting Physician: Dr. Gilbert     Assessment/Plan:     Principal Diagnosis:    Altered Mental Status R41.82    Additional Diagnoses:   Delirium  F05  Fatigue, Lethargy  R53.83  Encounter for Palliative Care  Z51.5    Palliative Performance Scale (PPS):       Medical Decision Making:   Reviewed and summarized: Chart review during admission, ED, and prior to determine patient baseline  Discussed case with appropriate providers.   Reviewed laboratory and x-ray data.     Will discuss findings with members of the interdisciplinary team.      Thank you for this referral.           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 He was hypotensive on arrival and was given a bolus of NS with response noted. On 02/16 he was again hypotensive with no response from fluid bolus and was therefore transferred to the ICU for pressure support. He was remained in the ICU since then on and off Levo for hypotension. He was seen by ST and was deemed appropriate for purred diet with nectar thick liquids. However, nursing notes he continues to have intermittent somnolence and has been pocketingfood. He is seen today without family. He states his daughter, Yamini, lives in [place]. Yamini is his POA. He lives in a SNF.       Alerted Mental Status: Unable to

## 2025-02-19 NOTE — PLAN OF CARE
Problem: Chronic Conditions and Co-morbidities  Goal: Patient's chronic conditions and co-morbidity symptoms are monitored and maintained or improved  2/18/2025 2244 by Mildred Burk RN  Outcome: Progressing  Flowsheets (Taken 2/18/2025 1930)  Care Plan - Patient's Chronic Conditions and Co-Morbidity Symptoms are Monitored and Maintained or Improved:   Monitor and assess patient's chronic conditions and comorbid symptoms for stability, deterioration, or improvement   Collaborate with multidisciplinary team to address chronic and comorbid conditions and prevent exacerbation or deterioration   Update acute care plan with appropriate goals if chronic or comorbid symptoms are exacerbated and prevent overall improvement and discharge  2/18/2025 1014 by Zulma Yu RN  Outcome: Progressing  Flowsheets (Taken 2/18/2025 0850)  Care Plan - Patient's Chronic Conditions and Co-Morbidity Symptoms are Monitored and Maintained or Improved:   Monitor and assess patient's chronic conditions and comorbid symptoms for stability, deterioration, or improvement   Collaborate with multidisciplinary team to address chronic and comorbid conditions and prevent exacerbation or deterioration     Problem: Discharge Planning  Goal: Discharge to home or other facility with appropriate resources  2/18/2025 2244 by Mildred Burk RN  Outcome: Progressing  Flowsheets (Taken 2/18/2025 1930)  Discharge to home or other facility with appropriate resources: Identify barriers to discharge with patient and caregiver  2/18/2025 1014 by Zulma Yu RN  Outcome: Progressing  Flowsheets (Taken 2/18/2025 0850)  Discharge to home or other facility with appropriate resources:   Identify barriers to discharge with patient and caregiver   Arrange for needed discharge resources and transportation as appropriate   Identify discharge learning needs (meds, wound care, etc)   Refer to discharge planning if patient needs post-hospital  services based on physician order or complex needs related to functional status, cognitive ability or social support system     Problem: Pain  Goal: Verbalizes/displays adequate comfort level or baseline comfort level  2/18/2025 2244 by Mildred Burk RN  Outcome: Progressing  Flowsheets (Taken 2/18/2025 1930)  Verbalizes/displays adequate comfort level or baseline comfort level:   Encourage patient to monitor pain and request assistance   Assess pain using appropriate pain scale   Administer analgesics based on type and severity of pain and evaluate response  2/18/2025 1014 by Zulma Yu RN  Outcome: Progressing  Flowsheets (Taken 2/18/2025 0734)  Verbalizes/displays adequate comfort level or baseline comfort level:   Encourage patient to monitor pain and request assistance   Assess pain using appropriate pain scale   Administer analgesics based on type and severity of pain and evaluate response   Implement non-pharmacological measures as appropriate and evaluate response   Consider cultural and social influences on pain and pain management   Notify Licensed Independent Practitioner if interventions unsuccessful or patient reports new pain     Problem: Skin/Tissue Integrity  Goal: Skin integrity remains intact  Description: 1.  Monitor for areas of redness and/or skin breakdown  2.  Assess vascular access sites hourly  3.  Every 4-6 hours minimum:  Change oxygen saturation probe site  4.  Every 4-6 hours:  If on nasal continuous positive airway pressure, respiratory therapy assess nares and determine need for appliance change or resting period  2/18/2025 2244 by Mildred Burk RN  Outcome: Progressing  Flowsheets (Taken 2/18/2025 1930)  Skin Integrity Remains Intact: Monitor for areas of redness and/or skin breakdown  2/18/2025 1014 by Zulam Yu RN  Outcome: Progressing  Flowsheets (Taken 2/18/2025 0850)  Skin Integrity Remains Intact: Monitor for areas of redness and/or skin breakdown

## 2025-02-19 NOTE — PROGRESS NOTES
Patient shepherd catheter removed approx 1230 and external catheter placed. Patient had not voided at this time. Bladder scanned patient with volume of 508ml. Dr Deleon notified via perfect serve. MD gave telephone order to place shepherd catheter. 16fr catheter placed per sterile technique. Patient tolerated well. Return of clear/yellow urine noted. Patient voided 325ml at this time. Will cont to monitor urine output.

## 2025-02-19 NOTE — INTERDISCIPLINARY ROUNDS
Interdisciplinary team rounds were held 2/19/2025 with the following team members:Nursing, Physical Therapy, Physician, and Clinical Coordinator and the patient.    Plan of care discussed. See clinical pathway and/or care plan for interventions and desired outcomes.

## 2025-02-19 NOTE — PROGRESS NOTES
ACUTE PHYSICAL THERAPY GOALS:   (Developed with and agreed upon by patient and/or caregiver.)  TRIAL GOALS 2/17/25:  (1.)Mr. Gabriel will roll R<>L & move from supine to sit and sit to supine  with MODERATE MURTAZA, bed modified PRN..   (2.)Mr. Gabriel will perform EOB static sitting balance for 20-30 sec, also pt will wt shift on cue with CGA while sitting EOB on out stretched arms.  3) pt will perform scooting EOB with MODERATE ASSIST.    ________________________________________________________________________________________________     PHYSICAL THERAPY Initial Assessment and AM  (Link to Caseload Tracking: PT Visit Days : 2  Acknowledge Orders  Time In/Out  PT Charge Capture  Rehab Caseload Tracker    Justin Gabriel is a 74 y.o. male   PRIMARY DIAGNOSIS: Hypovolemic shock (HCC)  Hypernatremia [E87.0]  Septicemia (HCC) [A41.9]  Acute kidney injury [N17.9]  Acute renal failure, unspecified acute renal failure type [N17.9]       Reason for Referral: Generalized Muscle Weakness (M62.81)  Other lack of cordination (R27.8)  Inpatient: Payor: GENERIC MANAGED MEDICARE / Plan: GENERIC MANAGED MEDICARE / Product Type: *No Product type* /     ASSESSMENT:     REHAB RECOMMENDATIONS:   Recommendation to date pending progress:  Setting:  Long Term Acute Care Facility    Equipment:    To Be Determined     ASSESSMENT:  Mr. Gabriel presented as awake with eyes open on cue, but very lethargic. Pt was mostly non-verbal other than offering his name & saying \" I want back to bed\". Pt is a long term [facility] at [facility] & according to POA has been non-ambulatory for the last 3 years. MSW determined that pt was dependent for transfers at his facility prior to this admission.   2/19 : Today, Pt was oriented to person & place, did not follow cues consistently & actively resisted attempts to get pt to participate in bed mobility & sitting balance . Pt could only hold static balance for brief periods & did actively participate in  non-functional throughout currently ( LUE weaker than RUE )   Balance []  Poor static sitting, No dynamic    Posture [] N/A   Sensation [x]  grossly   Coordination []   Decreased & non-functional throughout currently   Tone []  Hypertonus left UE   Edema []    Activity Tolerance []  poor    []      COGNITION/  PERCEPTION: Intact Impaired (Comments):   Orientation []  To person & place only   Vision []  Unable to assess   Hearing []  Unable to assess   Cognition  []  Follows very simple 1 step command, can not care for self or express his needs     MOBILITY: I Mod I S SBA CGA Min Mod Max Total  NT x2 Comments:   Bed Mobility    Rolling [] [] [] [] [] [] [] [] [x] [] [x]    Supine to Sit [] [] [] [] [] [] [] [] [x] [] []    Scooting [] [] [] [] [] [] [] [] [x] [] []    Sit to Supine [] [] [] [] [] [] [] [] [x] [] [x]    Transfers    Sit to Stand [] [] [] [] [] [] [] [] [] [x] []    Bed to Chair [] [] [] [] [] [] [] [] [] [x] []    Stand to Sit [] [] [] [] [] [] [] [] [] [x] []     [] [] [] [] [] [] [] [] [] [x] []    I=Independent, Mod I=Modified Independent, S=Supervision, SBA=Standby Assistance, CGA=Contact Guard Assistance,   Min=Minimal Assistance, Mod=Moderate Assistance, Max=Maximal Assistance, Total=Total Assistance, NT=Not Tested    GAIT: I Mod I S SBA CGA Min Mod Max Total  NT x2 Comments:   Level of Assistance [] [] [] [] [] [] [] [] [] [x] []    Distance   N/A    DME N/A    Gait Quality N/A    Weightbearing Status Restrictions/Precautions  Restrictions/Precautions: Fall Risk    Stairs  N/A    I=Independent, Mod I=Modified Independent, S=Supervision, SBA=Standby Assistance, CGA=Contact Guard Assistance,   Min=Minimal Assistance, Mod=Moderate Assistance, Max=Maximal Assistance, Total=Total Assistance, NT=Not Tested    PLAN:   FREQUENCY AND DURATION: 5 times/week for duration of hospital stay or until stated goals are met, whichever comes first.    THERAPY PROGNOSIS: Guarded    PROBLEM LIST:   (Skilled

## 2025-02-19 NOTE — PROGRESS NOTES
Hospitalist Progress Note   Admit Date:  2/15/2025 11:56 AM   Name:  Justin Gabriel   Age:  74 y.o.  Sex:  male  :  1950   MRN:  [id number]   Room:  [facility]/01    Presenting/Chief Complaint: Shortness of Breath and Altered Mental Status     Reason(s) for Admission: Hypernatremia [E87.0]  Septicemia (HCC) [A41.9]  Acute kidney injury [N17.9]  Acute renal failure, unspecified acute renal failure type [N17.9]     Hospital Course:   Justin Gabriel is a 74 y.o. 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  On  he developed hypotension and despite multiple boluses, had to be moved to the ICU on Levophed drip.    Subjective & 24hr Events:   He is alert this morning.  He was hypotensive and  was on Levophed.  He was having breakfast and is pocketing food in his mouth.  He is at high risk of aspiration.  He was not following commands.  He was saturating well on oxygen.  ROS is limited due to waxing and waning confusion.     Assessment & Plan:   Hypovolemic shock (HCC)   Lactic acidosis (Resolved)  Continue Levophed drip to keep MAP >65. Wean Levophed as appropriate  Increased midodrine 10 Mg 3 times daily        Acute hypernatremia  Resolved with D5    Hypokalemia  Resolved      Acute metabolic encephalopathy in the setting of hypernatremia    Cognitive disorder    Hypoxia  On 2L NC oxygen and wean as tolerated      Acute kidney injury (HCC)  Resolved with fluids      Benign essential HTN  Hold all blood pressure meds for now due to hypotension      Type 2 diabetes mellitus (HCC)  HbA1c 6.3  Monitor blood glucose losely      Iliac artery aneurysm, bilateral (HCC)    Enlarged aorta (HCC)    Aneurysm artery, celiac (HCC)      CVA, old, hemiparesis    02/18/25 2115 -- 84 17 (!) 133/104 --   02/18/25 2100 -- 72 19 121/84 97 %   02/18/25 2030 -- 70 16 113/83 96 %   02/18/25 2015 -- 70 18 113/63 --   02/18/25 2000 -- 73 14 111/82 96 %   02/18/25 1945 -- 69 18 99/86 --   02/18/25 1930 97.9 °F (36.6 °C) 69 19 110/78 97 %   02/18/25 1900 -- 71 17 105/75 95 %   02/18/25 1801 -- 70 19 125/82 97 %   02/18/25 1746 -- 66 18 132/74 98 %   02/18/25 1731 -- 65 19 98/79 95 %   02/18/25 1716 -- 82 19 113/79 95 %   02/18/25 1701 -- -- 20 114/84 97 %   02/18/25 1646 -- 70 17 (!) 108/90 96 %   02/18/25 1631 -- 72 16 121/80 99 %   02/18/25 1616 -- 67 19 131/84 99 %   02/18/25 1610 97.5 °F (36.4 °C) -- -- -- --   02/18/25 1601 -- 59 13 122/74 95 %   02/18/25 1546 -- 71 17 (!) 110/59 97 %   02/18/25 1531 -- 62 16 112/88 98 %   02/18/25 1516 -- 61 18 109/68 94 %   02/18/25 1501 -- 79 (!) 32 96/60 93 %   02/18/25 1446 -- 64 22 116/73 97 %   02/18/25 1431 -- 68 20 99/86 97 %   02/18/25 1416 -- 63 18 111/87 98 %   02/18/25 1401 -- 67 16 118/76 98 %   02/18/25 1346 -- 61 22 105/85 97 %   02/18/25 1331 -- 68 13 104/84 98 %   02/18/25 1301 -- 66 17 112/71 95 %       Oxygen Therapy  SpO2: 96 %  Pulse Oximetry Type: Continuous  Pulse via Oximetry: 70 beats per minute  Pulse Oximeter Device Mode: Continuous  Pulse Oximeter Device Location: Finger  O2 Device: None (Room air)  O2 Flow Rate (L/min): 2 L/min    Estimated body mass index is 20.76 kg/m² as calculated from the following:    Height as of this encounter: 1.829 m (6').    Weight as of this encounter: 69.4 kg (153 lb 1.6 oz).    Intake/Output Summary (Last 24 hours) at 2/19/2025 1255  Last data filed at 2/19/2025 1209  Gross per 24 hour   Intake 3978.51 ml   Output 1325 ml   Net 2653.51 ml         Physical Exam:   General:    Well nourished.  Confused.  Talking, but not making much sense.  Head:  Normocephalic, atraumatic  Eyes:  Sclerae appear normal.  Pupils equally round.  ENT:  Nares appear normal.  Moist oral mucosa  Neck:  No  restricted ROM.  Trachea midline   CV:   RRR.  No m/r/g.  No jugular venous distension.  Lungs:   CTAB.  No wheezing, rhonchi, or rales.  Symmetric expansion.  Abdomen:   Soft, nontender, nondistended.  Extremities: No cyanosis or clubbing.  No edema  Skin:     No rashes.  Normal coloration.   Warm and dry.    Neuro:  CN II-XII grossly intact.    Psych:  Confused    I have personally reviewed labs and tests:  Recent Labs:  Recent Results (from the past 48 hour(s))   POCT Glucose    Collection Time: 02/17/25  4:25 PM   Result Value Ref Range    POC Glucose 102 (H) 65 - 100 mg/dL    Performed by: EscobarSHAYNA    Basic Metabolic Panel    Collection Time: 02/17/25  4:27 PM   Result Value Ref Range    Sodium 155 (H) 136 - 145 mmol/L    Potassium 3.9 3.5 - 5.1 mmol/L    Chloride 125 (H) 98 - 107 mmol/L    CO2 19 (L) 20 - 29 mmol/L    Anion Gap 11 7 - 16 mmol/L    Glucose 111 (H) 70 - 99 mg/dL    BUN 23 8 - 23 MG/DL    Creatinine 0.74 (L) 0.80 - 1.30 MG/DL    Est, Glom Filt Rate >90 >60 ml/min/1.73m2    Calcium 8.8 8.8 - 10.2 MG/DL   Ammonia    Collection Time: 02/17/25  4:27 PM   Result Value Ref Range    Ammonia 48 16 - 60 umol/L   TSH    Collection Time: 02/17/25  4:27 PM   Result Value Ref Range    TSH, 3rd Generation 0.624 0.270 - 4.200 uIU/mL   T4, Free    Collection Time: 02/17/25  4:27 PM   Result Value Ref Range    T4 Free 1.1 0.9 - 1.7 NG/DL   Basic Metabolic Panel    Collection Time: 02/17/25  8:33 PM   Result Value Ref Range    Sodium 157 (H) 136 - 145 mmol/L    Potassium 3.5 3.5 - 5.1 mmol/L    Chloride 126 (H) 98 - 107 mmol/L    CO2 24 20 - 29 mmol/L    Anion Gap 7 7 - 16 mmol/L    Glucose 136 (H) 70 - 99 mg/dL    BUN 20 8 - 23 MG/DL    Creatinine 0.77 (L) 0.80 - 1.30 MG/DL    Est, Glom Filt Rate >90 >60 ml/min/1.73m2    Calcium 8.8 8.8 - 10.2 MG/DL   POCT Glucose    Collection Time: 02/17/25  9:09 PM   Result Value Ref Range    POC Glucose 117 (H) 65 - 100 mg/dL    Performed by: Keira   Rate >90 >60 ml/min/1.73m2    Calcium 8.9 8.8 - 10.2 MG/DL   POCT Glucose    Collection Time: 02/18/25  4:04 PM   Result Value Ref Range    POC Glucose 129 (H) 65 - 100 mg/dL    Performed by: Mary    POCT Glucose    Collection Time: 02/18/25  9:11 PM   Result Value Ref Range    POC Glucose 88 65 - 100 mg/dL    Performed by: Keira    CBC with Auto Differential    Collection Time: 02/19/25  3:00 AM   Result Value Ref Range    WBC 5.4 4.3 - 11.1 K/uL    RBC 3.86 (L) 4.23 - 5.6 M/uL    Hemoglobin 12.2 (L) 13.6 - 17.2 g/dL    Hematocrit 37.5 (L) 41.1 - 50.3 %    MCV 97.2 82.0 - 102.0 FL    MCH 31.6 26.1 - 32.9 PG    MCHC 32.5 31.4 - 35.0 g/dL    RDW 13.7 11.9 - 14.6 %    Platelets 168 150 - 450 K/uL    MPV 10.9 9.4 - 12.3 FL    nRBC 0.00 0.0 - 0.2 K/uL    Differential Type AUTOMATED      Neutrophils % 66.9 43.0 - 78.0 %    Lymphocytes % 20.9 13.0 - 44.0 %    Monocytes % 5.6 4.0 - 12.0 %    Eosinophils % 5.0 0.5 - 7.8 %    Basophils % 0.7 0.0 - 2.0 %    Immature Granulocytes % 0.9 0.0 - 5.0 %    Neutrophils Absolute 3.57 1.70 - 8.20 K/UL    Lymphocytes Absolute 1.12 0.50 - 4.60 K/UL    Monocytes Absolute 0.30 0.10 - 1.30 K/UL    Eosinophils Absolute 0.27 0.00 - 0.80 K/UL    Basophils Absolute 0.04 0.00 - 0.20 K/UL    Immature Granulocytes Absolute 0.05 0.0 - 0.5 K/UL   Magnesium    Collection Time: 02/19/25  3:00 AM   Result Value Ref Range    Magnesium 1.6 (L) 1.8 - 2.4 mg/dL   Phosphorus    Collection Time: 02/19/25  3:00 AM   Result Value Ref Range    Phosphorus 1.5 (L) 2.5 - 4.5 MG/DL   POCT Glucose    Collection Time: 02/19/25  7:30 AM   Result Value Ref Range    POC Glucose 124 (H) 65 - 100 mg/dL    Performed by: Tracy    Basic Metabolic Panel    Collection Time: 02/19/25  9:41 AM   Result Value Ref Range    Sodium 146 (H) 136 - 145 mmol/L    Potassium 3.9 3.5 - 5.1 mmol/L    Chloride 114 (H) 98 - 107 mmol/L    CO2 21 20 - 29 mmol/L    Anion Gap 11 7 - 16 mmol/L    Glucose 176

## 2025-02-19 NOTE — PLAN OF CARE
Problem: Chronic Conditions and Co-morbidities  Goal: Patient's chronic conditions and co-morbidity symptoms are monitored and maintained or improved  2/19/2025 0946 by Zulma Yu RN  Outcome: Progressing  Flowsheets (Taken 2/19/2025 0800)  Care Plan - Patient's Chronic Conditions and Co-Morbidity Symptoms are Monitored and Maintained or Improved:   Monitor and assess patient's chronic conditions and comorbid symptoms for stability, deterioration, or improvement   Collaborate with multidisciplinary team to address chronic and comorbid conditions and prevent exacerbation or deterioration  2/18/2025 2244 by Mildred Burk, RN  Outcome: Progressing  Flowsheets (Taken 2/18/2025 1930)  Care Plan - Patient's Chronic Conditions and Co-Morbidity Symptoms are Monitored and Maintained or Improved:   Monitor and assess patient's chronic conditions and comorbid symptoms for stability, deterioration, or improvement   Collaborate with multidisciplinary team to address chronic and comorbid conditions and prevent exacerbation or deterioration   Update acute care plan with appropriate goals if chronic or comorbid symptoms are exacerbated and prevent overall improvement and discharge     Problem: Discharge Planning  Goal: Discharge to home or other facility with appropriate resources  2/19/2025 0946 by Zulma Yu RN  Outcome: Progressing  Flowsheets (Taken 2/19/2025 0800)  Discharge to home or other facility with appropriate resources:   Identify barriers to discharge with patient and caregiver   Arrange for needed discharge resources and transportation as appropriate   Identify discharge learning needs (meds, wound care, etc)   Refer to discharge planning if patient needs post-hospital services based on physician order or complex needs related to functional status, cognitive ability or social support system  2/18/2025 2244 by Mildred Burk RN  Outcome: Progressing  Flowsheets (Taken 2/18/2025  1930)  Discharge to home or other facility with appropriate resources: Identify barriers to discharge with patient and caregiver     Problem: Pain  Goal: Verbalizes/displays adequate comfort level or baseline comfort level  2/19/2025 0946 by Zulma Yu RN  Outcome: Progressing  Flowsheets (Taken 2/19/2025 0727)  Verbalizes/displays adequate comfort level or baseline comfort level:   Encourage patient to monitor pain and request assistance   Assess pain using appropriate pain scale   Administer analgesics based on type and severity of pain and evaluate response   Consider cultural and social influences on pain and pain management   Implement non-pharmacological measures as appropriate and evaluate response   Notify Licensed Independent Practitioner if interventions unsuccessful or patient reports new pain  2/18/2025 2244 by Mildred Burk RN  Outcome: Progressing  Flowsheets (Taken 2/18/2025 1930)  Verbalizes/displays adequate comfort level or baseline comfort level:   Encourage patient to monitor pain and request assistance   Assess pain using appropriate pain scale   Administer analgesics based on type and severity of pain and evaluate response     Problem: Skin/Tissue Integrity  Goal: Skin integrity remains intact  Description: 1.  Monitor for areas of redness and/or skin breakdown  2.  Assess vascular access sites hourly  3.  Every 4-6 hours minimum:  Change oxygen saturation probe site  4.  Every 4-6 hours:  If on nasal continuous positive airway pressure, respiratory therapy assess nares and determine need for appliance change or resting period  2/19/2025 0946 by Zulma Yu RN  Outcome: Progressing  Flowsheets (Taken 2/19/2025 0800)  Skin Integrity Remains Intact: Monitor for areas of redness and/or skin breakdown  2/18/2025 2244 by Mildred Burk RN  Outcome: Progressing  Flowsheets (Taken 2/18/2025 1930)  Skin Integrity Remains Intact: Monitor for areas of redness and/or skin  breakdown     Problem: ABCDS Injury Assessment  Goal: Absence of physical injury  2/19/2025 0946 by Zulma Yu RN  Outcome: Progressing  2/18/2025 2244 by Mildred Burk RN  Outcome: Progressing     Problem: Confusion  Goal: Confusion, delirium, dementia, or psychosis is improved or at baseline  Description: INTERVENTIONS:  1. Assess for possible contributors to thought disturbance, including medications, impaired vision or hearing, underlying metabolic abnormalities, dehydration, psychiatric diagnoses, and notify attending LIP  2. [place] high risk fall precautions, as indicated  3. Provide frequent short contacts to provide reality reorientation, refocusing and direction  4. Decrease environmental stimuli, including noise as appropriate  5. Monitor and intervene to maintain adequate nutrition, hydration, elimination, sleep and activity  6. If unable to ensure safety without constant attention obtain sitter and review sitter guidelines with assigned personnel  7. Initiate Psychosocial CNS and Spiritual Care consult, as indicated  2/19/2025 0946 by Zulma Yu RN  Outcome: Progressing  Flowsheets (Taken 2/19/2025 0800)  Effect of thought disturbance (confusion, delirium, dementia, or psychosis) are managed with adequate functional status:   Assess for contributors to thought disturbance, including medications, impaired vision or hearing, underlying metabolic abnormalities, dehydration, psychiatric diagnoses, notify LIP   [place] high risk fall precautions, as indicated   Provide frequent short contacts to provide reality reorientation, refocusing and direction   Monitor and intervene to maintain adequate nutrition, hydration, elimination, sleep and activity   If unable to ensure safety without constant attention obtain sitter and review sitter guidelines with assigned personnel  2/18/2025 2244 by Mildred Burk, RN  Outcome: Progressing  Flowsheets (Taken 2/18/2025 1930)  Effect of

## 2025-02-19 NOTE — CARE COORDINATION
Pt chart reviewed and discussed in Critical Care Interdisciplinary Rounds. LOS 4 days. Pt admitted with hypernatremia, septicemia, JALEN, and acute renal failure. Per MD, pt with interimittent AMS and PO intake at times; levo gtts (attempting to wean); high aspiration risk due to pocketing food in mouth; palliative consulted; SLP following. Pt not medically stable for discharge.     DC/POC to return to LTC at [facility] via [facility] transport when medically stable.     CM team will continue to follow for potential discharge needs that may arise.     Daylin Brewster, MSW, LBSW  ICU   [facility]

## 2025-02-20 ENCOUNTER — APPOINTMENT (OUTPATIENT)
Dept: GENERAL RADIOLOGY | Age: 75
DRG: 871 | End: 2025-02-20
Payer: MEDICARE

## 2025-02-20 PROBLEM — A41.9 SEPTICEMIA (HCC): Status: ACTIVE | Noted: 2025-02-20

## 2025-02-20 LAB
ANION GAP SERPL CALC-SCNC: 7 MMOL/L (ref 7–16)
BACTERIA SPEC CULT: NORMAL
BACTERIA SPEC CULT: NORMAL
BASOPHILS # BLD: 0.05 K/UL (ref 0–0.2)
BASOPHILS NFR BLD: 0.9 % (ref 0–2)
BUN SERPL-MCNC: 13 MG/DL (ref 8–23)
CALCIUM SERPL-MCNC: 8.6 MG/DL (ref 8.8–10.2)
CHLORIDE SERPL-SCNC: 114 MMOL/L (ref 98–107)
CO2 SERPL-SCNC: 23 MMOL/L (ref 20–29)
CORTIS AM PEAK SERPL-MCNC: <0.2 UG/DL (ref 4.8–19.5)
CREAT SERPL-MCNC: 0.65 MG/DL (ref 0.8–1.3)
DIFFERENTIAL METHOD BLD: ABNORMAL
EOSINOPHIL # BLD: 0.26 K/UL (ref 0–0.8)
EOSINOPHIL NFR BLD: 4.6 % (ref 0.5–7.8)
ERYTHROCYTE [DISTWIDTH] IN BLOOD BY AUTOMATED COUNT: 13.4 % (ref 11.9–14.6)
GLUCOSE BLD STRIP.AUTO-MCNC: 128 MG/DL (ref 65–100)
GLUCOSE BLD STRIP.AUTO-MCNC: 138 MG/DL (ref 65–100)
GLUCOSE BLD STRIP.AUTO-MCNC: 78 MG/DL (ref 65–100)
GLUCOSE BLD STRIP.AUTO-MCNC: 99 MG/DL (ref 65–100)
GLUCOSE SERPL-MCNC: 84 MG/DL (ref 70–99)
HCT VFR BLD AUTO: 37.4 % (ref 41.1–50.3)
HGB BLD-MCNC: 12.2 G/DL (ref 13.6–17.2)
IMM GRANULOCYTES # BLD AUTO: 0.05 K/UL (ref 0–0.5)
IMM GRANULOCYTES NFR BLD AUTO: 0.9 % (ref 0–5)
LYMPHOCYTES # BLD: 1.44 K/UL (ref 0.5–4.6)
LYMPHOCYTES NFR BLD: 25.3 % (ref 13–44)
MAGNESIUM SERPL-MCNC: 1.8 MG/DL (ref 1.8–2.4)
MCH RBC QN AUTO: 31.4 PG (ref 26.1–32.9)
MCHC RBC AUTO-ENTMCNC: 32.6 G/DL (ref 31.4–35)
MCV RBC AUTO: 96.1 FL (ref 82–102)
MONOCYTES # BLD: 0.37 K/UL (ref 0.1–1.3)
MONOCYTES NFR BLD: 6.5 % (ref 4–12)
NEUTS SEG # BLD: 3.53 K/UL (ref 1.7–8.2)
NEUTS SEG NFR BLD: 61.8 % (ref 43–78)
NRBC # BLD: 0 K/UL (ref 0–0.2)
PHOSPHATE SERPL-MCNC: 2 MG/DL (ref 2.5–4.5)
PLATELET # BLD AUTO: 174 K/UL (ref 150–450)
PMV BLD AUTO: 11.5 FL (ref 9.4–12.3)
POTASSIUM SERPL-SCNC: 3.8 MMOL/L (ref 3.5–5.1)
RBC # BLD AUTO: 3.89 M/UL (ref 4.23–5.6)
SERVICE CMNT-IMP: ABNORMAL
SERVICE CMNT-IMP: ABNORMAL
SERVICE CMNT-IMP: NORMAL
SODIUM SERPL-SCNC: 144 MMOL/L (ref 136–145)
WBC # BLD AUTO: 5.7 K/UL (ref 4.3–11.1)

## 2025-02-20 PROCEDURE — 94761 N-INVAS EAR/PLS OXIMETRY MLT: CPT

## 2025-02-20 PROCEDURE — 2000000000 HC ICU R&B

## 2025-02-20 PROCEDURE — 6370000000 HC RX 637 (ALT 250 FOR IP): Performed by: STUDENT IN AN ORGANIZED HEALTH CARE EDUCATION/TRAINING PROGRAM

## 2025-02-20 PROCEDURE — 0DH67UZ INSERTION OF FEEDING DEVICE INTO STOMACH, VIA NATURAL OR ARTIFICIAL OPENING: ICD-10-PCS | Performed by: STUDENT IN AN ORGANIZED HEALTH CARE EDUCATION/TRAINING PROGRAM

## 2025-02-20 PROCEDURE — 84100 ASSAY OF PHOSPHORUS: CPT

## 2025-02-20 PROCEDURE — 99222 1ST HOSP IP/OBS MODERATE 55: CPT | Performed by: INTERNAL MEDICINE

## 2025-02-20 PROCEDURE — 83735 ASSAY OF MAGNESIUM: CPT

## 2025-02-20 PROCEDURE — 80048 BASIC METABOLIC PNL TOTAL CA: CPT

## 2025-02-20 PROCEDURE — 2500000003 HC RX 250 WO HCPCS: Performed by: STUDENT IN AN ORGANIZED HEALTH CARE EDUCATION/TRAINING PROGRAM

## 2025-02-20 PROCEDURE — 74018 RADEX ABDOMEN 1 VIEW: CPT

## 2025-02-20 PROCEDURE — 82962 GLUCOSE BLOOD TEST: CPT

## 2025-02-20 PROCEDURE — 3E0G76Z INTRODUCTION OF NUTRITIONAL SUBSTANCE INTO UPPER GI, VIA NATURAL OR ARTIFICIAL OPENING: ICD-10-PCS | Performed by: STUDENT IN AN ORGANIZED HEALTH CARE EDUCATION/TRAINING PROGRAM

## 2025-02-20 PROCEDURE — 85025 COMPLETE CBC W/AUTO DIFF WBC: CPT

## 2025-02-20 PROCEDURE — 36415 COLL VENOUS BLD VENIPUNCTURE: CPT

## 2025-02-20 PROCEDURE — 2700000000 HC OXYGEN THERAPY PER DAY

## 2025-02-20 PROCEDURE — 82533 TOTAL CORTISOL: CPT

## 2025-02-20 RX ORDER — FINASTERIDE 5 MG/1
5 TABLET, FILM COATED ORAL DAILY
Status: DISCONTINUED | OUTPATIENT
Start: 2025-02-20 | End: 2025-02-21

## 2025-02-20 RX ORDER — LANOLIN ALCOHOL/MO/W.PET/CERES
100 CREAM (GRAM) TOPICAL DAILY
Status: COMPLETED | OUTPATIENT
Start: 2025-02-20 | End: 2025-02-26

## 2025-02-20 RX ORDER — TAMSULOSIN HYDROCHLORIDE 0.4 MG/1
0.4 CAPSULE ORAL DAILY
Status: DISCONTINUED | OUTPATIENT
Start: 2025-02-20 | End: 2025-02-28

## 2025-02-20 RX ORDER — NOREPINEPHRINE BITARTRATE 0.02 MG/ML
2-100 INJECTION, SOLUTION INTRAVENOUS CONTINUOUS
Status: DISCONTINUED | OUTPATIENT
Start: 2025-02-20 | End: 2025-02-28

## 2025-02-20 RX ORDER — POTASSIUM CHLORIDE 1500 MG/1
40 TABLET, EXTENDED RELEASE ORAL ONCE
Status: DISCONTINUED | OUTPATIENT
Start: 2025-02-20 | End: 2025-02-20

## 2025-02-20 RX ADMIN — KETOTIFEN FUMARATE 1 DROP: 0.25 SOLUTION/ DROPS OPHTHALMIC at 09:50

## 2025-02-20 RX ADMIN — EZETIMIBE 10 MG: 10 TABLET ORAL at 22:15

## 2025-02-20 RX ADMIN — SODIUM CHLORIDE, PRESERVATIVE FREE 10 ML: 5 INJECTION INTRAVENOUS at 09:51

## 2025-02-20 RX ADMIN — SENNOSIDES AND DOCUSATE SODIUM 2 TABLET: 50; 8.6 TABLET ORAL at 09:50

## 2025-02-20 RX ADMIN — CETIRIZINE HYDROCHLORIDE 10 MG: 10 TABLET, FILM COATED ORAL at 09:51

## 2025-02-20 RX ADMIN — TAMSULOSIN HYDROCHLORIDE 0.4 MG: 0.4 CAPSULE ORAL at 09:51

## 2025-02-20 RX ADMIN — Medication 100 MG: at 19:24

## 2025-02-20 RX ADMIN — POTASSIUM & SODIUM PHOSPHATES POWDER PACK 280-160-250 MG 250 MG: 280-160-250 PACK at 09:50

## 2025-02-20 RX ADMIN — POTASSIUM & SODIUM PHOSPHATES POWDER PACK 280-160-250 MG 250 MG: 280-160-250 PACK at 22:15

## 2025-02-20 RX ADMIN — NOREPINEPHRINE BITARTRATE 2 MCG/MIN: 16 SOLUTION INTRAVENOUS at 11:53

## 2025-02-20 RX ADMIN — APIXABAN 5 MG: 5 TABLET, FILM COATED ORAL at 09:50

## 2025-02-20 RX ADMIN — MIDODRINE HYDROCHLORIDE 10 MG: 5 TABLET ORAL at 19:19

## 2025-02-20 RX ADMIN — MIDODRINE HYDROCHLORIDE 10 MG: 5 TABLET ORAL at 14:23

## 2025-02-20 RX ADMIN — MIDODRINE HYDROCHLORIDE 10 MG: 5 TABLET ORAL at 09:50

## 2025-02-20 RX ADMIN — POTASSIUM & SODIUM PHOSPHATES POWDER PACK 280-160-250 MG 250 MG: 280-160-250 PACK at 14:23

## 2025-02-20 RX ADMIN — NOREPINEPHRINE BITARTRATE 8 MCG/MIN: 16 SOLUTION INTRAVENOUS at 22:23

## 2025-02-20 RX ADMIN — FINASTERIDE 5 MG: 5 TABLET, FILM COATED ORAL at 09:51

## 2025-02-20 RX ADMIN — KETOTIFEN FUMARATE 1 DROP: 0.25 SOLUTION/ DROPS OPHTHALMIC at 22:08

## 2025-02-20 RX ADMIN — POTASSIUM & SODIUM PHOSPHATES POWDER PACK 280-160-250 MG 250 MG: 280-160-250 PACK at 19:20

## 2025-02-20 RX ADMIN — Medication 400 MG: at 09:51

## 2025-02-20 ASSESSMENT — PAIN SCALES - GENERAL
PAINLEVEL_OUTOF10: 0

## 2025-02-20 NOTE — PROGRESS NOTES
This nurse spoke with the daughter, Yamini (TERE) and her  Venkat. They spoke with Dr. Gilbert regarding patients condition. Yamini and Venkat called back and gave the OK for the feeding tube. They would like Neurosurgery to call them and explain to them the patients condition and then decide from there if Hospice is appropriate. Relayed message to Dr. Gilbert.

## 2025-02-20 NOTE — PROGRESS NOTES
Critical Care Interdisciplinary Rounds with staff.     Nicole Shanks M.Div, [facility] / / Bereavement Coordinator  [facility]   c: [phone]/ [phone] / [email or web address]  [email or web address]

## 2025-02-20 NOTE — PLAN OF CARE
Problem: Chronic Conditions and Co-morbidities  Goal: Patient's chronic conditions and co-morbidity symptoms are monitored and maintained or improved  Outcome: Progressing  Flowsheets (Taken 2/20/2025 0800)  Care Plan - Patient's Chronic Conditions and Co-Morbidity Symptoms are Monitored and Maintained or Improved: Monitor and assess patient's chronic conditions and comorbid symptoms for stability, deterioration, or improvement     Problem: Discharge Planning  Goal: Discharge to home or other facility with appropriate resources  Outcome: Progressing  Flowsheets (Taken 2/20/2025 0800)  Discharge to home or other facility with appropriate resources: Identify barriers to discharge with patient and caregiver     Problem: Pain  Goal: Verbalizes/displays adequate comfort level or baseline comfort level  Outcome: Progressing     Problem: Skin/Tissue Integrity  Goal: Skin integrity remains intact  Description: 1.  Monitor for areas of redness and/or skin breakdown  2.  Assess vascular access sites hourly  3.  Every 4-6 hours minimum:  Change oxygen saturation probe site  4.  Every 4-6 hours:  If on nasal continuous positive airway pressure, respiratory therapy assess nares and determine need for appliance change or resting period  Outcome: Progressing  Flowsheets (Taken 2/20/2025 0800)  Skin Integrity Remains Intact: Monitor for areas of redness and/or skin breakdown     Problem: ABCDS Injury Assessment  Goal: Absence of physical injury  Outcome: Progressing  Flowsheets (Taken 2/20/2025 0800)  Absence of Physical Injury: Implement safety measures based on patient assessment     Problem: Confusion  Goal: Confusion, delirium, dementia, or psychosis is improved or at baseline  Description: INTERVENTIONS:  1. Assess for possible contributors to thought disturbance, including medications, impaired vision or hearing, underlying metabolic abnormalities, dehydration, psychiatric diagnoses, and notify attending LIP  2. [place]  high risk fall precautions, as indicated  3. Provide frequent short contacts to provide reality reorientation, refocusing and direction  4. Decrease environmental stimuli, including noise as appropriate  5. Monitor and intervene to maintain adequate nutrition, hydration, elimination, sleep and activity  6. If unable to ensure safety without constant attention obtain sitter and review sitter guidelines with assigned personnel  7. Initiate Psychosocial CNS and Spiritual Care consult, as indicated  Outcome: Progressing  Flowsheets (Taken 2/20/2025 0800)  Effect of thought disturbance (confusion, delirium, dementia, or psychosis) are managed with adequate functional status:   Assess for contributors to thought disturbance, including medications, impaired vision or hearing, underlying metabolic abnormalities, dehydration, psychiatric diagnoses, notify LIP   [place] high risk fall precautions, as indicated   Provide frequent short contacts to provide reality reorientation, refocusing and direction   Decrease environmental stimuli, including noise as appropriate   Monitor and intervene to maintain adequate nutrition, hydration, elimination, sleep and activity   If unable to ensure safety without constant attention obtain sitter and review sitter guidelines with assigned personnel   Initiate Psychosocial Clinical Nurse Specialist and Spiritual Care consult, as indicated     Problem: Safety - Adult  Goal: Free from fall injury  Outcome: Progressing  Flowsheets (Taken 2/20/2025 0800)  Free From Fall Injury: Instruct family/caregiver on patient safety

## 2025-02-20 NOTE — PROGRESS NOTES
I attempted calling the patient's daughter and TERE Kc again today to discuss patient. Voicemail was left.     Addendum:  Spoke with Venkat and Yamini regarding the patient's condition. Discussed the need for feeding tube as he is having a difficult time eating by mouth. Discussed what the patient would want moving forward in regards to a tube for feeding versus a hospice setting. Explained that if hospice was pursued this would mean we would stop treatment and focus on comfort measures. Venkat and Yamini both agree, Mr. Gabriel would want a feeding tube placed and continue to seek treatment to return to his facility. All of their questions were answered.     Stephanie Gilliam, MIKAELP-C  [facility]

## 2025-02-20 NOTE — CONSULTS
Gastroenterology/Hepatology Note    Consult Note            Date:2/20/2025        Patient Name:Justin Gabriel     YOB: 1950     Age:74 y.o.    Reason for consult: PEG tube     History of Present Illness   73 yo M with history of CVA, currently on Eliquis presents to the ED on 2/15 with severe dehydrations, confusion, lactic acidosis, and JALEN. Patient has been hypotensive during his admission and appears to have inadequate PO intake. Patient has required pressor support for his hypotension as well as Midodrine. GI contacted as family is requesting PEG tube placement as there was also a discussion about hospice. Patient has been on his Eliquis and this is currently being held for anticipated PEG placement during this admission. No c/o abdominal pain, nausea, vomiting, or overt GI bleeding.     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    Chronic pain     left foot    CVA (cerebral infarction) 1998, 2008    left sided weakness(TIA) stroke causing weakness    Depression     Diabetes (HCC) 2010     typell, Avg fasting ; denies s/s hypo does not know last HA1C    Diabetic ulcer of left great toe (HCC)     Enlarged aorta 8/6/2014    arteriomegaly 2.9cm 7/30/14     Gout     History of stroke 1998    Residual left hemiparesis    Hypertension     Mixed hyperlipidemia     Obesity     Skin cancer     neck    Stroke (HCC)     Unspecified constipation     Unspecified vitamin D deficiency         Past Surgical History     Past Surgical History:   Procedure Laterality Date    ADENOIDECTOMY      childhood    CATARACT REMOVAL  OD-2013/OS-2014    with IOL placement    COLONOSCOPY  2007, 2010, 2014    with polypectomy    ORTHOPEDIC SURGERY  11/2013    amp left great toe     injection 5-40 mL, PRN  0.9 % sodium chloride infusion, PRN  potassium chloride (KLOR-CON M) extended release tablet 40 mEq, PRN   Or  potassium bicarb-citric acid (EFFER-K) effervescent tablet 40 mEq, PRN   Or  potassium chloride 10 mEq/100 mL IVPB (Peripheral Line), PRN  magnesium sulfate 2000 mg in 50 mL IVPB premix, PRN  ondansetron (ZOFRAN-ODT) disintegrating tablet 4 mg, Q8H PRN   Or  ondansetron (ZOFRAN) injection 4 mg, Q6H PRN  polyethylene glycol (GLYCOLAX) packet 17 g, Daily PRN  acetaminophen (TYLENOL) tablet 650 mg, Q6H PRN   Or  acetaminophen (TYLENOL) suppository 650 mg, Q6H PRN  [Held by provider] apixaban (ELIQUIS) tablet 5 mg, BID  ezetimibe (ZETIA) tablet 10 mg, Nightly  sennosides-docusate sodium (SENOKOT-S) 8.6-50 MG tablet 2 tablet, Daily  insulin lispro (HUMALOG,ADMELOG) injection vial 0-4 Units, 4x Daily AC & HS  glucose chewable tablet 16 g, PRN  dextrose bolus 10% 125 mL, PRN   Or  dextrose bolus 10% 250 mL, PRN  Glucagon Emergency KIT 1 mg, PRN  dextrose 10 % infusion, Continuous PRN  ketotifen fumarate (ZADITOR) 0.035 % ophthalmic solution 1 drop, BID  cetirizine (ZYRTEC) tablet 10 mg, Daily  lubiprostone (AMITIZA) capsule 8 mcg  (Patient Supplied), BID WC  mirtazapine (REMERON) tablet 7.5 mg, Nightly          Allergies   Sulfa antibiotics, Oxycodone-acetaminophen, and Penicillin g    Social History     Pertinent history mentioned above.     Family History     Family History   Problem Relation Age of Onset    Hypertension Father     Diabetes Father     Diabetes Brother     Cancer Brother     Other Brother         Angiosarcoma, AAA    Cancer Mother     Other Father        Review of Systems   As per HPI    Physical Exam   BP (!) 94/41   Pulse 75   Temp 97 °F (36.1 °C) (Oral)   Resp 15   Ht 1.829 m (6')   Wt 73.6 kg (162 lb 4.8 oz)   SpO2 (!) 86%   BMI 22.01 kg/m²      - GENERAL: Able to converse but speech is slow. No acute distress. Chronically ill appearing. Eating lunch.     -  a PEG as indicated next week.     No contraindications for PEG placement (sepsis, ascites, or SBO)     Recommend nutrition evaluation if not completed with calorie count.     Please keep patient NPO after MN on Sunday for possible PEG placement on Monday, if patient still requiring pressors throughout the weekend then I recommend pushing PEG until later in the week once HD stable (for an elective case).     Thank you for involving the GI service in the care of your patient. Please dont hesitate to call me directly on my cell below with any questions, updates, etc.      -Danny Finn MD  [phone]   Gastroenterology/Hepatology

## 2025-02-20 NOTE — CONSULTS
Nutrition Assessment  Assessment Type: Reassess, Consult  Reason for visit:  Tube Feeding Management (Hospitalists) and Poor Intake/Appetite 5 or More Days (Hospitalists)      Nutrition Intervention:   Food and/or Nutrient Delivery:   Enteral Nutrition:   Enteral Access: Nasogastric  Initiate  Formula: Standard without Fiber (Osmolite 1.2 [place])  Goal Rate: Continuous 70 ml/hr  Initiate  Water flush  30 ml every hour  Modulars: None not indicated at this time   Enteral regimen at above goal to provide per 24 hours:  1848 calories, 85 grams protein and 1923 ml free fluid.    Above regimen: Intended to meet macronutrient goals  Labs:   Basic Metabolic Panel, Magnesium and Phosphorus active per nutrition parameters  POC Glucoses/SSI Active   Nutrition Related Medication Management:  Electrolyte Replacement:   Continue prn protocol Magnesium and Potassium. Activate Phos.   Intravenous fluids:  Not applicable  Thiamine 100 mg daily x 7 days (EOT 2/27)  Bowel Regimen Senokot daily  and Per MD  Meals and Snacks:  Diet: Continue current diet per SLP evaluation  Medical Food Supplements:   Medical food supplement therapy:  Discontinue Magic Cup (frozen oral supplement) 290 calories, 9 grams protein per 4 ounce serving  Coordination of Nutrition Care:  Coordination with nayeli care provider Provider, Dr Gilbert and RNMickey       Malnutrition Assessment:2/17  [facility] Clinical Malnutrition Criteria  Malnutrition Status: Insufficient data (No diet recall or weight hx, limited visual NFPE)  Nutrition Focused Physical Exam, visual: Unremarkable     Nutrition Assessment:  Food/Nutrition Related History: 2/17: Limited hx obtained at this time d/t pt lethargy with mumbled responses to queries. However he says he was not a consistency modified diet PTA. He indicates he was on a regular diet with thin liquids.     Do You Have Any Cultural, Orthodoxy, or Ethnic Food Preferences?:  (Unable to communicate)   Weight History: No  recent weight in EMR. No NH records in paper chart.   Last recorded weight in EMR was 185# in 2023.  Nutrition Background:       Pertinent PMH/PSH: CVA with residual left-sided hemiplegia, multiple vascular abnormalities including aneurysm arachnoid cyst, cognitive disorder HLD, DM, constipation, recurrent UTI's   Presented from NH with AMS, hypotension, hypoxemia, being treated for UTI  Admitted with acute metabolic encephalopathy, hypernatremia, JALEN, acute hypoxic respiratory failure, hypotension,  UTI    Nutrition Monitoring/Evaluation:  SLP eval: 2/16-pureed with mildly thick liquids, 2/19: liquids by spoon only  Pt seen sitting in bed. He does not interact with RD. Mentation, level of alertness, and confusion waxes and wanes but po intake has remained poor despite normalization of serum Na.  2/20: GI consulted for PEG placement but will not be placed until 2/24 at the earliest d/t hypotension requiring pressor and currently on anticoagulant.  Plan for placement of NGFT and start of TF  Abdominal Status (last documented by RN):   Last BM (including prior to admit):  (unknown and pt unable to report)  Abdomen Inspection: Soft, RUQ Bowel Sounds: Active, LUQ Bowel Sounds: Active, RLQ Bowel Sounds: Active, LLQ Bowel Sounds: Active  Pertinent Medications: SSI (has required minimal since admission), remeron, senokot. MgOx  Continuous: Levophed (2-4 mcg/min)  IVF: none  Electrolyte Replacement:  2/17: 40 meq KLORCON, 2/18: 40 meq KLORCON, 2/19: 2 gram Mg, 20 mmol Kphos, Phos NAK x 1 dose,   2/20: Phos NAK x 4 scheduled doses  Pertinent administered PRN: none  Pertinent Labs:   Lab Results   Component Value Date/Time     02/20/2025 02:58 AM    K 3.8 02/20/2025 02:58 AM     02/20/2025 02:58 AM    CO2 23 02/20/2025 02:58 AM    BUN 13 02/20/2025 02:58 AM    CREATININE 0.65 02/20/2025 02:58 AM    GLUCOSE 84 02/20/2025 02:58 AM    CALCIUM 8.6 02/20/2025 02:58 AM    PHOS 2.0 02/20/2025 02:58 AM    MG 1.8  determine    BIRGIT LAND, RD

## 2025-02-20 NOTE — PLAN OF CARE
Problem: Chronic Conditions and Co-morbidities  Goal: Patient's chronic conditions and co-morbidity symptoms are monitored and maintained or improved  Outcome: Progressing  Flowsheets (Taken 2/19/2025 2045)  Care Plan - Patient's Chronic Conditions and Co-Morbidity Symptoms are Monitored and Maintained or Improved: Monitor and assess patient's chronic conditions and comorbid symptoms for stability, deterioration, or improvement     Problem: Discharge Planning  Goal: Discharge to home or other facility with appropriate resources  Outcome: Progressing  Flowsheets (Taken 2/19/2025 2045)  Discharge to home or other facility with appropriate resources: Identify barriers to discharge with patient and caregiver     Problem: Pain  Goal: Verbalizes/displays adequate comfort level or baseline comfort level  Outcome: Progressing  Flowsheets (Taken 2/19/2025 2045)  Verbalizes/displays adequate comfort level or baseline comfort level:   Encourage patient to monitor pain and request assistance   Assess pain using appropriate pain scale     Problem: Skin/Tissue Integrity  Goal: Skin integrity remains intact  Description: 1.  Monitor for areas of redness and/or skin breakdown  2.  Assess vascular access sites hourly  3.  Every 4-6 hours minimum:  Change oxygen saturation probe site  4.  Every 4-6 hours:  If on nasal continuous positive airway pressure, respiratory therapy assess nares and determine need for appliance change or resting period  Outcome: Progressing  Flowsheets (Taken 2/19/2025 2045)  Skin Integrity Remains Intact: Monitor for areas of redness and/or skin breakdown     Problem: ABCDS Injury Assessment  Goal: Absence of physical injury  Outcome: Progressing     Problem: Confusion  Goal: Confusion, delirium, dementia, or psychosis is improved or at baseline  Description: INTERVENTIONS:  1. Assess for possible contributors to thought disturbance, including medications, impaired vision or hearing, underlying metabolic  abnormalities, dehydration, psychiatric diagnoses, and notify attending LIP  2. [place] high risk fall precautions, as indicated  3. Provide frequent short contacts to provide reality reorientation, refocusing and direction  4. Decrease environmental stimuli, including noise as appropriate  5. Monitor and intervene to maintain adequate nutrition, hydration, elimination, sleep and activity  6. If unable to ensure safety without constant attention obtain sitter and review sitter guidelines with assigned personnel  7. Initiate Psychosocial CNS and Spiritual Care consult, as indicated  Outcome: Progressing     Problem: Safety - Adult  Goal: Free from fall injury  Outcome: Progressing

## 2025-02-20 NOTE — PROGRESS NOTES
Hospitalist Progress Note   Admit Date:  2/15/2025 11:56 AM   Name:  Justin Gabriel   Age:  74 y.o.  Sex:  male  :  1950   MRN:  [id number]   Room:  [facility]/    Presenting/Chief Complaint: Shortness of Breath and Altered Mental Status     Reason(s) for Admission: Hypernatremia [E87.0]  Septicemia (HCC) [A41.9]  Acute kidney injury [N17.9]  Acute renal failure, unspecified acute renal failure type [N17.9]     Hospital Course:   Justin Gabriel is a 74 y.o. 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  On  he developed hypotension and despite multiple boluses, had to be moved to the ICU on Levophed drip.  He is intermittently on and off Levophed. He was having breakfast and was pocketing food in his mouth.  Speech was consulted and recommended puréed diet.  He was off of Levophed.  He was started on midodrine and increase the dose.  He was hyponatremic and it was resolved with D5.  He is having waxing and waning confusion.  Neurosurgery was consulted and recommended draining the extra-axial fluid collection in the brain.  Attempted to call the daughter twice and left voicemail.  Lock was removed on  and he was retaining urine approximately 500 mL.  Lock was placed again.    Subjective & 24hr Events:   He is alert this morning.  His BP improved and he was weaned off Levophed. Lock was removed on  and he was retaining urine approximately 500 mL.  Lock was placed again. He was saturating well on 2L NC oxygen.  ROS is limited due to waxing and waning confusion.     Assessment & Plan:   Hypovolemic shock (HCC)   Lactic acidosis (Resolved)  Started back on  Levophed drip as his MAP was in 50s.  Continue Midodrine 10 Mg 3 times  Time: 02/20/25  2:58 AM   Result Value Ref Range    Sodium 144 136 - 145 mmol/L    Potassium 3.8 3.5 - 5.1 mmol/L    Chloride 114 (H) 98 - 107 mmol/L    CO2 23 20 - 29 mmol/L    Anion Gap 7 7 - 16 mmol/L    Glucose 84 70 - 99 mg/dL    BUN 13 8 - 23 MG/DL    Creatinine 0.65 (L) 0.80 - 1.30 MG/DL    Est, Glom Filt Rate >90 >60 ml/min/1.73m2    Calcium 8.6 (L) 8.8 - 10.2 MG/DL   Cortisol AM, Total    Collection Time: 02/20/25  2:58 AM   Result Value Ref Range    Cortisol - AM <0.2 (L) 4.8 - 19.5 ug/dL   POCT Glucose    Collection Time: 02/20/25  7:14 AM   Result Value Ref Range    POC Glucose 78 65 - 100 mg/dL    Performed by: Kingsley(Quinten)        No results for input(s): \"COVID19\" in the last 72 hours.      Current Meds:  Current Facility-Administered Medications   Medication Dose Route Frequency    potassium & sodium phosphates (PHOS-NAK) 280-160-250 MG packet 250 mg  1 packet Oral 4x Daily    tamsulosin (FLOMAX) capsule 0.4 mg  0.4 mg Oral Daily    finasteride (PROSCAR) tablet 5 mg  5 mg Oral Daily    midodrine (PROAMATINE) tablet 10 mg  10 mg Oral TID WC    magnesium oxide (MAG-OX) tablet 400 mg  400 mg Oral Daily    norepinephrine (LEVOPHED) 4 mg in sodium chloride 0.9 % 250 mL infusion (premix)  2-100 mcg/min IntraVENous Continuous    sodium chloride flush 0.9 % injection 5-40 mL  5-40 mL IntraVENous 2 times per day    sodium chloride flush 0.9 % injection 5-40 mL  5-40 mL IntraVENous PRN    0.9 % sodium chloride infusion   IntraVENous PRN    potassium chloride (KLOR-CON M) extended release tablet 40 mEq  40 mEq Oral PRN    Or    potassium bicarb-citric acid (EFFER-K) effervescent tablet 40 mEq  40 mEq Oral PRN    Or    potassium chloride 10 mEq/100 mL IVPB (Peripheral Line)  10 mEq IntraVENous PRN    magnesium sulfate 2000 mg in 50 mL IVPB premix  2,000 mg IntraVENous PRN    ondansetron (ZOFRAN-ODT) disintegrating tablet 4 mg  4 mg Oral Q8H PRN    Or    ondansetron (ZOFRAN) injection 4 mg  4 mg

## 2025-02-20 NOTE — CONSULTS
Neurosurgery does not cover inpatients at [facility] but I was asked to review the films with Mr. Reid and voice an opinion on any neurosurgical intervention that might be helpful.  On review of his CAT scan and comparing back to CAT scans his oldest 2010 he has had a CSF density collection with abnormal cortex and midline shift since at least 2010.  He also has a very large what appears to be a giant calcified aneurysm on the left as well as some other calcification in the posterior aspect of the large CSF cavity.  This does not appear to be an enlarging chronic subdural hematoma is much as this appears to be an arachnoid cyst with a fairly thick membrane/wall.  It enlarged from 2463-3442 but really has not changed a great deal since then.  He also has fairly loose folds of sulcal gyral patterns around the arachnoid cyst which would also go against this being under any new or acute pressure.  He has extensive atrophy that is also worsened during this timeframe.  Also reviewed these films before my partners.  We are both in agreement that this longstanding arachnoid cyst is most likely not the cause of his acute mental status changes.  Draining this was most likely not improve his functioning.'s are relatively simple low risk process to place a percutaneous drain at the bedside but even with that in mind the small risk probably outweigh the very minimal benefits of placing the drain.  I discussed these findings with .  If the family needs to discuss this issues with me I am glad to do so.

## 2025-02-21 PROBLEM — R13.10 DYSPHAGIA: Status: ACTIVE | Noted: 2025-02-15

## 2025-02-21 LAB
ANION GAP SERPL CALC-SCNC: 10 MMOL/L (ref 7–16)
BASOPHILS # BLD: 0.04 K/UL (ref 0–0.2)
BASOPHILS NFR BLD: 0.6 % (ref 0–2)
BUN SERPL-MCNC: 11 MG/DL (ref 8–23)
CALCIUM SERPL-MCNC: 8.6 MG/DL (ref 8.8–10.2)
CHLORIDE SERPL-SCNC: 110 MMOL/L (ref 98–107)
CO2 SERPL-SCNC: 22 MMOL/L (ref 20–29)
CREAT SERPL-MCNC: 0.52 MG/DL (ref 0.8–1.3)
DIFFERENTIAL METHOD BLD: ABNORMAL
EOSINOPHIL # BLD: 0.24 K/UL (ref 0–0.8)
EOSINOPHIL NFR BLD: 3.5 % (ref 0.5–7.8)
ERYTHROCYTE [DISTWIDTH] IN BLOOD BY AUTOMATED COUNT: 13.1 % (ref 11.9–14.6)
GLUCOSE BLD STRIP.AUTO-MCNC: 135 MG/DL (ref 65–100)
GLUCOSE BLD STRIP.AUTO-MCNC: 143 MG/DL (ref 65–100)
GLUCOSE BLD STRIP.AUTO-MCNC: 146 MG/DL (ref 65–100)
GLUCOSE BLD STRIP.AUTO-MCNC: 168 MG/DL (ref 65–100)
GLUCOSE BLD STRIP.AUTO-MCNC: 174 MG/DL (ref 65–100)
GLUCOSE SERPL-MCNC: 157 MG/DL (ref 70–99)
HCT VFR BLD AUTO: 38.1 % (ref 41.1–50.3)
HGB BLD-MCNC: 12.6 G/DL (ref 13.6–17.2)
IMM GRANULOCYTES # BLD AUTO: 0.07 K/UL (ref 0–0.5)
IMM GRANULOCYTES NFR BLD AUTO: 1 % (ref 0–5)
LYMPHOCYTES # BLD: 1.37 K/UL (ref 0.5–4.6)
LYMPHOCYTES NFR BLD: 19.9 % (ref 13–44)
MAGNESIUM SERPL-MCNC: 1.4 MG/DL (ref 1.8–2.4)
MAGNESIUM SERPL-MCNC: 1.9 MG/DL (ref 1.8–2.4)
MCH RBC QN AUTO: 31 PG (ref 26.1–32.9)
MCHC RBC AUTO-ENTMCNC: 33.1 G/DL (ref 31.4–35)
MCV RBC AUTO: 93.6 FL (ref 82–102)
MONOCYTES # BLD: 0.48 K/UL (ref 0.1–1.3)
MONOCYTES NFR BLD: 7 % (ref 4–12)
NEUTS SEG # BLD: 4.69 K/UL (ref 1.7–8.2)
NEUTS SEG NFR BLD: 68 % (ref 43–78)
NRBC # BLD: 0 K/UL (ref 0–0.2)
PHOSPHATE SERPL-MCNC: 2.4 MG/DL (ref 2.5–4.5)
PLATELET # BLD AUTO: 190 K/UL (ref 150–450)
PMV BLD AUTO: 11.3 FL (ref 9.4–12.3)
POTASSIUM SERPL-SCNC: 3.7 MMOL/L (ref 3.5–5.1)
RBC # BLD AUTO: 4.07 M/UL (ref 4.23–5.6)
SERVICE CMNT-IMP: ABNORMAL
SODIUM SERPL-SCNC: 142 MMOL/L (ref 136–145)
WBC # BLD AUTO: 6.9 K/UL (ref 4.3–11.1)

## 2025-02-21 PROCEDURE — 85025 COMPLETE CBC W/AUTO DIFF WBC: CPT

## 2025-02-21 PROCEDURE — 2500000003 HC RX 250 WO HCPCS: Performed by: STUDENT IN AN ORGANIZED HEALTH CARE EDUCATION/TRAINING PROGRAM

## 2025-02-21 PROCEDURE — 83735 ASSAY OF MAGNESIUM: CPT

## 2025-02-21 PROCEDURE — 80048 BASIC METABOLIC PNL TOTAL CA: CPT

## 2025-02-21 PROCEDURE — 2000000000 HC ICU R&B

## 2025-02-21 PROCEDURE — 6370000000 HC RX 637 (ALT 250 FOR IP): Performed by: STUDENT IN AN ORGANIZED HEALTH CARE EDUCATION/TRAINING PROGRAM

## 2025-02-21 PROCEDURE — 6360000002 HC RX W HCPCS: Performed by: STUDENT IN AN ORGANIZED HEALTH CARE EDUCATION/TRAINING PROGRAM

## 2025-02-21 PROCEDURE — 84100 ASSAY OF PHOSPHORUS: CPT

## 2025-02-21 PROCEDURE — 92526 ORAL FUNCTION THERAPY: CPT

## 2025-02-21 PROCEDURE — 36415 COLL VENOUS BLD VENIPUNCTURE: CPT

## 2025-02-21 PROCEDURE — 82962 GLUCOSE BLOOD TEST: CPT

## 2025-02-21 RX ORDER — MIRTAZAPINE 15 MG/1
7.5 TABLET, FILM COATED ORAL NIGHTLY
Status: DISCONTINUED | OUTPATIENT
Start: 2025-02-21 | End: 2025-02-28

## 2025-02-21 RX ORDER — MAGNESIUM SULFATE IN WATER 40 MG/ML
2000 INJECTION, SOLUTION INTRAVENOUS ONCE
Status: COMPLETED | OUTPATIENT
Start: 2025-02-21 | End: 2025-02-21

## 2025-02-21 RX ORDER — LANOLIN ALCOHOL/MO/W.PET/CERES
400 CREAM (GRAM) TOPICAL DAILY
Status: DISCONTINUED | OUTPATIENT
Start: 2025-02-21 | End: 2025-02-28

## 2025-02-21 RX ORDER — SENNA AND DOCUSATE SODIUM 50; 8.6 MG/1; MG/1
2 TABLET, FILM COATED ORAL DAILY
Status: DISCONTINUED | OUTPATIENT
Start: 2025-02-22 | End: 2025-02-28

## 2025-02-21 RX ORDER — POLYETHYLENE GLYCOL 3350 17 G/17G
17 POWDER, FOR SOLUTION ORAL DAILY
Status: DISCONTINUED | OUTPATIENT
Start: 2025-02-21 | End: 2025-02-28

## 2025-02-21 RX ORDER — FINASTERIDE 5 MG/1
5 TABLET, FILM COATED ORAL DAILY
Status: DISCONTINUED | OUTPATIENT
Start: 2025-02-21 | End: 2025-02-28

## 2025-02-21 RX ORDER — POLYETHYLENE GLYCOL 3350 17 G/17G
17 POWDER, FOR SOLUTION ORAL DAILY
Status: DISCONTINUED | OUTPATIENT
Start: 2025-02-21 | End: 2025-02-21

## 2025-02-21 RX ORDER — LANOLIN ALCOHOL/MO/W.PET/CERES
400 CREAM (GRAM) TOPICAL DAILY
Status: DISCONTINUED | OUTPATIENT
Start: 2025-02-21 | End: 2025-02-21

## 2025-02-21 RX ORDER — MIDODRINE HYDROCHLORIDE 5 MG/1
15 TABLET ORAL
Status: DISCONTINUED | OUTPATIENT
Start: 2025-02-21 | End: 2025-02-25

## 2025-02-21 RX ORDER — CETIRIZINE HYDROCHLORIDE 10 MG/1
10 TABLET ORAL DAILY
Status: DISCONTINUED | OUTPATIENT
Start: 2025-02-21 | End: 2025-02-28

## 2025-02-21 RX ORDER — EZETIMIBE 10 MG/1
10 TABLET ORAL NIGHTLY
Status: DISCONTINUED | OUTPATIENT
Start: 2025-02-21 | End: 2025-02-28

## 2025-02-21 RX ORDER — MIDODRINE HYDROCHLORIDE 5 MG/1
15 TABLET ORAL
Status: DISCONTINUED | OUTPATIENT
Start: 2025-02-21 | End: 2025-02-21

## 2025-02-21 RX ADMIN — MIDODRINE HYDROCHLORIDE 15 MG: 5 TABLET ORAL at 10:13

## 2025-02-21 RX ADMIN — MIDODRINE HYDROCHLORIDE 15 MG: 5 TABLET ORAL at 17:05

## 2025-02-21 RX ADMIN — FINASTERIDE 5 MG: 5 TABLET, FILM COATED ORAL at 09:57

## 2025-02-21 RX ADMIN — POTASSIUM & SODIUM PHOSPHATES POWDER PACK 280-160-250 MG 250 MG: 280-160-250 PACK at 21:34

## 2025-02-21 RX ADMIN — KETOTIFEN FUMARATE 1 DROP: 0.25 SOLUTION/ DROPS OPHTHALMIC at 21:33

## 2025-02-21 RX ADMIN — Medication 100 MG: at 09:57

## 2025-02-21 RX ADMIN — KETOTIFEN FUMARATE 1 DROP: 0.25 SOLUTION/ DROPS OPHTHALMIC at 10:20

## 2025-02-21 RX ADMIN — POTASSIUM & SODIUM PHOSPHATES POWDER PACK 280-160-250 MG 250 MG: 280-160-250 PACK at 14:05

## 2025-02-21 RX ADMIN — MAGNESIUM SULFATE HEPTAHYDRATE 2000 MG: 40 INJECTION, SOLUTION INTRAVENOUS at 10:02

## 2025-02-21 RX ADMIN — POTASSIUM & SODIUM PHOSPHATES POWDER PACK 280-160-250 MG 250 MG: 280-160-250 PACK at 17:05

## 2025-02-21 RX ADMIN — MIRTAZAPINE 7.5 MG: 15 TABLET, FILM COATED ORAL at 21:33

## 2025-02-21 RX ADMIN — NOREPINEPHRINE BITARTRATE 7 MCG/MIN: 16 SOLUTION INTRAVENOUS at 14:00

## 2025-02-21 RX ADMIN — POLYETHYLENE GLYCOL 3350 17 G: 17 POWDER, FOR SOLUTION ORAL at 14:06

## 2025-02-21 RX ADMIN — EZETIMIBE 10 MG: 10 TABLET ORAL at 21:33

## 2025-02-21 RX ADMIN — NOREPINEPHRINE BITARTRATE 9 MCG/MIN: 16 SOLUTION INTRAVENOUS at 05:52

## 2025-02-21 RX ADMIN — MAGNESIUM SULFATE HEPTAHYDRATE 2000 MG: 40 INJECTION, SOLUTION INTRAVENOUS at 04:54

## 2025-02-21 RX ADMIN — Medication 400 MG: at 09:57

## 2025-02-21 RX ADMIN — CETIRIZINE HYDROCHLORIDE 10 MG: 10 TABLET, FILM COATED ORAL at 09:57

## 2025-02-21 RX ADMIN — MIDODRINE HYDROCHLORIDE 15 MG: 5 TABLET ORAL at 14:08

## 2025-02-21 RX ADMIN — SODIUM CHLORIDE, PRESERVATIVE FREE 10 ML: 5 INJECTION INTRAVENOUS at 21:33

## 2025-02-21 ASSESSMENT — PAIN SCALES - GENERAL: PAINLEVEL_OUTOF10: 0

## 2025-02-21 NOTE — PLAN OF CARE
Problem: Chronic Conditions and Co-morbidities  Goal: Patient's chronic conditions and co-morbidity symptoms are monitored and maintained or improved  2/21/2025 0159 by Crystal Riggins RN  Outcome: Progressing  Flowsheets (Taken 2/20/2025 1920)  Care Plan - Patient's Chronic Conditions and Co-Morbidity Symptoms are Monitored and Maintained or Improved: Monitor and assess patient's chronic conditions and comorbid symptoms for stability, deterioration, or improvement  2/20/2025 1828 by Mickey Allan RN  Outcome: Progressing  Flowsheets (Taken 2/20/2025 0800)  Care Plan - Patient's Chronic Conditions and Co-Morbidity Symptoms are Monitored and Maintained or Improved: Monitor and assess patient's chronic conditions and comorbid symptoms for stability, deterioration, or improvement     Problem: Discharge Planning  Goal: Discharge to home or other facility with appropriate resources  2/21/2025 0159 by Crystal Riggins RN  Outcome: Progressing  Flowsheets (Taken 2/20/2025 1920)  Discharge to home or other facility with appropriate resources: Identify barriers to discharge with patient and caregiver  2/20/2025 1828 by Mickey Allan RN  Outcome: Progressing  Flowsheets (Taken 2/20/2025 0800)  Discharge to home or other facility with appropriate resources: Identify barriers to discharge with patient and caregiver     Problem: Pain  Goal: Verbalizes/displays adequate comfort level or baseline comfort level  2/21/2025 0159 by Crystal Riggins RN  Outcome: Progressing  2/20/2025 1828 by Mickey Allan RN  Outcome: Progressing     Problem: Skin/Tissue Integrity  Goal: Skin integrity remains intact  Description: 1.  Monitor for areas of redness and/or skin breakdown  2.  Assess vascular access sites hourly  3.  Every 4-6 hours minimum:  Change oxygen saturation probe site  4.  Every 4-6 hours:  If on nasal continuous positive airway pressure, respiratory therapy assess nares and determine need for appliance change or

## 2025-02-21 NOTE — PLAN OF CARE
Problem: Chronic Conditions and Co-morbidities  Goal: Patient's chronic conditions and co-morbidity symptoms are monitored and maintained or improved  2/21/2025 1537 by Aarti Dalh RN  Outcome: Progressing  Flowsheets (Taken 2/21/2025 0810)  Care Plan - Patient's Chronic Conditions and Co-Morbidity Symptoms are Monitored and Maintained or Improved:   Monitor and assess patient's chronic conditions and comorbid symptoms for stability, deterioration, or improvement   Collaborate with multidisciplinary team to address chronic and comorbid conditions and prevent exacerbation or deterioration   Update acute care plan with appropriate goals if chronic or comorbid symptoms are exacerbated and prevent overall improvement and discharge  2/21/2025 0159 by Crystal Riggins RN  Outcome: Progressing  Flowsheets (Taken 2/20/2025 1920)  Care Plan - Patient's Chronic Conditions and Co-Morbidity Symptoms are Monitored and Maintained or Improved: Monitor and assess patient's chronic conditions and comorbid symptoms for stability, deterioration, or improvement     Problem: Discharge Planning  Goal: Discharge to home or other facility with appropriate resources  2/21/2025 1537 by Aarti Dahl RN  Outcome: Progressing  Flowsheets (Taken 2/21/2025 0810)  Discharge to home or other facility with appropriate resources:   Arrange for needed discharge resources and transportation as appropriate   Identify barriers to discharge with patient and caregiver   Refer to discharge planning if patient needs post-hospital services based on physician order or complex needs related to functional status, cognitive ability or social support system  Note: HCPOA having discussions about possible hospice.  2/21/2025 0159 by Crystal Riggins RN  Outcome: Progressing  Flowsheets (Taken 2/20/2025 1920)  Discharge to home or other facility with appropriate resources: Identify barriers to discharge with patient and caregiver     Problem:  RN  Outcome: Progressing  2/21/2025 0159 by Crystal Riggins RN  Outcome: Progressing  Flowsheets (Taken 2/20/2025 1930)  Remains free of injury from restraints (restraint for interference with medical device): Determine that other, less restrictive measures have been tried or would not be effective before applying the restraint

## 2025-02-21 NOTE — PROGRESS NOTES
Nurse advised to hold PT due to a decline in medical status (increase in ICP). Since pt was not showing a benefit from therapy while on a trial basis & also due to a decline in pt's medical condition, PT will DC services at this time.  PT will be glad to re-assess pt's rehab potential when medically stable.  Gerald Smith, PT, ABRIL

## 2025-02-21 NOTE — PROGRESS NOTES
Critical Care Interdisciplinary Rounds with staff.     Nicole Shanks M.Div, [facility] / / Bereavement Coordinator  [facility] Care Department   c: [phone]/ [phone] / [email or web address]  [email or web address]

## 2025-02-21 NOTE — PROGRESS NOTES
Occupational Therapy Note:    Per Rn and PT pt on hold today for therapy due to a decline in medical status. OT will discharge services at this time and will re-assess as needed.    Thank you,    Teodoro Melchor, OT    Rehab Caseload Tracker

## 2025-02-21 NOTE — PROGRESS NOTES
Patient's HCPOA Yamini and her  Leland spoke with RN this AM, and then spoke with neurosurgeon Dr. Guzman by phone as well. Subsequently, they requested to speak with Dr. Gilbert. Dr. Gilbert called but was unable to get a hold of them. They called back to the unit and RN reached out to Dr. Gilbert to come speak with them. After the conversation with Dr. Gilbert, the HCPOA also wanted to speak again with palliative. Message left for inpatient palliative care.

## 2025-02-21 NOTE — PROGRESS NOTES
Nutrition Assessment  Assessment Type: Reassess  Reason for visit:  Tube Feeding Management (Hospitalists) and Poor Intake/Appetite 5 or More Days (Hospitalists)      Nutrition Intervention:   Food and/or Nutrient Delivery:   Enteral Nutrition:   Enteral Access: Nasogastric  Initiate  Formula: Standard without Fiber (Osmolite 1.2 [place])  Goal Rate: Continuous 70 ml/hr  Initiate  Water flush  30 ml every hour  Modulars: None not indicated at this time   Enteral regimen at above goal to provide per 24 hours:  1848 calories, 85 grams protein and 1923 ml free fluid.    Above regimen: Intended to meet macronutrient goals  Labs:   Basic Metabolic Panel, Magnesium and Phosphorus active per nutrition parameters  POC Glucoses/SSI Active   Nutrition Related Medication Management:  Electrolyte Replacement:   Continue prn protocol Magnesium, Potassium, and Phosphorus.   Intravenous fluids:  Not applicable  Thiamine 100 mg daily x 7 days (EOT 2/27)  Bowel Regimen Senokot daily  and Per MD  Meals and Snacks:  Diet: Continue current diet per SLP evaluation  Medical Food Supplements:   Medical food supplement therapy:  None Not applicable  Coordination of Nutrition Care:  Coordination with nayeli care provider Provider, Dr Gilbert and RNAarti     Malnutrition Assessment:2/17  [facility] Clinical Malnutrition Criteria  Malnutrition Status: Insufficient data (No diet recall or weight hx, limited visual NFPE)  Nutrition Focused Physical Exam, visual: Unremarkable     Nutrition Assessment:  Food/Nutrition Related History: 2/17: Limited hx obtained at this time d/t pt lethargy with mumbled responses to queries. However he says he was not a consistency modified diet PTA. He indicates he was on a regular diet with thin liquids.     Do You Have Any Cultural, Synagogue, or Ethnic Food Preferences?:  (Unable to communicate)   Weight History: No recent weight in EMR. No NH records in paper chart.   Last recorded weight in EMR was 185# in

## 2025-02-21 NOTE — PROGRESS NOTES
Spoke with pt's son in law, Venkat, via phone.  Yamini as present, as well.  They misunderstood that we provide hospice services, and had questions about the program.  Counseled that we do not provide hospice services, and they would need to speak with a hospice program.  They are receptive to a hospice consult for information, but have not fully decided to pursue hospice.  Hospice consult placed.    Negrita Houser, MIKAELP-C  Palliative Care

## 2025-02-21 NOTE — PROGRESS NOTES
GOALS:  LTG: Patient will tolerate least restrictive diet without overt signs or symptoms of airway compromise.   STG: Patient will consume puree diet with nectar thick liquids without adverse pulmonary events. Ongoing 2025.   STG: Patient will participate in modified barium swallow study as clinically indicated.      LTG: Patient will improve cognitive- communicative function to perform daily activities at highest possible level.   STG: Patient will answer open ended questions related to self/status with 90% accuracy given min A. Ongoing 2025  STG: Patient will sustain attention to therapy task for 10 minute interval with min A provided. Ongoing 25    SPEECH LANGUAGE PATHOLOGY: COMBINED Dysphagia and Cognitive- Communicative Daily Note #3    Acknowledge Order  I  Therapy Time  I   Charges     I  Rehab Caseload Tracker    NAME: Justin Gabriel  : 1950  MRN: [id number]    ADMISSION DATE: 2/15/2025  PRIMARY DIAGNOSIS: Hypovolemic shock (HCC)    ICD-10: Treatment Diagnosis:   R13.12 Dysphagia, Oropharyngeal Phase  R41.841 Cognitive-Communication Deficit  F80.2 Mixed Receptive-Expressive Language Disorder    RECOMMENDATIONS   Diet:    NGT for nutrition/hydration/meds  PO for pleasure -   Puree Diet  Mildly Thick/Nectar Thick Liquids BY SPOON     Medication: non-oral   Compensatory Swallowing Strategies:   Fully awake/alert for all PO  Assist feed  Small bites/sips  Ensure patient has swallowed and oral cavity clear before presenting additional bites  Upright as possible for all oral intake   Therapeutic Intervention:   Patient/family education  Dysphagia treatment  Language treatment  Cognitive-linguistic treatment   Patient continues to require skilled intervention:  Yes. Recommend ongoing speech therapy services during this hospitalization.     Anticipated Discharge Needs: Ongoing speech therapy is recommended at next level of care.      ASSESSMENT    Dysphagia: Patient continues to exhibit  appendectomy; orthopedic surgery (12/2012); orthopedic surgery (last 4/2013); and Adenoidectomy.  Precautions/Allergies: Sulfa antibiotics, Oxycodone-acetaminophen, and Penicillin g     Observations: Patient awake, alert.     Prior Dysphagia History: None upon chart review.   Prior Instrumental Assessment: N/A  Prior Speech Therapy: None upon chart review.     Current Diet:  Puree diet with Mildly Thick/Nectar Thick Liquids    Respiratory Status: Nasal Cannula    Pain:  Patient does not c/o pain  Pain intervention: None- No pain observed  Pain response: Patient satisfied    OBJECTIVE    Dysphagia:  Patient presented with mildly thick liquids by spoon and items from pureed meal tray. No overt signs or symptoms of respiratory compromise, but limited intake. Oral holding puts patient at increased risk for aspiration.    Cognitive- Communicative: Patient with improved attention to task compared to prior session. He responds accurately to simple Y/N no and open ended questions in context given extra processing time.    Dysphagia Outcome and Severity Scale (JOVANNI)  Score: 2 Description   [] 7 Normal in all situations   [] 6 Within Functional Limits/ Modified independent   [] 5 Mild Dysphagia: Distant Supervision. May need one diet consistency      restricted.   [] 4 Mild-Moderate Dysphagia: Intermittent supervision/cuing. One-two diet    consistencies restricted.   [] 3 Moderate Dysphagia: Total assistance, supervision, or strategies.       Two or more diet consistencies restricted.   [x] 2 Moderate-Severe Dysphagia: Maximum assistance or maximum use     of strategies with partial po intake   [] 1 Severe dysphagia- NPO. Unable to tolerate any po safely     MODIFIED TUAN SCALE (mRS) SCORE:   Score: 5 Description   [] 0  No Symptoms   [] 1 No significant disability despite symptoms; Able to carry out all usual duties and activities.   [] 2 Slight Disability: Unable to carry out all previous activities, but able to look

## 2025-02-21 NOTE — PROGRESS NOTES
Hospitalist Progress Note   Admit Date:  2/15/2025 11:56 AM   Name:  Justin Gabriel   Age:  74 y.o.  Sex:  male  :  1950   MRN:  [id number]   Room:  [facility]/    Presenting/Chief Complaint: Shortness of Breath and Altered Mental Status     Reason(s) for Admission: Hypernatremia [E87.0]  Septicemia (HCC) [A41.9]  Acute kidney injury [N17.9]  Acute renal failure, unspecified acute renal failure type [N17.9]     Hospital Course:   Justin Gabriel is a 74 y.o. 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  On  he developed hypotension and despite multiple boluses, had to be moved to the ICU on Levophed drip.  He is intermittently on and off Levophed. He was having breakfast and was pocketing food in his mouth.  Speech was consulted and recommended puréed diet.  He was off of Levophed.  He was started on midodrine and increase the dose.  He was hyponatremic and it was resolved with D5.  He is having waxing and waning confusion.  Neurosurgery was consulted and recommended draining the extra-axial fluid collection in the brain.  Attempted to call the daughter twice and left voicemail.  Lock was removed on  and he was retaining urine approximately 500 mL.  Lock was placed again.  G-tube was placed.  He was started on tube feedings on .  Discussed with the neurosurgeon and recommended no drain placement.    Subjective & 24hr Events:   He was hypotensive and requiring pressors.  He was on tube feedings.  He was saturating well on 2L NC oxygen.  ROS is limited due to waxing and waning confusion.     Assessment & Plan:   Hypovolemic shock (HCC)   Lactic acidosis (Resolved)  Continue Levophed drip and his pressor requirement is high.  Maintain  may still contain some grammatical/other typographical errors.

## 2025-02-22 LAB
ANION GAP SERPL CALC-SCNC: 8 MMOL/L (ref 7–16)
BASOPHILS # BLD: 0.05 K/UL (ref 0–0.2)
BASOPHILS NFR BLD: 0.7 % (ref 0–2)
BUN SERPL-MCNC: 8 MG/DL (ref 8–23)
CALCIUM SERPL-MCNC: 8.6 MG/DL (ref 8.8–10.2)
CHLORIDE SERPL-SCNC: 107 MMOL/L (ref 98–107)
CO2 SERPL-SCNC: 22 MMOL/L (ref 20–29)
CREAT SERPL-MCNC: 0.49 MG/DL (ref 0.8–1.3)
DIFFERENTIAL METHOD BLD: ABNORMAL
EOSINOPHIL # BLD: 0.27 K/UL (ref 0–0.8)
EOSINOPHIL NFR BLD: 3.9 % (ref 0.5–7.8)
ERYTHROCYTE [DISTWIDTH] IN BLOOD BY AUTOMATED COUNT: 13.2 % (ref 11.9–14.6)
GLUCOSE BLD STRIP.AUTO-MCNC: 148 MG/DL (ref 65–100)
GLUCOSE BLD STRIP.AUTO-MCNC: 167 MG/DL (ref 65–100)
GLUCOSE BLD STRIP.AUTO-MCNC: 185 MG/DL (ref 65–100)
GLUCOSE BLD STRIP.AUTO-MCNC: 197 MG/DL (ref 65–100)
GLUCOSE SERPL-MCNC: 144 MG/DL (ref 70–99)
HCT VFR BLD AUTO: 36.4 % (ref 41.1–50.3)
HGB BLD-MCNC: 12.2 G/DL (ref 13.6–17.2)
IMM GRANULOCYTES # BLD AUTO: 0.05 K/UL (ref 0–0.5)
IMM GRANULOCYTES NFR BLD AUTO: 0.7 % (ref 0–5)
LYMPHOCYTES # BLD: 1.43 K/UL (ref 0.5–4.6)
LYMPHOCYTES NFR BLD: 20.8 % (ref 13–44)
MAGNESIUM SERPL-MCNC: 1.7 MG/DL (ref 1.8–2.4)
MCH RBC QN AUTO: 31 PG (ref 26.1–32.9)
MCHC RBC AUTO-ENTMCNC: 33.5 G/DL (ref 31.4–35)
MCV RBC AUTO: 92.4 FL (ref 82–102)
MONOCYTES # BLD: 0.61 K/UL (ref 0.1–1.3)
MONOCYTES NFR BLD: 8.9 % (ref 4–12)
NEUTS SEG # BLD: 4.45 K/UL (ref 1.7–8.2)
NEUTS SEG NFR BLD: 65 % (ref 43–78)
NRBC # BLD: 0 K/UL (ref 0–0.2)
PHOSPHATE SERPL-MCNC: 2.1 MG/DL (ref 2.5–4.5)
PLATELET # BLD AUTO: 200 K/UL (ref 150–450)
PMV BLD AUTO: 10.9 FL (ref 9.4–12.3)
POTASSIUM SERPL-SCNC: 3.8 MMOL/L (ref 3.5–5.1)
RBC # BLD AUTO: 3.94 M/UL (ref 4.23–5.6)
SERVICE CMNT-IMP: ABNORMAL
SODIUM SERPL-SCNC: 137 MMOL/L (ref 136–145)
WBC # BLD AUTO: 6.9 K/UL (ref 4.3–11.1)

## 2025-02-22 PROCEDURE — 84100 ASSAY OF PHOSPHORUS: CPT

## 2025-02-22 PROCEDURE — 6370000000 HC RX 637 (ALT 250 FOR IP): Performed by: STUDENT IN AN ORGANIZED HEALTH CARE EDUCATION/TRAINING PROGRAM

## 2025-02-22 PROCEDURE — 2500000003 HC RX 250 WO HCPCS: Performed by: STUDENT IN AN ORGANIZED HEALTH CARE EDUCATION/TRAINING PROGRAM

## 2025-02-22 PROCEDURE — 6360000002 HC RX W HCPCS: Performed by: STUDENT IN AN ORGANIZED HEALTH CARE EDUCATION/TRAINING PROGRAM

## 2025-02-22 PROCEDURE — 2000000000 HC ICU R&B

## 2025-02-22 PROCEDURE — 83735 ASSAY OF MAGNESIUM: CPT

## 2025-02-22 PROCEDURE — 80048 BASIC METABOLIC PNL TOTAL CA: CPT

## 2025-02-22 PROCEDURE — 82962 GLUCOSE BLOOD TEST: CPT

## 2025-02-22 PROCEDURE — 2580000003 HC RX 258: Performed by: STUDENT IN AN ORGANIZED HEALTH CARE EDUCATION/TRAINING PROGRAM

## 2025-02-22 PROCEDURE — 36415 COLL VENOUS BLD VENIPUNCTURE: CPT

## 2025-02-22 PROCEDURE — 85025 COMPLETE CBC W/AUTO DIFF WBC: CPT

## 2025-02-22 RX ORDER — MENTHOL/CAMPHOR/ALLANTOIN/PHE 0.6-0.5-1%
OINTMENT(EA) TOPICAL PRN
Status: DISCONTINUED | OUTPATIENT
Start: 2025-02-22 | End: 2025-02-28

## 2025-02-22 RX ORDER — MAGNESIUM SULFATE 1 G/100ML
1000 INJECTION INTRAVENOUS ONCE
Status: COMPLETED | OUTPATIENT
Start: 2025-02-22 | End: 2025-02-22

## 2025-02-22 RX ADMIN — POTASSIUM & SODIUM PHOSPHATES POWDER PACK 280-160-250 MG 250 MG: 280-160-250 PACK at 12:55

## 2025-02-22 RX ADMIN — MIRTAZAPINE 7.5 MG: 15 TABLET, FILM COATED ORAL at 21:37

## 2025-02-22 RX ADMIN — FINASTERIDE 5 MG: 5 TABLET, FILM COATED ORAL at 07:32

## 2025-02-22 RX ADMIN — MAGNESIUM SULFATE HEPTAHYDRATE 1000 MG: 1 INJECTION, SOLUTION INTRAVENOUS at 10:35

## 2025-02-22 RX ADMIN — TAMSULOSIN HYDROCHLORIDE 0.4 MG: 0.4 CAPSULE ORAL at 07:32

## 2025-02-22 RX ADMIN — POTASSIUM & SODIUM PHOSPHATES POWDER PACK 280-160-250 MG 250 MG: 280-160-250 PACK at 21:38

## 2025-02-22 RX ADMIN — SODIUM CHLORIDE, PRESERVATIVE FREE 10 ML: 5 INJECTION INTRAVENOUS at 07:35

## 2025-02-22 RX ADMIN — SENNOSIDES AND DOCUSATE SODIUM 2 TABLET: 50; 8.6 TABLET ORAL at 07:32

## 2025-02-22 RX ADMIN — MIDODRINE HYDROCHLORIDE 15 MG: 5 TABLET ORAL at 07:32

## 2025-02-22 RX ADMIN — POTASSIUM & SODIUM PHOSPHATES POWDER PACK 280-160-250 MG 250 MG: 280-160-250 PACK at 07:32

## 2025-02-22 RX ADMIN — SODIUM PHOSPHATE, MONOBASIC, MONOHYDRATE AND SODIUM PHOSPHATE, DIBASIC, ANHYDROUS 15 MMOL: 142; 276 INJECTION, SOLUTION INTRAVENOUS at 06:34

## 2025-02-22 RX ADMIN — KETOTIFEN FUMARATE 1 DROP: 0.25 SOLUTION/ DROPS OPHTHALMIC at 21:38

## 2025-02-22 RX ADMIN — NOREPINEPHRINE BITARTRATE 4 MCG/MIN: 16 SOLUTION INTRAVENOUS at 06:23

## 2025-02-22 RX ADMIN — POTASSIUM & SODIUM PHOSPHATES POWDER PACK 280-160-250 MG 250 MG: 280-160-250 PACK at 18:17

## 2025-02-22 RX ADMIN — MIDODRINE HYDROCHLORIDE 15 MG: 5 TABLET ORAL at 18:17

## 2025-02-22 RX ADMIN — CETIRIZINE HYDROCHLORIDE 10 MG: 10 TABLET, FILM COATED ORAL at 07:32

## 2025-02-22 RX ADMIN — Medication 400 MG: at 07:32

## 2025-02-22 RX ADMIN — POLYETHYLENE GLYCOL 3350 17 G: 17 POWDER, FOR SOLUTION ORAL at 07:33

## 2025-02-22 RX ADMIN — INSULIN LISPRO 1 UNITS: 100 INJECTION, SOLUTION INTRAVENOUS; SUBCUTANEOUS at 12:54

## 2025-02-22 RX ADMIN — Medication 100 MG: at 07:32

## 2025-02-22 RX ADMIN — INSULIN LISPRO 1 UNITS: 100 INJECTION, SOLUTION INTRAVENOUS; SUBCUTANEOUS at 07:33

## 2025-02-22 RX ADMIN — MIDODRINE HYDROCHLORIDE 15 MG: 5 TABLET ORAL at 12:55

## 2025-02-22 RX ADMIN — KETOTIFEN FUMARATE 1 DROP: 0.25 SOLUTION/ DROPS OPHTHALMIC at 07:34

## 2025-02-22 RX ADMIN — SODIUM CHLORIDE, PRESERVATIVE FREE 10 ML: 5 INJECTION INTRAVENOUS at 21:38

## 2025-02-22 RX ADMIN — EZETIMIBE 10 MG: 10 TABLET ORAL at 21:37

## 2025-02-22 ASSESSMENT — PAIN SCALES - GENERAL
PAINLEVEL_OUTOF10: 0
PAINLEVEL_OUTOF10: 0

## 2025-02-22 NOTE — CARE COORDINATION
RNANGUS spoke with Yamini and Venkat in regards to discharge planning for patient. Venkat would like to come to the hospital to see patient and update DASIA Kc, for further decision making. SARAH will follow up with DASIA this afternoon.

## 2025-02-22 NOTE — PROGRESS NOTES
Hospitalist Progress Note   Admit Date:  2/15/2025 11:56 AM   Name:  Justin Gabriel   Age:  74 y.o.  Sex:  male  :  1950   MRN:  [id number]   Room:  [facility]/    Presenting/Chief Complaint: Shortness of Breath and Altered Mental Status     Reason(s) for Admission: Hypernatremia [E87.0]  Septicemia (HCC) [A41.9]  Acute kidney injury [N17.9]  Acute renal failure, unspecified acute renal failure type [N17.9]     Hospital Course:   Justin Gabriel is a 74 y.o. 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  On  he developed hypotension and despite multiple boluses, had to be moved to the ICU on Levophed drip.  He is intermittently on and off Levophed. He was having breakfast and was pocketing food in his mouth.  Speech was consulted and recommended puréed diet.  He was off of Levophed.  He was started on midodrine and increase the dose.  He was hyponatremic and it was resolved with D5.  He is having waxing and waning confusion.  Neurosurgery was consulted and recommended draining the extra-axial fluid collection in the brain.  Attempted to call the daughter twice and left voicemail.  Lock was removed on  and he was retaining urine approximately 500 mL.  Lock was placed again.  G-tube was placed.  He was started on tube feedings on .  Discussed with the neurosurgeon and recommended no drain placement.    Subjective & 24hr Events:   He was hypotensive and he is on pressors.  He was on tube feedings.  He was saturating well on 2L NC oxygen.  He was agitated and was placed on mittens.  ROS is limited due to waxing and waning confusion.     Assessment & Plan:   Hypovolemic shock (HCC)   Lactic acidosis (Resolved)  Continue Levophed drip and  and treatment plan with patient and/or family, discussing patient with consultants and colleagues, ordering and reviewing pertinent laboratory and radiographic evaluations, and discussing patient with nursing staff. Time excludes procedures.        PT/OT evals ordered?  Therapy evals ordered  Diet:  ADULT DIET; Dysphagia - Pureed; Mildly Thick (Nectar); No Drinking Straws, Liquids by spoon only  ADULT TUBE FEEDING; Nasogastric; Standard without Fiber; Continuous; 20; Yes; 10; Q 8 hours; 70; 30; Q 1 hour  VTE prophylaxis: Already on anticoagulation  Code status: DNR      Non-peripheral Lines and Tubes (if present):      NG/OG/NJ/NE Tube Nasogastric 14 fr Right nostril (Active)       Urinary Catheter 02/19/25 Lock (Active)        Telemetry (if present):  Cardiac/Telemetry Monitor On: Bedside monitor in use        Hospital Problems:  Principal Problem:    Hypovolemic shock (HCC)  Active Problems:    Iliac artery aneurysm, bilateral    Benign essential HTN    Enlarged aorta    Gout    Mixed hyperlipidemia    CVA, old, hemiparesis (HCC)    Aneurysm artery, celiac    Weakness of left upper extremity    Acute metabolic encephalopathy    Acute kidney injury    Type 2 diabetes mellitus (HCC)    Cognitive disorder    Acute hypernatremia    Hypokalemia    DNR (do not resuscitate)    Midline shift of brain    Lactic acidosis    Septicemia (HCC)  Resolved Problems:    * No resolved hospital problems. *      Objective:   Patient Vitals for the past 24 hrs:   Temp Pulse Resp BP SpO2   02/22/25 0712 98.1 °F (36.7 °C) 72 25 90/60 92 %   02/22/25 0630 -- 79 25 (!) 83/64 93 %   02/22/25 0600 -- 74 15 (!) 80/59 91 %   02/22/25 0545 -- 73 17 (!) 77/60 92 %   02/22/25 0530 -- 75 23 (!) 83/55 92 %   02/22/25 0515 -- 76 20 (!) 74/51 91 %   02/22/25 0500 -- 82 (!) 41 (!) 83/58 91 %   02/22/25 0445 -- 84 20 (!) 82/60 93 %   02/22/25 0430 -- 82 24 (!) 81/63 91 %   02/22/25 0415 -- 86 22 (!) 83/60 92 %   02/22/25 0400 -- 81 14 (!) 81/65 91 %  infusion   IntraVENous PRN    potassium chloride (KLOR-CON M) extended release tablet 40 mEq  40 mEq Oral PRN    Or    potassium bicarb-citric acid (EFFER-K) effervescent tablet 40 mEq  40 mEq Oral PRN    Or    potassium chloride 10 mEq/100 mL IVPB (Peripheral Line)  10 mEq IntraVENous PRN    magnesium sulfate 2000 mg in 50 mL IVPB premix  2,000 mg IntraVENous PRN    ondansetron (ZOFRAN-ODT) disintegrating tablet 4 mg  4 mg Oral Q8H PRN    Or    ondansetron (ZOFRAN) injection 4 mg  4 mg IntraVENous Q6H PRN    polyethylene glycol (GLYCOLAX) packet 17 g  17 g Oral Daily PRN    acetaminophen (TYLENOL) tablet 650 mg  650 mg Oral Q6H PRN    Or    acetaminophen (TYLENOL) suppository 650 mg  650 mg Rectal Q6H PRN    [Held by provider] apixaban (ELIQUIS) tablet 5 mg  5 mg Oral BID    insulin lispro (HUMALOG,ADMELOG) injection vial 0-4 Units  0-4 Units SubCUTAneous 4x Daily AC & HS    glucose chewable tablet 16 g  4 tablet Oral PRN    dextrose bolus 10% 125 mL  125 mL IntraVENous PRN    Or    dextrose bolus 10% 250 mL  250 mL IntraVENous PRN    Glucagon Emergency KIT 1 mg  1 mg SubCUTAneous PRN    dextrose 10 % infusion   IntraVENous Continuous PRN    ketotifen fumarate (ZADITOR) 0.035 % ophthalmic solution 1 drop  1 drop Both Eyes BID    lubiprostone (AMITIZA) capsule 8 mcg  (Patient Supplied)  8 mcg Oral BID WC       Signed:  Rama Gilbert MD    Part of this note may have been written by using a voice dictation software.  The note has been proof read but may still contain some grammatical/other typographical errors.

## 2025-02-22 NOTE — CARE COORDINATION
HCPOA reports uncertainty in pursuing hospice at this time. Venkat states he will visit again tomorrow to assess situation with the educational knowledge of Levophed gtt and tube feedings. Venkat would like to attempt to speak with patient about how to proceed. RNCM will follow up with Venkat and Yamini 2/22.

## 2025-02-23 ENCOUNTER — APPOINTMENT (OUTPATIENT)
Dept: GENERAL RADIOLOGY | Age: 75
DRG: 871 | End: 2025-02-23
Payer: MEDICARE

## 2025-02-23 LAB
ANION GAP SERPL CALC-SCNC: 9 MMOL/L (ref 7–16)
BASOPHILS # BLD: 0.06 K/UL (ref 0–0.2)
BASOPHILS NFR BLD: 0.9 % (ref 0–2)
BUN SERPL-MCNC: 6 MG/DL (ref 8–23)
CALCIUM SERPL-MCNC: 8.9 MG/DL (ref 8.8–10.2)
CHLORIDE SERPL-SCNC: 108 MMOL/L (ref 98–107)
CO2 SERPL-SCNC: 20 MMOL/L (ref 20–29)
CREAT SERPL-MCNC: 0.49 MG/DL (ref 0.8–1.3)
DIFFERENTIAL METHOD BLD: ABNORMAL
EOSINOPHIL # BLD: 0.34 K/UL (ref 0–0.8)
EOSINOPHIL NFR BLD: 5.1 % (ref 0.5–7.8)
ERYTHROCYTE [DISTWIDTH] IN BLOOD BY AUTOMATED COUNT: 13.5 % (ref 11.9–14.6)
GLUCOSE BLD STRIP.AUTO-MCNC: 137 MG/DL (ref 65–100)
GLUCOSE BLD STRIP.AUTO-MCNC: 155 MG/DL (ref 65–100)
GLUCOSE BLD STRIP.AUTO-MCNC: 162 MG/DL (ref 65–100)
GLUCOSE BLD STRIP.AUTO-MCNC: 164 MG/DL (ref 65–100)
GLUCOSE BLD STRIP.AUTO-MCNC: 243 MG/DL (ref 65–100)
GLUCOSE SERPL-MCNC: 135 MG/DL (ref 70–99)
HCT VFR BLD AUTO: 35.3 % (ref 41.1–50.3)
HGB BLD-MCNC: 12.2 G/DL (ref 13.6–17.2)
IMM GRANULOCYTES # BLD AUTO: 0.06 K/UL (ref 0–0.5)
IMM GRANULOCYTES NFR BLD AUTO: 0.9 % (ref 0–5)
LYMPHOCYTES # BLD: 1.47 K/UL (ref 0.5–4.6)
LYMPHOCYTES NFR BLD: 22.1 % (ref 13–44)
MAGNESIUM SERPL-MCNC: 1.6 MG/DL (ref 1.8–2.4)
MCH RBC QN AUTO: 31.8 PG (ref 26.1–32.9)
MCHC RBC AUTO-ENTMCNC: 34.6 G/DL (ref 31.4–35)
MCV RBC AUTO: 91.9 FL (ref 82–102)
MONOCYTES # BLD: 0.61 K/UL (ref 0.1–1.3)
MONOCYTES NFR BLD: 9.2 % (ref 4–12)
NEUTS SEG # BLD: 4.1 K/UL (ref 1.7–8.2)
NEUTS SEG NFR BLD: 61.8 % (ref 43–78)
NRBC # BLD: 0 K/UL (ref 0–0.2)
PHOSPHATE SERPL-MCNC: 2.5 MG/DL (ref 2.5–4.5)
PLATELET # BLD AUTO: 237 K/UL (ref 150–450)
PMV BLD AUTO: 10.8 FL (ref 9.4–12.3)
POTASSIUM SERPL-SCNC: 3.8 MMOL/L (ref 3.5–5.1)
RBC # BLD AUTO: 3.84 M/UL (ref 4.23–5.6)
SERVICE CMNT-IMP: ABNORMAL
SODIUM SERPL-SCNC: 137 MMOL/L (ref 136–145)
WBC # BLD AUTO: 6.6 K/UL (ref 4.3–11.1)

## 2025-02-23 PROCEDURE — 80048 BASIC METABOLIC PNL TOTAL CA: CPT

## 2025-02-23 PROCEDURE — 85025 COMPLETE CBC W/AUTO DIFF WBC: CPT

## 2025-02-23 PROCEDURE — 82962 GLUCOSE BLOOD TEST: CPT

## 2025-02-23 PROCEDURE — 2000000000 HC ICU R&B

## 2025-02-23 PROCEDURE — 2500000003 HC RX 250 WO HCPCS: Performed by: STUDENT IN AN ORGANIZED HEALTH CARE EDUCATION/TRAINING PROGRAM

## 2025-02-23 PROCEDURE — 6360000002 HC RX W HCPCS: Performed by: STUDENT IN AN ORGANIZED HEALTH CARE EDUCATION/TRAINING PROGRAM

## 2025-02-23 PROCEDURE — 6370000000 HC RX 637 (ALT 250 FOR IP): Performed by: STUDENT IN AN ORGANIZED HEALTH CARE EDUCATION/TRAINING PROGRAM

## 2025-02-23 PROCEDURE — 71045 X-RAY EXAM CHEST 1 VIEW: CPT

## 2025-02-23 PROCEDURE — 83735 ASSAY OF MAGNESIUM: CPT

## 2025-02-23 PROCEDURE — 84100 ASSAY OF PHOSPHORUS: CPT

## 2025-02-23 PROCEDURE — 36415 COLL VENOUS BLD VENIPUNCTURE: CPT

## 2025-02-23 RX ORDER — NALOXONE HYDROCHLORIDE 0.4 MG/ML
INJECTION, SOLUTION INTRAMUSCULAR; INTRAVENOUS; SUBCUTANEOUS PRN
Status: CANCELLED | OUTPATIENT
Start: 2025-02-23

## 2025-02-23 RX ORDER — SODIUM CHLORIDE 0.9 % (FLUSH) 0.9 %
5-40 SYRINGE (ML) INJECTION EVERY 12 HOURS SCHEDULED
Status: CANCELLED | OUTPATIENT
Start: 2025-02-23

## 2025-02-23 RX ORDER — SODIUM CHLORIDE 9 MG/ML
INJECTION, SOLUTION INTRAVENOUS PRN
Status: CANCELLED | OUTPATIENT
Start: 2025-02-23

## 2025-02-23 RX ORDER — SODIUM CHLORIDE, SODIUM LACTATE, POTASSIUM CHLORIDE, CALCIUM CHLORIDE 600; 310; 30; 20 MG/100ML; MG/100ML; MG/100ML; MG/100ML
INJECTION, SOLUTION INTRAVENOUS CONTINUOUS
Status: CANCELLED | OUTPATIENT
Start: 2025-02-23

## 2025-02-23 RX ORDER — LIDOCAINE HYDROCHLORIDE 10 MG/ML
1 INJECTION, SOLUTION INFILTRATION; PERINEURAL
Status: CANCELLED | OUTPATIENT
Start: 2025-02-23 | End: 2025-02-24

## 2025-02-23 RX ORDER — SODIUM CHLORIDE 0.9 % (FLUSH) 0.9 %
5-40 SYRINGE (ML) INJECTION PRN
Status: CANCELLED | OUTPATIENT
Start: 2025-02-23

## 2025-02-23 RX ORDER — MAGNESIUM SULFATE 1 G/100ML
1000 INJECTION INTRAVENOUS ONCE
Status: COMPLETED | OUTPATIENT
Start: 2025-02-23 | End: 2025-02-23

## 2025-02-23 RX ADMIN — MIDODRINE HYDROCHLORIDE 15 MG: 5 TABLET ORAL at 08:25

## 2025-02-23 RX ADMIN — KETOTIFEN FUMARATE 1 DROP: 0.25 SOLUTION/ DROPS OPHTHALMIC at 09:50

## 2025-02-23 RX ADMIN — KETOTIFEN FUMARATE 1 DROP: 0.25 SOLUTION/ DROPS OPHTHALMIC at 21:05

## 2025-02-23 RX ADMIN — MAGNESIUM SULFATE HEPTAHYDRATE 1000 MG: 1 INJECTION, SOLUTION INTRAVENOUS at 08:30

## 2025-02-23 RX ADMIN — SODIUM CHLORIDE, PRESERVATIVE FREE 10 ML: 5 INJECTION INTRAVENOUS at 08:27

## 2025-02-23 RX ADMIN — EZETIMIBE 10 MG: 10 TABLET ORAL at 21:04

## 2025-02-23 RX ADMIN — Medication 100 MG: at 08:26

## 2025-02-23 RX ADMIN — MIDODRINE HYDROCHLORIDE 15 MG: 5 TABLET ORAL at 11:48

## 2025-02-23 RX ADMIN — CETIRIZINE HYDROCHLORIDE 10 MG: 10 TABLET, FILM COATED ORAL at 08:26

## 2025-02-23 RX ADMIN — INSULIN LISPRO 2 UNITS: 100 INJECTION, SOLUTION INTRAVENOUS; SUBCUTANEOUS at 11:48

## 2025-02-23 RX ADMIN — FINASTERIDE 5 MG: 5 TABLET, FILM COATED ORAL at 08:26

## 2025-02-23 RX ADMIN — POLYETHYLENE GLYCOL 3350 17 G: 17 POWDER, FOR SOLUTION ORAL at 08:26

## 2025-02-23 RX ADMIN — NOREPINEPHRINE BITARTRATE 2 MCG/MIN: 16 SOLUTION INTRAVENOUS at 21:07

## 2025-02-23 RX ADMIN — POTASSIUM & SODIUM PHOSPHATES POWDER PACK 280-160-250 MG 250 MG: 280-160-250 PACK at 11:48

## 2025-02-23 RX ADMIN — SENNOSIDES AND DOCUSATE SODIUM 2 TABLET: 50; 8.6 TABLET ORAL at 08:25

## 2025-02-23 RX ADMIN — POTASSIUM & SODIUM PHOSPHATES POWDER PACK 280-160-250 MG 250 MG: 280-160-250 PACK at 21:04

## 2025-02-23 RX ADMIN — MIDODRINE HYDROCHLORIDE 15 MG: 5 TABLET ORAL at 17:04

## 2025-02-23 RX ADMIN — Medication 400 MG: at 08:26

## 2025-02-23 RX ADMIN — POTASSIUM & SODIUM PHOSPHATES POWDER PACK 280-160-250 MG 250 MG: 280-160-250 PACK at 17:04

## 2025-02-23 RX ADMIN — NOREPINEPHRINE BITARTRATE 6 MCG/MIN: 16 SOLUTION INTRAVENOUS at 06:23

## 2025-02-23 RX ADMIN — SODIUM CHLORIDE, PRESERVATIVE FREE 10 ML: 5 INJECTION INTRAVENOUS at 19:48

## 2025-02-23 RX ADMIN — POTASSIUM & SODIUM PHOSPHATES POWDER PACK 280-160-250 MG 250 MG: 280-160-250 PACK at 08:26

## 2025-02-23 ASSESSMENT — PAIN SCALES - GENERAL
PAINLEVEL_OUTOF10: 0

## 2025-02-23 NOTE — PLAN OF CARE
Problem: Chronic Conditions and Co-morbidities  Goal: Patient's chronic conditions and co-morbidity symptoms are monitored and maintained or improved  2/23/2025 0005 by Ai Gómez RN  Outcome: Not Progressing  2/23/2025 0005 by Ai Gómez RN  Outcome: Not Progressing  Flowsheets (Taken 2/22/2025 2130)  Care Plan - Patient's Chronic Conditions and Co-Morbidity Symptoms are Monitored and Maintained or Improved: Monitor and assess patient's chronic conditions and comorbid symptoms for stability, deterioration, or improvement     Problem: Discharge Planning  Goal: Discharge to home or other facility with appropriate resources  2/23/2025 0005 by Ai Gómez RN  Outcome: Not Progressing  2/23/2025 0005 by Ai Gómez RN  Outcome: Not Progressing  Flowsheets (Taken 2/22/2025 2130)  Discharge to home or other facility with appropriate resources: Identify barriers to discharge with patient and caregiver     Problem: Pain  Goal: Verbalizes/displays adequate comfort level or baseline comfort level  2/23/2025 0005 by Ai Gómez RN  Outcome: Not Progressing  2/23/2025 0005 by Ai Gómez RN  Outcome: Not Progressing  Flowsheets (Taken 2/22/2025 2130)  Verbalizes/displays adequate comfort level or baseline comfort level: Assess pain using appropriate pain scale     Problem: Skin/Tissue Integrity  Goal: Skin integrity remains intact  Description: 1.  Monitor for areas of redness and/or skin breakdown  2.  Assess vascular access sites hourly  3.  Every 4-6 hours minimum:  Change oxygen saturation probe site  4.  Every 4-6 hours:  If on nasal continuous positive airway pressure, respiratory therapy assess nares and determine need for appliance change or resting period  2/23/2025 0005 by Ai Gómez RN  Outcome: Not Progressing  2/23/2025 0005 by Ai Gómez RN  Outcome: Not Progressing  Flowsheets (Taken 2/22/2025 2130)  Skin Integrity Remains  Intact: Monitor for areas of redness and/or skin breakdown     Problem: ABCDS Injury Assessment  Goal: Absence of physical injury  2/23/2025 0005 by Ai Gómez RN  Outcome: Not Progressing  2/23/2025 0005 by Ai Gómez RN  Outcome: Not Progressing     Problem: Confusion  Goal: Confusion, delirium, dementia, or psychosis is improved or at baseline  Description: INTERVENTIONS:  1. Assess for possible contributors to thought disturbance, including medications, impaired vision or hearing, underlying metabolic abnormalities, dehydration, psychiatric diagnoses, and notify attending LIP  2. [place] high risk fall precautions, as indicated  3. Provide frequent short contacts to provide reality reorientation, refocusing and direction  4. Decrease environmental stimuli, including noise as appropriate  5. Monitor and intervene to maintain adequate nutrition, hydration, elimination, sleep and activity  6. If unable to ensure safety without constant attention obtain sitter and review sitter guidelines with assigned personnel  7. Initiate Psychosocial CNS and Spiritual Care consult, as indicated  2/23/2025 0005 by Ai Gómez RN  Outcome: Not Progressing  2/23/2025 0005 by Ai Gómez RN  Outcome: Not Progressing  Flowsheets (Taken 2/22/2025 2130)  Effect of thought disturbance (confusion, delirium, dementia, or psychosis) are managed with adequate functional status: Assess for contributors to thought disturbance, including medications, impaired vision or hearing, underlying metabolic abnormalities, dehydration, psychiatric diagnoses, notify LIP     Problem: Safety - Adult  Goal: Free from fall injury  2/23/2025 0005 by Ai Gómez RN  Outcome: Not Progressing  2/23/2025 0005 by Ai Gómez RN  Outcome: Not Progressing     Problem: Safety - Medical Restraint  Goal: Remains free of injury from restraints (Restraint for Interference with Medical Device)  Description:  INTERVENTIONS:  1. Determine that other, less restrictive measures have been tried or would not be effective before applying the restraint  2. Evaluate the patient's condition at the time of restraint application  3. Inform patient/family regarding the reason for restraint  4. Q2H: Monitor safety, psychosocial status, comfort, nutrition and hydration  2/23/2025 0005 by Ai Gómez RN  Outcome: Not Progressing  2/23/2025 0005 by Ai Gómez RN  Outcome: Not Progressing  Flowsheets  Taken 2/22/2025 2354  Remains free of injury from restraints (restraint for interference with medical device): Determine that other, less restrictive measures have been tried or would not be effective before applying the restraint  Taken 2/22/2025 2000  Remains free of injury from restraints (restraint for interference with medical device): Determine that other, less restrictive measures have been tried or would not be effective before applying the restraint

## 2025-02-23 NOTE — PLAN OF CARE
Problem: Pain  Goal: Verbalizes/displays adequate comfort level or baseline comfort level  2/23/2025 1122 by Ghazala Borden RN  Outcome: Progressing  2/23/2025 0005 by Ai Gómez RN  Outcome: Not Progressing  2/23/2025 0005 by Ai Gómez RN  Outcome: Not Progressing  Flowsheets (Taken 2/22/2025 2130)  Verbalizes/displays adequate comfort level or baseline comfort level: Assess pain using appropriate pain scale     Problem: Chronic Conditions and Co-morbidities  Goal: Patient's chronic conditions and co-morbidity symptoms are monitored and maintained or improved  2/23/2025 0005 by Ai Gómez RN  Outcome: Not Progressing  2/23/2025 0005 by Ai Gómez RN  Outcome: Not Progressing  Flowsheets (Taken 2/22/2025 2130)  Care Plan - Patient's Chronic Conditions and Co-Morbidity Symptoms are Monitored and Maintained or Improved: Monitor and assess patient's chronic conditions and comorbid symptoms for stability, deterioration, or improvement     Problem: Discharge Planning  Goal: Discharge to home or other facility with appropriate resources  2/23/2025 0005 by Ai Gómez RN  Outcome: Not Progressing  2/23/2025 0005 by Ai Gómez RN  Outcome: Not Progressing  Flowsheets (Taken 2/22/2025 2130)  Discharge to home or other facility with appropriate resources: Identify barriers to discharge with patient and caregiver     Problem: Pain  Goal: Verbalizes/displays adequate comfort level or baseline comfort level  2/23/2025 1122 by Ghazala Borden RN  Outcome: Progressing  2/23/2025 0005 by Ai Gómez RN  Outcome: Not Progressing  2/23/2025 0005 by Ai Gómez RN  Outcome: Not Progressing  Flowsheets (Taken 2/22/2025 2130)  Verbalizes/displays adequate comfort level or baseline comfort level: Assess pain using appropriate pain scale     Problem: Skin/Tissue Integrity  Goal: Skin integrity remains intact  Description: 1.  Monitor for areas of  thought disturbance, including medications, impaired vision or hearing, underlying metabolic abnormalities, dehydration, psychiatric diagnoses, notify LIP     Problem: Safety - Adult  Goal: Free from fall injury  2/23/2025 0005 by Ai Gómez RN  Outcome: Not Progressing  2/23/2025 0005 by Ai Gómez RN  Outcome: Not Progressing     Problem: Safety - Medical Restraint  Goal: Remains free of injury from restraints (Restraint for Interference with Medical Device)  Description: INTERVENTIONS:  1. Determine that other, less restrictive measures have been tried or would not be effective before applying the restraint  2. Evaluate the patient's condition at the time of restraint application  3. Inform patient/family regarding the reason for restraint  4. Q2H: Monitor safety, psychosocial status, comfort, nutrition and hydration  2/23/2025 0005 by Ai Gómez RN  Outcome: Not Progressing  2/23/2025 0005 by Ai Gómez RN  Outcome: Not Progressing  Flowsheets  Taken 2/22/2025 2354  Remains free of injury from restraints (restraint for interference with medical device): Determine that other, less restrictive measures have been tried or would not be effective before applying the restraint  Taken 2/22/2025 2000  Remains free of injury from restraints (restraint for interference with medical device): Determine that other, less restrictive measures have been tried or would not be effective before applying the restraint

## 2025-02-23 NOTE — PROGRESS NOTES
Hospitalist Progress Note   Admit Date:  2/15/2025 11:56 AM   Name:  Justin Gabriel   Age:  74 y.o.  Sex:  male  :  1950   MRN:  [id number]   Room:  [facility]/    Presenting/Chief Complaint: Shortness of Breath and Altered Mental Status     Reason(s) for Admission: Hypernatremia [E87.0]  Septicemia (HCC) [A41.9]  Acute kidney injury [N17.9]  Acute renal failure, unspecified acute renal failure type [N17.9]     Hospital Course:   Justin Gabriel is a 74 y.o. 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  On  he developed hypotension and despite multiple boluses, had to be moved to the ICU on Levophed drip.  He is intermittently on and off Levophed. He was having breakfast and was pocketing food in his mouth.  Speech was consulted and recommended puréed diet.  He was off of Levophed.  He was started on midodrine and increase the dose.  He was hyponatremic and it was resolved with D5.  He is having waxing and waning confusion.  Neurosurgery was consulted and recommended draining the extra-axial fluid collection in the brain.  Attempted to call the daughter twice and left voicemail.  Lock was removed on  and he was retaining urine approximately 500 mL.  Lock was placed again.  G-tube was placed.  He was started on tube feedings on .  Discussed with the neurosurgeon and recommended no drain placement.    Subjective & 24hr Events:   He is still on pressors and mittens.  He removed his NG tube yesterday and a new one was placed.  He was on tube feedings.  He was saturating well on 2L NC oxygen.  He was awake and alert and answering few questions.  ROS is limited due to waxing and waning confusion.     Assessment & Plan:   Hypovolemic  shock (HCC)   Lactic acidosis (Resolved)  Continue Levophed drip and his pressor requirement is increasing..  Maintain MAP greater than 65  Continue midodrine 15 Mg 3 times daily  Hospice was consulted  Palliative on board, appreciate recommendations    Hypomagnesemia  Magnesium was 1. 6   Repleted magnesium   Follow-up with repeat Mg    Dysphagia  SLP is on board, appreciate recommendations  S/p NG tube and on  tube feedings since 2/20  Nutritionist was managing TF  Discussed with GI Dr. Delgado on 2/23 and Holding off on PEG tube as patient is requiring high pressors and is at high risk of pulling the PEG tube..  GI on board, appreciate recommendations      Acute metabolic encephalopathy in the setting of hypernatremia    Cognitive disorder    Hypoxia  On 2L NC oxygen and wean as tolerated      Acute kidney injury (HCC)  Resolved with fluids      Benign essential HTN  Hold all blood pressure meds for now due to hypotension      Type 2 diabetes mellitus (HCC)  HbA1c 6.3  Monitor blood glucose losely      Iliac artery aneurysm, bilateral (HCC)    Enlarged aorta (HCC)    Aneurysm artery, celiac (HCC)      CVA, old, hemiparesis (HCC)    Weakness of left upper extremity  Holding Eliquis 5 Mg for possible PEG      DNR (do not resuscitate)      Chronic extra-axial fluid collection in the brain    Midline shift of brain  Midline shift has shifted 2 mm from 6 mm to 8 mm since 2023  No Plan for drain placement  NS signed off, appreciate recommendations    Acute urinary retention  S/p Lock  Continue Proscar and Flomax.      Acute hypernatremia  Resolved with D5    Hypokalemia  Resolved    Anticipated Discharge Arrangements:   Skilled Nursing Facility    Patient has very poor prognosis.    Patient is critically ill.  Without the interventions noted, there is a high probability of imminent acute organ impairment or life-threatening deterioration.  Total critical care time spent: 40 minutes.    Critical care time includes time  111/75 93 %   02/22/25 1945 99.3 °F (37.4 °C) 74 27 107/72 93 %   02/22/25 1930 -- 76 30 111/87 93 %   02/22/25 1915 -- 73 20 96/69 94 %   02/22/25 1900 -- 74 26 97/66 93 %   02/22/25 1845 -- 74 23 106/61 94 %   02/22/25 1830 -- 78 25 98/65 93 %   02/22/25 1815 -- 76 20 93/65 93 %   02/22/25 1800 -- 73 17 (!) 89/61 93 %   02/22/25 1745 -- 69 16 112/60 92 %   02/22/25 1730 -- 72 23 94/63 93 %   02/22/25 1715 -- 68 23 95/63 92 %   02/22/25 1700 -- 67 23 97/64 92 %   02/22/25 1645 -- 72 17 98/68 92 %   02/22/25 1630 -- 68 23 101/66 91 %   02/22/25 1615 -- 74 (!) 31 94/66 92 %   02/22/25 1600 -- 69 18 102/74 91 %   02/22/25 1545 -- 75 28 105/63 93 %   02/22/25 1530 -- 72 20 (!) 88/61 91 %   02/22/25 1515 -- 72 23 (!) 93/58 91 %   02/22/25 1500 98.3 °F (36.8 °C) 68 24 104/65 93 %   02/22/25 1445 -- 66 21 98/67 91 %   02/22/25 1430 -- 68 18 95/85 93 %   02/22/25 1415 -- 71 25 105/70 94 %   02/22/25 1400 -- 78 24 103/65 93 %   02/22/25 1345 -- 76 22 100/69 93 %   02/22/25 1330 -- 77 15 95/74 92 %   02/22/25 1315 -- 74 23 93/71 93 %   02/22/25 1300 -- 76 30 (!) 84/68 92 %   02/22/25 1245 -- 77 25 (!) 83/54 93 %   02/22/25 1230 -- 79 26 104/75 93 %   02/22/25 1215 -- 77 25 103/63 94 %       Oxygen Therapy  SpO2: 97 %  Pulse Oximetry Type: Continuous  Pulse via Oximetry: 63 beats per minute  Pulse Oximeter Device Mode: Continuous  Pulse Oximeter Device Location: Finger  O2 Device: Nasal cannula  Skin Assessment: Clean, dry, & intact  O2 Flow Rate (L/min): 2 L/min    Estimated body mass index is 20.75 kg/m² as calculated from the following:    Height as of this encounter: 1.829 m (6').    Weight as of this encounter: 69.4 kg (153 lb).    Intake/Output Summary (Last 24 hours) at 2/23/2025 1204  Last data filed at 2/23/2025 1115  Gross per 24 hour   Intake 2887.64 ml   Output 2650 ml   Net 237.64 ml         Physical Exam:   General:    Well nourished.  Confused.  Talking, but not making much sense.  Head:  Normocephalic,

## 2025-02-23 NOTE — CARE COORDINATION
SARAH met with Venkat at bedside with Yamini on speaker phone. Patient alert at this time and speaking to Yamini on telephone in simple sentences. Venkat and Yamini anticipate the patient returning to nursing home care and will consider the addition of hospice services. Venkat states understanding that patient has been unable to meet dietary requirement orally and understanding that NGT is not a permanent feeding solution. [employer] provided Venkat with [employer] resource guide for assistance with Yamini, as this will allow Venkat to have more time spent with patient. Brochures provided for various hospice companies to consider with the additional guidance to check with [facility] for their contracted hospice companies, if the patient is to return to [facility].    Case management will continue to follow for ongoing discharge planning needs. Please notify of any changes.

## 2025-02-24 LAB
ANION GAP SERPL CALC-SCNC: 10 MMOL/L (ref 7–16)
BASOPHILS # BLD: 0.05 K/UL (ref 0–0.2)
BASOPHILS NFR BLD: 0.8 % (ref 0–2)
BUN SERPL-MCNC: 10 MG/DL (ref 8–23)
CALCIUM SERPL-MCNC: 9.3 MG/DL (ref 8.8–10.2)
CHLORIDE SERPL-SCNC: 103 MMOL/L (ref 98–107)
CO2 SERPL-SCNC: 20 MMOL/L (ref 20–29)
CREAT SERPL-MCNC: 0.47 MG/DL (ref 0.8–1.3)
DIFFERENTIAL METHOD BLD: ABNORMAL
EOSINOPHIL # BLD: 0.38 K/UL (ref 0–0.8)
EOSINOPHIL NFR BLD: 5.8 % (ref 0.5–7.8)
ERYTHROCYTE [DISTWIDTH] IN BLOOD BY AUTOMATED COUNT: 13.5 % (ref 11.9–14.6)
GLUCOSE BLD STRIP.AUTO-MCNC: 151 MG/DL (ref 65–100)
GLUCOSE BLD STRIP.AUTO-MCNC: 174 MG/DL (ref 65–100)
GLUCOSE BLD STRIP.AUTO-MCNC: 239 MG/DL (ref 65–100)
GLUCOSE BLD STRIP.AUTO-MCNC: 243 MG/DL (ref 65–100)
GLUCOSE BLD STRIP.AUTO-MCNC: 264 MG/DL (ref 65–100)
GLUCOSE SERPL-MCNC: 159 MG/DL (ref 70–99)
HCT VFR BLD AUTO: 38.1 % (ref 41.1–50.3)
HGB BLD-MCNC: 12.7 G/DL (ref 13.6–17.2)
IMM GRANULOCYTES # BLD AUTO: 0.05 K/UL (ref 0–0.5)
IMM GRANULOCYTES NFR BLD AUTO: 0.8 % (ref 0–5)
LYMPHOCYTES # BLD: 1.55 K/UL (ref 0.5–4.6)
LYMPHOCYTES NFR BLD: 23.8 % (ref 13–44)
MAGNESIUM SERPL-MCNC: 1.7 MG/DL (ref 1.8–2.4)
MCH RBC QN AUTO: 31 PG (ref 26.1–32.9)
MCHC RBC AUTO-ENTMCNC: 33.3 G/DL (ref 31.4–35)
MCV RBC AUTO: 92.9 FL (ref 82–102)
MONOCYTES # BLD: 0.55 K/UL (ref 0.1–1.3)
MONOCYTES NFR BLD: 8.5 % (ref 4–12)
NEUTS SEG # BLD: 3.92 K/UL (ref 1.7–8.2)
NEUTS SEG NFR BLD: 60.3 % (ref 43–78)
NRBC # BLD: 0 K/UL (ref 0–0.2)
PHOSPHATE SERPL-MCNC: 3.1 MG/DL (ref 2.5–4.5)
PLATELET # BLD AUTO: 261 K/UL (ref 150–450)
PMV BLD AUTO: 10.7 FL (ref 9.4–12.3)
POTASSIUM SERPL-SCNC: 4.2 MMOL/L (ref 3.5–5.1)
RBC # BLD AUTO: 4.1 M/UL (ref 4.23–5.6)
SERVICE CMNT-IMP: ABNORMAL
SODIUM SERPL-SCNC: 133 MMOL/L (ref 136–145)
WBC # BLD AUTO: 6.5 K/UL (ref 4.3–11.1)

## 2025-02-24 PROCEDURE — 85025 COMPLETE CBC W/AUTO DIFF WBC: CPT

## 2025-02-24 PROCEDURE — 6370000000 HC RX 637 (ALT 250 FOR IP): Performed by: STUDENT IN AN ORGANIZED HEALTH CARE EDUCATION/TRAINING PROGRAM

## 2025-02-24 PROCEDURE — 36415 COLL VENOUS BLD VENIPUNCTURE: CPT

## 2025-02-24 PROCEDURE — 6360000002 HC RX W HCPCS: Performed by: STUDENT IN AN ORGANIZED HEALTH CARE EDUCATION/TRAINING PROGRAM

## 2025-02-24 PROCEDURE — 84100 ASSAY OF PHOSPHORUS: CPT

## 2025-02-24 PROCEDURE — 82962 GLUCOSE BLOOD TEST: CPT

## 2025-02-24 PROCEDURE — 83735 ASSAY OF MAGNESIUM: CPT

## 2025-02-24 PROCEDURE — 2700000000 HC OXYGEN THERAPY PER DAY

## 2025-02-24 PROCEDURE — 94761 N-INVAS EAR/PLS OXIMETRY MLT: CPT

## 2025-02-24 PROCEDURE — 80048 BASIC METABOLIC PNL TOTAL CA: CPT

## 2025-02-24 PROCEDURE — 2000000000 HC ICU R&B

## 2025-02-24 PROCEDURE — 2500000003 HC RX 250 WO HCPCS: Performed by: STUDENT IN AN ORGANIZED HEALTH CARE EDUCATION/TRAINING PROGRAM

## 2025-02-24 RX ORDER — SODIUM PHOSPHATE, DIBASIC AND SODIUM PHOSPHATE, MONOBASIC 7; 19 G/230ML; G/230ML
1 ENEMA RECTAL AS NEEDED
Status: DISCONTINUED | OUTPATIENT
Start: 2025-02-24 | End: 2025-03-02 | Stop reason: HOSPADM

## 2025-02-24 RX ORDER — INSULIN LISPRO 100 [IU]/ML
2 INJECTION, SOLUTION INTRAVENOUS; SUBCUTANEOUS ONCE
Status: COMPLETED | OUTPATIENT
Start: 2025-02-24 | End: 2025-02-24

## 2025-02-24 RX ORDER — MAGNESIUM SULFATE 1 G/100ML
1000 INJECTION INTRAVENOUS ONCE
Status: COMPLETED | OUTPATIENT
Start: 2025-02-24 | End: 2025-02-24

## 2025-02-24 RX ADMIN — KETOTIFEN FUMARATE 1 DROP: 0.25 SOLUTION/ DROPS OPHTHALMIC at 20:30

## 2025-02-24 RX ADMIN — POTASSIUM & SODIUM PHOSPHATES POWDER PACK 280-160-250 MG 250 MG: 280-160-250 PACK at 12:38

## 2025-02-24 RX ADMIN — MIRTAZAPINE 7.5 MG: 15 TABLET, FILM COATED ORAL at 20:23

## 2025-02-24 RX ADMIN — MIDODRINE HYDROCHLORIDE 15 MG: 5 TABLET ORAL at 09:34

## 2025-02-24 RX ADMIN — MIDODRINE HYDROCHLORIDE 15 MG: 5 TABLET ORAL at 12:38

## 2025-02-24 RX ADMIN — MIDODRINE HYDROCHLORIDE 15 MG: 5 TABLET ORAL at 17:56

## 2025-02-24 RX ADMIN — INSULIN LISPRO 1 UNITS: 100 INJECTION, SOLUTION INTRAVENOUS; SUBCUTANEOUS at 09:33

## 2025-02-24 RX ADMIN — POTASSIUM & SODIUM PHOSPHATES POWDER PACK 280-160-250 MG 250 MG: 280-160-250 PACK at 20:23

## 2025-02-24 RX ADMIN — MAGNESIUM SULFATE HEPTAHYDRATE 1000 MG: 1 INJECTION, SOLUTION INTRAVENOUS at 09:37

## 2025-02-24 RX ADMIN — Medication 400 MG: at 09:35

## 2025-02-24 RX ADMIN — FINASTERIDE 5 MG: 5 TABLET, FILM COATED ORAL at 09:35

## 2025-02-24 RX ADMIN — Medication 100 MG: at 09:35

## 2025-02-24 RX ADMIN — POLYETHYLENE GLYCOL 3350 17 G: 17 POWDER, FOR SOLUTION ORAL at 09:33

## 2025-02-24 RX ADMIN — POTASSIUM & SODIUM PHOSPHATES POWDER PACK 280-160-250 MG 250 MG: 280-160-250 PACK at 09:34

## 2025-02-24 RX ADMIN — CETIRIZINE HYDROCHLORIDE 10 MG: 10 TABLET, FILM COATED ORAL at 09:35

## 2025-02-24 RX ADMIN — KETOTIFEN FUMARATE 1 DROP: 0.25 SOLUTION/ DROPS OPHTHALMIC at 09:35

## 2025-02-24 RX ADMIN — TAMSULOSIN HYDROCHLORIDE 0.4 MG: 0.4 CAPSULE ORAL at 09:35

## 2025-02-24 RX ADMIN — SODIUM CHLORIDE, PRESERVATIVE FREE 10 ML: 5 INJECTION INTRAVENOUS at 09:35

## 2025-02-24 RX ADMIN — EZETIMIBE 10 MG: 10 TABLET ORAL at 20:23

## 2025-02-24 RX ADMIN — NOREPINEPHRINE BITARTRATE 2 MCG/MIN: 16 SOLUTION INTRAVENOUS at 18:02

## 2025-02-24 RX ADMIN — INSULIN LISPRO 2 UNITS: 100 INJECTION, SOLUTION INTRAVENOUS; SUBCUTANEOUS at 12:44

## 2025-02-24 RX ADMIN — POTASSIUM & SODIUM PHOSPHATES POWDER PACK 280-160-250 MG 250 MG: 280-160-250 PACK at 17:56

## 2025-02-24 RX ADMIN — INSULIN LISPRO 1 UNITS: 100 INJECTION, SOLUTION INTRAVENOUS; SUBCUTANEOUS at 06:48

## 2025-02-24 RX ADMIN — SENNOSIDES AND DOCUSATE SODIUM 2 TABLET: 50; 8.6 TABLET ORAL at 09:34

## 2025-02-24 ASSESSMENT — PAIN SCALES - GENERAL
PAINLEVEL_OUTOF10: 0

## 2025-02-24 NOTE — CARE COORDINATION
Pt chart reviewed and discussed in Critical Care Interdisciplinary Rounds. LOS 9 days. Pt admitted with hypernatremia, septicemia, JALEN, and acute renal failure.  Per MD, levo gtts; 1.5L/Min 02 NC; NG-tube; GI holding off on PEG tube placement d/t pt on pressors.     Per previous CM note, HCPOA's uncertain regarding hospice.     Addendum: MILADY GRECO spoke with pt's HCPOA regarding comfort care, hospice at [facility], hospice at home, and hospice house. SM provided education on benefits. HCPOA verbalized in agreement with sending referral to Hospice House to evaluate pt. HCPOA contemplating hospice at home vs hospice at [facility] if pt does not qualify for Hospice House. HCPOA plans to discuss with spouse. HCPOA requests hospice not be discussed with pt as pt may become upset. CM encouraged HCPOA to discuss steps in comfort care with RN and MD.     CM sent referral to [facility] by Vikas.     DC/POC to return to [facility] with hospice, Hospice house, hospice at home, or comfort care.     CM team will continue to follow for potential discharge needs that may arise.     Daylin Brewster, MSW, LBSW  ICU   [facility]

## 2025-02-24 NOTE — PLAN OF CARE
Problem: Confusion  Goal: Confusion, delirium, dementia, or psychosis is improved or at baseline  Description: INTERVENTIONS:  1. Assess for possible contributors to thought disturbance, including medications, impaired vision or hearing, underlying metabolic abnormalities, dehydration, psychiatric diagnoses, and notify attending LIP  2. [place] high risk fall precautions, as indicated  3. Provide frequent short contacts to provide reality reorientation, refocusing and direction  4. Decrease environmental stimuli, including noise as appropriate  5. Monitor and intervene to maintain adequate nutrition, hydration, elimination, sleep and activity  6. If unable to ensure safety without constant attention obtain sitter and review sitter guidelines with assigned personnel  7. Initiate Psychosocial CNS and Spiritual Care consult, as indicated  Outcome: Not Progressing  Flowsheets (Taken 2/23/2025 1948)  Effect of thought disturbance (confusion, delirium, dementia, or psychosis) are managed with adequate functional status: Assess for contributors to thought disturbance, including medications, impaired vision or hearing, underlying metabolic abnormalities, dehydration, psychiatric diagnoses, notify LIP     Problem: Confusion  Goal: Confusion, delirium, dementia, or psychosis is improved or at baseline  Description: INTERVENTIONS:  1. Assess for possible contributors to thought disturbance, including medications, impaired vision or hearing, underlying metabolic abnormalities, dehydration, psychiatric diagnoses, and notify attending LIP  2. [place] high risk fall precautions, as indicated  3. Provide frequent short contacts to provide reality reorientation, refocusing and direction  4. Decrease environmental stimuli, including noise as appropriate  5. Monitor and intervene to maintain adequate nutrition, hydration, elimination, sleep and activity  6. If unable to ensure safety without constant attention obtain sitter and  review sitter guidelines with assigned personnel  7. Initiate Psychosocial CNS and Spiritual Care consult, as indicated  Outcome: Not Progressing  Flowsheets (Taken 2/23/2025 1948)  Effect of thought disturbance (confusion, delirium, dementia, or psychosis) are managed with adequate functional status: Assess for contributors to thought disturbance, including medications, impaired vision or hearing, underlying metabolic abnormalities, dehydration, psychiatric diagnoses, notify LIP

## 2025-02-24 NOTE — PROGRESS NOTES
- 1.30 K/UL    Eosinophils Absolute 0.38 0.00 - 0.80 K/UL    Basophils Absolute 0.05 0.00 - 0.20 K/UL    Immature Granulocytes Absolute 0.05 0.0 - 0.5 K/UL   POCT Glucose    Collection Time: 02/24/25  6:44 AM   Result Value Ref Range    POC Glucose 239 (H) 65 - 100 mg/dL    Performed by: Halina    POCT Glucose    Collection Time: 02/24/25  8:32 AM   Result Value Ref Range    POC Glucose 243 (H) 65 - 100 mg/dL    Performed by: Krystle    POCT Glucose    Collection Time: 02/24/25 11:17 AM   Result Value Ref Range    POC Glucose 264 (H) 65 - 100 mg/dL    Performed by: Toni        No results for input(s): \"COVID19\" in the last 72 hours.      Current Meds:  Current Facility-Administered Medications   Medication Dose Route Frequency    sodium phosphate (FLEET) rectal enema 1 enema  1 enema Rectal PRN    medicated lip ointment (BLISTEX)   Topical PRN    cetirizine (ZYRTEC) tablet 10 mg  10 mg Per NG tube Daily    ezetimibe (ZETIA) tablet 10 mg  10 mg Per NG tube Nightly    finasteride (PROSCAR) tablet 5 mg  5 mg Per NG tube Daily    magnesium oxide (MAG-OX) tablet 400 mg  400 mg Per NG tube Daily    midodrine (PROAMATINE) tablet 15 mg  15 mg Per NG tube TID WC    mirtazapine (REMERON) tablet 7.5 mg  7.5 mg Per NG tube Nightly    sennosides-docusate sodium (SENOKOT-S) 8.6-50 MG tablet 2 tablet  2 tablet Per NG tube Daily    potassium & sodium phosphates (PHOS-NAK) 280-160-250 MG packet 250 mg  1 packet Per NG tube 4x Daily    polyethylene glycol (GLYCOLAX) packet 17 g  17 g Per NG tube Daily    tamsulosin (FLOMAX) capsule 0.4 mg  0.4 mg Oral Daily    norepinephrine (LEVOPHED) 4 mg in sodium chloride 0.9 % 250 mL infusion (premix)  2-100 mcg/min IntraVENous Continuous    sodium phosphate 15 mmol in sodium chloride 0.9 % 250 mL IVPB ([id number])  15 mmol IntraVENous PRN    thiamine tablet 100 mg  100 mg Per NG tube Daily    sodium chloride flush 0.9 % injection 5-40 mL  5-40 mL  IntraVENous 2 times per day    sodium chloride flush 0.9 % injection 5-40 mL  5-40 mL IntraVENous PRN    0.9 % sodium chloride infusion   IntraVENous PRN    potassium chloride (KLOR-CON M) extended release tablet 40 mEq  40 mEq Oral PRN    Or    potassium bicarb-citric acid (EFFER-K) effervescent tablet 40 mEq  40 mEq Oral PRN    Or    potassium chloride 10 mEq/100 mL IVPB (Peripheral Line)  10 mEq IntraVENous PRN    magnesium sulfate 2000 mg in 50 mL IVPB premix  2,000 mg IntraVENous PRN    ondansetron (ZOFRAN-ODT) disintegrating tablet 4 mg  4 mg Oral Q8H PRN    Or    ondansetron (ZOFRAN) injection 4 mg  4 mg IntraVENous Q6H PRN    polyethylene glycol (GLYCOLAX) packet 17 g  17 g Oral Daily PRN    acetaminophen (TYLENOL) tablet 650 mg  650 mg Oral Q6H PRN    Or    acetaminophen (TYLENOL) suppository 650 mg  650 mg Rectal Q6H PRN    [Held by provider] apixaban (ELIQUIS) tablet 5 mg  5 mg Oral BID    insulin lispro (HUMALOG,ADMELOG) injection vial 0-4 Units  0-4 Units SubCUTAneous 4x Daily AC & HS    glucose chewable tablet 16 g  4 tablet Oral PRN    dextrose bolus 10% 125 mL  125 mL IntraVENous PRN    Or    dextrose bolus 10% 250 mL  250 mL IntraVENous PRN    Glucagon Emergency KIT 1 mg  1 mg SubCUTAneous PRN    dextrose 10 % infusion   IntraVENous Continuous PRN    ketotifen fumarate (ZADITOR) 0.035 % ophthalmic solution 1 drop  1 drop Both Eyes BID    lubiprostone (AMITIZA) capsule 8 mcg  (Patient Supplied)  8 mcg Oral BID        Signed:  Rama Gilbert MD    Part of this note may have been written by using a voice dictation software.  The note has been proof read but may still contain some grammatical/other typographical errors.

## 2025-02-24 NOTE — PROGRESS NOTES
Nutrition Assessment  Assessment Type: Reassess  Reason for visit:  Tube Feeding Management (Hospitalists) and Poor Intake/Appetite 5 or More Days (Hospitalists)      Nutrition Intervention:   Food and/or Nutrient Delivery:   Enteral Nutrition:   Enteral Access: Nasogastric  Change  Formula: Diabetic (Glucerna 1.5 [place])  Goal Rate: Continuous 55 ml/hr  Continue  Water flush  30 ml every hour  Modulars: None not indicated at this time   Enteral regimen at above goal to provide per 24 hours:  1815 calories, 100 grams protein and 1580 ml free fluid. Receiving additional water from medication administration    Above regimen: Intended to meet macronutrient goals  Labs:   Basic Metabolic Panel, Magnesium and Phosphorus active per nutrition parameters  POC Glucoses/SSI Active   Nutrition Related Medication Management:  Electrolyte Replacement:   Continue prn protocol Magnesium, Potassium, and Phosphorus.   Intravenous fluids:  Not applicable  Thiamine 100 mg daily x 7 days (EOT 2/27)  Bowel Regimen Glycolax daily, Senokot daily , and Per MD  Meals and Snacks:  Diet: Continue current diet per SLP evaluation  Medical Food Supplements:   Medical food supplement therapy:  None Not applicable  Coordination of Nutrition Care:  Coordination with nayeli care provider Provider, Dr Gilbert and RN, Fatuma     Malnutrition Assessment:2/17  [facility] Clinical Malnutrition Criteria  Malnutrition Status: Insufficient data (No diet recall or weight hx, limited visual NFPE)  Nutrition Focused Physical Exam, visual: Unremarkable     Nutrition Assessment:  Food/Nutrition Related History: 2/17: Limited hx obtained at this time d/t pt lethargy with mumbled responses to queries. However he says he was not a consistency modified diet PTA. He indicates he was on a regular diet with thin liquids.     Do You Have Any Cultural, Adventism, or Ethnic Food Preferences?:  (Unable to communicate)   Weight History: No recent weight in EMR. No NH  Active  Pertinent Medications: SSI (has required minimal since admission, maximum of 2 unitsin a day), amitxa (has not received since admission d/t \"patient supplied\"), remeron, glycolax, senokot. MgOx, midodrine,  PhosNAK, thiamin  Continuous: Levophed (2-4 mcg/min for past 24 hrs)  IVF: none  Electrolyte Replacement:  2/17: 40 meq KLORCON, 2/18: 40 meq KLORCON, 2/19: 2 gram Mg, 20 mmol Kphos, Phos NAK x 1 dose,   2/20: Phos NAK x 4 doses, 2/21: 4 grams Mg, 2/22-15 NaPhos  Pertinent administered PRN: none  Pertinent Labs:   Lab Results   Component Value Date/Time     02/24/2025 03:23 AM    K 4.2 02/24/2025 03:23 AM     02/24/2025 03:23 AM    CO2 20 02/24/2025 03:23 AM    BUN 10 02/24/2025 03:23 AM    CREATININE 0.47 02/24/2025 03:23 AM    GLUCOSE 159 02/24/2025 03:23 AM    CALCIUM 9.3 02/24/2025 03:23 AM    PHOS 3.1 02/24/2025 03:23 AM    MG 1.7 02/24/2025 03:23 AM          Lab Results   Component Value Date/Time    POCGLU 264 02/24/2025 11:17 AM    POCGLU 243 02/24/2025 08:32 AM    POCGLU 239 02/24/2025 06:44 AM    POCGLU 155 02/23/2025 09:15 PM    POCGLU 162 02/23/2025 04:22 PM    POCGLU 243 02/23/2025 11:18 AM     Hemoglobin A1C   Date Value Ref Range Status   02/17/2025 6.3 (H) 0 - 5.6 % Final     Comment:     Reference Range  Normal       <5.7%  Prediabetes  5.7-6.4%  Diabetes     >6.4%       Remarkable for: Phos improved to WDL, slightly low Mg. Remains on Mg and Phos supplementation.   Increasing BG with TF at goal. May benefit from change to lower CHO, fiber containing TF formula now that pressor dose is consistently low.   Low Na-may benefit from decreased FWF  Current Nutrition Therapies:  ADULT DIET; Dysphagia - Pureed; Mildly Thick (Nectar); No Drinking Straws, Liquids by spoon only  ADULT TUBE FEEDING; Nasogastric; Standard without Fiber; Continuous; 20; Yes; 10; Q 8 hours; 70; 30; Q 1 hour  Active Enteral Nutrition Order:  Route: Nasogastric  Composition: Standard without Fiber  Schedule:

## 2025-02-25 PROBLEM — E11.9 TYPE 2 DIABETES MELLITUS (HCC): Chronic | Status: ACTIVE | Noted: 2025-02-15

## 2025-02-25 PROBLEM — G93.0 ARACHNOID CYST: Chronic | Status: ACTIVE | Noted: 2025-02-25

## 2025-02-25 PROBLEM — E83.42 HYPOMAGNESEMIA: Status: ACTIVE | Noted: 2025-02-25

## 2025-02-25 PROBLEM — R09.02 HYPOXIA: Status: ACTIVE | Noted: 2025-02-25

## 2025-02-25 PROBLEM — R33.8 ACUTE URINARY RETENTION: Status: ACTIVE | Noted: 2025-02-25

## 2025-02-25 PROBLEM — I10 BENIGN ESSENTIAL HTN: Chronic | Status: ACTIVE | Noted: 2022-03-11

## 2025-02-25 PROBLEM — E87.1 HYPONATREMIA: Status: ACTIVE | Noted: 2025-02-17

## 2025-02-25 PROBLEM — Z66 DNR (DO NOT RESUSCITATE): Chronic | Status: ACTIVE | Noted: 2025-02-17

## 2025-02-25 LAB
ANION GAP SERPL CALC-SCNC: 10 MMOL/L (ref 7–16)
BASOPHILS # BLD: 0.06 K/UL (ref 0–0.2)
BASOPHILS NFR BLD: 1 % (ref 0–2)
BUN SERPL-MCNC: 11 MG/DL (ref 8–23)
CALCIUM SERPL-MCNC: 9.5 MG/DL (ref 8.8–10.2)
CHLORIDE SERPL-SCNC: 101 MMOL/L (ref 98–107)
CO2 SERPL-SCNC: 23 MMOL/L (ref 20–29)
CREAT SERPL-MCNC: 0.58 MG/DL (ref 0.8–1.3)
DIFFERENTIAL METHOD BLD: ABNORMAL
EOSINOPHIL # BLD: 0.39 K/UL (ref 0–0.8)
EOSINOPHIL NFR BLD: 6.3 % (ref 0.5–7.8)
ERYTHROCYTE [DISTWIDTH] IN BLOOD BY AUTOMATED COUNT: 13.5 % (ref 11.9–14.6)
GLUCOSE BLD STRIP.AUTO-MCNC: 107 MG/DL (ref 65–100)
GLUCOSE BLD STRIP.AUTO-MCNC: 117 MG/DL (ref 65–100)
GLUCOSE BLD STRIP.AUTO-MCNC: 157 MG/DL (ref 65–100)
GLUCOSE BLD STRIP.AUTO-MCNC: 164 MG/DL (ref 65–100)
GLUCOSE SERPL-MCNC: 157 MG/DL (ref 70–99)
HCT VFR BLD AUTO: 37.4 % (ref 41.1–50.3)
HGB BLD-MCNC: 12.8 G/DL (ref 13.6–17.2)
IMM GRANULOCYTES # BLD AUTO: 0.04 K/UL (ref 0–0.5)
IMM GRANULOCYTES NFR BLD AUTO: 0.6 % (ref 0–5)
LYMPHOCYTES # BLD: 1.35 K/UL (ref 0.5–4.6)
LYMPHOCYTES NFR BLD: 21.9 % (ref 13–44)
MCH RBC QN AUTO: 31.7 PG (ref 26.1–32.9)
MCHC RBC AUTO-ENTMCNC: 34.2 G/DL (ref 31.4–35)
MCV RBC AUTO: 92.6 FL (ref 82–102)
MONOCYTES # BLD: 0.56 K/UL (ref 0.1–1.3)
MONOCYTES NFR BLD: 9.1 % (ref 4–12)
NEUTS SEG # BLD: 3.76 K/UL (ref 1.7–8.2)
NEUTS SEG NFR BLD: 61.1 % (ref 43–78)
NRBC # BLD: 0 K/UL (ref 0–0.2)
PHOSPHATE SERPL-MCNC: 3.3 MG/DL (ref 2.5–4.5)
PLATELET # BLD AUTO: 281 K/UL (ref 150–450)
PMV BLD AUTO: 10.7 FL (ref 9.4–12.3)
POTASSIUM SERPL-SCNC: 4.2 MMOL/L (ref 3.5–5.1)
RBC # BLD AUTO: 4.04 M/UL (ref 4.23–5.6)
SERVICE CMNT-IMP: ABNORMAL
SODIUM SERPL-SCNC: 134 MMOL/L (ref 136–145)
WBC # BLD AUTO: 6.2 K/UL (ref 4.3–11.1)

## 2025-02-25 PROCEDURE — 6370000000 HC RX 637 (ALT 250 FOR IP): Performed by: FAMILY MEDICINE

## 2025-02-25 PROCEDURE — 6360000002 HC RX W HCPCS: Performed by: FAMILY MEDICINE

## 2025-02-25 PROCEDURE — 2500000003 HC RX 250 WO HCPCS: Performed by: STUDENT IN AN ORGANIZED HEALTH CARE EDUCATION/TRAINING PROGRAM

## 2025-02-25 PROCEDURE — 6370000000 HC RX 637 (ALT 250 FOR IP): Performed by: STUDENT IN AN ORGANIZED HEALTH CARE EDUCATION/TRAINING PROGRAM

## 2025-02-25 PROCEDURE — 80048 BASIC METABOLIC PNL TOTAL CA: CPT

## 2025-02-25 PROCEDURE — 2000000000 HC ICU R&B

## 2025-02-25 PROCEDURE — 36415 COLL VENOUS BLD VENIPUNCTURE: CPT

## 2025-02-25 PROCEDURE — 85025 COMPLETE CBC W/AUTO DIFF WBC: CPT

## 2025-02-25 PROCEDURE — 84100 ASSAY OF PHOSPHORUS: CPT

## 2025-02-25 PROCEDURE — 82962 GLUCOSE BLOOD TEST: CPT

## 2025-02-25 RX ORDER — MIDODRINE HYDROCHLORIDE 5 MG/1
20 TABLET ORAL
Status: DISCONTINUED | OUTPATIENT
Start: 2025-02-25 | End: 2025-02-28

## 2025-02-25 RX ORDER — ENOXAPARIN SODIUM 100 MG/ML
40 INJECTION SUBCUTANEOUS DAILY
Status: DISCONTINUED | OUTPATIENT
Start: 2025-02-25 | End: 2025-02-28

## 2025-02-25 RX ADMIN — MIDODRINE HYDROCHLORIDE 20 MG: 5 TABLET ORAL at 09:40

## 2025-02-25 RX ADMIN — POTASSIUM & SODIUM PHOSPHATES POWDER PACK 280-160-250 MG 250 MG: 280-160-250 PACK at 09:41

## 2025-02-25 RX ADMIN — KETOTIFEN FUMARATE 1 DROP: 0.25 SOLUTION/ DROPS OPHTHALMIC at 20:25

## 2025-02-25 RX ADMIN — POTASSIUM & SODIUM PHOSPHATES POWDER PACK 280-160-250 MG 250 MG: 280-160-250 PACK at 17:12

## 2025-02-25 RX ADMIN — POTASSIUM & SODIUM PHOSPHATES POWDER PACK 280-160-250 MG 250 MG: 280-160-250 PACK at 20:24

## 2025-02-25 RX ADMIN — FINASTERIDE 5 MG: 5 TABLET, FILM COATED ORAL at 09:41

## 2025-02-25 RX ADMIN — MIDODRINE HYDROCHLORIDE 20 MG: 5 TABLET ORAL at 17:12

## 2025-02-25 RX ADMIN — TAMSULOSIN HYDROCHLORIDE 0.4 MG: 0.4 CAPSULE ORAL at 09:40

## 2025-02-25 RX ADMIN — MIRTAZAPINE 7.5 MG: 15 TABLET, FILM COATED ORAL at 20:24

## 2025-02-25 RX ADMIN — ENOXAPARIN SODIUM 40 MG: 100 INJECTION SUBCUTANEOUS at 09:41

## 2025-02-25 RX ADMIN — EZETIMIBE 10 MG: 10 TABLET ORAL at 20:24

## 2025-02-25 RX ADMIN — CETIRIZINE HYDROCHLORIDE 10 MG: 10 TABLET, FILM COATED ORAL at 09:41

## 2025-02-25 RX ADMIN — KETOTIFEN FUMARATE 1 DROP: 0.25 SOLUTION/ DROPS OPHTHALMIC at 09:41

## 2025-02-25 RX ADMIN — SODIUM CHLORIDE, PRESERVATIVE FREE 10 ML: 5 INJECTION INTRAVENOUS at 20:39

## 2025-02-25 RX ADMIN — POTASSIUM & SODIUM PHOSPHATES POWDER PACK 280-160-250 MG 250 MG: 280-160-250 PACK at 13:32

## 2025-02-25 RX ADMIN — Medication 100 MG: at 09:40

## 2025-02-25 RX ADMIN — Medication 400 MG: at 09:41

## 2025-02-25 RX ADMIN — POLYETHYLENE GLYCOL 3350 17 G: 17 POWDER, FOR SOLUTION ORAL at 09:41

## 2025-02-25 RX ADMIN — MIDODRINE HYDROCHLORIDE 20 MG: 5 TABLET ORAL at 13:32

## 2025-02-25 RX ADMIN — SENNOSIDES AND DOCUSATE SODIUM 2 TABLET: 50; 8.6 TABLET ORAL at 09:41

## 2025-02-25 RX ADMIN — SODIUM CHLORIDE, PRESERVATIVE FREE 10 ML: 5 INJECTION INTRAVENOUS at 09:42

## 2025-02-25 ASSESSMENT — PAIN SCALES - GENERAL
PAINLEVEL_OUTOF10: 0

## 2025-02-25 NOTE — CARE COORDINATION
Pt chart reviewed and discussed in Critical Care Interdisciplinary Rounds. LOS 10 days. Pt admitted with hypernatremia, septicemia, JALEN, and acute renal failure. Per MD, awaiting [facility]'s decision on where pt to return with hospice.    MILADY GRECO spoke with [facility] and reports [facility] liaison meeting with pt today to evaluate whether pt qualifies for hospice house.     Addendum: Hospice liaison contacted CM to discuss meeting with pt. Liaison to discuss with MD to determine if pt qualifies for GIP.     Addendum: MILADY GRECO contacted by hospice liaison regarding MD decision for hospice house admission. Pt does not qualify for GIP LOC at this time. Pt can admit to hospice house to Residential [facility] area if made comfort prior to discharge. Pt will need to be out of mittens prior to discharge if [facility] decide to accept offer.     CM contacted [facility] to discuss hospice house decision. CM spoke with [facility] at length and provided education regarding comfort care. [facility] asked whether pt could stay on pressors and if pt could be switched to oral. CM encouraged [facility] to speak with RN to discuss. [facility] verbalized plans to discuss with spouse. [facility] verbalized understanding with options and plans to contact CM at 8:30 AM tomorrow for decision.      DC/POC hospice house vs comfort care.      CM team will continue to follow for potential discharge needs that may arise.     TERE Welch, \[facility] [place] [facility]\""  ICU   [facility]

## 2025-02-25 NOTE — PLAN OF CARE
Problem: Chronic Conditions and Co-morbidities  Goal: Patient's chronic conditions and co-morbidity symptoms are monitored and maintained or improved  2/24/2025 2159 by Crytsal Riggins RN  Outcome: Progressing  2/24/2025 2158 by Crystal Riggins RN  Outcome: Progressing  Flowsheets (Taken 2/24/2025 0800 by Bev, Fatuma, RN)  Care Plan - Patient's Chronic Conditions and Co-Morbidity Symptoms are Monitored and Maintained or Improved:   Monitor and assess patient's chronic conditions and comorbid symptoms for stability, deterioration, or improvement   Collaborate with multidisciplinary team to address chronic and comorbid conditions and prevent exacerbation or deterioration   Update acute care plan with appropriate goals if chronic or comorbid symptoms are exacerbated and prevent overall improvement and discharge     Problem: Discharge Planning  Goal: Discharge to home or other facility with appropriate resources  2/24/2025 2159 by Crystal Riggins RN  Outcome: Progressing  2/24/2025 2158 by Crystal Riggins RN  Outcome: Progressing  Flowsheets (Taken 2/24/2025 0800 by Fatuma Pablo RN)  Discharge to home or other facility with appropriate resources:   Identify barriers to discharge with patient and caregiver   Arrange for needed discharge resources and transportation as appropriate   Identify discharge learning needs (meds, wound care, etc)     Problem: Pain  Goal: Verbalizes/displays adequate comfort level or baseline comfort level  2/24/2025 2159 by Crystal Riggins RN  Outcome: Progressing  2/24/2025 2158 by Crystal Riggins RN  Outcome: Progressing     Problem: Skin/Tissue Integrity  Goal: Skin integrity remains intact  Description: 1.  Monitor for areas of redness and/or skin breakdown  2.  Assess vascular access sites hourly  3.  Every 4-6 hours minimum:  Change oxygen saturation probe site  4.  Every 4-6 hours:  If on nasal continuous positive airway pressure, respiratory therapy assess nares and  free of injury from restraints (Restraint for Interference with Medical Device)  Description: INTERVENTIONS:  1. Determine that other, less restrictive measures have been tried or would not be effective before applying the restraint  2. Evaluate the patient's condition at the time of restraint application  3. Inform patient/family regarding the reason for restraint  4. Q2H: Monitor safety, psychosocial status, comfort, nutrition and hydration  2/24/2025 2159 by Crystal Riggins, RN  Outcome: Progressing  2/24/2025 2158 by Crystal Riggins, RN  Outcome: Progressing

## 2025-02-25 NOTE — PROGRESS NOTES
SPEECH LANGUAGE PATHOLOGY: ATTEMPT     NAME: Justin Gabriel  : 1950  MRN: [id number]    ADMISSION DATE: 2/15/2025  PRIMARY DIAGNOSIS: Hypovolemic shock (HCC)    Patient somnolent and unable to participate in speech therapy services. ST will follow up and treat, as patient is medically appropriate.     JOSY BARKLEY  2025 10:01 AM

## 2025-02-25 NOTE — PROGRESS NOTES
Critical Care Interdisciplinary Rounds with staff.     Nicole Shanks M.Div, [facility] / / Bereavement Coordinator  [facility] Care Department   c: [phone]/ [phone] / [email or web address] [address]  [email or web address]

## 2025-02-25 NOTE — PLAN OF CARE
Problem: Chronic Conditions and Co-morbidities  Goal: Patient's chronic conditions and co-morbidity symptoms are monitored and maintained or improved  Outcome: Progressing  Flowsheets (Taken 2/24/2025 0800 by Fatuma Pablo, RN)  Care Plan - Patient's Chronic Conditions and Co-Morbidity Symptoms are Monitored and Maintained or Improved:   Monitor and assess patient's chronic conditions and comorbid symptoms for stability, deterioration, or improvement   Collaborate with multidisciplinary team to address chronic and comorbid conditions and prevent exacerbation or deterioration   Update acute care plan with appropriate goals if chronic or comorbid symptoms are exacerbated and prevent overall improvement and discharge     Problem: Discharge Planning  Goal: Discharge to home or other facility with appropriate resources  Outcome: Progressing  Flowsheets (Taken 2/24/2025 0800 by Fatuma Pablo RN)  Discharge to home or other facility with appropriate resources:   Identify barriers to discharge with patient and caregiver   Arrange for needed discharge resources and transportation as appropriate   Identify discharge learning needs (meds, wound care, etc)     Problem: Pain  Goal: Verbalizes/displays adequate comfort level or baseline comfort level  Outcome: Progressing     Problem: Skin/Tissue Integrity  Goal: Skin integrity remains intact  Description: 1.  Monitor for areas of redness and/or skin breakdown  2.  Assess vascular access sites hourly  3.  Every 4-6 hours minimum:  Change oxygen saturation probe site  4.  Every 4-6 hours:  If on nasal continuous positive airway pressure, respiratory therapy assess nares and determine need for appliance change or resting period  Outcome: Progressing  Flowsheets  Taken 2/24/2025 1945 by Crystal Riggins RN  Skin Integrity Remains Intact: Monitor for areas of redness and/or skin breakdown  Taken 2/24/2025 0800 by Fatuma Pablo, RN  Skin Integrity Remains  Intact:   Monitor for areas of redness and/or skin breakdown   Assess vascular access sites hourly   Every 4-6 hours minimum: Change oxygen saturation probe site     Problem: ABCDS Injury Assessment  Goal: Absence of physical injury  Outcome: Progressing     Problem: Confusion  Goal: Confusion, delirium, dementia, or psychosis is improved or at baseline  Description: INTERVENTIONS:  1. Assess for possible contributors to thought disturbance, including medications, impaired vision or hearing, underlying metabolic abnormalities, dehydration, psychiatric diagnoses, and notify attending LIP  2. [place] high risk fall precautions, as indicated  3. Provide frequent short contacts to provide reality reorientation, refocusing and direction  4. Decrease environmental stimuli, including noise as appropriate  5. Monitor and intervene to maintain adequate nutrition, hydration, elimination, sleep and activity  6. If unable to ensure safety without constant attention obtain sitter and review sitter guidelines with assigned personnel  7. Initiate Psychosocial CNS and Spiritual Care consult, as indicated  Outcome: Progressing     Problem: Safety - Adult  Goal: Free from fall injury  Outcome: Progressing     Problem: Safety - Medical Restraint  Goal: Remains free of injury from restraints (Restraint for Interference with Medical Device)  Description: INTERVENTIONS:  1. Determine that other, less restrictive measures have been tried or would not be effective before applying the restraint  2. Evaluate the patient's condition at the time of restraint application  3. Inform patient/family regarding the reason for restraint  4. Q2H: Monitor safety, psychosocial status, comfort, nutrition and hydration  Outcome: Progressing

## 2025-02-25 NOTE — PROGRESS NOTES
Hospitalist Progress Note   Admit Date:  2/15/2025 11:56 AM   Name:  Justin Gabriel   Age:  74 y.o.  Sex:  male  :  1950   MRN:  [id number]   Room:  [facility]/    Presenting/Chief Complaint: Shortness of Breath and Altered Mental Status     Reason(s) for Admission: Hypernatremia [E87.0]  Septicemia (HCC) [A41.9]  Acute kidney injury [N17.9]  Acute renal failure, unspecified acute renal failure type [N17.9]     Hospital Day #10      Hospital Course:   Justin Gabriel is a 74 y.o. 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  On  he developed hypotension and despite multiple boluses, had to be moved to the ICU on Levophed drip and started on midodrine.  He has been intermittently on and off Levophed. He was having breakfast and was pocketing food in his mouth.  Speech was consulted and recommended puréed diet.    He was hypernatremic and resolved with D5 IVF.  He has been having waxing and waning confusion.  Neurosurgery was consulted and recommended draining the extra-axial fluid collection in the brain seen on CT head on date of presentation.  Attempted to call the daughter twice and left voicemail.  Lock was removed on  and he was retaining urine approximately 500 mL.  Lock was placed again.  NG-tube was placed.  He was started on tube feedings on .  Discussed with the neurosurgeon and recommended no drain placement.  GI was consulted for PEG tube placement and they were holding off for now as the patient remained on vasopressors.     Subjective & 24hr Events:   Patient reclining in bed dozing at time of visit.  He awakens upon auscultation of his chest.  He is nonverbal, but can answer questions with nod of his head.  He denies  pain.  Remains in bilateral mitten restraints for preservation of medically necessary tubes and lines.    Assessment & Plan:     Hypovolemic shock/lactic acidosis (resolved)  Remains on Levophed drip at low-dose.  Increase midodrine 15 -> 20 mg 3 times daily.    Hypernatremia  Resolved following hypotonic IVF resuscitation.  Acute kidney injury  Resolved.    Acute metabolic encephalopathy  Appears improved versus admission based on review of the record.  Per H&P, baseline mental status is oriented x 3.    Arachnoid cyst, chronic (since at least 2010)/brain compression, chronic  Seen in consultation by neurosurgery on 2/20, no operative intervention recommended.    Dysphagia  Eventual PEG tube per GI when off of pressors.    Acute urinary retention   Lock catheter in place after recently failing voiding trial.  Continue Flomax and Proscar.    Hypoxia  Remains on 2 L O2 BNC, continue to wean off as tolerated.    Diabetes mellitus type 2 (A1c 6.3)  Reasonable FSBG control.  Continue LDSSI.    Chronic constipation  Continue Amitiza, MiraLAX, Senokot-S.    History of stroke with left hemiparesis  Continue Zetia.  Holding Eliquis for possible PEG tube placement.    Hypokalemia/hypomagnesemia  Normalized.  Follow daily BMP and magnesium levels.    Sepsis, ruled out      Patient is critically ill.  Without the interventions noted, there is a high probability of imminent acute organ impairment or life-threatening deterioration.  Total critical care time spent: 42 minutes.    Critical care time includes time spent at bedside performing history and exam, performing chart review, discussing findings and treatment plan with patient and/or family, discussing patient with consultants and colleagues, ordering and reviewing pertinent laboratory and radiographic evaluations, and discussing patient with nursing staff. Time excludes procedures.     -I did call the patient's first contact (Yamini Rosenberg) mobile phone number, which was  (Last 24 hours) at 2/25/2025 0649  Last data filed at 2/25/2025 0533  Gross per 24 hour   Intake 2287.29 ml   Output 1835 ml   Net 452.29 ml         Physical Exam:   General:    Thin elderly male reclining in bed.  Head:  Normocephalic, atraumatic  Eyes:  Sclerae appear normal.  Pupils equally round.  ENT:  Nares appear normal.  Moist oral mucosa.  Neck:  No restricted ROM.  Trachea midline.   CV:   RRR.  No m/r/g.  No jugular venous distension.  No LE edema.  Lungs:   CTAB.  No wheezing, rhonchi, or rales.  Symmetric expansion.  3 L O2 BNC.  Abdomen:   Soft, nontender, nondistended.  +NGT, TF's running at 55 cc/h.  Skin:     No rashes.  Normal coloration.   Warm and dry.    :  Lock catheter draining clear yellow urine.  Neuro:  Awake and alert.  Nonverbal.  Follows simple commands.    Psych:  Normal mood and affect.      I have personally reviewed labs and tests:  Recent Labs:  Recent Results (from the past 48 hour(s))   POCT Glucose    Collection Time: 02/23/25  7:18 AM   Result Value Ref Range    POC Glucose 164 (H) 65 - 100 mg/dL    Performed by: Mauriciopopchips    POCT Glucose    Collection Time: 02/23/25 11:18 AM   Result Value Ref Range    POC Glucose 243 (H) 65 - 100 mg/dL    Performed by: [email or web address]    POCT Glucose    Collection Time: 02/23/25  4:22 PM   Result Value Ref Range    POC Glucose 162 (H) 65 - 100 mg/dL    Performed by: Mauriciopopchips    POCT Glucose    Collection Time: 02/23/25  9:15 PM   Result Value Ref Range    POC Glucose 155 (H) 65 - 100 mg/dL    Performed by: Daisy    Magnesium    Collection Time: 02/24/25  3:23 AM   Result Value Ref Range    Magnesium 1.7 (L) 1.8 - 2.4 mg/dL   Phosphorus    Collection Time: 02/24/25  3:23 AM   Result Value Ref Range    Phosphorus 3.1 2.5 - 4.5 MG/DL   Basic Metabolic Panel    Collection Time: 02/24/25  3:23 AM   Result Value Ref Range    Sodium 133 (L) 136 - 145 mmol/L    Potassium 4.2 3.5 - 5.1 mmol/L    Chloride 103 98 - 107 mmol/L  250 mL  250 mL IntraVENous PRN    Glucagon Emergency KIT 1 mg  1 mg SubCUTAneous PRN    dextrose 10 % infusion   IntraVENous Continuous PRN    ketotifen fumarate (ZADITOR) 0.035 % ophthalmic solution 1 drop  1 drop Both Eyes BID    lubiprostone (AMITIZA) capsule 8 mcg  (Patient Supplied)  8 mcg Oral BID        Signed:  Tico Feliciano M.D.  Hospitalist  02/25/25   6:49 AM

## 2025-02-26 ENCOUNTER — APPOINTMENT (OUTPATIENT)
Dept: GENERAL RADIOLOGY | Age: 75
DRG: 871 | End: 2025-02-26
Payer: MEDICARE

## 2025-02-26 LAB
ANION GAP SERPL CALC-SCNC: 11 MMOL/L (ref 7–16)
BASOPHILS # BLD: 0.05 K/UL (ref 0–0.2)
BASOPHILS NFR BLD: 0.7 % (ref 0–2)
BUN SERPL-MCNC: 14 MG/DL (ref 8–23)
CALCIUM SERPL-MCNC: 9.7 MG/DL (ref 8.8–10.2)
CHLORIDE SERPL-SCNC: 98 MMOL/L (ref 98–107)
CO2 SERPL-SCNC: 23 MMOL/L (ref 20–29)
CREAT SERPL-MCNC: 0.64 MG/DL (ref 0.8–1.3)
DIFFERENTIAL METHOD BLD: ABNORMAL
EOSINOPHIL # BLD: 0.14 K/UL (ref 0–0.8)
EOSINOPHIL NFR BLD: 1.9 % (ref 0.5–7.8)
ERYTHROCYTE [DISTWIDTH] IN BLOOD BY AUTOMATED COUNT: 13.6 % (ref 11.9–14.6)
GLUCOSE BLD STRIP.AUTO-MCNC: 141 MG/DL (ref 65–100)
GLUCOSE BLD STRIP.AUTO-MCNC: 145 MG/DL (ref 65–100)
GLUCOSE BLD STRIP.AUTO-MCNC: 165 MG/DL (ref 65–100)
GLUCOSE BLD STRIP.AUTO-MCNC: 169 MG/DL (ref 65–100)
GLUCOSE SERPL-MCNC: 177 MG/DL (ref 70–99)
HCT VFR BLD AUTO: 38.2 % (ref 41.1–50.3)
HGB BLD-MCNC: 12.6 G/DL (ref 13.6–17.2)
IMM GRANULOCYTES # BLD AUTO: 0.02 K/UL (ref 0–0.5)
IMM GRANULOCYTES NFR BLD AUTO: 0.3 % (ref 0–5)
LYMPHOCYTES # BLD: 0.79 K/UL (ref 0.5–4.6)
LYMPHOCYTES NFR BLD: 10.5 % (ref 13–44)
MAGNESIUM SERPL-MCNC: 1.7 MG/DL (ref 1.8–2.4)
MCH RBC QN AUTO: 31 PG (ref 26.1–32.9)
MCHC RBC AUTO-ENTMCNC: 33 G/DL (ref 31.4–35)
MCV RBC AUTO: 94.1 FL (ref 82–102)
MONOCYTES # BLD: 0.48 K/UL (ref 0.1–1.3)
MONOCYTES NFR BLD: 6.4 % (ref 4–12)
NEUTS SEG # BLD: 6.07 K/UL (ref 1.7–8.2)
NEUTS SEG NFR BLD: 80.2 % (ref 43–78)
NRBC # BLD: 0 K/UL (ref 0–0.2)
PHOSPHATE SERPL-MCNC: 3.9 MG/DL (ref 2.5–4.5)
PLATELET # BLD AUTO: 277 K/UL (ref 150–450)
PMV BLD AUTO: 10.8 FL (ref 9.4–12.3)
POTASSIUM SERPL-SCNC: 4.4 MMOL/L (ref 3.5–5.1)
RBC # BLD AUTO: 4.06 M/UL (ref 4.23–5.6)
SERVICE CMNT-IMP: ABNORMAL
SODIUM SERPL-SCNC: 132 MMOL/L (ref 136–145)
WBC # BLD AUTO: 7.6 K/UL (ref 4.3–11.1)

## 2025-02-26 PROCEDURE — 36415 COLL VENOUS BLD VENIPUNCTURE: CPT

## 2025-02-26 PROCEDURE — 80048 BASIC METABOLIC PNL TOTAL CA: CPT

## 2025-02-26 PROCEDURE — 2500000003 HC RX 250 WO HCPCS: Performed by: STUDENT IN AN ORGANIZED HEALTH CARE EDUCATION/TRAINING PROGRAM

## 2025-02-26 PROCEDURE — 2000000000 HC ICU R&B

## 2025-02-26 PROCEDURE — 83735 ASSAY OF MAGNESIUM: CPT

## 2025-02-26 PROCEDURE — 84100 ASSAY OF PHOSPHORUS: CPT

## 2025-02-26 PROCEDURE — 71045 X-RAY EXAM CHEST 1 VIEW: CPT

## 2025-02-26 PROCEDURE — 6370000000 HC RX 637 (ALT 250 FOR IP): Performed by: STUDENT IN AN ORGANIZED HEALTH CARE EDUCATION/TRAINING PROGRAM

## 2025-02-26 PROCEDURE — 85025 COMPLETE CBC W/AUTO DIFF WBC: CPT

## 2025-02-26 PROCEDURE — 2700000000 HC OXYGEN THERAPY PER DAY

## 2025-02-26 PROCEDURE — 6370000000 HC RX 637 (ALT 250 FOR IP): Performed by: FAMILY MEDICINE

## 2025-02-26 PROCEDURE — 82962 GLUCOSE BLOOD TEST: CPT

## 2025-02-26 PROCEDURE — 6360000002 HC RX W HCPCS: Performed by: FAMILY MEDICINE

## 2025-02-26 PROCEDURE — 94761 N-INVAS EAR/PLS OXIMETRY MLT: CPT

## 2025-02-26 RX ORDER — MORPHINE SULFATE 2 MG/ML
2 INJECTION, SOLUTION INTRAMUSCULAR; INTRAVENOUS EVERY 4 HOURS PRN
Status: DISCONTINUED | OUTPATIENT
Start: 2025-02-26 | End: 2025-02-28

## 2025-02-26 RX ADMIN — ENOXAPARIN SODIUM 40 MG: 100 INJECTION SUBCUTANEOUS at 09:19

## 2025-02-26 RX ADMIN — CETIRIZINE HYDROCHLORIDE 10 MG: 10 TABLET, FILM COATED ORAL at 09:19

## 2025-02-26 RX ADMIN — POTASSIUM & SODIUM PHOSPHATES POWDER PACK 280-160-250 MG 250 MG: 280-160-250 PACK at 12:35

## 2025-02-26 RX ADMIN — POTASSIUM & SODIUM PHOSPHATES POWDER PACK 280-160-250 MG 250 MG: 280-160-250 PACK at 21:06

## 2025-02-26 RX ADMIN — SODIUM CHLORIDE, PRESERVATIVE FREE 10 ML: 5 INJECTION INTRAVENOUS at 09:20

## 2025-02-26 RX ADMIN — POTASSIUM & SODIUM PHOSPHATES POWDER PACK 280-160-250 MG 250 MG: 280-160-250 PACK at 09:19

## 2025-02-26 RX ADMIN — KETOTIFEN FUMARATE 1 DROP: 0.25 SOLUTION/ DROPS OPHTHALMIC at 21:06

## 2025-02-26 RX ADMIN — MIRTAZAPINE 7.5 MG: 15 TABLET, FILM COATED ORAL at 21:06

## 2025-02-26 RX ADMIN — POLYETHYLENE GLYCOL 3350 17 G: 17 POWDER, FOR SOLUTION ORAL at 09:20

## 2025-02-26 RX ADMIN — MIDODRINE HYDROCHLORIDE 20 MG: 5 TABLET ORAL at 09:19

## 2025-02-26 RX ADMIN — TAMSULOSIN HYDROCHLORIDE 0.4 MG: 0.4 CAPSULE ORAL at 09:19

## 2025-02-26 RX ADMIN — FINASTERIDE 5 MG: 5 TABLET, FILM COATED ORAL at 09:20

## 2025-02-26 RX ADMIN — KETOTIFEN FUMARATE 1 DROP: 0.25 SOLUTION/ DROPS OPHTHALMIC at 09:20

## 2025-02-26 RX ADMIN — MIDODRINE HYDROCHLORIDE 20 MG: 5 TABLET ORAL at 17:28

## 2025-02-26 RX ADMIN — SODIUM CHLORIDE, PRESERVATIVE FREE 10 ML: 5 INJECTION INTRAVENOUS at 21:06

## 2025-02-26 RX ADMIN — SENNOSIDES AND DOCUSATE SODIUM 2 TABLET: 50; 8.6 TABLET ORAL at 09:19

## 2025-02-26 RX ADMIN — POTASSIUM & SODIUM PHOSPHATES POWDER PACK 280-160-250 MG 250 MG: 280-160-250 PACK at 17:29

## 2025-02-26 RX ADMIN — NOREPINEPHRINE BITARTRATE 2 MCG/MIN: 16 SOLUTION INTRAVENOUS at 10:45

## 2025-02-26 RX ADMIN — Medication 100 MG: at 09:19

## 2025-02-26 RX ADMIN — Medication 400 MG: at 09:19

## 2025-02-26 RX ADMIN — EZETIMIBE 10 MG: 10 TABLET ORAL at 21:06

## 2025-02-26 RX ADMIN — MIDODRINE HYDROCHLORIDE 20 MG: 5 TABLET ORAL at 12:36

## 2025-02-26 ASSESSMENT — PAIN SCALES - GENERAL: PAINLEVEL_OUTOF10: 0

## 2025-02-26 NOTE — FLOWSHEET NOTE
02/26/25 1055   Treatment Team Notification   Reason for Communication Change in status  (MAP less than 65 placed back on norepinepherine.)   Name of Team Member Notified Dr. Feliciano   Treatment Team Role Attending Provider   Method of Communication Secure Message   Response Waiting for response   Notification Time 1050

## 2025-02-26 NOTE — PLAN OF CARE
Problem: Chronic Conditions and Co-morbidities  Goal: Patient's chronic conditions and co-morbidity symptoms are monitored and maintained or improved  2/26/2025 1350 by Lucinda Nunez RN  Outcome: Progressing  Flowsheets (Taken 2/26/2025 0715)  Care Plan - Patient's Chronic Conditions and Co-Morbidity Symptoms are Monitored and Maintained or Improved:   Monitor and assess patient's chronic conditions and comorbid symptoms for stability, deterioration, or improvement   Collaborate with multidisciplinary team to address chronic and comorbid conditions and prevent exacerbation or deterioration   Update acute care plan with appropriate goals if chronic or comorbid symptoms are exacerbated and prevent overall improvement and discharge  2/26/2025 0032 by Crystal Riggins RN  Outcome: Progressing  2/26/2025 0031 by Crystal Riggins RN  Outcome: Progressing  Flowsheets (Taken 2/25/2025 1930)  Care Plan - Patient's Chronic Conditions and Co-Morbidity Symptoms are Monitored and Maintained or Improved: Monitor and assess patient's chronic conditions and comorbid symptoms for stability, deterioration, or improvement     Problem: Discharge Planning  Goal: Discharge to home or other facility with appropriate resources  2/26/2025 1350 by Lucinda Nunez, RN  Outcome: Progressing  Flowsheets (Taken 2/26/2025 0715)  Discharge to home or other facility with appropriate resources:   Identify barriers to discharge with patient and caregiver   Arrange for needed discharge resources and transportation as appropriate   Identify discharge learning needs (meds, wound care, etc)   Arrange for interpreters to assist at discharge as needed   Refer to discharge planning if patient needs post-hospital services based on physician order or complex needs related to functional status, cognitive ability or social support system  2/26/2025 0032 by Crystal Riggins RN  Outcome: Progressing  2/26/2025 0031 by Crystal Riggins RN  Outcome:  and Spiritual Care consult, as indicated  2/26/2025 1350 by Lucinda Nunez, RN  Outcome: Progressing  Flowsheets (Taken 2/26/2025 0715)  Effect of thought disturbance (confusion, delirium, dementia, or psychosis) are managed with adequate functional status:   Assess for contributors to thought disturbance, including medications, impaired vision or hearing, underlying metabolic abnormalities, dehydration, psychiatric diagnoses, notify LIP   [place] high risk fall precautions, as indicated   Provide frequent short contacts to provide reality reorientation, refocusing and direction   Decrease environmental stimuli, including noise as appropriate   Monitor and intervene to maintain adequate nutrition, hydration, elimination, sleep and activity   If unable to ensure safety without constant attention obtain sitter and review sitter guidelines with assigned personnel   Initiate Psychosocial Clinical Nurse Specialist and Spiritual Care consult, as indicated  2/26/2025 0032 by Crystal Riggins RN  Outcome: Progressing  Flowsheets (Taken 2/25/2025 1930)  Effect of thought disturbance (confusion, delirium, dementia, or psychosis) are managed with adequate functional status: Assess for contributors to thought disturbance, including medications, impaired vision or hearing, underlying metabolic abnormalities, dehydration, psychiatric diagnoses, notify LIP     Problem: Safety - Adult  Goal: Free from fall injury  2/26/2025 1350 by Lucinda Nunez, RN  Outcome: Progressing  2/26/2025 0032 by Crystal Riggins RN  Outcome: Progressing     Problem: Safety - Medical Restraint  Goal: Remains free of injury from restraints (Restraint for Interference with Medical Device)  Description: INTERVENTIONS:  1. Determine that other, less restrictive measures have been tried or would not be effective before applying the restraint  2. Evaluate the patient's condition at the time of restraint application  3. Inform patient/family regarding the reason

## 2025-02-26 NOTE — PROGRESS NOTES
TUBE FEEDING; Nasogastric; Diabetic; Continuous; 55; No; 30; Q 1 hour  VTE prophylaxis: Lovenox  Code status: DNR      Non-peripheral Lines and Tubes (if present):      NG/OG/NJ/NE Tube Nasogastric 14 fr Right nostril (Active)       Urinary Catheter 02/19/25 Lock (Active)        Telemetry (if present):  Cardiac/Telemetry Monitor On: Bedside monitor in use        Hospital Problems:  Principal Problem:    Hypovolemic shock (HCC)  Active Problems:    Iliac artery aneurysm, bilateral    Benign essential HTN    Enlarged aorta    Gout    Mixed hyperlipidemia    CVA, old, hemiparesis (HCC)    Aneurysm artery, celiac    Weakness of left upper extremity    Acute metabolic encephalopathy    Acute kidney injury    Type 2 diabetes mellitus (HCC)    Hyponatremia    Hypokalemia    DNR (do not resuscitate)    Midline shift of brain    Lactic acidosis    Dysphagia    Hypoxia    Hypomagnesemia    Acute urinary retention    Arachnoid cyst  Resolved Problems:    * No resolved hospital problems. *      Objective:   Patient Vitals for the past 24 hrs:   Temp Pulse Resp BP SpO2   02/26/25 0630 -- 80 20 (!) 84/64 95 %   02/26/25 0615 -- 82 22 (!) 71/59 97 %   02/26/25 0600 -- 72 18 (!) 86/53 97 %   02/26/25 0545 -- 71 13 (!) 79/60 97 %   02/26/25 0530 -- 79 20 (!) 84/54 95 %   02/26/25 0515 -- 84 18 (!) 101/59 95 %   02/26/25 0500 -- 77 17 91/62 95 %   02/26/25 0445 -- 80 19 104/67 93 %   02/26/25 0430 -- 81 18 97/71 92 %   02/26/25 0415 -- 79 15 91/75 91 %   02/26/25 0400 -- 77 20 (!) 109/31 95 %   02/26/25 0345 -- 79 13 (!) 89/68 94 %   02/26/25 0330 -- 78 18 (!) 88/68 95 %   02/26/25 0315 96.9 °F (36.1 °C) 93 16 (!) 87/70 91 %   02/26/25 0245 -- 82 16 (!) 75/59 94 %   02/26/25 0230 -- 87 14 (!) 72/56 94 %   02/26/25 0215 -- 83 21 (!) 80/43 94 %   02/26/25 0200 -- 83 16 (!) 85/51 95 %   02/26/25 0145 -- 76 21 (!) 93/48 93 %   02/26/25 0130 -- 78 16 (!) 80/41 94 %   02/26/25 0115 -- 80 22 (!) 85/42 94 %   02/26/25 0100 -- 75 13 (!)    CBC with Auto Differential    Collection Time: 02/26/25  3:15 AM   Result Value Ref Range    WBC 7.6 4.3 - 11.1 K/uL    RBC 4.06 (L) 4.23 - 5.6 M/uL    Hemoglobin 12.6 (L) 13.6 - 17.2 g/dL    Hematocrit 38.2 (L) 41.1 - 50.3 %    MCV 94.1 82.0 - 102.0 FL    MCH 31.0 26.1 - 32.9 PG    MCHC 33.0 31.4 - 35.0 g/dL    RDW 13.6 11.9 - 14.6 %    Platelets 277 150 - 450 K/uL    MPV 10.8 9.4 - 12.3 FL    nRBC 0.00 0.0 - 0.2 K/uL    Differential Type AUTOMATED      Neutrophils % 80.2 (H) 43.0 - 78.0 %    Lymphocytes % 10.5 (L) 13.0 - 44.0 %    Monocytes % 6.4 4.0 - 12.0 %    Eosinophils % 1.9 0.5 - 7.8 %    Basophils % 0.7 0.0 - 2.0 %    Immature Granulocytes % 0.3 0.0 - 5.0 %    Neutrophils Absolute 6.07 1.70 - 8.20 K/UL    Lymphocytes Absolute 0.79 0.50 - 4.60 K/UL    Monocytes Absolute 0.48 0.10 - 1.30 K/UL    Eosinophils Absolute 0.14 0.00 - 0.80 K/UL    Basophils Absolute 0.05 0.00 - 0.20 K/UL    Immature Granulocytes Absolute 0.02 0.0 - 0.5 K/UL   Magnesium    Collection Time: 02/26/25  3:15 AM   Result Value Ref Range    Magnesium 1.7 (L) 1.8 - 2.4 mg/dL   POCT Glucose    Collection Time: 02/26/25  6:28 AM   Result Value Ref Range    POC Glucose 145 (H) 65 - 100 mg/dL    Performed by: Daisy        Current Meds:  Current Facility-Administered Medications   Medication Dose Route Frequency    midodrine (PROAMATINE) tablet 20 mg  20 mg Per NG tube TID WC    enoxaparin (LOVENOX) injection 40 mg  40 mg SubCUTAneous Daily    sodium phosphate (FLEET) rectal enema 1 enema  1 enema Rectal PRN    medicated lip ointment (BLISTEX)   Topical PRN    cetirizine (ZYRTEC) tablet 10 mg  10 mg Per NG tube Daily    ezetimibe (ZETIA) tablet 10 mg  10 mg Per NG tube Nightly    finasteride (PROSCAR) tablet 5 mg  5 mg Per NG tube Daily    magnesium oxide (MAG-OX) tablet 400 mg  400 mg Per NG tube Daily    mirtazapine (REMERON) tablet 7.5 mg  7.5 mg Per NG tube Nightly    sennosides-docusate sodium (SENOKOT-S) 8.6-50 MG tablet 2  drop  1 drop Both Eyes BID    lubiprostone (AMITIZA) capsule 8 mcg  (Patient Supplied)  8 mcg Oral BID WC       Signed:  Tico Feliciano M.D.  Hospitalist  02/26/25   6:41 AM

## 2025-02-26 NOTE — PLAN OF CARE
Problem: Chronic Conditions and Co-morbidities  Goal: Patient's chronic conditions and co-morbidity symptoms are monitored and maintained or improved  2/26/2025 0032 by Crystal Riggins RN  Outcome: Progressing  2/26/2025 0031 by Crystal Riggins RN  Outcome: Progressing  Flowsheets (Taken 2/25/2025 1930)  Care Plan - Patient's Chronic Conditions and Co-Morbidity Symptoms are Monitored and Maintained or Improved: Monitor and assess patient's chronic conditions and comorbid symptoms for stability, deterioration, or improvement     Problem: Discharge Planning  Goal: Discharge to home or other facility with appropriate resources  2/26/2025 0032 by Crystal Riggins RN  Outcome: Progressing  2/26/2025 0031 by Crystal Riggins RN  Outcome: Progressing  Flowsheets (Taken 2/25/2025 1930)  Discharge to home or other facility with appropriate resources: Identify barriers to discharge with patient and caregiver     Problem: Pain  Goal: Verbalizes/displays adequate comfort level or baseline comfort level  2/26/2025 0032 by Crystal Riggins RN  Outcome: Progressing  2/26/2025 0031 by Crystal Riggins RN  Outcome: Progressing     Problem: Skin/Tissue Integrity  Goal: Skin integrity remains intact  Description: 1.  Monitor for areas of redness and/or skin breakdown  2.  Assess vascular access sites hourly  3.  Every 4-6 hours minimum:  Change oxygen saturation probe site  4.  Every 4-6 hours:  If on nasal continuous positive airway pressure, respiratory therapy assess nares and determine need for appliance change or resting period  2/26/2025 0032 by Crystal Riggins RN  Outcome: Progressing  2/26/2025 0031 by Crystal Riggins RN  Flowsheets  Taken 2/26/2025 0031  Skin Integrity Remains Intact: Assess vascular access sites hourly  Taken 2/25/2025 1930  Skin Integrity Remains Intact: Monitor for areas of redness and/or skin breakdown     Problem: ABCDS Injury Assessment  Goal: Absence of physical injury  Outcome: Progressing     Problem:  Confusion  Goal: Confusion, delirium, dementia, or psychosis is improved or at baseline  Description: INTERVENTIONS:  1. Assess for possible contributors to thought disturbance, including medications, impaired vision or hearing, underlying metabolic abnormalities, dehydration, psychiatric diagnoses, and notify attending LIP  2. [place] high risk fall precautions, as indicated  3. Provide frequent short contacts to provide reality reorientation, refocusing and direction  4. Decrease environmental stimuli, including noise as appropriate  5. Monitor and intervene to maintain adequate nutrition, hydration, elimination, sleep and activity  6. If unable to ensure safety without constant attention obtain sitter and review sitter guidelines with assigned personnel  7. Initiate Psychosocial CNS and Spiritual Care consult, as indicated  Outcome: Progressing  Flowsheets (Taken 2/25/2025 1930)  Effect of thought disturbance (confusion, delirium, dementia, or psychosis) are managed with adequate functional status: Assess for contributors to thought disturbance, including medications, impaired vision or hearing, underlying metabolic abnormalities, dehydration, psychiatric diagnoses, notify LIP     Problem: Safety - Adult  Goal: Free from fall injury  Outcome: Progressing     Problem: Safety - Medical Restraint  Goal: Remains free of injury from restraints (Restraint for Interference with Medical Device)  Description: INTERVENTIONS:  1. Determine that other, less restrictive measures have been tried or would not be effective before applying the restraint  2. Evaluate the patient's condition at the time of restraint application  3. Inform patient/family regarding the reason for restraint  4. Q2H: Monitor safety, psychosocial status, comfort, nutrition and hydration  Outcome: Progressing  Flowsheets (Taken 2/26/2025 0000)  Remains free of injury from restraints (restraint for interference with medical device): Determine that

## 2025-02-26 NOTE — FLOWSHEET NOTE
02/26/25 1405   Treatment Team Notification   Reason for Communication Evaluate  (NA+ 132 this AM. Inquiring if MD still wants H20 30 ml/hr water flushes through NG.)   Name of Team Member Notified Dr. Feliciano   Treatment Team Role Attending Provider   Method of Communication Secure Message   Response See orders   Notification Time 1410

## 2025-02-27 LAB
ALBUMIN SERPL-MCNC: 2.7 G/DL (ref 3.2–4.6)
ALBUMIN/GLOB SERPL: 0.6 (ref 1–1.9)
ALP SERPL-CCNC: 112 U/L (ref 40–129)
ALT SERPL-CCNC: 17 U/L (ref 8–55)
ANION GAP SERPL CALC-SCNC: 12 MMOL/L (ref 7–16)
ANION GAP SERPL CALC-SCNC: 14 MMOL/L (ref 7–16)
AST SERPL-CCNC: 27 U/L (ref 15–37)
BASOPHILS # BLD: 0.05 K/UL (ref 0–0.2)
BASOPHILS NFR BLD: 0.7 % (ref 0–2)
BILIRUB SERPL-MCNC: 0.5 MG/DL (ref 0–1.2)
BUN SERPL-MCNC: 15 MG/DL (ref 8–23)
BUN SERPL-MCNC: 15 MG/DL (ref 8–23)
CALCIUM SERPL-MCNC: 9.8 MG/DL (ref 8.8–10.2)
CALCIUM SERPL-MCNC: 9.9 MG/DL (ref 8.8–10.2)
CHLORIDE SERPL-SCNC: 100 MMOL/L (ref 98–107)
CHLORIDE SERPL-SCNC: 94 MMOL/L (ref 98–107)
CO2 SERPL-SCNC: 22 MMOL/L (ref 20–29)
CO2 SERPL-SCNC: 24 MMOL/L (ref 20–29)
CREAT SERPL-MCNC: 0.52 MG/DL (ref 0.8–1.3)
CREAT SERPL-MCNC: 0.62 MG/DL (ref 0.8–1.3)
DIFFERENTIAL METHOD BLD: ABNORMAL
EOSINOPHIL # BLD: 0.36 K/UL (ref 0–0.8)
EOSINOPHIL NFR BLD: 5.2 % (ref 0.5–7.8)
ERYTHROCYTE [DISTWIDTH] IN BLOOD BY AUTOMATED COUNT: 13.7 % (ref 11.9–14.6)
GLOBULIN SER CALC-MCNC: 4.2 G/DL (ref 2.3–3.5)
GLUCOSE BLD STRIP.AUTO-MCNC: 156 MG/DL (ref 65–100)
GLUCOSE BLD STRIP.AUTO-MCNC: 161 MG/DL (ref 65–100)
GLUCOSE BLD STRIP.AUTO-MCNC: 166 MG/DL (ref 65–100)
GLUCOSE BLD STRIP.AUTO-MCNC: 208 MG/DL (ref 65–100)
GLUCOSE SERPL-MCNC: 184 MG/DL (ref 70–99)
GLUCOSE SERPL-MCNC: 205 MG/DL (ref 70–99)
HCT VFR BLD AUTO: 37.9 % (ref 41.1–50.3)
HGB BLD-MCNC: 12.7 G/DL (ref 13.6–17.2)
IMM GRANULOCYTES # BLD AUTO: 0.04 K/UL (ref 0–0.5)
IMM GRANULOCYTES NFR BLD AUTO: 0.6 % (ref 0–5)
LYMPHOCYTES # BLD: 1.37 K/UL (ref 0.5–4.6)
LYMPHOCYTES NFR BLD: 19.8 % (ref 13–44)
MAGNESIUM SERPL-MCNC: 1.7 MG/DL (ref 1.8–2.4)
MAGNESIUM SERPL-MCNC: 1.8 MG/DL (ref 1.8–2.4)
MCH RBC QN AUTO: 31.1 PG (ref 26.1–32.9)
MCHC RBC AUTO-ENTMCNC: 33.5 G/DL (ref 31.4–35)
MCV RBC AUTO: 92.7 FL (ref 82–102)
MONOCYTES # BLD: 0.66 K/UL (ref 0.1–1.3)
MONOCYTES NFR BLD: 9.6 % (ref 4–12)
NEUTS SEG # BLD: 4.43 K/UL (ref 1.7–8.2)
NEUTS SEG NFR BLD: 64.1 % (ref 43–78)
NRBC # BLD: 0 K/UL (ref 0–0.2)
NT PRO BNP: 183 PG/ML (ref 0–450)
PHOSPHATE SERPL-MCNC: 3.3 MG/DL (ref 2.5–4.5)
PLATELET # BLD AUTO: 293 K/UL (ref 150–450)
PMV BLD AUTO: 10.5 FL (ref 9.4–12.3)
POTASSIUM SERPL-SCNC: 4 MMOL/L (ref 3.5–5.1)
POTASSIUM SERPL-SCNC: 4.4 MMOL/L (ref 3.5–5.1)
PROT SERPL-MCNC: 6.9 G/DL (ref 6.3–8.2)
RBC # BLD AUTO: 4.09 M/UL (ref 4.23–5.6)
SERVICE CMNT-IMP: ABNORMAL
SODIUM SERPL-SCNC: 132 MMOL/L (ref 136–145)
SODIUM SERPL-SCNC: 134 MMOL/L (ref 136–145)
WBC # BLD AUTO: 6.9 K/UL (ref 4.3–11.1)

## 2025-02-27 PROCEDURE — 92507 TX SP LANG VOICE COMM INDIV: CPT

## 2025-02-27 PROCEDURE — 6370000000 HC RX 637 (ALT 250 FOR IP): Performed by: STUDENT IN AN ORGANIZED HEALTH CARE EDUCATION/TRAINING PROGRAM

## 2025-02-27 PROCEDURE — 2500000003 HC RX 250 WO HCPCS: Performed by: STUDENT IN AN ORGANIZED HEALTH CARE EDUCATION/TRAINING PROGRAM

## 2025-02-27 PROCEDURE — 83735 ASSAY OF MAGNESIUM: CPT

## 2025-02-27 PROCEDURE — 2000000000 HC ICU R&B

## 2025-02-27 PROCEDURE — 82962 GLUCOSE BLOOD TEST: CPT

## 2025-02-27 PROCEDURE — 36415 COLL VENOUS BLD VENIPUNCTURE: CPT

## 2025-02-27 PROCEDURE — 92526 ORAL FUNCTION THERAPY: CPT

## 2025-02-27 PROCEDURE — 84100 ASSAY OF PHOSPHORUS: CPT

## 2025-02-27 PROCEDURE — 6360000002 HC RX W HCPCS: Performed by: FAMILY MEDICINE

## 2025-02-27 PROCEDURE — 6370000000 HC RX 637 (ALT 250 FOR IP): Performed by: FAMILY MEDICINE

## 2025-02-27 PROCEDURE — 83880 ASSAY OF NATRIURETIC PEPTIDE: CPT

## 2025-02-27 PROCEDURE — 85025 COMPLETE CBC W/AUTO DIFF WBC: CPT

## 2025-02-27 PROCEDURE — 80053 COMPREHEN METABOLIC PANEL: CPT

## 2025-02-27 RX ORDER — MAGNESIUM SULFATE 1 G/100ML
1000 INJECTION INTRAVENOUS ONCE
Status: COMPLETED | OUTPATIENT
Start: 2025-02-27 | End: 2025-02-27

## 2025-02-27 RX ADMIN — SODIUM CHLORIDE, PRESERVATIVE FREE 10 ML: 5 INJECTION INTRAVENOUS at 21:19

## 2025-02-27 RX ADMIN — KETOTIFEN FUMARATE 1 DROP: 0.25 SOLUTION/ DROPS OPHTHALMIC at 09:17

## 2025-02-27 RX ADMIN — INSULIN LISPRO 1 UNITS: 100 INJECTION, SOLUTION INTRAVENOUS; SUBCUTANEOUS at 07:48

## 2025-02-27 RX ADMIN — FINASTERIDE 5 MG: 5 TABLET, FILM COATED ORAL at 09:06

## 2025-02-27 RX ADMIN — SODIUM CHLORIDE, PRESERVATIVE FREE 10 ML: 5 INJECTION INTRAVENOUS at 09:14

## 2025-02-27 RX ADMIN — POTASSIUM & SODIUM PHOSPHATES POWDER PACK 280-160-250 MG 250 MG: 280-160-250 PACK at 18:08

## 2025-02-27 RX ADMIN — EZETIMIBE 10 MG: 10 TABLET ORAL at 21:19

## 2025-02-27 RX ADMIN — MIDODRINE HYDROCHLORIDE 20 MG: 5 TABLET ORAL at 14:15

## 2025-02-27 RX ADMIN — TAMSULOSIN HYDROCHLORIDE 0.4 MG: 0.4 CAPSULE ORAL at 09:02

## 2025-02-27 RX ADMIN — ENOXAPARIN SODIUM 40 MG: 100 INJECTION SUBCUTANEOUS at 09:17

## 2025-02-27 RX ADMIN — POTASSIUM & SODIUM PHOSPHATES POWDER PACK 280-160-250 MG 250 MG: 280-160-250 PACK at 09:06

## 2025-02-27 RX ADMIN — CETIRIZINE HYDROCHLORIDE 10 MG: 10 TABLET, FILM COATED ORAL at 09:06

## 2025-02-27 RX ADMIN — NOREPINEPHRINE BITARTRATE 2 MCG/MIN: 16 SOLUTION INTRAVENOUS at 17:37

## 2025-02-27 RX ADMIN — MIDODRINE HYDROCHLORIDE 20 MG: 5 TABLET ORAL at 07:36

## 2025-02-27 RX ADMIN — MAGNESIUM SULFATE HEPTAHYDRATE 1000 MG: 1 INJECTION, SOLUTION INTRAVENOUS at 07:14

## 2025-02-27 RX ADMIN — POLYETHYLENE GLYCOL 3350 17 G: 17 POWDER, FOR SOLUTION ORAL at 09:05

## 2025-02-27 RX ADMIN — MIRTAZAPINE 7.5 MG: 15 TABLET, FILM COATED ORAL at 21:19

## 2025-02-27 RX ADMIN — POTASSIUM & SODIUM PHOSPHATES POWDER PACK 280-160-250 MG 250 MG: 280-160-250 PACK at 14:15

## 2025-02-27 RX ADMIN — POTASSIUM & SODIUM PHOSPHATES POWDER PACK 280-160-250 MG 250 MG: 280-160-250 PACK at 21:19

## 2025-02-27 RX ADMIN — SENNOSIDES AND DOCUSATE SODIUM 2 TABLET: 50; 8.6 TABLET ORAL at 09:05

## 2025-02-27 RX ADMIN — MIDODRINE HYDROCHLORIDE 20 MG: 5 TABLET ORAL at 18:08

## 2025-02-27 RX ADMIN — KETOTIFEN FUMARATE 1 DROP: 0.25 SOLUTION/ DROPS OPHTHALMIC at 21:19

## 2025-02-27 RX ADMIN — Medication 400 MG: at 09:06

## 2025-02-27 ASSESSMENT — PAIN SCALES - GENERAL
PAINLEVEL_OUTOF10: 0

## 2025-02-27 NOTE — PROGRESS NOTES
NG tube length at nares changed from 55 cm on previous assessment to 50 cm. Tube feeds and flushes put on hold. Air bolus was audible but due to patient frequently coughing, KUB ordered. NG tube repositioned and re-taped. Will continue tube feeding once position is verified with x-ray.

## 2025-02-27 NOTE — PROGRESS NOTES
Nutrition Assessment  Assessment Type: Reassess  Reason for visit:  Tube Feeding Management (Hospitalists) and Poor Intake/Appetite 5 or More Days (Hospitalists)      Nutrition Intervention:   Food and/or Nutrient Delivery:   Enteral Nutrition:   Enteral Access: Nasogastric  Continue  Formula: Diabetic (Glucerna 1.5 [place])  Goal Rate: Continuous 55 ml/hr  Continue  Water flush  15 ml every hour  Modulars: None not indicated at this time   Enteral regimen at above goal to provide per 24 hours:  1815 calories, 100 grams protein and 1250 ml free fluid. Receiving additional water from medication administration    Above regimen: Intended to meet macronutrient goals  Labs:   Basic Metabolic Panel, Magnesium and Phosphorus active per nutrition parameters  POC Glucoses/SSI Active   Nutrition Related Medication Management:  Electrolyte Replacement:   Continue prn protocol Magnesium, Potassium, and Phosphorus.   Intravenous fluids:  Not applicable  Thiamine Completed 2/26  Bowel Regimen Glycolax daily, Senokot daily , and Per MD  Meals and Snacks:  Diet: Continue current diet per SLP evaluation  Medical Food Supplements:   Medical food supplement therapy:  None Not applicable  Coordination of Nutrition Care:  Coordination with nayeli care provider Aarti LESTER     Malnutrition Assessment:2/17  [facility] Clinical Malnutrition Criteria  Malnutrition Status: Insufficient data (No diet recall or weight hx, limited visual NFPE)  Nutrition Focused Physical Exam, visual: Unremarkable     Nutrition Assessment:  Food/Nutrition Related History: 2/17: Limited hx obtained at this time d/t pt lethargy with mumbled responses to queries. However he says he was not a consistency modified diet PTA. He indicates he was on a regular diet with thin liquids.     Do You Have Any Cultural, Jainism, or Ethnic Food Preferences?:  (Unable to communicate)   Weight History: No recent weight in EMR. No NH records in paper chart.   Last recorded  Measures:  Height: 182.9 cm (6')  Current Body Wt: 72.9 kg (160 lb 11.5 oz) (2/27), Weight source: Bed scale  BMI: 21.8, Underweight (BMI less than 22) age over 65 (wide weight variances during admission c/w low normal BMI to < 22/underweight for age >65)  Admission Body Weight: 73 kg (160 lb 15 oz) (bed scale)  Ideal Body Weight (Kg) (Calculated): 81 kg (178 lbs),    BMI Category Underweight (BMI less than 22) age over 65 (wide weight variances during admission c/w low normal BMI to < 22/underweight for age >65)  Comparative Standards:  Energy (kcal/day): 5512-2200 (25-30 kcal/kg) (Kcal/kg Weight used: 73 kg Admission  Protein (g/day): 73-91 (1-1.25 g/kg) Weight Used: (Admission) 73 kg  Fluid (ml/day):   (1 ml/kcal)    Nutrition Diagnosis:   Inadequate oral intake related to cognitive or neurological impairment, swallowing difficulty as evidenced by  (waxing and waning mentation, SLP eval and po intake meeting <25% of estimated needs as above)    Nutrition Goal(s):   Previous Goal Met: Goal(s) Achieved  Active Goal:  (Continued TF to meet estimated needs)  Type of Goal: Continue current goal    Discharge Planning:    Too soon to determine    BIRGIT LAND RD

## 2025-02-27 NOTE — PROGRESS NOTES
GOALS:  LTG: Patient will tolerate least restrictive diet without overt signs or symptoms of airway compromise.   STG: Patient will consume puree diet with nectar thick liquids without adverse pulmonary events. Ongoing 2025  STG: Patient will participate in modified barium swallow study as clinically indicated.      LTG: Patient will improve cognitive- communicative function to perform daily activities at highest possible level.   STG: Patient will answer open ended questions related to self/status with 90% accuracy given min A. Ongoing 2025  STG: Patient will sustain attention to therapy task for 10 minute interval with min A provided. Ongoing 2025    SPEECH LANGUAGE PATHOLOGY: COMBINED Dysphagia and Cognitive- Communicative Daily Note #4    Acknowledge Order  I  Therapy Time  I   Charges     I  Rehab Caseload Tracker    NAME: Justin Gabriel  : 1950  MRN: [id number]    ADMISSION DATE: 2/15/2025  PRIMARY DIAGNOSIS: Hypovolemic shock (HCC)    ICD-10: Treatment Diagnosis:   R13.12 Dysphagia, Oropharyngeal Phase  R41.841 Cognitive-Communication Deficit  F80.2 Mixed Receptive-Expressive Language Disorder    RECOMMENDATIONS   Diet:    Utilize alternate source/ NG tube for patient's nutrition, hydration, and medication needs.   OK for puree foods and mildly thick liquids via spoon for comfort only. Provide PO only when awake/alert and with assistance from staff.     Medication:  Via alternate source though OK for PO meds crushed in purees if alternate source not available.    Compensatory Swallowing Strategies:   Fully awake/alert for all PO  Assist feed  Small bites/sips  Check oral cavity to ensure patient swallowed PO presentations  Upright as possible for all oral intake   Therapeutic Intervention:   Patient/family education  Dysphagia treatment  Language treatment  Cognitive-linguistic treatment   Patient continues to require skilled intervention:  Yes. Recommend ongoing speech therapy  plan  Education response: Needs reinforcement    Safety:   Call light within reach  In Bed  RN notified     Therapy Time:  Time In: 0900  Time Out: 0924  Minutes: 24    Pankaj Ely M.S., CCC-SLP    2/27/2025 9:47 AM

## 2025-02-27 NOTE — PROGRESS NOTES
Critical Care Interdisciplinary Rounds with staff.     Nicole Shanks M.Div, [facility] / / Bereavement Coordinator  [facility] Care Department   c: [phone]/ [phone] / [email or web address]  [email or web address]

## 2025-02-27 NOTE — PROGRESS NOTES
Hospitalist Progress Note   Admit Date:  2/15/2025 11:56 AM   Name:  Justin Gabriel   Age:  74 y.o.  Sex:  male  :  1950   MRN:  [id number]   Room:  [facility]/    Presenting/Chief Complaint: Shortness of Breath and Altered Mental Status     Reason(s) for Admission: Hypernatremia [E87.0]  Septicemia (HCC) [A41.9]  Acute kidney injury [N17.9]  Acute renal failure, unspecified acute renal failure type [N17.9]     Hospital Day #12      Hospital Course:   Justin Gabriel is a 74 y.o. 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  On  he developed hypotension and despite multiple boluses, had to be moved to the ICU on Levophed drip and started on midodrine.  He has been intermittently on and off Levophed. He was having breakfast and was pocketing food in his mouth.  Speech was consulted and recommended puréed diet.    He was hypernatremic and resolved with D5 IVF.  He has been having waxing and waning confusion.  Neurosurgery was consulted and recommended draining the extra-axial fluid collection in the brain seen on CT head on date of presentation.  Attempted to call the daughter twice and left voicemail.  Lock was removed on  and he was retaining urine approximately 500 mL.  Lock was placed again.  NG-tube was placed.  He was started on tube feedings on .  Discussed with the neurosurgeon and recommended no drain placement.  GI was consulted for PEG tube placement and they were holding off for now as the patient remained on vasopressors.     Subjective & 24hr Events:   Patient lying in bed on his left side, dozing.  Remains on vasopressors.    Assessment & Plan:     Hypovolemic shock/lactic acidosis (resolved)  Remains intermittently    02/27/25 0400 -- 72 20 116/77 97 %   02/27/25 0345 -- 79 30 104/73 96 %   02/27/25 0330 -- 92 (!) 33 94/75 95 %   02/27/25 0315 -- 89 (!) 35 111/77 94 %   02/27/25 0300 98.3 °F (36.8 °C) 86 29 112/83 95 %   02/27/25 0245 -- 77 20 104/75 96 %   02/27/25 0230 -- 74 19 105/72 95 %   02/27/25 0215 -- 77 19 103/72 95 %   02/27/25 0200 -- 71 19 112/68 94 %   02/27/25 0145 -- 75 21 98/76 94 %   02/27/25 0130 -- 74 27 (!) 84/53 97 %   02/27/25 0115 -- 77 (!) 40 97/66 98 %   02/27/25 0100 -- 93 19 (!) 77/66 94 %   02/27/25 0045 -- 84 19 (!) 71/51 91 %   02/27/25 0030 -- 86 19 (!) 75/55 94 %   02/27/25 0015 -- 89 18 (!) 89/48 94 %   02/27/25 0000 -- 84 28 (!) 76/60 97 %   02/26/25 2345 -- 83 21 (!) 82/61 97 %   02/26/25 2330 -- 84 21 (!) 89/64 98 %   02/26/25 2315 -- 80 19 (!) 87/63 93 %   02/26/25 2300 97.7 °F (36.5 °C) 74 18 98/69 94 %   02/26/25 2245 -- 79 20 (!) 89/70 94 %   02/26/25 2230 -- 85 24 105/88 95 %   02/26/25 2215 -- 83 21 106/81 95 %   02/26/25 2200 -- 84 27 111/80 95 %   02/26/25 2145 -- 81 29 107/77 97 %   02/26/25 2130 -- 82 23 99/79 95 %   02/26/25 2115 -- 77 23 104/63 97 %   02/26/25 2100 -- 78 23 106/74 97 %   02/26/25 2047 -- 77 22 112/69 97 %   02/26/25 2030 -- 80 26 105/75 97 %   02/26/25 2015 -- 76 22 103/76 99 %   02/26/25 2000 -- 72 17 114/84 99 %   02/26/25 1958 -- 80 18 -- 99 %   02/26/25 1945 -- 71 17 112/75 98 %   02/26/25 1930 -- 70 19 102/72 99 %   02/26/25 1915 -- 72 21 108/80 100 %   02/26/25 1901 98.1 °F (36.7 °C) 76 16 104/74 99 %   02/26/25 1845 -- 71 21 95/76 99 %   02/26/25 1800 -- 75 23 97/76 98 %   02/26/25 1745 -- 74 21 101/72 97 %   02/26/25 1730 -- 82 17 103/73 97 %   02/26/25 1715 -- 77 22 98/72 97 %   02/26/25 1700 -- 69 17 100/73 98 %   02/26/25 1645 -- 71 12 111/73 97 %   02/26/25 1630 -- 71 19 106/83 98 %   02/26/25 1615 -- 70 18 98/80 98 %   02/26/25 1600 -- 74 17 107/72 100 %   02/26/25 1545 -- 74 22 107/80 --   02/26/25 1530 -- 77 19 98/82 --   02/26/25 1521 98.1 °F  (36.7 °C) 65 17 94/81 97 %   02/26/25 1515 -- 69 22 103/78 96 %   02/26/25 1500 -- 68 18 101/75 96 %   02/26/25 1445 -- 62 17 94/78 95 %   02/26/25 1430 -- 67 19 97/66 93 %   02/26/25 1415 -- 65 16 100/74 95 %   02/26/25 1400 -- 65 21 101/68 95 %   02/26/25 1345 -- 73 17 94/70 93 %   02/26/25 1330 -- 72 18 (!) 81/56 96 %   02/26/25 1315 -- 73 18 (!) 83/60 95 %   02/26/25 1305 -- 72 20 (!) 96/54 96 %   02/26/25 1300 -- 68 20 (!) 84/53 96 %   02/26/25 1245 -- 75 17 (!) 80/55 95 %   02/26/25 1230 -- 72 20 (!) 76/50 94 %   02/26/25 1215 -- 78 30 (!) 80/51 93 %   02/26/25 1205 -- 73 18 (!) 81/54 93 %   02/26/25 1200 -- 75 16 (!) 75/53 90 %   02/26/25 1145 -- 74 18 (!) 82/55 95 %   02/26/25 1130 -- 69 14 (!) 83/59 94 %   02/26/25 1119 98.2 °F (36.8 °C) 73 22 92/72 95 %   02/26/25 1115 -- 72 20 (!) 81/61 94 %   02/26/25 1100 -- 70 17 (!) 88/65 95 %   02/26/25 1045 -- 79 17 (!) 76/58 95 %   02/26/25 1030 -- 71 11 (!) 75/52 94 %   02/26/25 1015 -- 77 16 (!) 84/58 96 %   02/26/25 1000 -- 66 18 (!) 81/59 95 %   02/26/25 0945 -- 68 13 99/70 95 %   02/26/25 0930 -- 78 24 (!) 100/59 94 %   02/26/25 0915 -- 68 18 (!) 98/53 96 %   02/26/25 0900 -- 70 16 (!) 82/48 96 %   02/26/25 0845 -- 74 16 (!) 79/61 94 %   02/26/25 0830 -- 78 18 (!) 85/62 97 %   02/26/25 0815 -- 70 13 (!) 84/65 96 %   02/26/25 0800 -- 70 15 93/65 96 %   02/26/25 0745 -- 70 19 (!) 100/58 96 %   02/26/25 0730 -- 76 18 (!) 84/67 93 %   02/26/25 0717 98.1 °F (36.7 °C) 72 15 99/70 96 %   02/26/25 0715 -- 69 17 (!) 86/67 96 %   02/26/25 0700 -- 74 17 (!) 88/63 97 %   02/26/25 0645 -- 76 16 (!) 82/69 95 %   02/26/25 0630 -- 80 20 (!) 84/64 95 %       Oxygen Therapy  SpO2: 99 %  Pulse Oximetry Type: Continuous  Pulse via Oximetry: 59 beats per minute  SPO2 High Alarm Limit: 100  SPO2 Low Alarm Limit POX: 90  Pulse Oximeter Device Mode: Continuous  Pulse Oximeter Device Location: Finger  O2 Device: Nasal cannula  Skin Assessment: Clean, dry, & intact  O2 Flow Rate

## 2025-02-27 NOTE — PLAN OF CARE
Problem: Chronic Conditions and Co-morbidities  Goal: Patient's chronic conditions and co-morbidity symptoms are monitored and maintained or improved  2/27/2025 1526 by Aarti Dahl RN  Outcome: Progressing  Flowsheets (Taken 2/27/2025 0730)  Care Plan - Patient's Chronic Conditions and Co-Morbidity Symptoms are Monitored and Maintained or Improved:   Monitor and assess patient's chronic conditions and comorbid symptoms for stability, deterioration, or improvement   Collaborate with multidisciplinary team to address chronic and comorbid conditions and prevent exacerbation or deterioration   Update acute care plan with appropriate goals if chronic or comorbid symptoms are exacerbated and prevent overall improvement and discharge  2/27/2025 0632 by Lars Tolbert RN  Outcome: Progressing  Flowsheets (Taken 2/26/2025 1901)  Care Plan - Patient's Chronic Conditions and Co-Morbidity Symptoms are Monitored and Maintained or Improved:   Monitor and assess patient's chronic conditions and comorbid symptoms for stability, deterioration, or improvement   Collaborate with multidisciplinary team to address chronic and comorbid conditions and prevent exacerbation or deterioration   Update acute care plan with appropriate goals if chronic or comorbid symptoms are exacerbated and prevent overall improvement and discharge     Problem: Discharge Planning  Goal: Discharge to home or other facility with appropriate resources  Recent Flowsheet Documentation  Taken 2/27/2025 0730 by Aarti Dahl RN  Discharge to home or other facility with appropriate resources:   Identify barriers to discharge with patient and caregiver   Arrange for needed discharge resources and transportation as appropriate   Refer to discharge planning if patient needs post-hospital services based on physician order or complex needs related to functional status, cognitive ability or social support system  2/27/2025 0632 by Lars Tolbert  Aarti Dahl RN  Outcome: Progressing  2/27/2025 0632 by Lars Tolbert RN  Outcome: Progressing     Problem: Safety - Medical Restraint  Goal: Remains free of injury from restraints (Restraint for Interference with Medical Device)  Description: INTERVENTIONS:  1. Determine that other, less restrictive measures have been tried or would not be effective before applying the restraint  2. Evaluate the patient's condition at the time of restraint application  3. Inform patient/family regarding the reason for restraint  4. Q2H: Monitor safety, psychosocial status, comfort, nutrition and hydration  2/27/2025 1526 by Aarit Dahl RN  Outcome: Progressing  Note: Patient was able to be taken out of bilateral mittens this AM and has left IV and NGT in place.  2/27/2025 0632 by Lars Tolbert RN  Outcome: Progressing  Flowsheets  Taken 2/27/2025 0100  Remains free of injury from restraints (restraint for interference with medical device): Determine that other, less restrictive measures have been tried or would not be effective before applying the restraint  Taken 2/26/2025 2351  Remains free of injury from restraints (restraint for interference with medical device): Determine that other, less restrictive measures have been tried or would not be effective before applying the restraint  Taken 2/26/2025 1900  Remains free of injury from restraints (restraint for interference with medical device):   Determine that other, less restrictive measures have been tried or would not be effective before applying the restraint   Evaluate the patient's condition at the time of restraint application   Inform patient/family regarding the reason for restraint     Problem: Confusion  Goal: Confusion, delirium, dementia, or psychosis is improved or at baseline  Description: INTERVENTIONS:  1. Assess for possible contributors to thought disturbance, including medications, impaired vision or hearing, underlying metabolic  abnormalities, dehydration, psychiatric diagnoses, and notify attending LIP  2. [place] high risk fall precautions, as indicated  3. Provide frequent short contacts to provide reality reorientation, refocusing and direction  4. Decrease environmental stimuli, including noise as appropriate  5. Monitor and intervene to maintain adequate nutrition, hydration, elimination, sleep and activity  6. If unable to ensure safety without constant attention obtain sitter and review sitter guidelines with assigned personnel  7. Initiate Psychosocial CNS and Spiritual Care consult, as indicated  2/27/2025 1526 by Aarti Dahl, RN  Outcome: Progressing  Flowsheets (Taken 2/27/2025 0730)  Effect of thought disturbance (confusion, delirium, dementia, or psychosis) are managed with adequate functional status:   Assess for contributors to thought disturbance, including medications, impaired vision or hearing, underlying metabolic abnormalities, dehydration, psychiatric diagnoses, notify LIP   [place] high risk fall precautions, as indicated    Patient has been more interactive and appropriate since about 0900 this AM. He is still unable to state date, time or location, but has been compliant with instructions.  2/27/2025 0632 by Lars Tolbert, RN  Outcome: Not Progressing  Flowsheets (Taken 2/26/2025 1901)  Effect of thought disturbance (confusion, delirium, dementia, or psychosis) are managed with adequate functional status:   Assess for contributors to thought disturbance, including medications, impaired vision or hearing, underlying metabolic abnormalities, dehydration, psychiatric diagnoses, notify LIP   [place] high risk fall precautions, as indicated   Provide frequent short contacts to provide reality reorientation, refocusing and direction

## 2025-02-27 NOTE — PLAN OF CARE
Problem: Confusion  Goal: Confusion, delirium, dementia, or psychosis is improved or at baseline  Description: INTERVENTIONS:  1. Assess for possible contributors to thought disturbance, including medications, impaired vision or hearing, underlying metabolic abnormalities, dehydration, psychiatric diagnoses, and notify attending LIP  2. [place] high risk fall precautions, as indicated  3. Provide frequent short contacts to provide reality reorientation, refocusing and direction  4. Decrease environmental stimuli, including noise as appropriate  5. Monitor and intervene to maintain adequate nutrition, hydration, elimination, sleep and activity  6. If unable to ensure safety without constant attention obtain sitter and review sitter guidelines with assigned personnel  7. Initiate Psychosocial CNS and Spiritual Care consult, as indicated  Outcome: Not Progressing  Flowsheets (Taken 2/26/2025 1901)  Effect of thought disturbance (confusion, delirium, dementia, or psychosis) are managed with adequate functional status:   Assess for contributors to thought disturbance, including medications, impaired vision or hearing, underlying metabolic abnormalities, dehydration, psychiatric diagnoses, notify LIP   [place] high risk fall precautions, as indicated   Provide frequent short contacts to provide reality reorientation, refocusing and direction     Problem: Confusion  Goal: Confusion, delirium, dementia, or psychosis is improved or at baseline  Description: INTERVENTIONS:  1. Assess for possible contributors to thought disturbance, including medications, impaired vision or hearing, underlying metabolic abnormalities, dehydration, psychiatric diagnoses, and notify attending LIP  2. [place] high risk fall precautions, as indicated  3. Provide frequent short contacts to provide reality reorientation, refocusing and direction  4. Decrease environmental stimuli, including noise as appropriate  5. Monitor and intervene to  maintain adequate nutrition, hydration, elimination, sleep and activity  6. If unable to ensure safety without constant attention obtain sitter and review sitter guidelines with assigned personnel  7. Initiate Psychosocial CNS and Spiritual Care consult, as indicated  Outcome: Not Progressing  Flowsheets (Taken 2/26/2025 1901)  Effect of thought disturbance (confusion, delirium, dementia, or psychosis) are managed with adequate functional status:   Assess for contributors to thought disturbance, including medications, impaired vision or hearing, underlying metabolic abnormalities, dehydration, psychiatric diagnoses, notify LIP   [place] high risk fall precautions, as indicated   Provide frequent short contacts to provide reality reorientation, refocusing and direction

## 2025-02-28 LAB
ANION GAP SERPL CALC-SCNC: 10 MMOL/L (ref 7–16)
BASOPHILS # BLD: 0.04 K/UL (ref 0–0.2)
BASOPHILS NFR BLD: 0.4 % (ref 0–2)
BUN SERPL-MCNC: 15 MG/DL (ref 8–23)
CALCIUM SERPL-MCNC: 9.5 MG/DL (ref 8.8–10.2)
CHLORIDE SERPL-SCNC: 101 MMOL/L (ref 98–107)
CO2 SERPL-SCNC: 23 MMOL/L (ref 20–29)
CREAT SERPL-MCNC: 0.53 MG/DL (ref 0.8–1.3)
DIFFERENTIAL METHOD BLD: ABNORMAL
EOSINOPHIL # BLD: 0.25 K/UL (ref 0–0.8)
EOSINOPHIL NFR BLD: 2.5 % (ref 0.5–7.8)
ERYTHROCYTE [DISTWIDTH] IN BLOOD BY AUTOMATED COUNT: 13.4 % (ref 11.9–14.6)
GLUCOSE BLD STRIP.AUTO-MCNC: 252 MG/DL (ref 65–100)
GLUCOSE SERPL-MCNC: 214 MG/DL (ref 70–99)
HCT VFR BLD AUTO: 36 % (ref 41.1–50.3)
HGB BLD-MCNC: 11.9 G/DL (ref 13.6–17.2)
IMM GRANULOCYTES # BLD AUTO: 0.04 K/UL (ref 0–0.5)
IMM GRANULOCYTES NFR BLD AUTO: 0.4 % (ref 0–5)
LYMPHOCYTES # BLD: 0.79 K/UL (ref 0.5–4.6)
LYMPHOCYTES NFR BLD: 7.8 % (ref 13–44)
MCH RBC QN AUTO: 31.1 PG (ref 26.1–32.9)
MCHC RBC AUTO-ENTMCNC: 33.1 G/DL (ref 31.4–35)
MCV RBC AUTO: 94 FL (ref 82–102)
MONOCYTES # BLD: 0.81 K/UL (ref 0.1–1.3)
MONOCYTES NFR BLD: 8 % (ref 4–12)
NEUTS SEG # BLD: 8.24 K/UL (ref 1.7–8.2)
NEUTS SEG NFR BLD: 80.9 % (ref 43–78)
NRBC # BLD: 0 K/UL (ref 0–0.2)
PLATELET # BLD AUTO: 267 K/UL (ref 150–450)
PMV BLD AUTO: 10.6 FL (ref 9.4–12.3)
POTASSIUM SERPL-SCNC: 3.9 MMOL/L (ref 3.5–5.1)
RBC # BLD AUTO: 3.83 M/UL (ref 4.23–5.6)
SERVICE CMNT-IMP: ABNORMAL
SODIUM SERPL-SCNC: 134 MMOL/L (ref 136–145)
WBC # BLD AUTO: 10.2 K/UL (ref 4.3–11.1)

## 2025-02-28 PROCEDURE — 2700000000 HC OXYGEN THERAPY PER DAY

## 2025-02-28 PROCEDURE — 6370000000 HC RX 637 (ALT 250 FOR IP): Performed by: FAMILY MEDICINE

## 2025-02-28 PROCEDURE — 6370000000 HC RX 637 (ALT 250 FOR IP): Performed by: STUDENT IN AN ORGANIZED HEALTH CARE EDUCATION/TRAINING PROGRAM

## 2025-02-28 PROCEDURE — 2580000003 HC RX 258: Performed by: INTERNAL MEDICINE

## 2025-02-28 PROCEDURE — 2500000003 HC RX 250 WO HCPCS: Performed by: STUDENT IN AN ORGANIZED HEALTH CARE EDUCATION/TRAINING PROGRAM

## 2025-02-28 PROCEDURE — 2500000003 HC RX 250 WO HCPCS: Performed by: FAMILY MEDICINE

## 2025-02-28 PROCEDURE — 6360000002 HC RX W HCPCS: Performed by: FAMILY MEDICINE

## 2025-02-28 PROCEDURE — 80048 BASIC METABOLIC PNL TOTAL CA: CPT

## 2025-02-28 PROCEDURE — 85025 COMPLETE CBC W/AUTO DIFF WBC: CPT

## 2025-02-28 PROCEDURE — 36415 COLL VENOUS BLD VENIPUNCTURE: CPT

## 2025-02-28 PROCEDURE — 94761 N-INVAS EAR/PLS OXIMETRY MLT: CPT

## 2025-02-28 PROCEDURE — 94760 N-INVAS EAR/PLS OXIMETRY 1: CPT

## 2025-02-28 PROCEDURE — 82962 GLUCOSE BLOOD TEST: CPT

## 2025-02-28 PROCEDURE — 2000000000 HC ICU R&B

## 2025-02-28 PROCEDURE — 2580000003 HC RX 258: Performed by: FAMILY MEDICINE

## 2025-02-28 RX ORDER — 0.9 % SODIUM CHLORIDE 0.9 %
1000 INTRAVENOUS SOLUTION INTRAVENOUS ONCE
Status: COMPLETED | OUTPATIENT
Start: 2025-02-28 | End: 2025-02-28

## 2025-02-28 RX ORDER — NOREPINEPHRINE BITARTRATE 0.02 MG/ML
INJECTION, SOLUTION INTRAVENOUS
Status: DISPENSED
Start: 2025-02-28 | End: 2025-02-28

## 2025-02-28 RX ORDER — GLYCOPYRROLATE 0.2 MG/ML
0.4 INJECTION INTRAMUSCULAR; INTRAVENOUS EVERY 4 HOURS PRN
Status: DISCONTINUED | OUTPATIENT
Start: 2025-02-28 | End: 2025-03-02 | Stop reason: HOSPADM

## 2025-02-28 RX ORDER — MORPHINE SULFATE 2 MG/ML
2 INJECTION, SOLUTION INTRAMUSCULAR; INTRAVENOUS
Status: DISCONTINUED | OUTPATIENT
Start: 2025-02-28 | End: 2025-03-02 | Stop reason: HOSPADM

## 2025-02-28 RX ORDER — ONDANSETRON 2 MG/ML
4 INJECTION INTRAMUSCULAR; INTRAVENOUS EVERY 4 HOURS PRN
Status: DISCONTINUED | OUTPATIENT
Start: 2025-02-28 | End: 2025-03-02 | Stop reason: HOSPADM

## 2025-02-28 RX ADMIN — SODIUM CHLORIDE, PRESERVATIVE FREE 10 ML: 5 INJECTION INTRAVENOUS at 11:34

## 2025-02-28 RX ADMIN — INSULIN LISPRO 2 UNITS: 100 INJECTION, SOLUTION INTRAVENOUS; SUBCUTANEOUS at 08:08

## 2025-02-28 RX ADMIN — MORPHINE SULFATE 2 MG: 2 INJECTION, SOLUTION INTRAMUSCULAR; INTRAVENOUS at 09:39

## 2025-02-28 RX ADMIN — SODIUM CHLORIDE 1000 ML: 9 INJECTION, SOLUTION INTRAVENOUS at 00:21

## 2025-02-28 RX ADMIN — NOREPINEPHRINE BITARTRATE 9 MCG/MIN: 16 SOLUTION INTRAVENOUS at 03:32

## 2025-02-28 RX ADMIN — ENOXAPARIN SODIUM 40 MG: 100 INJECTION SUBCUTANEOUS at 08:08

## 2025-02-28 RX ADMIN — GLYCOPYRROLATE 0.4 MG: 0.2 INJECTION INTRAMUSCULAR; INTRAVENOUS at 11:33

## 2025-02-28 RX ADMIN — MIDODRINE HYDROCHLORIDE 20 MG: 5 TABLET ORAL at 08:09

## 2025-02-28 RX ADMIN — LORAZEPAM 2 MG: 2 INJECTION INTRAMUSCULAR; INTRAVENOUS at 15:15

## 2025-02-28 RX ADMIN — POLYETHYLENE GLYCOL 3350 17 G: 17 POWDER, FOR SOLUTION ORAL at 08:08

## 2025-02-28 RX ADMIN — SODIUM CHLORIDE, PRESERVATIVE FREE 10 ML: 5 INJECTION INTRAVENOUS at 14:57

## 2025-02-28 RX ADMIN — MORPHINE SULFATE 2 MG: 2 INJECTION, SOLUTION INTRAMUSCULAR; INTRAVENOUS at 11:34

## 2025-02-28 RX ADMIN — Medication 400 MG: at 08:10

## 2025-02-28 RX ADMIN — FINASTERIDE 5 MG: 5 TABLET, FILM COATED ORAL at 08:10

## 2025-02-28 RX ADMIN — CETIRIZINE HYDROCHLORIDE 10 MG: 10 TABLET, FILM COATED ORAL at 08:10

## 2025-02-28 ASSESSMENT — PAIN SCALES - GENERAL
PAINLEVEL_OUTOF10: 2
PAINLEVEL_OUTOF10: 0

## 2025-02-28 NOTE — PROGRESS NOTES
Pt had a period of tachypnea shortly after 1900. Pt became excessively diaphoretic and tachypneic, with respirations 40s-50s. Pt also became hypoxic, desatting down to 86% on 5 L NC. Pt became more lethargic, nodding off frequently while nurses were in the room. Oxygen support was escalated to nonrebreather and pt condition improved approximately 20 minutes later.

## 2025-02-28 NOTE — CARE COORDINATION
Pt chart reviewed for continued stay. LOS 13 days. Pt admitted with hypovolemic shock. Per MD, plans to speak with HCPOA to revisit comfort care as unable to wean off pressors.     Per RN, pt now on comfort care.     Pt may benefit from hospice house pending clinical stability for transport.     CM team to continue to follow for any needs that may arise.     Daylin Brewster, MSW, LBSW    [facility]

## 2025-02-28 NOTE — PROGRESS NOTES
Continues with clammy to diaphoretic skin.  Nods head yes and no and whispers answers.  He states he has some pain and morphine given.  Tube feedings turned off per order.

## 2025-02-28 NOTE — ACP (ADVANCE CARE PLANNING)
Advance Care Planning Note   Admit Date:  2/15/2025 11:56 AM   Name:  Justin Gabriel   Age:  74 y.o.  Sex:  male  :  1950   MRN:  [id number]   Room:  /01    Justin Gabriel has capacity to make his own decisions:   No    If pt unable to make decisions, POA/surrogate decision maker:  POA    Other people present:   POA's spouse    Patient / surrogate decision-maker directed code status:  DNR/DNI    Other ACP topics discussed, if applicable:   Proceeding with transition to comfort care.    Patient or surrogate consented to discussion of the current conditions, workup, management plans, prognosis, and the risk for further deterioration.  Time spent: 17 minutes in direct discussion.      Signed:  Tico Feliciano M.D.   Hospitalist  25   11:04 AM

## 2025-02-28 NOTE — PLAN OF CARE
Problem: Chronic Conditions and Co-morbidities  Goal: Patient's chronic conditions and co-morbidity symptoms are monitored and maintained or improved  2/28/2025 1110 by Mervat Byrne RN  Outcome: Not Progressing  2/28/2025 0554 by Lars Tolbert RN  Outcome: Not Progressing  Flowsheets (Taken 2/27/2025 1915)  Care Plan - Patient's Chronic Conditions and Co-Morbidity Symptoms are Monitored and Maintained or Improved:   Monitor and assess patient's chronic conditions and comorbid symptoms for stability, deterioration, or improvement   Collaborate with multidisciplinary team to address chronic and comorbid conditions and prevent exacerbation or deterioration   Update acute care plan with appropriate goals if chronic or comorbid symptoms are exacerbated and prevent overall improvement and discharge     Problem: Skin/Tissue Integrity  Goal: Skin integrity remains intact  Description: 1.  Monitor for areas of redness and/or skin breakdown  2.  Assess vascular access sites hourly  3.  Every 4-6 hours minimum:  Change oxygen saturation probe site  4.  Every 4-6 hours:  If on nasal continuous positive airway pressure, respiratory therapy assess nares and determine need for appliance change or resting period  2/28/2025 1110 by Mervat Byrne, RN  Outcome: Not Progressing  Flowsheets (Taken 2/28/2025 0730)  Skin Integrity Remains Intact: Monitor for areas of redness and/or skin breakdown  2/28/2025 0554 by Lars Tolbert RN  Outcome: Progressing  Flowsheets (Taken 2/27/2025 1915)  Skin Integrity Remains Intact:   Assess vascular access sites hourly   Monitor for areas of redness and/or skin breakdown   Every 4-6 hours minimum: Change oxygen saturation probe site     Problem: Confusion  Goal: Confusion, delirium, dementia, or psychosis is improved or at baseline  Description: INTERVENTIONS:  1. Assess for possible contributors to thought disturbance, including medications, impaired vision or hearing, underlying metabolic  discharge resources and transportation as appropriate   Identify discharge learning needs (meds, wound care, etc)     Problem: Pain  Goal: Verbalizes/displays adequate comfort level or baseline comfort level  2/28/2025 1110 by Mervat Byrne RN  Outcome: Progressing  Flowsheets (Taken 2/28/2025 0730)  Verbalizes/displays adequate comfort level or baseline comfort level: Assess pain using appropriate pain scale  2/28/2025 0554 by Lars Tolbert RN  Outcome: Progressing  Flowsheets (Taken 2/27/2025 1915)  Verbalizes/displays adequate comfort level or baseline comfort level:   Encourage patient to monitor pain and request assistance   Assess pain using appropriate pain scale   Administer analgesics based on type and severity of pain and evaluate response     Problem: ABCDS Injury Assessment  Goal: Absence of physical injury  2/28/2025 1110 by Mervat Byrne RN  Outcome: Progressing  2/28/2025 0554 by Lars Tolbert RN  Outcome: Progressing     Problem: Safety - Adult  Goal: Free from fall injury  2/28/2025 1110 by Mervat Byrne RN  Outcome: Progressing  2/28/2025 0554 by Lars Tolbert RN  Outcome: Progressing     Problem: Safety - Medical Restraint  Goal: Remains free of injury from restraints (Restraint for Interference with Medical Device)  Description: INTERVENTIONS:  1. Determine that other, less restrictive measures have been tried or would not be effective before applying the restraint  2. Evaluate the patient's condition at the time of restraint application  3. Inform patient/family regarding the reason for restraint  4. Q2H: Monitor safety, psychosocial status, comfort, nutrition and hydration  2/28/2025 0554 by Lars Tolbert RN  Outcome: Completed     Problem: Chronic Conditions and Co-morbidities  Goal: Patient's chronic conditions and co-morbidity symptoms are monitored and maintained or improved  2/28/2025 1110 by Mervat Byrne RN  Outcome: Not Progressing  2/28/2025 0554 by Lars Tolbert RN  Outcome:

## 2025-02-28 NOTE — PLAN OF CARE
Problem: Chronic Conditions and Co-morbidities  Goal: Patient's chronic conditions and co-morbidity symptoms are monitored and maintained or improved  Outcome: Not Progressing  Flowsheets (Taken 2/27/2025 1915)  Care Plan - Patient's Chronic Conditions and Co-Morbidity Symptoms are Monitored and Maintained or Improved:   Monitor and assess patient's chronic conditions and comorbid symptoms for stability, deterioration, or improvement   Collaborate with multidisciplinary team to address chronic and comorbid conditions and prevent exacerbation or deterioration   Update acute care plan with appropriate goals if chronic or comorbid symptoms are exacerbated and prevent overall improvement and discharge     Problem: Chronic Conditions and Co-morbidities  Goal: Patient's chronic conditions and co-morbidity symptoms are monitored and maintained or improved  Outcome: Not Progressing  Flowsheets (Taken 2/27/2025 1915)  Care Plan - Patient's Chronic Conditions and Co-Morbidity Symptoms are Monitored and Maintained or Improved:   Monitor and assess patient's chronic conditions and comorbid symptoms for stability, deterioration, or improvement   Collaborate with multidisciplinary team to address chronic and comorbid conditions and prevent exacerbation or deterioration   Update acute care plan with appropriate goals if chronic or comorbid symptoms are exacerbated and prevent overall improvement and discharge

## 2025-02-28 NOTE — PROGRESS NOTES
Critical Care Interdisciplinary Rounds with staff.     Nicole Shanks M.Div, [facility] / / Bereavement Coordinator  \[facility] [place] [facility]\"" Care Department   c: [phone]/ [phone] / [email or web address]  [email or web address]

## 2025-02-28 NOTE — PROGRESS NOTES
Pt has become hypotensive, requiring increased vasopressor support. Pt is currently in vent trigeminy. Pulse is irregular. Pt placed on venturi mask 12L 40%. MD notified, mag and BNP ordered.

## 2025-02-28 NOTE — PROGRESS NOTES
Hospitalist Progress Note   Admit Date:  2/15/2025 11:56 AM   Name:  Justin Gabriel   Age:  74 y.o.  Sex:  male  :  1950   MRN:  [id number]   Room:  [facility]/    Presenting/Chief Complaint: Shortness of Breath and Altered Mental Status     Reason(s) for Admission: Hypernatremia [E87.0]  Septicemia (HCC) [A41.9]  Acute kidney injury [N17.9]  Acute renal failure, unspecified acute renal failure type [N17.9]     Hospital Day #13      Hospital Course:   Justin Gabriel is a 74 y.o. 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  On  he developed hypotension and despite multiple boluses, had to be moved to the ICU on Levophed drip and started on midodrine.  He has been intermittently on and off Levophed. He was having breakfast and was pocketing food in his mouth.  Speech was consulted and recommended puréed diet.    He was hypernatremic and resolved with D5 IVF.  He has been having waxing and waning confusion.  Neurosurgery was consulted and recommended draining the extra-axial fluid collection in the brain seen on CT head on date of presentation.  Attempted to call the daughter twice and left voicemail.  Lock was removed on  and he was retaining urine approximately 500 mL.  Lock was placed again.  NG-tube was placed.  He was started on tube feedings on .  Discussed with the neurosurgeon and recommended no drain placement.  GI was consulted for PEG tube placement and they were holding off for now as the patient remained on vasopressors.     Subjective & 24hr Events:   Per nursing documentation yesterday at 1900 hrs. patient became diaphoretic and tachypneic with respirations in the 40s to 50s.  He was desaturating to graciela of 86% on 5  %   02/27/25 2100 -- 72 17 -- 98 %   02/27/25 2045 -- 63 18 (!) 97/51 100 %   02/27/25 2030 -- 86 26 (!) 85/61 (!) 89 %   02/27/25 2015 -- 85 24 (!) 72/58 94 %   02/27/25 2000 -- 80 21 90/62 94 %   02/27/25 1945 -- 75 21 104/69 95 %   02/27/25 1930 -- 82 19 105/74 93 %   02/27/25 1915 97.7 °F (36.5 °C) 92 22 92/65 95 %   02/27/25 1900 -- (!) 104 (!) 45 114/76 91 %   02/27/25 1845 -- 88 21 92/61 95 %   02/27/25 1830 -- -- -- -- 95 %   02/27/25 1815 -- 91 18 (!) 89/64 99 %   02/27/25 1800 -- 90 15 90/65 99 %   02/27/25 1745 -- 82 17 (!) 76/66 96 %   02/27/25 1730 -- 79 20 (!) 82/69 97 %   02/27/25 1715 -- 85 17 (!) 75/62 97 %   02/27/25 1700 -- 73 16 (!) 75/58 94 %   02/27/25 1645 -- 73 20 (!) 92/52 93 %   02/27/25 1630 -- 83 14 (!) 82/59 96 %   02/27/25 1615 -- 74 16 90/70 94 %   02/27/25 1600 -- 77 19 96/70 97 %   02/27/25 1545 98.2 °F (36.8 °C) 80 20 91/80 94 %   02/27/25 1530 -- 74 16 100/72 94 %   02/27/25 1515 -- 72 18 107/74 90 %   02/27/25 1500 -- 69 16 100/73 96 %   02/27/25 1445 -- 80 17 108/75 (!) 89 %   02/27/25 1430 -- 72 18 98/73 (!) 88 %   02/27/25 1415 -- 78 25 107/85 91 %   02/27/25 1400 -- 80 17 99/66 (!) 87 %   02/27/25 1345 -- 78 16 91/73 95 %   02/27/25 1330 -- 88 19 97/68 91 %   02/27/25 1315 -- (!) 103 (!) 42 91/63 91 %   02/27/25 1300 -- 91 16 94/60 96 %   02/27/25 1245 -- 85 21 (!) 79/56 95 %   02/27/25 1230 -- 82 22 (!) 86/61 93 %   02/27/25 1215 -- 82 18 (!) 75/59 95 %   02/27/25 1200 -- 82 18 (!) 84/55 94 %   02/27/25 1145 98.7 °F (37.1 °C) 84 21 (!) 66/54 95 %   02/27/25 1130 -- 79 18 (!) 75/53 93 %   02/27/25 1115 -- 83 19 (!) 73/48 95 %   02/27/25 1100 -- 78 20 (!) 79/48 97 %   02/27/25 1045 -- 79 19 (!) 79/54 95 %   02/27/25 1030 -- 78 (!) 9 (!) 71/54 95 %   02/27/25 1015 -- 76 17 (!) 80/70 97 %   02/27/25 1000 -- 75 18 (!) 86/69 98 %   02/27/25 0945 -- 68 17 95/71 97 %   02/27/25 0930 -- 73 18 99/70 96 %   02/27/25 0915 -- 79 21 100/60 93 %   02/27/25 0900 -- 68 15 116/73 95 %    02/27/25 0845 -- 67 13 117/78 96 %   02/27/25 0830 -- 71 16 109/70 95 %   02/27/25 0815 -- 70 16 97/69 96 %   02/27/25 0800 -- 72 15 94/73 96 %   02/27/25 0745 -- 75 18 94/74 98 %   02/27/25 0730 97.6 °F (36.4 °C) 77 15 93/73 97 %   02/27/25 0715 -- 66 16 (!) 127/96 97 %   02/27/25 0700 -- 71 16 95/70 98 %   02/27/25 0645 -- 82 23 113/78 100 %       Oxygen Therapy  SpO2: 97 %  Pulse Oximetry Type: Continuous  Pulse via Oximetry: 56 beats per minute  SPO2 High Alarm Limit: 100  SPO2 Low Alarm Limit POX: 90  Pulse Oximeter Device Mode: Continuous  Pulse Oximeter Device Location: Toe  O2 Device: Nasal cannula  Skin Assessment: Clean, dry, & intact  O2 Flow Rate (L/min): 4 L/min    Estimated body mass index is 21.74 kg/m² as calculated from the following:    Height as of this encounter: 1.829 m (6').    Weight as of this encounter: 72.7 kg (160 lb 4.4 oz).    Intake/Output Summary (Last 24 hours) at 2/28/2025 0637  Last data filed at 2/28/2025 0447  Gross per 24 hour   Intake 3167.8 ml   Output 1250 ml   Net 1917.8 ml         Physical Exam:   General:    Thin elderly male lying in bed on left side.  Head:  Normocephalic, atraumatic  Eyes:  Sclerae appear normal.  Pupils equally round.  ENT:  Nares appear normal.  Moist oral mucosa.  Neck:  No restricted ROM.  Trachea midline.   CV:   RRR.  No m/r/g.  No jugular venous distension.  No LE edema.  Lungs:   CTAB.  No wheezing, rhonchi, or rales.  Symmetric expansion.  5 L O2 BNC, SpO2 97%.  Abdomen:   Soft, nontender, nondistended.  +NGT, TF's running at 55 cc/h.  Skin:     No rashes.  Normal coloration.   Warm and dry.    :  Lock catheter draining clear yellow urine.  Neuro:  Awake and reasonably alert.    Psych:  Unable to assess.      I have personally reviewed labs and tests:  Recent Labs:  Recent Results (from the past 48 hour(s))   POCT Glucose    Collection Time: 02/26/25 11:22 AM   Result Value Ref Range    POC Glucose 165 (H) 65 - 100 mg/dL    Performed by:    6:37 AM

## 2025-03-01 LAB
ANION GAP SERPL CALC-SCNC: 9 MMOL/L (ref 7–16)
BASOPHILS # BLD: 0.05 K/UL (ref 0–0.2)
BASOPHILS NFR BLD: 0.8 % (ref 0–2)
BUN SERPL-MCNC: 16 MG/DL (ref 8–23)
CALCIUM SERPL-MCNC: 9.7 MG/DL (ref 8.8–10.2)
CHLORIDE SERPL-SCNC: 103 MMOL/L (ref 98–107)
CO2 SERPL-SCNC: 24 MMOL/L (ref 20–29)
CREAT SERPL-MCNC: 0.53 MG/DL (ref 0.8–1.3)
DIFFERENTIAL METHOD BLD: ABNORMAL
EOSINOPHIL # BLD: 0.48 K/UL (ref 0–0.8)
EOSINOPHIL NFR BLD: 7.8 % (ref 0.5–7.8)
ERYTHROCYTE [DISTWIDTH] IN BLOOD BY AUTOMATED COUNT: 13.9 % (ref 11.9–14.6)
GLUCOSE SERPL-MCNC: 120 MG/DL (ref 70–99)
HCT VFR BLD AUTO: 37.2 % (ref 41.1–50.3)
HGB BLD-MCNC: 12.6 G/DL (ref 13.6–17.2)
IMM GRANULOCYTES # BLD AUTO: 0.03 K/UL (ref 0–0.5)
IMM GRANULOCYTES NFR BLD AUTO: 0.5 % (ref 0–5)
LYMPHOCYTES # BLD: 1.2 K/UL (ref 0.5–4.6)
LYMPHOCYTES NFR BLD: 19.5 % (ref 13–44)
MCH RBC QN AUTO: 32.1 PG (ref 26.1–32.9)
MCHC RBC AUTO-ENTMCNC: 33.9 G/DL (ref 31.4–35)
MCV RBC AUTO: 94.7 FL (ref 82–102)
MONOCYTES # BLD: 0.59 K/UL (ref 0.1–1.3)
MONOCYTES NFR BLD: 9.6 % (ref 4–12)
NEUTS SEG # BLD: 3.79 K/UL (ref 1.7–8.2)
NEUTS SEG NFR BLD: 61.8 % (ref 43–78)
NRBC # BLD: 0 K/UL (ref 0–0.2)
PLATELET # BLD AUTO: 276 K/UL (ref 150–450)
PMV BLD AUTO: 10.3 FL (ref 9.4–12.3)
POTASSIUM SERPL-SCNC: 4.1 MMOL/L (ref 3.5–5.1)
RBC # BLD AUTO: 3.93 M/UL (ref 4.23–5.6)
SODIUM SERPL-SCNC: 136 MMOL/L (ref 136–145)
WBC # BLD AUTO: 6.1 K/UL (ref 4.3–11.1)

## 2025-03-01 PROCEDURE — 36415 COLL VENOUS BLD VENIPUNCTURE: CPT

## 2025-03-01 PROCEDURE — 2500000003 HC RX 250 WO HCPCS: Performed by: FAMILY MEDICINE

## 2025-03-01 PROCEDURE — 85025 COMPLETE CBC W/AUTO DIFF WBC: CPT

## 2025-03-01 PROCEDURE — 80048 BASIC METABOLIC PNL TOTAL CA: CPT

## 2025-03-01 PROCEDURE — 51702 INSERT TEMP BLADDER CATH: CPT

## 2025-03-01 PROCEDURE — 2500000003 HC RX 250 WO HCPCS: Performed by: STUDENT IN AN ORGANIZED HEALTH CARE EDUCATION/TRAINING PROGRAM

## 2025-03-01 PROCEDURE — 2000000000 HC ICU R&B

## 2025-03-01 RX ORDER — ACETAMINOPHEN 325 MG/1
650 TABLET ORAL EVERY 6 HOURS PRN
OUTPATIENT
Start: 2025-03-01

## 2025-03-01 RX ORDER — SODIUM CHLORIDE 0.9 % (FLUSH) 0.9 %
5-40 SYRINGE (ML) INJECTION EVERY 12 HOURS SCHEDULED
OUTPATIENT
Start: 2025-03-01

## 2025-03-01 RX ORDER — GLYCOPYRROLATE 0.2 MG/ML
0.4 INJECTION INTRAMUSCULAR; INTRAVENOUS EVERY 4 HOURS PRN
OUTPATIENT
Start: 2025-03-01

## 2025-03-01 RX ORDER — SODIUM CHLORIDE 0.9 % (FLUSH) 0.9 %
5-40 SYRINGE (ML) INJECTION PRN
OUTPATIENT
Start: 2025-03-01

## 2025-03-01 RX ORDER — ONDANSETRON 2 MG/ML
4 INJECTION INTRAMUSCULAR; INTRAVENOUS EVERY 4 HOURS PRN
OUTPATIENT
Start: 2025-03-01

## 2025-03-01 RX ORDER — SODIUM CHLORIDE 9 MG/ML
INJECTION, SOLUTION INTRAVENOUS PRN
OUTPATIENT
Start: 2025-03-01

## 2025-03-01 RX ORDER — MORPHINE SULFATE 2 MG/ML
2 INJECTION, SOLUTION INTRAMUSCULAR; INTRAVENOUS
OUTPATIENT
Start: 2025-03-01

## 2025-03-01 RX ORDER — ACETAMINOPHEN 650 MG/1
650 SUPPOSITORY RECTAL EVERY 6 HOURS PRN
OUTPATIENT
Start: 2025-03-01

## 2025-03-01 RX ADMIN — SODIUM CHLORIDE, PRESERVATIVE FREE 10 ML: 5 INJECTION INTRAVENOUS at 12:05

## 2025-03-01 RX ADMIN — SODIUM CHLORIDE, PRESERVATIVE FREE 10 ML: 5 INJECTION INTRAVENOUS at 21:00

## 2025-03-01 RX ADMIN — MORPHINE SULFATE 2 MG: 2 INJECTION, SOLUTION INTRAMUSCULAR; INTRAVENOUS at 02:00

## 2025-03-01 RX ADMIN — SODIUM CHLORIDE, PRESERVATIVE FREE 5 ML: 5 INJECTION INTRAVENOUS at 02:33

## 2025-03-01 ASSESSMENT — PAIN SCALES - GENERAL
PAINLEVEL_OUTOF10: 0
PAINLEVEL_OUTOF10: 0

## 2025-03-01 NOTE — PROGRESS NOTES
Hospitalist Progress Note   Admit Date:  2/15/2025 11:56 AM   Name:  Justin Gabriel   Age:  74 y.o.  Sex:  male  :  1950   MRN:  [id number]   Room:  [facility]/    Presenting/Chief Complaint: Shortness of Breath and Altered Mental Status     Reason(s) for Admission: Hypernatremia [E87.0]  Septicemia (HCC) [A41.9]  Acute kidney injury [N17.9]  Acute renal failure, unspecified acute renal failure type [N17.9]     Hospital Day #14      Hospital Course:   Justin Gabriel is a 74 y.o. 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  On  he developed hypotension and despite multiple boluses, had to be moved to the ICU on Levophed drip and started on midodrine.  He has been intermittently on and off Levophed. He was having breakfast and was pocketing food in his mouth.  Speech was consulted and recommended puréed diet.    He was hypernatremic and resolved with D5 IVF.  He has been having waxing and waning confusion.  Neurosurgery was consulted and recommended draining the extra-axial fluid collection in the brain seen on CT head on date of presentation.  Attempted to call the daughter twice and left voicemail.  Lock was removed on  and he was retaining urine approximately 500 mL.  Lock was placed again.  NG-tube was placed.  He was started on tube feedings on .  Discussed with the neurosurgeon and recommended no drain placement.  GI was consulted for PEG tube placement and they were holding off for now as the patient remained on vasopressors.     Subjective & 24hr Events:   Patient dozing in bed, lying on his left side.  Awakens briefly to voice.  Denies pain.    Assessment & Plan:     Hypovolemic shock/lactic acidosis (resolved)  Levophed  discontinued yesterday afternoon following earlier conversation with POA/ regarding further deterioration in patient condition.  Since that time he has remained hypotensive, graciela 68/54, and BP was 67/54 at time of my visit today.  Required IV morphine x 3, IV Ativan x 1, and Robinul x 1 over the last 24 hours.  Continue comfort care measures.    Hypernatremia  Resolved following hypotonic IVF resuscitation.      Acute kidney injury  Resolved.    Acute metabolic encephalopathy  Appears improved versus admission based on review of the record.  Baseline cognitive status unclear to this provider.    Arachnoid cyst, chronic (since at least 2010)/brain compression, chronic  Seen in consultation by neurosurgery on 2/20, no operative intervention recommended.    Dysphagia  Unchanged.  NGT remains for enteral medications should they be needed, but TF's discontinued yesterday given comfort goals.  Speech therapy recommended puréed foods with mildly thick liquids via spoon for comfort only.    Acute urinary retention   Lock catheter in place after recently failing voiding trial.  Given his short life expectancy will leave Lock in place.    Acute hypoxic respiratory failure  Unchanged.  Remains on 4 L O2 BNC.    Diabetes mellitus type 2 (A1c 6.3)  Discontinued SSI due to comfort care.    Chronic constipation  Discontinued all non-covered interventions.    History of stroke with left hemiparesis  Discontinued Zetia and Eliquis due to comfort goals.    Hypomagnesemia  Treated.  Mag-Ox discontinued due to comfort goals.      Patient is critically ill.  Without the interventions noted, there is a high probability of imminent acute organ impairment or life-threatening deterioration.  Total critical care time spent: 45 minutes.    Critical care time includes time spent at bedside performing history and exam, performing chart review, discussing findings and treatment plan with patient and/or family, discussing patient with  Labs:  Recent Results (from the past 48 hour(s))   POCT Glucose    Collection Time: 02/27/25  7:30 AM   Result Value Ref Range    POC Glucose 208 (H) 65 - 100 mg/dL    Performed by: Jen    POCT Glucose    Collection Time: 02/27/25 11:32 AM   Result Value Ref Range    POC Glucose 156 (H) 65 - 100 mg/dL    Performed by: Krystle    POCT Glucose    Collection Time: 02/27/25  5:51 PM   Result Value Ref Range    POC Glucose 166 (H) 65 - 100 mg/dL    Performed by: Jen    POCT Glucose    Collection Time: 02/27/25  9:15 PM   Result Value Ref Range    POC Glucose 161 (H) 65 - 100 mg/dL    Performed by: Joshua    Brain Natriuretic Peptide    Collection Time: 02/27/25 10:36 PM   Result Value Ref Range    NT Pro- 0 - 450 PG/ML   Comprehensive Metabolic Panel    Collection Time: 02/27/25 10:42 PM   Result Value Ref Range    Sodium 132 (L) 136 - 145 mmol/L    Potassium 4.4 3.5 - 5.1 mmol/L    Chloride 94 (L) 98 - 107 mmol/L    CO2 24 20 - 29 mmol/L    Anion Gap 14 7 - 16 mmol/L    Glucose 205 (H) 70 - 99 mg/dL    BUN 15 8 - 23 MG/DL    Creatinine 0.62 (L) 0.80 - 1.30 MG/DL    Est, Glom Filt Rate >90 >60 ml/min/1.73m2    Calcium 9.9 8.8 - 10.2 MG/DL    Total Bilirubin 0.5 0.0 - 1.2 MG/DL    ALT 17 8 - 55 U/L    AST 27 15 - 37 U/L    Alk Phosphatase 112 40 - 129 U/L    Total Protein 6.9 6.3 - 8.2 g/dL    Albumin 2.7 (L) 3.2 - 4.6 g/dL    Globulin 4.2 (H) 2.3 - 3.5 g/dL    Albumin/Globulin Ratio 0.6 (L) 1.0 - 1.9     Magnesium    Collection Time: 02/27/25 10:42 PM   Result Value Ref Range    Magnesium 1.8 1.8 - 2.4 mg/dL   Basic Metabolic Panel w/ Reflex to MG    Collection Time: 02/28/25  3:39 AM   Result Value Ref Range    Sodium 134 (L) 136 - 145 mmol/L    Potassium 3.9 3.5 - 5.1 mmol/L    Chloride 101 98 - 107 mmol/L    CO2 23 20 - 29 mmol/L    Anion Gap 10 7 - 16 mmol/L    Glucose 214 (H) 70 - 99 mg/dL    BUN 15 8 - 23 MG/DL    Creatinine 0.53 (L) 0.80 - 1.30 MG/DL    Est,

## 2025-03-01 NOTE — DISCHARGE SUMMARY
Hospitalist Discharge Summary   Admit Date:  2/15/2025 11:56 AM   DC Note date: 3/2/2025  Name:  Justin Gabriel   Age:  74 y.o.  Sex:  male  :  1950   MRN:  [id number]   Room:  [facility]  PCP:  Armen Amin Jr., MD    Presenting Complaint: Shortness of Breath and Altered Mental Status     Initial Admission Diagnosis: Hypernatremia [E87.0]  Septicemia (HCC) [A41.9]  Acute kidney injury [N17.9]  Acute renal failure, unspecified acute renal failure type [N17.9]     Problem List for this Hospitalization (present on admission):    Principal Problem:    Hypovolemic shock (HCC)  Active Problems:    Iliac artery aneurysm, bilateral    Benign essential HTN    Enlarged aorta    Gout    Mixed hyperlipidemia    CVA, old, hemiparesis (HCC)    Aneurysm artery, celiac    Weakness of left upper extremity    Acute metabolic encephalopathy    Acute kidney injury    Type 2 diabetes mellitus (HCC)    Hyponatremia    Hypokalemia    DNR (do not resuscitate)    Midline shift of brain    Lactic acidosis    Dysphagia    Hypoxia    Hypomagnesemia    Acute urinary retention    Arachnoid cyst  Resolved Problems:    * No resolved hospital problems. *      Hospital Course:  Justin Gabriel is a 74 y.o. 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  On  he developed hypotension and despite multiple boluses, had to be moved to the ICU on Levophed drip and started on midodrine.  He has been intermittently on and off Levophed. He was having breakfast and was pocketing food in his mouth.  Speech was consulted and recommended puréed diet.    He was hypernatremic and resolved with D5 IVF.  He has been having waxing and waning confusion.  Neurosurgery was consulted and  recommended draining the extra-axial fluid collection in the brain seen on CT head on date of presentation.  Attempted to call the daughter twice and left voicemail.  Lock was removed on 2/19 and he was retaining urine approximately 500 mL.  Lock was placed again.  NG-tube was placed.  He was started on tube feedings on 2/20.      ------------------------------------------------------------------------------------  Hypovolemic shock/lactic acidosis (resolved)  Levophed discontinued yesterday afternoon following earlier conversation with POA/ regarding further deterioration in patient condition.  Since that time he has remained hypotensive, graciela 68/54, and BP was 67/54 at time of my visit this morning.  Required IV morphine x 3, IV Ativan x 1, and Robinul x 1 over the last 24 hours.  Continue comfort care measures.     Hypernatremia  Resolved following hypotonic IVF resuscitation.       Acute kidney injury  Resolved.     Acute metabolic encephalopathy  Appears improved versus admission based on review of the record.  Baseline cognitive status unclear to this provider.     Arachnoid cyst, chronic (since at least 2010)/brain compression, chronic  Seen in consultation by neurosurgery on 2/20, no operative intervention recommended.     Dysphagia  Unchanged.  NGT remains for enteral medications should they be needed, but TF's discontinued yesterday given comfort goals.  Speech therapy recommended puréed foods with mildly thick liquids via spoon for comfort only.     Acute urinary retention   Lock catheter in place after recently failing voiding trial.  Given his short life expectancy will leave Lock in place.     Acute hypoxic respiratory failure  Unchanged.  Remains on 4 L O2 BNC.     Diabetes mellitus type 2 (A1c 6.3)  Discontinued SSI due to comfort care.     Chronic constipation  Discontinued all non-covered interventions.     History of stroke with left hemiparesis  Discontinued Zetia and Eliquis due to  \"NTPROBNP\", \"TROPHS\"   Coags Lab Results   Component Value Date/Time    APTT 27.7 03/13/2022 11:55 AM      A1c Lab Results   Component Value Date/Time    LABA1C 6.3 02/17/2025 08:14 AM    LABA1C 6.0 04/03/2023 05:00 AM    LABA1C 7.4 05/11/2020 10:55 PM     02/17/2025 08:14 AM     04/03/2023 05:00 AM     05/11/2020 10:55 PM      Lipids Lab Results   Component Value Date/Time    CHOL 183 04/03/2023 05:00 AM    HDL 22 04/03/2023 05:00 AM    CHOLHDLRATIO 8.3 04/03/2023 05:00 AM    TRIG 212 04/03/2023 05:00 AM      Thyroid  Lab Results   Component Value Date/Time    TSHELE 0.54 04/02/2023 02:43 PM        Most Recent UA Lab Results   Component Value Date/Time    COLORU DARK YELLOW 02/16/2025 02:49 AM    APPEARANCE TURBID 02/16/2025 02:49 AM    PROTEINU TRACE 02/16/2025 02:49 AM    GLUCOSEU Negative 02/16/2025 02:49 AM    GLUCOSEU Negative 04/02/2023 03:39 PM    KETUA TRACE 02/16/2025 02:49 AM    BILIRUBINUR Negative 02/16/2025 02:49 AM    BLOODU SMALL 02/16/2025 02:49 AM    UROBILINOGEN 0.2 02/16/2025 02:49 AM    NITRU Negative 02/16/2025 02:49 AM    LEUKOCYTESUR TRACE 02/16/2025 02:49 AM    WBCUA 0-3 02/16/2025 02:49 AM    RBCUA 0-3 02/16/2025 02:49 AM    BACTERIA 1+ 02/16/2025 02:49 AM    LABCAST 10-20 02/16/2025 02:49 AM    MUCUS 0 02/16/2025 02:49 AM        Microbiology:  Results       Procedure Component Value Units Date/Time    Respiratory Panel, Molecular, with COVID-19 (Restricted: peds pts or suitable admitted adults) [[id number]] Collected: 02/16/25 2031    Order Status: Completed Specimen: Nasopharyngeal Updated: 02/16/25 2338     Adenovirus by PCR NOT DETECTED        Coronavirus 229E by PCR NOT DETECTED        Coronavirus HKU1 by PCR NOT DETECTED        Coronavirus NL63 by PCR NOT DETECTED        Coronavirus OC43 by PCR NOT DETECTED        SARS-CoV-2, PCR NOT DETECTED        Human Metapneumovirus by PCR NOT DETECTED        Rhinovirus Enterovirus PCR NOT DETECTED        Influenza A by PCR

## 2025-03-01 NOTE — PROGRESS NOTES
Pt intermittently diaphoretic. Pt is able to open eyes and answer questions. Pt lethargic, morphine given X1 for tachypnea.

## 2025-03-01 NOTE — CARE COORDINATION
RNCM contacted Odalis Moreira with [facility] due to need for IV medications (Morphine and Ativan) and comfort measures for patient. If patient is ineligible for [facility] hospice care, alternative plans has been to return to [facility] with hospice care, RNCM will verify this discharge plan once evaluated by hospice house.

## 2025-03-01 NOTE — PLAN OF CARE
Problem: Chronic Conditions and Co-morbidities  Goal: Patient's chronic conditions and co-morbidity symptoms are monitored and maintained or improved  3/1/2025 1748 by Aarti Dahl RN  Flowsheets (Taken 3/1/2025 0750)  Care Plan - Patient's Chronic Conditions and Co-Morbidity Symptoms are Monitored and Maintained or Improved:   Monitor and assess patient's chronic conditions and comorbid symptoms for stability, deterioration, or improvement   Collaborate with multidisciplinary team to address chronic and comorbid conditions and prevent exacerbation or deterioration   Update acute care plan with appropriate goals if chronic or comorbid symptoms are exacerbated and prevent overall improvement and discharge  Note: Patient was made comfort measures.  3/1/2025 0549 by Lars Tolbert RN  Outcome: Not Progressing     Problem: Discharge Planning  Goal: Discharge to home or other facility with appropriate resources  3/1/2025 1748 by Aarti Dahl RN  Outcome: Progressing  Flowsheets (Taken 3/1/2025 0750)  Discharge to home or other facility with appropriate resources:   Identify barriers to discharge with patient and caregiver   Arrange for needed discharge resources and transportation as appropriate   Refer to discharge planning if patient needs post-hospital services based on physician order or complex needs related to functional status, cognitive ability or social support system  Note: Plan is for patient to go to [facility].  3/1/2025 0549 by Lars Tolbert RN  Outcome: Progressing     Problem: Pain  Goal: Verbalizes/displays adequate comfort level or baseline comfort level  3/1/2025 1748 by Aarti Dahl RN  Outcome: Progressing  Flowsheets (Taken 2/28/2025 1901 by Lars Tolbert, RN)  Verbalizes/displays adequate comfort level or baseline comfort level:   Encourage patient to monitor pain and request assistance   Assess pain using appropriate pain scale   Administer analgesics based on  baseline  Description: INTERVENTIONS:  1. Assess for possible contributors to thought disturbance, including medications, impaired vision or hearing, underlying metabolic abnormalities, dehydration, psychiatric diagnoses, and notify attending LIP  2. [place] high risk fall precautions, as indicated  3. Provide frequent short contacts to provide reality reorientation, refocusing and direction  4. Decrease environmental stimuli, including noise as appropriate  5. Monitor and intervene to maintain adequate nutrition, hydration, elimination, sleep and activity  6. If unable to ensure safety without constant attention obtain sitter and review sitter guidelines with assigned personnel  7. Initiate Psychosocial CNS and Spiritual Care consult, as indicated  3/1/2025 1748 by Aarti Dahl, RN  Outcome: Progressing  Flowsheets (Taken 3/1/2025 0750)  Effect of thought disturbance (confusion, delirium, dementia, or psychosis) are managed with adequate functional status:   [place] high risk fall precautions, as indicated   Assess for contributors to thought disturbance, including medications, impaired vision or hearing, underlying metabolic abnormalities, dehydration, psychiatric diagnoses, notify LIP  Note: Patient has been drowsy/lethargic for most of the day.  3/1/2025 0549 by Lars Tolbert RN  Outcome: Not Progressing     Problem: Safety - Adult  Goal: Free from fall injury  3/1/2025 1748 by Aarti Dahl RN  Outcome: Progressing  3/1/2025 0549 by Lars Tolbert RN  Outcome: Progressing     Problem: Chronic Conditions and Co-morbidities  Goal: Patient's chronic conditions and co-morbidity symptoms are monitored and maintained or improved  3/1/2025 1748 by Aarti Dahl RN  Flowsheets (Taken 3/1/2025 0750)  Care Plan - Patient's Chronic Conditions and Co-Morbidity Symptoms are Monitored and Maintained or Improved:   Monitor and assess patient's chronic conditions and comorbid symptoms for stability,  deterioration, or improvement   Collaborate with multidisciplinary team to address chronic and comorbid conditions and prevent exacerbation or deterioration   Update acute care plan with appropriate goals if chronic or comorbid symptoms are exacerbated and prevent overall improvement and discharge  Note: Patient was made comfort measures.  3/1/2025 0549 by Lars Tolbert, RN  Outcome: Not Progressing     Problem: Confusion  Goal: Confusion, delirium, dementia, or psychosis is improved or at baseline  Description: INTERVENTIONS:  1. Assess for possible contributors to thought disturbance, including medications, impaired vision or hearing, underlying metabolic abnormalities, dehydration, psychiatric diagnoses, and notify attending LIP  2. [place] high risk fall precautions, as indicated  3. Provide frequent short contacts to provide reality reorientation, refocusing and direction  4. Decrease environmental stimuli, including noise as appropriate  5. Monitor and intervene to maintain adequate nutrition, hydration, elimination, sleep and activity  6. If unable to ensure safety without constant attention obtain sitter and review sitter guidelines with assigned personnel  7. Initiate Psychosocial CNS and Spiritual Care consult, as indicated  3/1/2025 1748 by Aarti Dahl RN  Outcome: Progressing  Flowsheets (Taken 3/1/2025 0750)  Effect of thought disturbance (confusion, delirium, dementia, or psychosis) are managed with adequate functional status:   [place] high risk fall precautions, as indicated   Assess for contributors to thought disturbance, including medications, impaired vision or hearing, underlying metabolic abnormalities, dehydration, psychiatric diagnoses, notify LIP  Note: Patient has been drowsy/lethargic for most of the day.  3/1/2025 0549 by Lars Tolbert, RN  Outcome: Not Progressing

## 2025-03-01 NOTE — PROGRESS NOTES
Patient found to have dislodged the NGT to 28cm. Per discussion with Dr. Feliciano this AM, ok if the NGT is removed. Consequently, RN completed removal of NGT at this time. Patient more alert at this time, but not able to feed self, but said \"Yes\" when asked if he wanted to try some lunch. Ate about 4 bites of pureed meal over approximately 20minutes, then started closing eyes and nodding head while RN at bedside.

## 2025-03-01 NOTE — PLAN OF CARE
Problem: Chronic Conditions and Co-morbidities  Goal: Patient's chronic conditions and co-morbidity symptoms are monitored and maintained or improved  Outcome: Not Progressing     Problem: Confusion  Goal: Confusion, delirium, dementia, or psychosis is improved or at baseline  Description: INTERVENTIONS:  1. Assess for possible contributors to thought disturbance, including medications, impaired vision or hearing, underlying metabolic abnormalities, dehydration, psychiatric diagnoses, and notify attending LIP  2. [place] high risk fall precautions, as indicated  3. Provide frequent short contacts to provide reality reorientation, refocusing and direction  4. Decrease environmental stimuli, including noise as appropriate  5. Monitor and intervene to maintain adequate nutrition, hydration, elimination, sleep and activity  6. If unable to ensure safety without constant attention obtain sitter and review sitter guidelines with assigned personnel  7. Initiate Psychosocial CNS and Spiritual Care consult, as indicated  Outcome: Not Progressing     Problem: Chronic Conditions and Co-morbidities  Goal: Patient's chronic conditions and co-morbidity symptoms are monitored and maintained or improved  Outcome: Not Progressing     Problem: Confusion  Goal: Confusion, delirium, dementia, or psychosis is improved or at baseline  Description: INTERVENTIONS:  1. Assess for possible contributors to thought disturbance, including medications, impaired vision or hearing, underlying metabolic abnormalities, dehydration, psychiatric diagnoses, and notify attending LIP  2. [place] high risk fall precautions, as indicated  3. Provide frequent short contacts to provide reality reorientation, refocusing and direction  4. Decrease environmental stimuli, including noise as appropriate  5. Monitor and intervene to maintain adequate nutrition, hydration, elimination, sleep and activity  6. If unable to ensure safety without constant  attention obtain sitter and review sitter guidelines with assigned personnel  7. Initiate Psychosocial CNS and Spiritual Care consult, as indicated  Outcome: Not Progressing

## 2025-03-02 VITALS
WEIGHT: 152.12 LBS | HEART RATE: 94 BPM | SYSTOLIC BLOOD PRESSURE: 120 MMHG | TEMPERATURE: 97.4 F | HEIGHT: 72 IN | RESPIRATION RATE: 19 BRPM | OXYGEN SATURATION: 95 % | BODY MASS INDEX: 20.6 KG/M2 | DIASTOLIC BLOOD PRESSURE: 52 MMHG

## 2025-03-02 PROCEDURE — 2500000003 HC RX 250 WO HCPCS: Performed by: FAMILY MEDICINE

## 2025-03-02 RX ADMIN — MORPHINE SULFATE 2 MG: 2 INJECTION, SOLUTION INTRAMUSCULAR; INTRAVENOUS at 00:12

## 2025-03-02 ASSESSMENT — PAIN DESCRIPTION - LOCATION: LOCATION: GENERALIZED

## 2025-03-02 ASSESSMENT — PAIN DESCRIPTION - DESCRIPTORS: DESCRIPTORS: SORE;CRAMPING;DISCOMFORT

## 2025-03-02 ASSESSMENT — PAIN SCALES - GENERAL: PAINLEVEL_OUTOF10: 5

## 2025-03-02 NOTE — PLAN OF CARE
Problem: Chronic Conditions and Co-morbidities  Goal: Patient's chronic conditions and co-morbidity symptoms are monitored and maintained or improved  3/1/2025 2337 by Mickey Allan RN  Outcome: Completed  Flowsheets (Taken 3/1/2025 2000)  Care Plan - Patient's Chronic Conditions and Co-Morbidity Symptoms are Monitored and Maintained or Improved:   Monitor and assess patient's chronic conditions and comorbid symptoms for stability, deterioration, or improvement   Collaborate with multidisciplinary team to address chronic and comorbid conditions and prevent exacerbation or deterioration   Update acute care plan with appropriate goals if chronic or comorbid symptoms are exacerbated and prevent overall improvement and discharge  3/1/2025 1748 by Aarti Dahl RN  Flowsheets (Taken 3/1/2025 0750)  Care Plan - Patient's Chronic Conditions and Co-Morbidity Symptoms are Monitored and Maintained or Improved:   Monitor and assess patient's chronic conditions and comorbid symptoms for stability, deterioration, or improvement   Collaborate with multidisciplinary team to address chronic and comorbid conditions and prevent exacerbation or deterioration   Update acute care plan with appropriate goals if chronic or comorbid symptoms are exacerbated and prevent overall improvement and discharge  Note: Patient was made comfort measures.     Problem: Discharge Planning  Goal: Discharge to home or other facility with appropriate resources  3/1/2025 2337 by Mickey Allan RN  Outcome: Completed  Flowsheets (Taken 3/1/2025 2000)  Discharge to home or other facility with appropriate resources: Identify barriers to discharge with patient and caregiver  3/1/2025 1748 by Aarti Dahl RN  Outcome: Progressing  Flowsheets (Taken 3/1/2025 0750)  Discharge to home or other facility with appropriate resources:   Identify barriers to discharge with patient and caregiver   Arrange for needed discharge resources and  assigned personnel   Initiate Psychosocial Clinical Nurse Specialist and Spiritual Care consult, as indicated  3/1/2025 1748 by Aarti Dahl, RN  Outcome: Progressing  Flowsheets (Taken 3/1/2025 0750)  Effect of thought disturbance (confusion, delirium, dementia, or psychosis) are managed with adequate functional status:   [place] high risk fall precautions, as indicated   Assess for contributors to thought disturbance, including medications, impaired vision or hearing, underlying metabolic abnormalities, dehydration, psychiatric diagnoses, notify LIP  Note: Patient has been drowsy/lethargic for most of the day.     Problem: Safety - Adult  Goal: Free from fall injury  3/1/2025 2337 by Mickey Allan, RN  Outcome: Completed  Flowsheets (Taken 3/1/2025 2000)  Free From Fall Injury: Instruct family/caregiver on patient safety  3/1/2025 1748 by Aarti Dahl, RN  Outcome: Progressing